# Patient Record
Sex: FEMALE | Race: NATIVE HAWAIIAN OR OTHER PACIFIC ISLANDER | NOT HISPANIC OR LATINO
[De-identification: names, ages, dates, MRNs, and addresses within clinical notes are randomized per-mention and may not be internally consistent; named-entity substitution may affect disease eponyms.]

---

## 2020-05-14 PROBLEM — Z00.00 ENCOUNTER FOR PREVENTIVE HEALTH EXAMINATION: Status: ACTIVE | Noted: 2020-05-14

## 2020-05-21 PROBLEM — E78.00 HYPERCHOLESTEREMIA: Status: ACTIVE | Noted: 2020-05-21

## 2020-05-21 PROBLEM — I10 HYPERTENSION: Status: ACTIVE | Noted: 2020-05-21

## 2020-05-22 ENCOUNTER — APPOINTMENT (OUTPATIENT)
Dept: OTOLARYNGOLOGY | Facility: CLINIC | Age: 55
End: 2020-05-22
Payer: MEDICAID

## 2020-05-22 ENCOUNTER — OUTPATIENT (OUTPATIENT)
Dept: OUTPATIENT SERVICES | Facility: HOSPITAL | Age: 55
LOS: 1 days | Discharge: HOME | End: 2020-05-22

## 2020-05-22 ENCOUNTER — LABORATORY RESULT (OUTPATIENT)
Age: 55
End: 2020-05-22

## 2020-05-22 VITALS
SYSTOLIC BLOOD PRESSURE: 133 MMHG | BODY MASS INDEX: 21.26 KG/M2 | HEIGHT: 63 IN | DIASTOLIC BLOOD PRESSURE: 91 MMHG | WEIGHT: 120 LBS

## 2020-05-22 DIAGNOSIS — I10 ESSENTIAL (PRIMARY) HYPERTENSION: ICD-10-CM

## 2020-05-22 DIAGNOSIS — E78.00 PURE HYPERCHOLESTEROLEMIA, UNSPECIFIED: ICD-10-CM

## 2020-05-22 PROCEDURE — 99204 OFFICE O/P NEW MOD 45 MIN: CPT | Mod: 25

## 2020-05-22 PROCEDURE — 41100 BIOPSY OF TONGUE: CPT

## 2020-05-22 RX ORDER — CALCIUM CARBONATE 500(1250)
TABLET ORAL
Refills: 0 | Status: ACTIVE | COMMUNITY

## 2020-05-22 RX ORDER — ATORVASTATIN CALCIUM 20 MG/1
20 TABLET, FILM COATED ORAL
Refills: 0 | Status: ACTIVE | COMMUNITY

## 2020-05-22 RX ORDER — LIOTHYRONINE SODIUM 25 UG/1
25 TABLET ORAL
Refills: 0 | Status: COMPLETED | COMMUNITY
End: 2020-05-22

## 2020-05-22 RX ORDER — AMLODIPINE BESYLATE 5 MG/1
5 TABLET ORAL
Refills: 0 | Status: ACTIVE | COMMUNITY

## 2020-05-22 NOTE — CONSULT LETTER
[Dear  ___] : Dear  [unfilled], [Consult Letter:] : I had the pleasure of evaluating your patient, [unfilled]. [Please see my note below.] : Please see my note below. [Sincerely,] : Sincerely, [Consult Closing:] : Thank you very much for allowing me to participate in the care of this patient.  If you have any questions, please do not hesitate to contact me. [FreeTextEntry2] : Dr Aman Perea [FreeTextEntry3] : \par Laurent Busby MD, FACS\par \par Otolaryngology-Head and Neck Surgery\par [employer] at [facility]\par

## 2020-05-22 NOTE — PHYSICAL EXAM
[Midline] : trachea located in midline position [Normal] : no rashes [de-identified] : 7 x 5 cm leukoplakic lesion of the R oral tongue at the level of the L inferior molars.

## 2020-05-22 NOTE — HISTORY OF PRESENT ILLNESS
[de-identified] : pt thyroid cancer ( dr Salmeron)  s/p 3/2020 ( sched MORGAN 6/2020) and stroke 11/2018 refer by Dr. Perea right tongue lesion. pt states the lesion started after stroke in 2018  and thinks it could be from trauma to the tongue from the teeth. pt has some discomfort, no bleeding, no pain. pt denies  dysphagia. no wt loss. No biopsy done of the tongue.\par never smoked and no h/o etoh

## 2020-05-26 DIAGNOSIS — K13.21 LEUKOPLAKIA OF ORAL MUCOSA, INCLUDING TONGUE: ICD-10-CM

## 2020-06-11 ENCOUNTER — APPOINTMENT (OUTPATIENT)
Dept: OTOLARYNGOLOGY | Facility: CLINIC | Age: 55
End: 2020-06-11
Payer: MEDICAID

## 2020-06-11 VITALS
SYSTOLIC BLOOD PRESSURE: 125 MMHG | DIASTOLIC BLOOD PRESSURE: 85 MMHG | BODY MASS INDEX: 21.26 KG/M2 | HEIGHT: 63 IN | WEIGHT: 120 LBS | HEART RATE: 84 BPM

## 2020-06-11 PROCEDURE — 99214 OFFICE O/P EST MOD 30 MIN: CPT

## 2020-06-11 NOTE — PHYSICAL EXAM
[Midline] : trachea located in midline position [de-identified] : 7 x 5 cm leukoplakic lesion of the R oral tongue at the level of the L inferior molars. [Normal] : no rashes

## 2020-06-11 NOTE — REASON FOR VISIT
[Initial Evaluation] : an initial evaluation for [[employer] ] : provided by [employer]   [FreeTextEntry1] : [id number] [TWNoteComboBox1] : Chinese [FreeTextEntry2] : Luis Angel

## 2020-06-11 NOTE — HISTORY OF PRESENT ILLNESS
[de-identified] : pt thyroid cancer ( dr Salmeron)  s/p 3/2020 ( sched MORGAN 6/2020) and stroke 11/2018 refer by Dr. Perea right tongue lesion. pt states the lesion started after stroke in 2018  and thinks it could be from trauma to the tongue from the teeth. pt is here for biopsy result. pt still some discomfort, s/s no change, no bleeding, no pain. pt denies  dysphagia. no wt loss. No biopsy done of the tongue.\par never smoked and no h/o etoh.\par Biopsy on 5/27/20 showed hyperkeratosis and mild to moderate dysplasia.\par Complete review of systems which was performed during a previous encounter was reviewed with the patient and there are no changes except as stated in the HPI section.\par

## 2020-12-08 ENCOUNTER — LABORATORY RESULT (OUTPATIENT)
Age: 55
End: 2020-12-08

## 2020-12-08 ENCOUNTER — APPOINTMENT (OUTPATIENT)
Dept: OTOLARYNGOLOGY | Facility: CLINIC | Age: 55
End: 2020-12-08
Payer: MEDICAID

## 2020-12-08 VITALS
DIASTOLIC BLOOD PRESSURE: 82 MMHG | HEART RATE: 108 BPM | SYSTOLIC BLOOD PRESSURE: 138 MMHG | WEIGHT: 116 LBS | OXYGEN SATURATION: 96 % | HEIGHT: 63 IN | BODY MASS INDEX: 20.55 KG/M2

## 2020-12-08 VITALS — TEMPERATURE: 97.4 F

## 2020-12-08 PROCEDURE — 41100 BIOPSY OF TONGUE: CPT

## 2020-12-08 PROCEDURE — 99214 OFFICE O/P EST MOD 30 MIN: CPT | Mod: 25

## 2020-12-08 PROCEDURE — 99072 ADDL SUPL MATRL&STAF TM PHE: CPT

## 2020-12-08 NOTE — PHYSICAL EXAM
[Midline] : trachea located in midline position [Normal] : no rashes [de-identified] : 3 cm infiltrative lesion of the R lateral tongue. Does nor extent into the FOM.

## 2020-12-08 NOTE — CONSULT LETTER
[Dear  ___] : Dear  [unfilled], [Courtesy Letter:] : I had the pleasure of seeing your patient, [unfilled], in my office today. [Please see my note below.] : Please see my note below. [Consult Closing:] : Thank you very much for allowing me to participate in the care of this patient.  If you have any questions, please do not hesitate to contact me. [Sincerely,] : Sincerely, [FreeTextEntry2] : Dr Aman Perea [FreeTextEntry3] : \par Laurent Busby MD, FACS\par \par Otolaryngology-Head and Neck Surgery\par [employer] at [facility]\par

## 2020-12-08 NOTE — HISTORY OF PRESENT ILLNESS
[de-identified] : 55 yro female pt with thyroid cancer ( dr Salmeron)  s/p 3/2020 ( sched MORGAN 6/2020) and stroke 11/2018 referred by Dr. Aman Perea for right tongue lesion. Pt states the lesion started after stroke in 2018 and thinks it could be from trauma to the tongue from the teeth. Today pt c/o slight tongue swelling and some discomfort speaking because of tongue lesion, which is getting larger. Pt denies tongue pain, bleeding, dysphagia, dyspnea, fever, chills. Weight stable, able to tolerate regular diet. Pt has been applying Triamcinolone acetonide dental paste as per PCP Dr. Mihai Wei, which provides relief. \par Never smoked and no h/o etoh.\par \par Biopsy - 5/27/20 showed hyperkeratosis and mild to moderate dysplasia.\par \par Complete review of systems which was performed during a previous encounter was reviewed with the patient and there are no changes except as stated in the HPI section.\par

## 2020-12-08 NOTE — REASON FOR VISIT
[Subsequent Evaluation] : a subsequent evaluation for [[employer] ] : provided by [employer]   [FreeTextEntry1] : [id number] [FreeTextEntry2] : clarence [FreeTextEntry3] : Mandarin [TWNoteComboBox1] : Chinese

## 2020-12-15 ENCOUNTER — APPOINTMENT (OUTPATIENT)
Dept: OTOLARYNGOLOGY | Facility: CLINIC | Age: 55
End: 2020-12-15
Payer: MEDICAID

## 2020-12-15 VITALS — TEMPERATURE: 97.1 F

## 2020-12-15 VITALS
BODY MASS INDEX: 20.55 KG/M2 | HEART RATE: 104 BPM | OXYGEN SATURATION: 97 % | HEIGHT: 63 IN | DIASTOLIC BLOOD PRESSURE: 95 MMHG | SYSTOLIC BLOOD PRESSURE: 154 MMHG | WEIGHT: 116 LBS

## 2020-12-15 PROCEDURE — 99024 POSTOP FOLLOW-UP VISIT: CPT

## 2020-12-15 NOTE — REASON FOR VISIT
[Subsequent Evaluation] : a subsequent evaluation for [[employer] ] : provided by [employer]   [FreeTextEntry1] : [id number] [FreeTextEntry2] : Obie [FreeTextEntry3] : Mandarin [TWNoteComboBox1] : Chinese

## 2020-12-15 NOTE — CONSULT LETTER
[Dear  ___] : Dear  [unfilled], [Courtesy Letter:] : I had the pleasure of seeing your patient, [unfilled], in my office today. [Please see my note below.] : Please see my note below. [Consult Closing:] : Thank you very much for allowing me to participate in the care of this patient.  If you have any questions, please do not hesitate to contact me. [Sincerely,] : Sincerely, [FreeTextEntry2] : Dr Aman Perea  [FreeTextEntry3] : \par Laurent Busby MD, FACS\par \par Otolaryngology-Head and Neck Surgery\par [employer] at [facility]\par

## 2020-12-15 NOTE — HISTORY OF PRESENT ILLNESS
[de-identified] : Pt here to go over biopsy results. 55 yro female pt with thyroid cancer ( dr Salmeron)  s/p 3/2020 ( sched MORGAN 6/2020) and stroke 11/2018 referred by Dr. Aman Perea for right tongue lesion. Pt states the lesion started after stroke in 2018 and thinks it could be from trauma to the tongue from the teeth. Today pt c/o occasional tongue pain, and still some discomfort speaking because of tongue lesion. Pt denies bleeding, dysphagia, dyspnea, fever, chills. Weight stable, able to tolerate regular diet. Pt has been applying Triamcinolone acetonide dental paste as per PCP Dr. Mihai Wei, which provides relief. \par Never smoked and no h/o etoh.\par \par Biopsy - 5/27/20 showed hyperkeratosis and mild to moderate dysplasia.\par Biopsy 12/13/20: Final Diagnosis\par \par           1. Tongue, right, biopsy\par - Invasive squamous cell carcinoma, well-differentiated\par \par Complete review of systems which was performed during a previous encounter was reviewed with the patient and there are no changes except as stated in the HPI section.\par

## 2020-12-15 NOTE — PHYSICAL EXAM
[Midline] : trachea located in midline position [de-identified] : 3 cm infiltrative lesion of the R lateral tongue. Does nor extent into the FOM. [Normal] : no rashes

## 2020-12-21 ENCOUNTER — OUTPATIENT (OUTPATIENT)
Dept: OUTPATIENT SERVICES | Facility: HOSPITAL | Age: 55
LOS: 1 days | End: 2020-12-21
Payer: MEDICAID

## 2020-12-21 ENCOUNTER — APPOINTMENT (OUTPATIENT)
Dept: NUCLEAR MEDICINE | Facility: IMAGING CENTER | Age: 55
End: 2020-12-21
Payer: MEDICAID

## 2020-12-21 VITALS
DIASTOLIC BLOOD PRESSURE: 78 MMHG | HEART RATE: 104 BPM | TEMPERATURE: 98 F | HEIGHT: 63 IN | OXYGEN SATURATION: 98 % | SYSTOLIC BLOOD PRESSURE: 122 MMHG | WEIGHT: 111.99 LBS | RESPIRATION RATE: 16 BRPM

## 2020-12-21 DIAGNOSIS — Z98.890 OTHER SPECIFIED POSTPROCEDURAL STATES: Chronic | ICD-10-CM

## 2020-12-21 DIAGNOSIS — I63.9 CEREBRAL INFARCTION, UNSPECIFIED: ICD-10-CM

## 2020-12-21 DIAGNOSIS — C02.9 MALIGNANT NEOPLASM OF TONGUE, UNSPECIFIED: ICD-10-CM

## 2020-12-21 DIAGNOSIS — G47.33 OBSTRUCTIVE SLEEP APNEA (ADULT) (PEDIATRIC): ICD-10-CM

## 2020-12-21 LAB
ALBUMIN SERPL ELPH-MCNC: 5.3 G/DL — HIGH (ref 3.3–5)
ALP SERPL-CCNC: 97 U/L — SIGNIFICANT CHANGE UP (ref 40–120)
ALT FLD-CCNC: 13 U/L — SIGNIFICANT CHANGE UP (ref 4–33)
ANION GAP SERPL CALC-SCNC: 15 MMOL/L — HIGH (ref 7–14)
AST SERPL-CCNC: 17 U/L — SIGNIFICANT CHANGE UP (ref 4–32)
BILIRUB SERPL-MCNC: 1.2 MG/DL — SIGNIFICANT CHANGE UP (ref 0.2–1.2)
BLD GP AB SCN SERPL QL: NEGATIVE — SIGNIFICANT CHANGE UP
BUN SERPL-MCNC: 26 MG/DL — HIGH (ref 7–23)
CALCIUM SERPL-MCNC: 9.8 MG/DL — SIGNIFICANT CHANGE UP (ref 8.4–10.5)
CHLORIDE SERPL-SCNC: 104 MMOL/L — SIGNIFICANT CHANGE UP (ref 98–107)
CO2 SERPL-SCNC: 23 MMOL/L — SIGNIFICANT CHANGE UP (ref 22–31)
CREAT SERPL-MCNC: 0.7 MG/DL — SIGNIFICANT CHANGE UP (ref 0.5–1.3)
GLUCOSE SERPL-MCNC: 99 MG/DL — SIGNIFICANT CHANGE UP (ref 70–99)
HCT VFR BLD CALC: 39.7 % — SIGNIFICANT CHANGE UP (ref 34.5–45)
HGB BLD-MCNC: 13.2 G/DL — SIGNIFICANT CHANGE UP (ref 11.5–15.5)
MCHC RBC-ENTMCNC: 28.1 PG — SIGNIFICANT CHANGE UP (ref 27–34)
MCHC RBC-ENTMCNC: 33.2 GM/DL — SIGNIFICANT CHANGE UP (ref 32–36)
MCV RBC AUTO: 84.5 FL — SIGNIFICANT CHANGE UP (ref 80–100)
NRBC # BLD: 0 /100 WBCS — SIGNIFICANT CHANGE UP
NRBC # FLD: 0 K/UL — SIGNIFICANT CHANGE UP
PLATELET # BLD AUTO: 212 K/UL — SIGNIFICANT CHANGE UP (ref 150–400)
POTASSIUM SERPL-MCNC: 3.3 MMOL/L — LOW (ref 3.5–5.3)
POTASSIUM SERPL-SCNC: 3.3 MMOL/L — LOW (ref 3.5–5.3)
PROT SERPL-MCNC: 8.2 G/DL — SIGNIFICANT CHANGE UP (ref 6–8.3)
RBC # BLD: 4.7 M/UL — SIGNIFICANT CHANGE UP (ref 3.8–5.2)
RBC # FLD: 12.9 % — SIGNIFICANT CHANGE UP (ref 10.3–14.5)
RH IG SCN BLD-IMP: POSITIVE — SIGNIFICANT CHANGE UP
SODIUM SERPL-SCNC: 142 MMOL/L — SIGNIFICANT CHANGE UP (ref 135–145)
WBC # BLD: 5.96 K/UL — SIGNIFICANT CHANGE UP (ref 3.8–10.5)
WBC # FLD AUTO: 5.96 K/UL — SIGNIFICANT CHANGE UP (ref 3.8–10.5)

## 2020-12-21 PROCEDURE — 93010 ELECTROCARDIOGRAM REPORT: CPT

## 2020-12-21 PROCEDURE — 78815 PET IMAGE W/CT SKULL-THIGH: CPT | Mod: 26,PI

## 2020-12-21 PROCEDURE — 78815 PET IMAGE W/CT SKULL-THIGH: CPT

## 2020-12-21 PROCEDURE — A9552: CPT

## 2020-12-21 RX ORDER — SODIUM CHLORIDE 9 MG/ML
1000 INJECTION, SOLUTION INTRAVENOUS
Refills: 0 | Status: DISCONTINUED | OUTPATIENT
Start: 2020-12-29 | End: 2020-12-29

## 2020-12-21 NOTE — H&P PST ADULT - HISTORY OF PRESENT ILLNESS
Pt is a 55 y.o. female ; pt speaks mandarin ; information obtained with assistance of [employer]  Taylor [id number]. Per pt ; she palpated a growth on her neck. Initial biopsy " no cancer " Pt states f/u with ENT "It is cancer "Pt s/p Pet Scan  Pt now presents for Right Hemiglossectomy Right Neck Dissection Tracheostomy . Pt is a 55 y.o. female ; pt speaks mandarin ; information obtained with assistance of [employer]  Taylor [id number].Pt with h/o Thyroid cancer ; s/p Total Thyroidectomy 3/3030 and RT . Pt reports right tongue lesion s/p Biopsy x 2 5/2020 and 12/2020 .Pt states " it is cancer "  Pt f/u with surgeon ;  Pt now presents for Right Hemiglossectomy Right Neck Dissection Tracheostomy . Pt is a 55 y.o. female ; pt speaks Mandarin> English  ; information obtained with assistance of [employer]  Taylor [id number].  Pt with h/o Thyroid cancer ; s/p Total Thyroidectomy 3/2020  and RT . Pt reports right tongue lesion s/p Biopsy x 2 last  12/2020 .Pt states " it is cancer "  Pt f/u with surgeon ;  Pt now presents for Right Hemiglossectomy Right Neck Dissection Tracheostomy .

## 2020-12-21 NOTE — H&P PST ADULT - NSICDXPASTMEDICALHX_GEN_ALL_CORE_FT
PAST MEDICAL HISTORY:  CVA (cerebrovascular accident) Per pt in [place] 11/ 2018 ; rx plavix ; Left Sided Weakness    Hypercholesterolemia     Hypertension     Thyroid cancer dx 3/2020 ; tx surgically/ RT

## 2020-12-21 NOTE — H&P PST ADULT - ASSESSMENT
Malignant Neoplasm of Tongue  Malignant Neoplasm of Tongue             RUBY    RUBY Precautions  OR Booking notified via fax Malignant Neoplasm of Tongue

## 2020-12-21 NOTE — H&P PST ADULT - NSICDXPROBLEM_GEN_ALL_CORE_FT
PROBLEM DIAGNOSES  Problem: Malignant neoplasm of tongue  Assessment and Plan: Right Hemoglossectomy , Right Neck Dissection, Tracheostomy  Pre op instructions reviewed with pt ; via [employer] # [id number] . pt verbalized good understanding of pre op instructions  Pt to Dr Wei for pre op medical evaluation; awaiting plavix instructions     Problem: CVA (cerebrovascular accident)  Assessment and Plan: Pt to Dr Gama for pre op neurological evaluation ; rx plavix ; awaiting pre op instructions        PROBLEM DIAGNOSES  Problem: Malignant neoplasm of tongue  Assessment and Plan: Right Hemoglossectomy , Right Neck Dissection, Tracheostomy  Pre op instructions reviewed with pt ; [facility] [employer] [facility] [id number] . pt verbalized good understanding of pre op instructions  Pt to Dr Wei for pre op medical evaluation; awaiting plavix instructions ; s/w Hyacinth     Problem: CVA (cerebrovascular accident)  Assessment and Plan: Pt to Dr Gama for pre op neurological evaluation  ; awaiting pre op plavix instructions        PROBLEM DIAGNOSES  Problem: Malignant neoplasm of tongue  Assessment and Plan: Right Hemoglossectomy , Right Neck Dissection, Tracheostomy  Pre op instructions reviewed with pt ; [facility] [id number] . pt verbalized good understanding of pre op instructions  Pt to Dr Wei for pre op medical evaluation; awaiting plavix instructions ; s/w Hyacinth   Per pt Covid test scheduled pre op    Problem: CVA (cerebrovascular accident)  Assessment and Plan: Pt to Dr Gama for pre op neurological evaluation  ; awaiting pre op plavix instructions        PROBLEM DIAGNOSES  Problem: Malignant neoplasm of tongue  Assessment and Plan: Right Hemoglossectomy , Right Neck Dissection, Tracheostomy  Pre op instructions reviewed with pt ; via [employer] # [id number] . pt verbalized good understanding of pre op instructions  Pt to Dr Wei for pre op medical evaluation; awaiting confirmation of plavix instructions from pcp  ; s/w Hyacinth   Per pt Covid test scheduled pre op    Problem: CVA (cerebrovascular accident)  Assessment and Plan: Pt to Dr Gama for pre op neurological evaluation  ; awaiting confirmation of  plavix instructions     Problem: RUBY (obstructive sleep apnea)  Assessment and Plan: RUBY Precautions  OR booking notified vi fax        PROBLEM DIAGNOSES  Problem: Malignant neoplasm of tongue  Assessment and Plan: Right Hemoglossectomy , Right Neck Dissection, Tracheostomy  Pre op instructions reviewed with pt ; via [employer]ating # [id number] . pt verbalized good understanding of pre op instructions  Pt to Dr Wei for preoperative  medical evaluation  Pt to Dr Gama for preoperative neurological evaluation   Per pt Covid test scheduled pre op    Problem: CVA (cerebrovascular accident)  Assessment and Plan: Rx Plavix ; per pt instructions as per Dr Wei . Call to Dr Wei message left with Hyacinth awaiting return call   Pt to Dr Gama for pre op neurological evaluation  ; review of confirmation plan     Problem: RUBY (obstructive sleep apnea)  Assessment and Plan: RUBY Precautions  OR booking notified vi fax

## 2020-12-26 ENCOUNTER — APPOINTMENT (OUTPATIENT)
Dept: DISASTER EMERGENCY | Facility: CLINIC | Age: 55
End: 2020-12-26

## 2020-12-26 DIAGNOSIS — Z01.818 ENCOUNTER FOR OTHER PREPROCEDURAL EXAMINATION: ICD-10-CM

## 2020-12-27 LAB — SARS-COV-2 N GENE NPH QL NAA+PROBE: NOT DETECTED

## 2020-12-28 ENCOUNTER — TRANSCRIPTION ENCOUNTER (OUTPATIENT)
Age: 55
End: 2020-12-28

## 2020-12-28 PROBLEM — E78.00 PURE HYPERCHOLESTEROLEMIA, UNSPECIFIED: Chronic | Status: ACTIVE | Noted: 2020-12-21

## 2020-12-28 PROBLEM — C73 MALIGNANT NEOPLASM OF THYROID GLAND: Chronic | Status: ACTIVE | Noted: 2020-12-21

## 2020-12-28 PROBLEM — I10 ESSENTIAL (PRIMARY) HYPERTENSION: Chronic | Status: ACTIVE | Noted: 2020-12-21

## 2020-12-28 PROBLEM — I63.9 CEREBRAL INFARCTION, UNSPECIFIED: Chronic | Status: ACTIVE | Noted: 2020-12-21

## 2020-12-28 NOTE — ASU PATIENT PROFILE, ADULT - PMH
CVA (cerebrovascular accident)  Per pt in [place] 11/ 2018 ; rx plavix ; Left Sided Weakness  Hypercholesterolemia    Hypertension    Thyroid cancer  dx 3/2020 ; tx surgically/ RT

## 2020-12-29 ENCOUNTER — RESULT REVIEW (OUTPATIENT)
Age: 55
End: 2020-12-29

## 2020-12-29 ENCOUNTER — APPOINTMENT (OUTPATIENT)
Dept: OTOLARYNGOLOGY | Facility: HOSPITAL | Age: 55
End: 2020-12-29

## 2020-12-29 ENCOUNTER — INPATIENT (INPATIENT)
Facility: HOSPITAL | Age: 55
LOS: 15 days | Discharge: HOME HEALTH | End: 2021-01-14
Attending: OTOLARYNGOLOGY | Admitting: OTOLARYNGOLOGY
Payer: MEDICAID

## 2020-12-29 VITALS
HEIGHT: 63 IN | HEART RATE: 93 BPM | WEIGHT: 111.99 LBS | RESPIRATION RATE: 16 BRPM | OXYGEN SATURATION: 99 % | TEMPERATURE: 98 F | DIASTOLIC BLOOD PRESSURE: 86 MMHG | SYSTOLIC BLOOD PRESSURE: 138 MMHG

## 2020-12-29 DIAGNOSIS — C02.9 MALIGNANT NEOPLASM OF TONGUE, UNSPECIFIED: ICD-10-CM

## 2020-12-29 DIAGNOSIS — Z98.890 OTHER SPECIFIED POSTPROCEDURAL STATES: Chronic | ICD-10-CM

## 2020-12-29 LAB
ALBUMIN SERPL ELPH-MCNC: 3.6 G/DL — SIGNIFICANT CHANGE UP (ref 3.3–5)
ALP SERPL-CCNC: 64 U/L — SIGNIFICANT CHANGE UP (ref 40–120)
ALT FLD-CCNC: 19 U/L — SIGNIFICANT CHANGE UP (ref 4–33)
ANION GAP SERPL CALC-SCNC: 10 MMOL/L — SIGNIFICANT CHANGE UP (ref 7–14)
APTT BLD: 31.1 SEC — SIGNIFICANT CHANGE UP (ref 27–36.3)
AST SERPL-CCNC: 21 U/L — SIGNIFICANT CHANGE UP (ref 4–32)
BILIRUB SERPL-MCNC: 1.2 MG/DL — SIGNIFICANT CHANGE UP (ref 0.2–1.2)
BLOOD GAS ARTERIAL - LYTES,HGB,ICA,LACT RESULT: SIGNIFICANT CHANGE UP
BUN SERPL-MCNC: 11 MG/DL — SIGNIFICANT CHANGE UP (ref 7–23)
CALCIUM SERPL-MCNC: 8.5 MG/DL — SIGNIFICANT CHANGE UP (ref 8.4–10.5)
CHLORIDE SERPL-SCNC: 104 MMOL/L — SIGNIFICANT CHANGE UP (ref 98–107)
CO2 SERPL-SCNC: 24 MMOL/L — SIGNIFICANT CHANGE UP (ref 22–31)
CREAT SERPL-MCNC: 0.63 MG/DL — SIGNIFICANT CHANGE UP (ref 0.5–1.3)
GAS PNL BLDA: SIGNIFICANT CHANGE UP
GLUCOSE SERPL-MCNC: 119 MG/DL — HIGH (ref 70–99)
HCT VFR BLD CALC: 28.9 % — LOW (ref 34.5–45)
HGB BLD-MCNC: 9.9 G/DL — LOW (ref 11.5–15.5)
INR BLD: 1.18 RATIO — HIGH (ref 0.88–1.16)
MAGNESIUM SERPL-MCNC: 1.5 MG/DL — LOW (ref 1.6–2.6)
MCHC RBC-ENTMCNC: 28.2 PG — SIGNIFICANT CHANGE UP (ref 27–34)
MCHC RBC-ENTMCNC: 34.3 GM/DL — SIGNIFICANT CHANGE UP (ref 32–36)
MCV RBC AUTO: 82.3 FL — SIGNIFICANT CHANGE UP (ref 80–100)
NRBC # BLD: 0 /100 WBCS — SIGNIFICANT CHANGE UP
NRBC # FLD: 0 K/UL — SIGNIFICANT CHANGE UP
PHOSPHATE SERPL-MCNC: 3.7 MG/DL — SIGNIFICANT CHANGE UP (ref 2.5–4.5)
PLATELET # BLD AUTO: 143 K/UL — LOW (ref 150–400)
POTASSIUM SERPL-MCNC: 3.8 MMOL/L — SIGNIFICANT CHANGE UP (ref 3.5–5.3)
POTASSIUM SERPL-SCNC: 3.8 MMOL/L — SIGNIFICANT CHANGE UP (ref 3.5–5.3)
PROT SERPL-MCNC: 5.5 G/DL — LOW (ref 6–8.3)
PROTHROM AB SERPL-ACNC: 13.3 SEC — SIGNIFICANT CHANGE UP (ref 10.6–13.6)
RBC # BLD: 3.51 M/UL — LOW (ref 3.8–5.2)
RBC # FLD: 13.1 % — SIGNIFICANT CHANGE UP (ref 10.3–14.5)
RH IG SCN BLD-IMP: POSITIVE — SIGNIFICANT CHANGE UP
SODIUM SERPL-SCNC: 138 MMOL/L — SIGNIFICANT CHANGE UP (ref 135–145)
WBC # BLD: 9.29 K/UL — SIGNIFICANT CHANGE UP (ref 3.8–10.5)
WBC # FLD AUTO: 9.29 K/UL — SIGNIFICANT CHANGE UP (ref 3.8–10.5)

## 2020-12-29 PROCEDURE — 99222 1ST HOSP IP/OBS MODERATE 55: CPT

## 2020-12-29 PROCEDURE — 88305 TISSUE EXAM BY PATHOLOGIST: CPT | Mod: 26

## 2020-12-29 PROCEDURE — 88332 PATH CONSLTJ SURG EA ADD BLK: CPT | Mod: 26

## 2020-12-29 PROCEDURE — 88331 PATH CONSLTJ SURG 1 BLK 1SPC: CPT | Mod: 26

## 2020-12-29 PROCEDURE — 15757 FREE SKIN FLAP MICROVASC: CPT | Mod: GC

## 2020-12-29 PROCEDURE — 38724 REMOVAL OF LYMPH NODES NECK: CPT | Mod: 50

## 2020-12-29 PROCEDURE — 41130 PARTIAL REMOVAL OF TONGUE: CPT

## 2020-12-29 PROCEDURE — 88307 TISSUE EXAM BY PATHOLOGIST: CPT | Mod: 26

## 2020-12-29 PROCEDURE — 71045 X-RAY EXAM CHEST 1 VIEW: CPT | Mod: 26

## 2020-12-29 PROCEDURE — 88309 TISSUE EXAM BY PATHOLOGIST: CPT | Mod: 26

## 2020-12-29 PROCEDURE — 31600 PLANNED TRACHEOSTOMY: CPT

## 2020-12-29 RX ORDER — ENOXAPARIN SODIUM 100 MG/ML
40 INJECTION SUBCUTANEOUS DAILY
Refills: 0 | Status: DISCONTINUED | OUTPATIENT
Start: 2020-12-29 | End: 2021-01-05

## 2020-12-29 RX ORDER — CALCIUM GLUCONATE 100 MG/ML
1 VIAL (ML) INTRAVENOUS ONCE
Refills: 0 | Status: COMPLETED | OUTPATIENT
Start: 2020-12-29 | End: 2020-12-29

## 2020-12-29 RX ORDER — ASPIRIN/CALCIUM CARB/MAGNESIUM 324 MG
300 TABLET ORAL ONCE
Refills: 0 | Status: COMPLETED | OUTPATIENT
Start: 2020-12-29 | End: 2020-12-29

## 2020-12-29 RX ORDER — CHLORHEXIDINE GLUCONATE 213 G/1000ML
15 SOLUTION TOPICAL EVERY 12 HOURS
Refills: 0 | Status: DISCONTINUED | OUTPATIENT
Start: 2020-12-29 | End: 2021-01-01

## 2020-12-29 RX ORDER — AMPICILLIN SODIUM AND SULBACTAM SODIUM 250; 125 MG/ML; MG/ML
INJECTION, POWDER, FOR SUSPENSION INTRAMUSCULAR; INTRAVENOUS
Refills: 0 | Status: DISCONTINUED | OUTPATIENT
Start: 2020-12-29 | End: 2021-01-03

## 2020-12-29 RX ORDER — ENOXAPARIN SODIUM 100 MG/ML
40 INJECTION SUBCUTANEOUS ONCE
Refills: 0 | Status: DISCONTINUED | OUTPATIENT
Start: 2020-12-29 | End: 2020-12-29

## 2020-12-29 RX ORDER — AMPICILLIN SODIUM AND SULBACTAM SODIUM 250; 125 MG/ML; MG/ML
INJECTION, POWDER, FOR SUSPENSION INTRAMUSCULAR; INTRAVENOUS
Refills: 0 | Status: DISCONTINUED | OUTPATIENT
Start: 2020-12-29 | End: 2020-12-29

## 2020-12-29 RX ORDER — ASPIRIN/CALCIUM CARB/MAGNESIUM 324 MG
81 TABLET ORAL DAILY
Refills: 0 | Status: DISCONTINUED | OUTPATIENT
Start: 2020-12-30 | End: 2021-01-14

## 2020-12-29 RX ORDER — AMPICILLIN SODIUM AND SULBACTAM SODIUM 250; 125 MG/ML; MG/ML
1.5 INJECTION, POWDER, FOR SUSPENSION INTRAMUSCULAR; INTRAVENOUS EVERY 6 HOURS
Refills: 0 | Status: DISCONTINUED | OUTPATIENT
Start: 2020-12-29 | End: 2021-01-03

## 2020-12-29 RX ORDER — LEVOTHYROXINE SODIUM 125 MCG
100 TABLET ORAL
Refills: 0 | Status: DISCONTINUED | OUTPATIENT
Start: 2020-12-29 | End: 2021-01-14

## 2020-12-29 RX ORDER — ACETAMINOPHEN 500 MG
1000 TABLET ORAL EVERY 6 HOURS
Refills: 0 | Status: DISCONTINUED | OUTPATIENT
Start: 2020-12-29 | End: 2020-12-30

## 2020-12-29 RX ORDER — PROPOFOL 10 MG/ML
20 INJECTION, EMULSION INTRAVENOUS
Qty: 1000 | Refills: 0 | Status: DISCONTINUED | OUTPATIENT
Start: 2020-12-29 | End: 2020-12-30

## 2020-12-29 RX ORDER — FENTANYL CITRATE 50 UG/ML
0.5 INJECTION INTRAVENOUS
Qty: 2500 | Refills: 0 | Status: DISCONTINUED | OUTPATIENT
Start: 2020-12-29 | End: 2020-12-30

## 2020-12-29 RX ORDER — LEVOTHYROXINE SODIUM 125 MCG
112 TABLET ORAL
Refills: 0 | Status: DISCONTINUED | OUTPATIENT
Start: 2020-12-29 | End: 2021-01-14

## 2020-12-29 RX ORDER — SODIUM CHLORIDE 9 MG/ML
1000 INJECTION, SOLUTION INTRAVENOUS
Refills: 0 | Status: DISCONTINUED | OUTPATIENT
Start: 2020-12-29 | End: 2020-12-31

## 2020-12-29 RX ORDER — CHLORHEXIDINE GLUCONATE 213 G/1000ML
1 SOLUTION TOPICAL DAILY
Refills: 0 | Status: DISCONTINUED | OUTPATIENT
Start: 2020-12-29 | End: 2021-01-05

## 2020-12-29 RX ORDER — AMPICILLIN SODIUM AND SULBACTAM SODIUM 250; 125 MG/ML; MG/ML
3 INJECTION, POWDER, FOR SUSPENSION INTRAMUSCULAR; INTRAVENOUS ONCE
Refills: 0 | Status: COMPLETED | OUTPATIENT
Start: 2020-12-29 | End: 2020-12-29

## 2020-12-29 RX ADMIN — Medication 100 GRAM(S): at 19:40

## 2020-12-29 RX ADMIN — PROPOFOL 6.1 MICROGRAM(S)/KG/MIN: 10 INJECTION, EMULSION INTRAVENOUS at 20:30

## 2020-12-29 RX ADMIN — FENTANYL CITRATE 2.54 MICROGRAM(S)/KG/HR: 50 INJECTION INTRAVENOUS at 20:30

## 2020-12-29 RX ADMIN — CHLORHEXIDINE GLUCONATE 15 MILLILITER(S): 213 SOLUTION TOPICAL at 19:41

## 2020-12-29 RX ADMIN — FENTANYL CITRATE 0.5 MICROGRAM(S)/KG/HR: 50 INJECTION INTRAVENOUS at 20:45

## 2020-12-29 RX ADMIN — Medication 300 MILLIGRAM(S): at 19:40

## 2020-12-29 RX ADMIN — SODIUM CHLORIDE 100 MILLILITER(S): 9 INJECTION, SOLUTION INTRAVENOUS at 20:30

## 2020-12-29 NOTE — CONSULT NOTE ADULT - ATTENDING COMMENTS
Agree with notes of health care providers on my service (PAs, residents and House Staff).  The patient is a patient with hemodynamic and metabolic instability being consulted for SICU care.   I have personally discussed with other consultants, House staff and PA's.   These diagnoses are unrelated to the surgical procedure noted above.  55F s/p total thyroidectomy (3/2020) and RT, CVA, HTN, now s/p R hemiglossectomy, b/l neck dissection with L ALT reconstruction for tongue cancer. SICU consulted for q1hr flap checks in respiratory failure secondary to failure to wean.  I have personally examined the patient, reviewed data and laboratory tests/x-rays and all pertinent electronic images.  NEURO  Exam: sedated on propafol, pain controlled with fentanyl  RESPIRATORY  Exam: tracheostomy on mechanical ventilation: AC 12/450/5/30%  CARDIOVASCULAR  Exam: normotensive, RRR  Exam: neck incision well approximated, soft, no hematoma; bilateral BRUCE draining ss  GI/NUTRITION  Exam: abdomen soft, ND  Diet: NPO    The assessment and plan is specified.  NEURO: no acute issue, hx CVA  - sedated on propofol  - tylenol, fentanyl    RESPIRATORY: no acute issue  - s/p tracheostomy on AC 12/450/5/30%    CARDIOVASCULAR: HTN  - q1h flap check with cook doppler    GI/NUTRITION: no acute issue  - NPO    GENITOURINARY/RENAL: no acute issue  - Alarcon in place    HEMATOLOGIC: no acute issue  - ASA   - LVX dvt ppx    INFECTIOUS DISEASE: no acute issue  - Unasyn while BRUCE drains in place    ENDOCRINE: thyroid cancer, s/p total thyroidectomy  - continue with synthroid    ENT: s/p R hemiglossectomy, b/l neck dissection with L ALT reconstruction for tongue cancer 12/29  - continue q1hr flap checks    DISPO: PACU/SICU care

## 2020-12-29 NOTE — CONSULT NOTE ADULT - SUBJECTIVE AND OBJECTIVE BOX
SICU CONSULT NOTE    HPI  55 year old female, Mandarin-speaking, with hx thyroid cancer s/p Total Thyroidectomy (3/2020) and RT, CVA, HTN, presented with R tongue lesion s/p biopsy x 2 (last 12/2020), said it was cancer. Patient now s/p R hemiglossectomy, b/l neck dissection with L ALT reconstruction. SICU consulted for q1hr flap checks    PAST MEDICAL HISTORY: Hypercholesterolemia    Thyroid cancer    CVA (cerebrovascular accident)    Hypertension    PAST SURGICAL HISTORY: S/P thyroidectomy    FAMILY HISTORY: No pertinent family history in first degree relatives    SOCIAL HISTORY:    CODE STATUS:     HOME MEDICATIONS:    ALLERGIES: apple (Other)  No Known Drug Allergies      VITAL SIGNS:  ICU Vital Signs Last 24 Hrs  T(C): 37.3 (29 Dec 2020 17:20), Max: 37.3 (29 Dec 2020 17:20)  T(F): 99.1 (29 Dec 2020 17:20), Max: 99.1 (29 Dec 2020 17:20)  HR: 75 (29 Dec 2020 18:00) (72 - 93)  BP: 97/65 (29 Dec 2020 18:00) (97/58 - 138/86)  BP(mean): 71 (29 Dec 2020 18:00) (66 - 71)  ABP: 107/59 (29 Dec 2020 18:00) (106/53 - 123/42)  ABP(mean): 76 (29 Dec 2020 18:00) (71 - 78)  RR: 12 (29 Dec 2020 18:00) (12 - 17)  SpO2: 100% (29 Dec 2020 18:00) (99% - 100%)    NEURO  Exam: sedated on propafol, pain controlled with fentanyl  acetaminophen  IVPB .. 1000 milliGRAM(s) IV Intermittent every 6 hours  aspirin Suppository 300 milliGRAM(s) Rectal once  fentaNYL   Infusion 0.5 MICROgram(s)/kG/Hr IV Continuous <Continuous>  propofol Infusion 20 MICROgram(s)/kG/Min IV Continuous <Continuous>    RESPIRATORY  Exam: tracheostomy on mechanical ventilation: AC 12/450/5/30%  ABG - ( 29 Dec 2020 18:06 )  pH: 7.50  /  pCO2: 30    /  pO2: 138   / HCO3: 25    / Base Excess: x     /  SaO2: 99.4    Lactate: x        CARDIOVASCULAR  Exam: normotensive, RRR    ENT:  Exam: neck incision well approximated, soft, no hematoma; bilateral BRUCE draining ss    GI/NUTRITION  Exam: abdomen soft, ND; keotube in nose  Diet: NPO    GENITOURINARY/RENAL  Alarcon in place, draining clear yellow urine  calcium gluconate IVPB 1 Gram(s) IV Intermittent once    12-29 @ 07:01  -  12-29 @ 19:15  --------------------------------------------------------  IN:  Total IN: 0 mL    OUT:    Bulb (mL): 25 mL    Bulb (mL): 25 mL    Bulb (mL): 0 mL    Indwelling Catheter - Urethral (mL): 35 mL  Total OUT: 85 mL    Total NET: -85 mL    Weight (kg): 50.8 (12-29 @ 06:56)  12-29    138  |  104  |  11  ----------------------------<  119<H>  3.8   |  24  |  0.63    Ca    8.5      29 Dec 2020 18:06  Phos  3.7     12-29  Mg     1.5     12-29    TPro  5.5<L>  /  Alb  3.6  /  TBili  1.2  /  DBili  x   /  AST  21  /  ALT  19  /  AlkPhos  64  12-29    [x] Alarcon catheter, indication: urine output monitoring in critically ill patient    HEMATOLOGIC  [x] VTE Prophylaxis:  aspirin  chewable 81 milliGRAM(s) Oral daily  enoxaparin Injectable 40 milliGRAM(s) SubCutaneous daily                          9.9    9.29  )-----------( 143      ( 29 Dec 2020 18:06 )             28.9     PT/INR - ( 29 Dec 2020 18:06 )   PT: 13.3 sec;   INR: 1.18 ratio    PTT - ( 29 Dec 2020 18:06 )  PTT:31.1 sec  Transfusion: [ ] PRBC	[ ] Platelets	[ ] FFP	[ ] Cryoprecipitate    INFECTIOUS DISEASES  ampicillin/sulbactam  IVPB      ampicillin/sulbactam  IVPB 3 Gram(s) IV Intermittent once  ampicillin/sulbactam  IVPB 1.5 Gram(s) IV Intermittent every 6 hours    RECENT CULTURES:    ENDOCRINE  levothyroxine 112 MICROGram(s) Oral <User Schedule>  levothyroxine 100 MICROGram(s) Oral <User Schedule>    CAPILLARY BLOOD GLUCOSE    MSK:  L thigh incision soft, c/d/i; BRUCE drain with ss    PATIENT CARE ACCESS DEVICES:  [x] Peripheral IV  [ ] Central Venous Line	[ ] R	[ ] L	[ ] IJ	[ ] Fem	[ ] SC	Placed:   [x] Arterial Line		[x] R	[ ] L	[ ] Fem	[x] Rad	[ ] Ax	Placed:   [ ] PICC:					[ ] Mediport  [x] Urinary Catheter, Date Placed: 12/29  [x] Necessity of urinary, arterial, and venous catheters discussed    OTHER MEDICATIONS: chlorhexidine 0.12% Liquid 15 milliLiter(s) Oral Mucosa every 12 hours  chlorhexidine 4% Liquid 1 Application(s) Topical daily    IMAGING STUDIES:

## 2020-12-29 NOTE — ASU PREOP CHECKLIST - 3.
Mandarian speaking Interperter #[id number] Anca Mandarian speaking Interperter #[id number] Howard Mandarin speaking  #[id number] Howard

## 2020-12-29 NOTE — ASU PREOP CHECKLIST - DNR CLARIFICATION FORM COMPLETED
History of Present Illness  Sapna Henao is a 58-year-old male who presents today for evaluation of a scalp lesion  He was referred to us by his primary care doctor, Dr Bree Ruvalcaba for several concerning scalp lesions  He states that he has had the scalp lesions now for several years and that they bleed and crust on occasion  He also has several other other lesions on the body; he has one on the nose, one on the right shoulder, and one on the mid chest  All of these are concerning to him  He does not follow the dermatologist because he has a  and states that he doesn't know where he will be working until the week before, so he is never able to maintain her dermatology appointment  Discussion/Summary    Please see history of present illness  All 6 lesions were biopsied at today's visit  The patient is given basic wound care instructions which include keeping the area dry for 24 hours after which time he may shower regularly  He understands that he will need to follow up with us again in 2 weeks for suture removal and path review, all questions were answered at today's visit and photographs were taken  The patient was counseled regarding instructions for management, prognosis, risks and benefits of treatment options  total time of encounter was 40 minutes and 30 minutes was spent counseling        Signatures   Electronically signed by : Aureliano Rogers, [facility]; Mar 16 2016  4:38PM EST                       (Author)    Electronically signed by : MIKHAIL Osman ; Mar 21 2016 10:53AM EST
n/a

## 2020-12-30 LAB
ALBUMIN SERPL ELPH-MCNC: 3.6 G/DL — SIGNIFICANT CHANGE UP (ref 3.3–5)
ALP SERPL-CCNC: 56 U/L — SIGNIFICANT CHANGE UP (ref 40–120)
ALT FLD-CCNC: 13 U/L — SIGNIFICANT CHANGE UP (ref 4–33)
ANION GAP SERPL CALC-SCNC: 12 MMOL/L — SIGNIFICANT CHANGE UP (ref 7–14)
ANION GAP SERPL CALC-SCNC: 14 MMOL/L — SIGNIFICANT CHANGE UP (ref 7–14)
AST SERPL-CCNC: 17 U/L — SIGNIFICANT CHANGE UP (ref 4–32)
BILIRUB SERPL-MCNC: 0.8 MG/DL — SIGNIFICANT CHANGE UP (ref 0.2–1.2)
BLOOD GAS ARTERIAL - LYTES,HGB,ICA,LACT RESULT: SIGNIFICANT CHANGE UP
BUN SERPL-MCNC: 12 MG/DL — SIGNIFICANT CHANGE UP (ref 7–23)
BUN SERPL-MCNC: 15 MG/DL — SIGNIFICANT CHANGE UP (ref 7–23)
CALCIUM SERPL-MCNC: 8.4 MG/DL — SIGNIFICANT CHANGE UP (ref 8.4–10.5)
CALCIUM SERPL-MCNC: 8.8 MG/DL — SIGNIFICANT CHANGE UP (ref 8.4–10.5)
CHLORIDE SERPL-SCNC: 105 MMOL/L — SIGNIFICANT CHANGE UP (ref 98–107)
CHLORIDE SERPL-SCNC: 105 MMOL/L — SIGNIFICANT CHANGE UP (ref 98–107)
CO2 SERPL-SCNC: 22 MMOL/L — SIGNIFICANT CHANGE UP (ref 22–31)
CO2 SERPL-SCNC: 23 MMOL/L — SIGNIFICANT CHANGE UP (ref 22–31)
CREAT SERPL-MCNC: 0.6 MG/DL — SIGNIFICANT CHANGE UP (ref 0.5–1.3)
CREAT SERPL-MCNC: 0.67 MG/DL — SIGNIFICANT CHANGE UP (ref 0.5–1.3)
GLUCOSE SERPL-MCNC: 155 MG/DL — HIGH (ref 70–99)
GLUCOSE SERPL-MCNC: 163 MG/DL — HIGH (ref 70–99)
HCT VFR BLD CALC: 28.1 % — LOW (ref 34.5–45)
HGB BLD-MCNC: 9.5 G/DL — LOW (ref 11.5–15.5)
MAGNESIUM SERPL-MCNC: 1.7 MG/DL — SIGNIFICANT CHANGE UP (ref 1.6–2.6)
MCHC RBC-ENTMCNC: 28.7 PG — SIGNIFICANT CHANGE UP (ref 27–34)
MCHC RBC-ENTMCNC: 33.8 GM/DL — SIGNIFICANT CHANGE UP (ref 32–36)
MCV RBC AUTO: 84.9 FL — SIGNIFICANT CHANGE UP (ref 80–100)
NRBC # BLD: 0 /100 WBCS — SIGNIFICANT CHANGE UP
NRBC # FLD: 0 K/UL — SIGNIFICANT CHANGE UP
PHOSPHATE SERPL-MCNC: 5.7 MG/DL — HIGH (ref 2.5–4.5)
PLATELET # BLD AUTO: 150 K/UL — SIGNIFICANT CHANGE UP (ref 150–400)
POTASSIUM SERPL-MCNC: 3.6 MMOL/L — SIGNIFICANT CHANGE UP (ref 3.5–5.3)
POTASSIUM SERPL-MCNC: 3.8 MMOL/L — SIGNIFICANT CHANGE UP (ref 3.5–5.3)
POTASSIUM SERPL-SCNC: 3.6 MMOL/L — SIGNIFICANT CHANGE UP (ref 3.5–5.3)
POTASSIUM SERPL-SCNC: 3.8 MMOL/L — SIGNIFICANT CHANGE UP (ref 3.5–5.3)
PROT SERPL-MCNC: 5.7 G/DL — LOW (ref 6–8.3)
RBC # BLD: 3.31 M/UL — LOW (ref 3.8–5.2)
RBC # FLD: 13.1 % — SIGNIFICANT CHANGE UP (ref 10.3–14.5)
SODIUM SERPL-SCNC: 140 MMOL/L — SIGNIFICANT CHANGE UP (ref 135–145)
SODIUM SERPL-SCNC: 141 MMOL/L — SIGNIFICANT CHANGE UP (ref 135–145)
WBC # BLD: 12.08 K/UL — HIGH (ref 3.8–10.5)
WBC # FLD AUTO: 12.08 K/UL — HIGH (ref 3.8–10.5)

## 2020-12-30 PROCEDURE — 99292 CRITICAL CARE ADDL 30 MIN: CPT

## 2020-12-30 PROCEDURE — 99291 CRITICAL CARE FIRST HOUR: CPT

## 2020-12-30 RX ORDER — GABAPENTIN 400 MG/1
200 CAPSULE ORAL EVERY 8 HOURS
Refills: 0 | Status: DISCONTINUED | OUTPATIENT
Start: 2020-12-30 | End: 2021-01-14

## 2020-12-30 RX ORDER — SODIUM CHLORIDE 9 MG/ML
500 INJECTION, SOLUTION INTRAVENOUS ONCE
Refills: 0 | Status: COMPLETED | OUTPATIENT
Start: 2020-12-30 | End: 2020-12-30

## 2020-12-30 RX ORDER — OXYCODONE HYDROCHLORIDE 5 MG/1
5 TABLET ORAL EVERY 4 HOURS
Refills: 0 | Status: DISCONTINUED | OUTPATIENT
Start: 2020-12-30 | End: 2021-01-05

## 2020-12-30 RX ORDER — SENNA PLUS 8.6 MG/1
10 TABLET ORAL AT BEDTIME
Refills: 0 | Status: DISCONTINUED | OUTPATIENT
Start: 2020-12-30 | End: 2021-01-14

## 2020-12-30 RX ORDER — AMLODIPINE BESYLATE 2.5 MG/1
5 TABLET ORAL DAILY
Refills: 0 | Status: DISCONTINUED | OUTPATIENT
Start: 2020-12-31 | End: 2021-01-14

## 2020-12-30 RX ORDER — ATORVASTATIN CALCIUM 80 MG/1
20 TABLET, FILM COATED ORAL AT BEDTIME
Refills: 0 | Status: DISCONTINUED | OUTPATIENT
Start: 2020-12-30 | End: 2021-01-14

## 2020-12-30 RX ORDER — POTASSIUM CHLORIDE 20 MEQ
10 PACKET (EA) ORAL
Refills: 0 | Status: COMPLETED | OUTPATIENT
Start: 2020-12-30 | End: 2020-12-30

## 2020-12-30 RX ORDER — OXYCODONE HYDROCHLORIDE 5 MG/1
10 TABLET ORAL EVERY 4 HOURS
Refills: 0 | Status: DISCONTINUED | OUTPATIENT
Start: 2020-12-30 | End: 2021-01-05

## 2020-12-30 RX ORDER — ACETAMINOPHEN 500 MG
650 TABLET ORAL EVERY 6 HOURS
Refills: 0 | Status: COMPLETED | OUTPATIENT
Start: 2020-12-30 | End: 2021-01-02

## 2020-12-30 RX ORDER — HYDROMORPHONE HYDROCHLORIDE 2 MG/ML
0.5 INJECTION INTRAMUSCULAR; INTRAVENOUS; SUBCUTANEOUS ONCE
Refills: 0 | Status: DISCONTINUED | OUTPATIENT
Start: 2020-12-30 | End: 2020-12-30

## 2020-12-30 RX ORDER — MAGNESIUM SULFATE 500 MG/ML
2 VIAL (ML) INJECTION ONCE
Refills: 0 | Status: COMPLETED | OUTPATIENT
Start: 2020-12-30 | End: 2020-12-30

## 2020-12-30 RX ADMIN — AMPICILLIN SODIUM AND SULBACTAM SODIUM 200 GRAM(S): 250; 125 INJECTION, POWDER, FOR SUSPENSION INTRAMUSCULAR; INTRAVENOUS at 18:22

## 2020-12-30 RX ADMIN — SODIUM CHLORIDE 500 MILLILITER(S): 9 INJECTION, SOLUTION INTRAVENOUS at 23:15

## 2020-12-30 RX ADMIN — GABAPENTIN 200 MILLIGRAM(S): 400 CAPSULE ORAL at 21:41

## 2020-12-30 RX ADMIN — AMPICILLIN SODIUM AND SULBACTAM SODIUM 200 GRAM(S): 250; 125 INJECTION, POWDER, FOR SUSPENSION INTRAMUSCULAR; INTRAVENOUS at 00:22

## 2020-12-30 RX ADMIN — CHLORHEXIDINE GLUCONATE 1 APPLICATION(S): 213 SOLUTION TOPICAL at 15:28

## 2020-12-30 RX ADMIN — OXYCODONE HYDROCHLORIDE 5 MILLIGRAM(S): 5 TABLET ORAL at 11:48

## 2020-12-30 RX ADMIN — GABAPENTIN 200 MILLIGRAM(S): 400 CAPSULE ORAL at 14:32

## 2020-12-30 RX ADMIN — SODIUM CHLORIDE 75 MILLILITER(S): 9 INJECTION, SOLUTION INTRAVENOUS at 12:00

## 2020-12-30 RX ADMIN — Medication 100 MILLIEQUIVALENT(S): at 04:27

## 2020-12-30 RX ADMIN — Medication 50 GRAM(S): at 02:57

## 2020-12-30 RX ADMIN — Medication 400 MILLIGRAM(S): at 06:43

## 2020-12-30 RX ADMIN — AMPICILLIN SODIUM AND SULBACTAM SODIUM 200 GRAM(S): 250; 125 INJECTION, POWDER, FOR SUSPENSION INTRAMUSCULAR; INTRAVENOUS at 06:26

## 2020-12-30 RX ADMIN — HYDROMORPHONE HYDROCHLORIDE 0.5 MILLIGRAM(S): 2 INJECTION INTRAMUSCULAR; INTRAVENOUS; SUBCUTANEOUS at 01:12

## 2020-12-30 RX ADMIN — HYDROMORPHONE HYDROCHLORIDE 0.5 MILLIGRAM(S): 2 INJECTION INTRAMUSCULAR; INTRAVENOUS; SUBCUTANEOUS at 01:25

## 2020-12-30 RX ADMIN — Medication 400 MILLIGRAM(S): at 00:22

## 2020-12-30 RX ADMIN — Medication 1000 MILLIGRAM(S): at 07:06

## 2020-12-30 RX ADMIN — Medication 650 MILLIGRAM(S): at 11:48

## 2020-12-30 RX ADMIN — Medication 650 MILLIGRAM(S): at 23:31

## 2020-12-30 RX ADMIN — CHLORHEXIDINE GLUCONATE 15 MILLILITER(S): 213 SOLUTION TOPICAL at 07:06

## 2020-12-30 RX ADMIN — Medication 100 MILLIEQUIVALENT(S): at 03:19

## 2020-12-30 RX ADMIN — Medication 650 MILLIGRAM(S): at 19:00

## 2020-12-30 RX ADMIN — Medication 650 MILLIGRAM(S): at 12:17

## 2020-12-30 RX ADMIN — Medication 112 MICROGRAM(S): at 06:27

## 2020-12-30 RX ADMIN — Medication 650 MILLIGRAM(S): at 18:23

## 2020-12-30 RX ADMIN — Medication 81 MILLIGRAM(S): at 11:48

## 2020-12-30 RX ADMIN — OXYCODONE HYDROCHLORIDE 5 MILLIGRAM(S): 5 TABLET ORAL at 12:20

## 2020-12-30 RX ADMIN — ENOXAPARIN SODIUM 40 MILLIGRAM(S): 100 INJECTION SUBCUTANEOUS at 11:48

## 2020-12-30 RX ADMIN — Medication 100 MILLIEQUIVALENT(S): at 05:29

## 2020-12-30 RX ADMIN — Medication 1000 MILLIGRAM(S): at 00:48

## 2020-12-30 RX ADMIN — AMPICILLIN SODIUM AND SULBACTAM SODIUM 200 GRAM(S): 250; 125 INJECTION, POWDER, FOR SUSPENSION INTRAMUSCULAR; INTRAVENOUS at 11:47

## 2020-12-30 RX ADMIN — SENNA PLUS 10 MILLILITER(S): 8.6 TABLET ORAL at 21:40

## 2020-12-30 RX ADMIN — ATORVASTATIN CALCIUM 20 MILLIGRAM(S): 80 TABLET, FILM COATED ORAL at 21:41

## 2020-12-30 RX ADMIN — SODIUM CHLORIDE 75 MILLILITER(S): 9 INJECTION, SOLUTION INTRAVENOUS at 06:56

## 2020-12-30 RX ADMIN — CHLORHEXIDINE GLUCONATE 15 MILLILITER(S): 213 SOLUTION TOPICAL at 18:22

## 2020-12-30 NOTE — PROGRESS NOTE ADULT - ASSESSMENT
ASSESSMENT/PLAN:   NICOL COLINDRES is a 55yFemale s/p Free ALT  doing well, currently sedated   - Q1H flap checks   - Bacitracin BID to incision line   - TF per ENT   -Wean sedation   -Cont drains   -Apprciate SICU ENT care   -Asa     Plastic Surgery   LYNDA: [id number]  [facility]: [phone]

## 2020-12-30 NOTE — PROGRESS NOTE ADULT - SUBJECTIVE AND OBJECTIVE BOX
55F s/p R glossectomy, bl SND 1-4, L ALT, trach 12/29. Transferred to PACU without incident. Sedated and on mechanical ventilation overnight. Flap checks unremarkable.    ICU Vital Signs Last 24 Hrs  T(C): 37.1 (30 Dec 2020 03:00), Max: 37.3 (29 Dec 2020 17:20)  T(F): 98.7 (30 Dec 2020 03:00), Max: 99.1 (29 Dec 2020 17:20)  HR: 58 (30 Dec 2020 06:00) (55 - 76)  BP: 97/59 (30 Dec 2020 06:00) (90/55 - 113/65)  BP(mean): 67 (30 Dec 2020 06:00) (63 - 80)  ABP: 101/50 (30 Dec 2020 06:00) (92/48 - 130/63)  ABP(mean): 68 (30 Dec 2020 06:00) (65 - 86)  RR: 12 (30 Dec 2020 06:00) (12 - 17)  SpO2: 100% (30 Dec 2020 06:00) (99% - 100%)    NAD, sedated  NGT sutures in nasal cavity  Intraoral suture lines intact, soft, warm, cook doppler with triphasic signal  Neck incision closed with stables, c/d/i, BRUCE x2 with serosanguinous drainage  6LPC sutured in place  L ALT incision c/d/i, closed with staples, JPx1 with serosanguinous drainage

## 2020-12-30 NOTE — PROGRESS NOTE ADULT - ASSESSMENT
55F s/p R glossectomy, bl SND 1-4, L ALT, trach 12/29    #Neuro  - pain control PRN     #ENT  - Flap checks per PRS  - ASA 81 via NGT daily  - lovenox     - Monitor BRUCE drain outputs     #CV  - Monitor BP, maintain MAPs   - Avoid pressors  - 500 cc bolus NS PRN for MAP maintenance     #Resp  - Routine trach care (obturator taped to head of bed, replacement trach at bedside, suction with red rubber catheters only, change inner cannula PRN and q shift, keep surrounding skin clean / dry / protected)  - trach collar, humidified O2   - Routine trach/stoma care, suctioning    #GI  - NPO, IVF  - TF to begin likely POD1, obtain nutrition consult and advance to goal per nutrition recs   - Bowel regimen while receiving opiates for pain  - PPI, anti-emetics   - Monitor lytes; replace PRN    #ID  - Monitor temp, WBC  - Continue unasyn    #MSK  - Monitor leg incision   - activity per PRS

## 2020-12-30 NOTE — PROGRESS NOTE ADULT - SUBJECTIVE AND OBJECTIVE BOX
SURGICAL INTENSIVE CARE UNIT DAILY PROGRESS NOTE    24 HOUR EVENTS:   -     HPI:      NEURO    RESPIRATORY  RR: 12 (12-30-20 @ 03:00) (12 - 17)  SpO2: 99% (12-30-20 @ 03:43) (99% - 100%)  Wt(kg): --  Mechanical Ventilation: Mode: AC/ CMV (Assist Control/ Continuous Mandatory Ventilation), RR (machine): 12, RR (patient): 12, TV (machine): 450, FiO2: 30, PEEP: 5, ITime: 0.98, MAP: 7, PIP: 15  ABG - ( 29 Dec 2020 18:06 )  pH: 7.50  /  pCO2: 30    /  pO2: 138   / HCO3: 25    / Base Excess: x     /  SaO2: 99.4    Lactate: x                  CARDIOVASCULAR  HR: 56 (12-30-20 @ 03:43) (55 - 93)  BP: 107/67 (12-30-20 @ 03:00) (90/55 - 138/86)  BP(mean): 77 (12-30-20 @ 03:00) (63 - 80)  ABP: 121/62 (12-30-20 @ 03:00) (92/48 - 130/63)  ABP(mean): 85 (12-30-20 @ 03:00) (65 - 86)  Wt(kg): --  CVP(cm H2O): --        GI/NUTRITION  Diet:    GENITOURINARY  I&O's Detail    12-29 @ 07:01  -  12-30 @ 03:50  --------------------------------------------------------  IN:    FentaNYL: 49 mL    IV PiggyBack: 350 mL    Lactated Ringers: 1000 mL    Propofol: 179.4 mL  Total IN: 1578.4 mL    OUT:    Bulb (mL): 25 mL    Bulb (mL): 50 mL    Bulb (mL): 20 mL    Indwelling Catheter - Urethral (mL): 1210 mL  Total OUT: 1305 mL    Total NET: 273.4 mL        Weight (kg): 50.8 (12-29 @ 06:56)  12-30    140  |  105  |  12  ----------------------------<  163<H>  3.6   |  23  |  0.67    Ca    8.8      30 Dec 2020 00:51  Phos  5.7     12-30  Mg     1.7     12-30    TPro  5.5<L>  /  Alb  3.6  /  TBili  1.2  /  DBili  x   /  AST  21  /  ALT  19  /  AlkPhos  64  12-29      HEMATOLOGIC                        9.5    12.08 )-----------( 150      ( 30 Dec 2020 00:51 )             28.1     PT/INR - ( 29 Dec 2020 18:06 )   PT: 13.3 sec;   INR: 1.18 ratio         PTT - ( 29 Dec 2020 18:06 )  PTT:31.1 sec    INFECTIOUS DISEASES  RECENT CULTURES:      ENDOCRINE  CAPILLARY BLOOD GLUCOSE          IMAGING:

## 2020-12-30 NOTE — PROGRESS NOTE ADULT - SUBJECTIVE AND OBJECTIVE BOX
Plastic Surgery Progress Note (pg [facility]: [id number], [facility]: [phone])    SUBJECTIVE  The patient was seen and examined. No acute events overnight.  Stayed in PACU and sedated  Lactate 2.6 this AM, otherwise labs remain stable from midnight  No acute events with flap checks     OBJECTIVE  ___________________________________________________  VITAL SIGNS / I&O's   Vital Signs Last 24 Hrs  T(C): 37.1 (30 Dec 2020 03:00), Max: 37.3 (29 Dec 2020 17:20)  T(F): 98.7 (30 Dec 2020 03:00), Max: 99.1 (29 Dec 2020 17:20)  HR: 59 (30 Dec 2020 08:38) (55 - 76)  BP: 97/59 (30 Dec 2020 06:00) (90/55 - 113/65)  BP(mean): 67 (30 Dec 2020 06:00) (63 - 80)  RR: 12 (30 Dec 2020 06:00) (12 - 17)  SpO2: 100% (30 Dec 2020 08:38) (99% - 100%)  Mode: AC/ CMV (Assist Control/ Continuous Mandatory Ventilation)  RR (machine): 12  TV (machine): 350  FiO2: 30  PEEP: 5  ITime: 0.98  MAP: 7  PIP: 15      29 Dec 2020 07:01  -  30 Dec 2020 07:00  --------------------------------------------------------  IN:    FentaNYL: 63.4 mL    IV PiggyBack: 750 mL    Lactated Ringers: 1375 mL    Propofol: 218.9 mL  Total IN: 2407.3 mL    OUT:    Bulb (mL): 25 mL    Bulb (mL): 60 mL    Bulb (mL): 27 mL    Indwelling Catheter - Urethral (mL): 1447 mL  Total OUT: 1559 mL    Total NET: 848.3 mL        ___________________________________________________  PHYSICAL EXAM    -- CONSTITUTIONAL: NAD, lying in bed, sedated   -- NEURO: sedated this AM, but arousable   -- HEENT: Neck is soft, no hematomas noted, BRUCE drains serosanginous. Cook doppler strong this AM     -- EXTREMITIES: Left thigh donor site CDI, drains are serosanginous     ___________________________________________________  LABS                        9.5    12.08 )-----------( 150      ( 30 Dec 2020 00:51 )             28.1     30 Dec 2020 00:51    140    |  105    |  12     ----------------------------<  163    3.6     |  23     |  0.67     Ca    8.8        30 Dec 2020 00:51  Phos  5.7       30 Dec 2020 00:51  Mg     1.7       30 Dec 2020 00:51    TPro  5.5    /  Alb  3.6    /  TBili  1.2    /  DBili  x      /  AST  21     /  ALT  19     /  AlkPhos  64     29 Dec 2020 18:06    PT/INR - ( 29 Dec 2020 18:06 )   PT: 13.3 sec;   INR: 1.18 ratio         PTT - ( 29 Dec 2020 18:06 )  PTT:31.1 sec  CAPILLARY BLOOD GLUCOSE              ___________________________________________________  MICRO  Recent Cultures:    ___________________________________________________  MEDICATIONS  (STANDING):  acetaminophen  IVPB .. 1000 milliGRAM(s) IV Intermittent every 6 hours  ampicillin/sulbactam  IVPB      ampicillin/sulbactam  IVPB 1.5 Gram(s) IV Intermittent every 6 hours  aspirin  chewable 81 milliGRAM(s) Oral daily  chlorhexidine 0.12% Liquid 15 milliLiter(s) Oral Mucosa every 12 hours  chlorhexidine 4% Liquid 1 Application(s) Topical daily  enoxaparin Injectable 40 milliGRAM(s) SubCutaneous daily  fentaNYL   Infusion 0.5 MICROgram(s)/kG/Hr (2.54 mL/Hr) IV Continuous <Continuous>  lactated ringers. 1000 milliLiter(s) (75 mL/Hr) IV Continuous <Continuous>  levothyroxine 112 MICROGram(s) Oral <User Schedule>  levothyroxine 100 MICROGram(s) Oral <User Schedule>  propofol Infusion 20 MICROgram(s)/kG/Min (6.1 mL/Hr) IV Continuous <Continuous>    MEDICATIONS  (PRN):

## 2020-12-30 NOTE — PROGRESS NOTE ADULT - ASSESSMENT
55F Mandarin-speaking, with hx thyroid cancer s/p total thyroidectomy (3/2020) and RT, CVA, HTN, now PO1 s/p R hemiglossectomy, b/l neck dissection with L ALT reconstruction for tongue cancer. SICU consulted for q1hr flap checks 12/30    PLAN:  NEURO: no acute issue, hx CVA  - awake, off propofol   - pain control with tylenol, fentanyl    RESPIRATORY: no acute issue  - s/p tracheostomy on AC 12/450/5/30%    CARDIOVASCULAR: HTN  - monitor vital, marie as need for MAP >65; currently not on pressors  - q1h flap check with Net 263 doppler    GI/NUTRITION: no acute issue  - NPO  - kurt tube in place    GENITOURINARY/RENAL: no acute issue  - Alarcon in place  - monitor UOP, I&O  - monitor BUN/Cr    HEMATOLOGIC: no acute issue  - ASA rectal last night; PO 81 starting today in AM  - LVX dvt ppx    INFECTIOUS DISEASE: no acute issue  - Unasyn while BRUCE drains in place  - monitor for fever and leukocytosis    ENDOCRINE: thyroid cancer, s/p total thyroidectomy  - continue with synthroid  - monitor blood glucose with BMP    ENT: s/p R hemiglossectomy, b/l neck dissection with L ALT reconstruction for tongue cancer 12/29  - continue q1hr flap checks    DISPO: PACU (under SICU care)

## 2020-12-30 NOTE — PROGRESS NOTE ADULT - ASSESSMENT
55F Mandarin-speaking, with hx thyroid cancer s/p total thyroidectomy (3/2020) and RT, CVA, HTN, now PO1 s/p R hemiglossectomy, b/l neck dissection with L ALT reconstruction for tongue cancer. SICU consulted for q1hr flap checks 12/30    PLAN:  NEURO: no acute issue, hx CVA  - sedated on propofol  - pain control with tylenol, fentanyl    RESPIRATORY: no acute issue  - s/p tracheostomy on AC 12/450/5/30%    CARDIOVASCULAR: HTN  - monitor vital, marie as need for MAP >65; currently not on pressors  - q1h flap check with [employer] doppler    GI/NUTRITION: no acute issue  - NPO  - keotube in place    GENITOURINARY/RENAL: no acute issue  - Alarcon in place  - monitor UOP, I&O  - monitor BUN/Cr    HEMATOLOGIC: no acute issue  - ASA rectal tonight; PO 81 starting tomorrow AM  - LVX dvt ppx    INFECTIOUS DISEASE: no acute issue  - Unasyn while BRUCE drains in place  - monitor for fever and leukocytosis    ENDOCRINE: thyroid cancer, s/p total thyroidectomy  - continue with synthroid  - monitor blood glucose with BMP    ENT: s/p R hemiglossectomy, b/l neck dissection with L ALT reconstruction for tongue cancer 12/29  - continue q1hr flap checks    DISPO: PACU (under SICU care)

## 2020-12-30 NOTE — PROGRESS NOTE ADULT - SUBJECTIVE AND OBJECTIVE BOX
24 HOUR EVENTS:   - started TF   - tracheostomy AC 12/350/5/30    HPI: 55F Mandarin-speaking, with hx thyroid cancer s/p total thyroidectomy (3/2020) and RT, CVA, HTN, now PO1 s/p R hemiglossectomy, b/l neck dissection with L ALT reconstruction for tongue cancer. SICU consulted for q1hr flap checks 12/30    NEURO: awake and alert     RESPIRATORY  -tracheostomy AC 12/350/5/30  RR: 17 (30 Dec 2020 15:00) (8 - 17)  SpO2: 100% (30 Dec 2020 15:00) (98% - 100%)  -ABG - ( 30 Dec 2020 07:35 )  pH, Arterial: 7.37  pH, Blood: x     /  pCO2: 42    /  pO2: 135   / HCO3: 24    / Base Excess: -1.0  /  SaO2: 98.8      CARDIOVASCULAR  HR: 96 (30 Dec 2020 15:00) (55 - 96)  BP: 114/68 (30 Dec 2020 15:00) (85/55 - 122/65)  BP(mean): 79 (30 Dec 2020 15:00) (62 - 80)  ABP: 136/69 (30 Dec 2020 15:00) (92/48 - 137/64)  ABP(mean): 94 (30 Dec 2020 15:00) (62 - 94)      GI/NUTRITION  Diet:  NGT TF with Pivot 1.5    GENITOURINARY  I&O's Detail    29 Dec 2020 07:01  -  30 Dec 2020 07:00  --------------------------------------------------------  IN:    FentaNYL: 63.4 mL    IV PiggyBack: 750 mL    Lactated Ringers: 1375 mL    Propofol: 218.9 mL  Total IN: 2407.3 mL    OUT:    Bulb (mL): 25 mL    Bulb (mL): 60 mL    Bulb (mL): 27 mL    Indwelling Catheter - Urethral (mL): 1447 mL  Total OUT: 1559 mL    Total NET: 848.3 mL      30 Dec 2020 07:01  -  30 Dec 2020 15:15  --------------------------------------------------------  IN:    FentaNYL: 7.2 mL    IV PiggyBack: 100 mL    Lactated Ringers: 600 mL    Pivot 1.5: 25 mL    Propofol: 18.2 mL  Total IN: 750.4 mL    OUT:    Bulb (mL): 9 mL    Bulb (mL): 2.8 mL    Bulb (mL): 8 mL    Indwelling Catheter - Urethral (mL): 503 mL    Oral Fluid: 0 mL  Total OUT: 522.8 mL    Total NET: 227.6 mL    12-30    140  |  105  |  12  ----------------------------<  163<H>  3.6   |  23  |  0.67    Ca    8.8      30 Dec 2020 00:51  Phos  5.7     12-30  Mg     1.7     12-30    TPro  5.5<L>  /  Alb  3.6  /  TBili  1.2  /  DBili  x   /  AST  21  /  ALT  19  /  AlkPhos  64  12-29        HEMATOLOGIC                                          9.5    12.08 )-----------( 150      ( 30 Dec 2020 00:51 )             28.1   PT/INR - ( 29 Dec 2020 18:06 )   PT: 13.3 sec;   INR: 1.18 ratio         PTT - ( 29 Dec 2020 18:06 )  PTT:31.1 sec    INFECTIOUS DISEASES: Unasyn while BRUCE drains in place  RECENT CULTURES: none      IMAGING:  < from: Xray Chest 1 View- PORTABLE-Urgent (Xray Chest 1 View- PORTABLE-Urgent .) (12.29.20 @ 17:57) >  IMPRESSION:  Clear lungs post tracheostomy and enteric tube placement without complicating extra alveolar air.    < end of copied text >

## 2020-12-31 LAB
ALBUMIN SERPL ELPH-MCNC: 2.9 G/DL — LOW (ref 3.3–5)
ALP SERPL-CCNC: 51 U/L — SIGNIFICANT CHANGE UP (ref 40–120)
ALT FLD-CCNC: 10 U/L — SIGNIFICANT CHANGE UP (ref 4–33)
ANION GAP SERPL CALC-SCNC: 9 MMOL/L — SIGNIFICANT CHANGE UP (ref 7–14)
AST SERPL-CCNC: 12 U/L — SIGNIFICANT CHANGE UP (ref 4–32)
BASOPHILS # BLD AUTO: 0.01 K/UL — SIGNIFICANT CHANGE UP (ref 0–0.2)
BASOPHILS NFR BLD AUTO: 0.1 % — SIGNIFICANT CHANGE UP (ref 0–2)
BILIRUB SERPL-MCNC: 0.4 MG/DL — SIGNIFICANT CHANGE UP (ref 0.2–1.2)
BUN SERPL-MCNC: 15 MG/DL — SIGNIFICANT CHANGE UP (ref 7–23)
CALCIUM SERPL-MCNC: 7.6 MG/DL — LOW (ref 8.4–10.5)
CHLORIDE SERPL-SCNC: 106 MMOL/L — SIGNIFICANT CHANGE UP (ref 98–107)
CO2 SERPL-SCNC: 25 MMOL/L — SIGNIFICANT CHANGE UP (ref 22–31)
CREAT SERPL-MCNC: 0.47 MG/DL — LOW (ref 0.5–1.3)
EOSINOPHIL # BLD AUTO: 0 K/UL — SIGNIFICANT CHANGE UP (ref 0–0.5)
EOSINOPHIL NFR BLD AUTO: 0 % — SIGNIFICANT CHANGE UP (ref 0–6)
GLUCOSE SERPL-MCNC: 182 MG/DL — HIGH (ref 70–99)
HCT VFR BLD CALC: 20.3 % — CRITICAL LOW (ref 34.5–45)
HCT VFR BLD CALC: 22.2 % — LOW (ref 34.5–45)
HCT VFR BLD CALC: 23.6 % — LOW (ref 34.5–45)
HGB BLD-MCNC: 6.8 G/DL — CRITICAL LOW (ref 11.5–15.5)
HGB BLD-MCNC: 7.4 G/DL — LOW (ref 11.5–15.5)
HGB BLD-MCNC: 7.9 G/DL — LOW (ref 11.5–15.5)
IANC: 8.92 K/UL — HIGH (ref 1.5–8.5)
IMM GRANULOCYTES NFR BLD AUTO: 0.6 % — SIGNIFICANT CHANGE UP (ref 0–1.5)
LYMPHOCYTES # BLD AUTO: 0.75 K/UL — LOW (ref 1–3.3)
LYMPHOCYTES # BLD AUTO: 7.3 % — LOW (ref 13–44)
MAGNESIUM SERPL-MCNC: 2.1 MG/DL — SIGNIFICANT CHANGE UP (ref 1.6–2.6)
MCHC RBC-ENTMCNC: 28.6 PG — SIGNIFICANT CHANGE UP (ref 27–34)
MCHC RBC-ENTMCNC: 29 PG — SIGNIFICANT CHANGE UP (ref 27–34)
MCHC RBC-ENTMCNC: 29.1 PG — SIGNIFICANT CHANGE UP (ref 27–34)
MCHC RBC-ENTMCNC: 33.3 GM/DL — SIGNIFICANT CHANGE UP (ref 32–36)
MCHC RBC-ENTMCNC: 33.5 GM/DL — SIGNIFICANT CHANGE UP (ref 32–36)
MCHC RBC-ENTMCNC: 33.5 GM/DL — SIGNIFICANT CHANGE UP (ref 32–36)
MCV RBC AUTO: 85.7 FL — SIGNIFICANT CHANGE UP (ref 80–100)
MCV RBC AUTO: 86.8 FL — SIGNIFICANT CHANGE UP (ref 80–100)
MCV RBC AUTO: 86.8 FL — SIGNIFICANT CHANGE UP (ref 80–100)
MONOCYTES # BLD AUTO: 0.54 K/UL — SIGNIFICANT CHANGE UP (ref 0–0.9)
MONOCYTES NFR BLD AUTO: 5.3 % — SIGNIFICANT CHANGE UP (ref 2–14)
NEUTROPHILS # BLD AUTO: 8.92 K/UL — HIGH (ref 1.8–7.4)
NEUTROPHILS NFR BLD AUTO: 86.7 % — HIGH (ref 43–77)
NRBC # BLD: 0 /100 WBCS — SIGNIFICANT CHANGE UP
NRBC # FLD: 0 K/UL — SIGNIFICANT CHANGE UP
PHOSPHATE SERPL-MCNC: 2.1 MG/DL — LOW (ref 2.5–4.5)
PLATELET # BLD AUTO: 104 K/UL — LOW (ref 150–400)
PLATELET # BLD AUTO: 109 K/UL — LOW (ref 150–400)
PLATELET # BLD AUTO: 136 K/UL — LOW (ref 150–400)
POTASSIUM SERPL-MCNC: 3.8 MMOL/L — SIGNIFICANT CHANGE UP (ref 3.5–5.3)
POTASSIUM SERPL-SCNC: 3.8 MMOL/L — SIGNIFICANT CHANGE UP (ref 3.5–5.3)
PROT SERPL-MCNC: 5 G/DL — LOW (ref 6–8.3)
RBC # BLD: 2.34 M/UL — LOW (ref 3.8–5.2)
RBC # BLD: 2.59 M/UL — LOW (ref 3.8–5.2)
RBC # BLD: 2.72 M/UL — LOW (ref 3.8–5.2)
RBC # FLD: 13.5 % — SIGNIFICANT CHANGE UP (ref 10.3–14.5)
RBC # FLD: 13.5 % — SIGNIFICANT CHANGE UP (ref 10.3–14.5)
RBC # FLD: 13.8 % — SIGNIFICANT CHANGE UP (ref 10.3–14.5)
SODIUM SERPL-SCNC: 140 MMOL/L — SIGNIFICANT CHANGE UP (ref 135–145)
WBC # BLD: 10.28 K/UL — SIGNIFICANT CHANGE UP (ref 3.8–10.5)
WBC # BLD: 8.22 K/UL — SIGNIFICANT CHANGE UP (ref 3.8–10.5)
WBC # BLD: 9.33 K/UL — SIGNIFICANT CHANGE UP (ref 3.8–10.5)
WBC # FLD AUTO: 10.28 K/UL — SIGNIFICANT CHANGE UP (ref 3.8–10.5)
WBC # FLD AUTO: 8.22 K/UL — SIGNIFICANT CHANGE UP (ref 3.8–10.5)
WBC # FLD AUTO: 9.33 K/UL — SIGNIFICANT CHANGE UP (ref 3.8–10.5)

## 2020-12-31 PROCEDURE — 99291 CRITICAL CARE FIRST HOUR: CPT

## 2020-12-31 RX ORDER — POTASSIUM CHLORIDE 20 MEQ
10 PACKET (EA) ORAL
Refills: 0 | Status: COMPLETED | OUTPATIENT
Start: 2020-12-31 | End: 2020-12-31

## 2020-12-31 RX ADMIN — GABAPENTIN 200 MILLIGRAM(S): 400 CAPSULE ORAL at 14:07

## 2020-12-31 RX ADMIN — ENOXAPARIN SODIUM 40 MILLIGRAM(S): 100 INJECTION SUBCUTANEOUS at 12:10

## 2020-12-31 RX ADMIN — CHLORHEXIDINE GLUCONATE 1 APPLICATION(S): 213 SOLUTION TOPICAL at 05:26

## 2020-12-31 RX ADMIN — Medication 100 MILLIEQUIVALENT(S): at 05:23

## 2020-12-31 RX ADMIN — AMPICILLIN SODIUM AND SULBACTAM SODIUM 200 GRAM(S): 250; 125 INJECTION, POWDER, FOR SUSPENSION INTRAMUSCULAR; INTRAVENOUS at 23:04

## 2020-12-31 RX ADMIN — Medication 100 MILLIEQUIVALENT(S): at 06:29

## 2020-12-31 RX ADMIN — CHLORHEXIDINE GLUCONATE 15 MILLILITER(S): 213 SOLUTION TOPICAL at 05:41

## 2020-12-31 RX ADMIN — SENNA PLUS 10 MILLILITER(S): 8.6 TABLET ORAL at 23:03

## 2020-12-31 RX ADMIN — Medication 650 MILLIGRAM(S): at 00:00

## 2020-12-31 RX ADMIN — Medication 112 MICROGRAM(S): at 05:23

## 2020-12-31 RX ADMIN — Medication 100 MILLIEQUIVALENT(S): at 07:32

## 2020-12-31 RX ADMIN — CHLORHEXIDINE GLUCONATE 15 MILLILITER(S): 213 SOLUTION TOPICAL at 17:52

## 2020-12-31 RX ADMIN — GABAPENTIN 200 MILLIGRAM(S): 400 CAPSULE ORAL at 23:03

## 2020-12-31 RX ADMIN — ATORVASTATIN CALCIUM 20 MILLIGRAM(S): 80 TABLET, FILM COATED ORAL at 23:03

## 2020-12-31 RX ADMIN — Medication 650 MILLIGRAM(S): at 23:03

## 2020-12-31 RX ADMIN — Medication 650 MILLIGRAM(S): at 12:30

## 2020-12-31 RX ADMIN — AMPICILLIN SODIUM AND SULBACTAM SODIUM 200 GRAM(S): 250; 125 INJECTION, POWDER, FOR SUSPENSION INTRAMUSCULAR; INTRAVENOUS at 12:03

## 2020-12-31 RX ADMIN — AMPICILLIN SODIUM AND SULBACTAM SODIUM 200 GRAM(S): 250; 125 INJECTION, POWDER, FOR SUSPENSION INTRAMUSCULAR; INTRAVENOUS at 00:12

## 2020-12-31 RX ADMIN — Medication 650 MILLIGRAM(S): at 18:30

## 2020-12-31 RX ADMIN — Medication 650 MILLIGRAM(S): at 06:00

## 2020-12-31 RX ADMIN — GABAPENTIN 200 MILLIGRAM(S): 400 CAPSULE ORAL at 05:23

## 2020-12-31 RX ADMIN — AMPICILLIN SODIUM AND SULBACTAM SODIUM 200 GRAM(S): 250; 125 INJECTION, POWDER, FOR SUSPENSION INTRAMUSCULAR; INTRAVENOUS at 18:00

## 2020-12-31 RX ADMIN — SODIUM CHLORIDE 75 MILLILITER(S): 9 INJECTION, SOLUTION INTRAVENOUS at 00:14

## 2020-12-31 RX ADMIN — Medication 650 MILLIGRAM(S): at 18:00

## 2020-12-31 RX ADMIN — AMPICILLIN SODIUM AND SULBACTAM SODIUM 200 GRAM(S): 250; 125 INJECTION, POWDER, FOR SUSPENSION INTRAMUSCULAR; INTRAVENOUS at 05:23

## 2020-12-31 RX ADMIN — Medication 650 MILLIGRAM(S): at 12:03

## 2020-12-31 RX ADMIN — Medication 81 MILLIGRAM(S): at 14:07

## 2020-12-31 RX ADMIN — Medication 650 MILLIGRAM(S): at 23:04

## 2020-12-31 RX ADMIN — Medication 650 MILLIGRAM(S): at 05:23

## 2020-12-31 NOTE — PROGRESS NOTE ADULT - SUBJECTIVE AND OBJECTIVE BOX
SICU Daily Progress Note  =====================================================  Interval/Overnight Events:     ***    HPI: Pt is a 55 y.o. female ; pt speaks Mandarin> English  ; information obtained with assistance of [employer]  Taylor [id number].  Pt with h/o Thyroid cancer ; s/p Total Thyroidectomy 3/2020  and RT . Pt reports right tongue lesion s/p Biopsy x 2 last  12/2020 .Pt states " it is cancer "  Pt f/u with surgeon ;  Pt now presents for Right Hemiglossectomy Right Neck Dissection Tracheostomy    PMH:    CVA (cerebrovascular accident) Per pt in [place] 11/ 2018 ; rx plavix ; Left Sided Weakness  Hypercholesterolemia   Hypertension   Thyroid cancer dx 3/2020 ; tx surgically/ RT.     PAST SURGICAL HISTORY:  S/P thyroidectomy 3/2020.     FAMILY HISTORY:  No pertinent family history in first degree relatives.  Allergies: apple (Other)  No Known Drug Allergies      MEDICATIONS:   --------------------------------------------------------------------------------------  Neurologic Medications  acetaminophen    Suspension .. 650 milliGRAM(s) Oral every 6 hours  gabapentin   Solution 200 milliGRAM(s) Oral every 8 hours  oxyCODONE    Solution 5 milliGRAM(s) Oral every 4 hours PRN Moderate Pain (4 - 6)  oxyCODONE    Solution 10 milliGRAM(s) Oral every 4 hours PRN Severe Pain (7 - 10)    Respiratory Medications    Cardiovascular Medications  amLODIPine   Tablet 5 milliGRAM(s) Oral daily    Gastrointestinal Medications  lactated ringers. 1000 milliLiter(s) IV Continuous <Continuous>  senna Syrup 10 milliLiter(s) Oral at bedtime    Genitourinary Medications    Hematologic/Oncologic Medications  aspirin  chewable 81 milliGRAM(s) Oral daily  enoxaparin Injectable 40 milliGRAM(s) SubCutaneous daily    Antimicrobial/Immunologic Medications  ampicillin/sulbactam  IVPB      ampicillin/sulbactam  IVPB 1.5 Gram(s) IV Intermittent every 6 hours    Endocrine/Metabolic Medications  atorvastatin 20 milliGRAM(s) Oral at bedtime  levothyroxine 112 MICROGram(s) Oral <User Schedule>  levothyroxine 100 MICROGram(s) Oral <User Schedule>    Topical/Other Medications  chlorhexidine 0.12% Liquid 15 milliLiter(s) Oral Mucosa every 12 hours  chlorhexidine 4% Liquid 1 Application(s) Topical daily    --------------------------------------------------------------------------------------    VITAL SIGNS, INS/OUTS (last 24 hours):  --------------------------------------------------------------------------------------  T(C): 37.1 (12-31-20 @ 00:00), Max: 37.3 (12-30-20 @ 12:00)  HR: 84 (12-31-20 @ 00:00) (55 - 96)  BP: 99/51 (12-31-20 @ 00:00) (85/55 - 122/65)  BP(mean): 65 (12-31-20 @ 00:00) (60 - 79)  ABP: 105/55 (12-31-20 @ 00:00) (92/48 - 137/64)  ABP(mean): 76 (12-31-20 @ 00:00) (62 - 94)  RR: 16 (12-31-20 @ 00:00) (8 - 17)  SpO2: 96% (12-31-20 @ 00:00) (96% - 100%)  Wt(kg): --  CVP(mm Hg): --  CI: --  CAPILLARY BLOOD GLUCOSE       N/A      12-29 @ 07:01 - 12-30 @ 07:00  --------------------------------------------------------  IN:    FentaNYL: 63.4 mL    IV PiggyBack: 750 mL    Lactated Ringers: 1375 mL    Propofol: 218.9 mL  Total IN: 2407.3 mL    OUT:    Bulb (mL): 25 mL    Bulb (mL): 60 mL    Bulb (mL): 27 mL    Indwelling Catheter - Urethral (mL): 1447 mL  Total OUT: 1559 mL    Total NET: 848.3 mL      12-30 @ 07:01 - 12-31 @ 00:12  --------------------------------------------------------  IN:    Enteral Tube Flush: 60 mL    FentaNYL: 7.2 mL    IV PiggyBack: 200 mL    IV PiggyBack: 500 mL    Lactated Ringers: 1275 mL    Pivot 1.5: 316 mL    Propofol: 18.2 mL  Total IN: 2376.4 mL    OUT:    Bulb (mL): 8 mL    Bulb (mL): 32 mL    Bulb (mL): 14.2 mL    Indwelling Catheter - Urethral (mL): 858 mL    Oral Fluid: 0 mL  Total OUT: 912.2 mL    Total NET: 1464.2 mL  --------------------------------------------------------------------------------------    EXAM  NEUROLOGY  RASS:   	GCS:    Exam: Normal, NAD, alert, oriented x3, no focal deficits. ***    HEENT  Exam: Normocephalic, atraumatic, EOMI.  ***    RESPIRATORY  Exam: Lungs clear to auscultation, Normal expansion/effort. ***  Mechanical Ventilation:     CARDIOVASCULAR  Exam: S1, S2.  Regular rate and rhythm.   ***    GI/NUTRITION  Exam: Abdomen soft, Non-tender, Non-distended.  ***  Wound:  ***  Current Diet:  NPO***    VASCULAR  Exam: Extremities warm, pink, well-perfused. ***    MUSCULOSKELETAL  Exam: All extremities moving spontaneously without limitations. ***    SKIN  Exam: Good skin turgor, no skin breakdown. ***    METABOLIC/FLUIDS/ELECTROLYTES  lactated ringers. 1000 milliLiter(s) IV Continuous <Continuous>      HEMATOLOGIC  [x] VTE Prophylaxis: aspirin  chewable 81 milliGRAM(s) Oral daily  enoxaparin Injectable 40 milliGRAM(s) SubCutaneous daily    Transfusions:	[] PRBC	[] Platelets		[] FFP	[] Cryoprecipitate    INFECTIOUS DISEASE  Antimicrobials/Immunologic Medications:  ampicillin/sulbactam  IVPB      ampicillin/sulbactam  IVPB 1.5 Gram(s) IV Intermittent every 6 hours    Day #      of     ***    Tubes/Lines/Drains  ***  [x] Peripheral IV  [] Central Venous Line     	[] R	[] L	[] IJ	[] Fem	[] SC	Date Placed:   [] Arterial Line		[] R	[] L	[] Fem	[] Rad	[] Ax	Date Placed:   [] PICC		[] Midline		[] Mediport  [] Urinary Catheter		Date Placed:   [x] Necessity of urinary, arterial, and venous catheters discussed    LABS  --------------------------------------------------------------------------------------  ((Insert SICU Labs here))***  --------------------------------------------------------------------------------------    OTHER LABORATORY:     IMAGING STUDIES:   CXR:     ASSESSMENT:  55y Female    ((insert from previous))***    PLAN:   ***  Neurologic:   Respiratory:   Cardiovascular:   Gastrointestinal/Nutrition:   Renal/Genitourinary:   Hematologic:   Infectious Disease:   Tubes/Lines/Drains:   Endocrine:   Disposition:     --------------------------------------------------------------------------------------    Critical Care Diagnoses: SICU Daily Progress Note  =====================================================  24HR EVENTS:  - Started on Pivot 1.5 Tube Feeds  - Cont Trache AC 12/350/5/30  - No acute events overnight        HPI: Pt is a 55 y.o. female ; pt speaks Mandarin> English  ; information obtained with assistance of [employer]  Taylor [id number].  Pt with h/o Thyroid cancer ; s/p Total Thyroidectomy 3/2020  and RT . Pt reports right tongue lesion s/p Biopsy x 2 last  12/2020 .Pt states " it is cancer "  Pt f/u with surgeon ;  Pt now presents for Right Hemiglossectomy Right Neck Dissection Tracheostomy    PMH:    CVA (cerebrovascular accident) Per pt in [place] 11/ 2018 ; rx plavix ; Left Sided Weakness  Hypercholesterolemia   Hypertension   Thyroid cancer dx 3/2020 ; tx surgically/ RT.     PAST SURGICAL HISTORY:  S/P thyroidectomy 3/2020.     FAMILY HISTORY:  No pertinent family history in first degree relatives.  Allergies: apple (Other)  No Known Drug Allergies      MEDICATIONS:   --------------------------------------------------------------------------------------  Neurologic Medications  acetaminophen    Suspension .. 650 milliGRAM(s) Oral every 6 hours  gabapentin   Solution 200 milliGRAM(s) Oral every 8 hours  oxyCODONE    Solution 5 milliGRAM(s) Oral every 4 hours PRN Moderate Pain (4 - 6)  oxyCODONE    Solution 10 milliGRAM(s) Oral every 4 hours PRN Severe Pain (7 - 10)    Respiratory Medications    Cardiovascular Medications  amLODIPine   Tablet 5 milliGRAM(s) Oral daily    Gastrointestinal Medications  lactated ringers. 1000 milliLiter(s) IV Continuous <Continuous>  senna Syrup 10 milliLiter(s) Oral at bedtime    Genitourinary Medications    Hematologic/Oncologic Medications  aspirin  chewable 81 milliGRAM(s) Oral daily  enoxaparin Injectable 40 milliGRAM(s) SubCutaneous daily    Antimicrobial/Immunologic Medications  ampicillin/sulbactam  IVPB      ampicillin/sulbactam  IVPB 1.5 Gram(s) IV Intermittent every 6 hours    Endocrine/Metabolic Medications  atorvastatin 20 milliGRAM(s) Oral at bedtime  levothyroxine 112 MICROGram(s) Oral <User Schedule>  levothyroxine 100 MICROGram(s) Oral <User Schedule>    Topical/Other Medications  chlorhexidine 0.12% Liquid 15 milliLiter(s) Oral Mucosa every 12 hours  chlorhexidine 4% Liquid 1 Application(s) Topical daily    --------------------------------------------------------------------------------------    VITAL SIGNS, INS/OUTS (last 24 hours):  --------------------------------------------------------------------------------------  T(C): 37.1 (12-31-20 @ 00:00), Max: 37.3 (12-30-20 @ 12:00)  HR: 84 (12-31-20 @ 00:00) (55 - 96)  BP: 99/51 (12-31-20 @ 00:00) (85/55 - 122/65)  BP(mean): 65 (12-31-20 @ 00:00) (60 - 79)  ABP: 105/55 (12-31-20 @ 00:00) (92/48 - 137/64)  ABP(mean): 76 (12-31-20 @ 00:00) (62 - 94)  RR: 16 (12-31-20 @ 00:00) (8 - 17)  SpO2: 96% (12-31-20 @ 00:00) (96% - 100%)  Wt(kg): --  CVP(mm Hg): --  CI: --  CAPILLARY BLOOD GLUCOSE       N/A      12-29 @ 07:01 - 12-30 @ 07:00  --------------------------------------------------------  IN:    FentaNYL: 63.4 mL    IV PiggyBack: 750 mL    Lactated Ringers: 1375 mL    Propofol: 218.9 mL  Total IN: 2407.3 mL    OUT:    Bulb (mL): 25 mL    Bulb (mL): 60 mL    Bulb (mL): 27 mL    Indwelling Catheter - Urethral (mL): 1447 mL  Total OUT: 1559 mL    Total NET: 848.3 mL      12-30 @ 07:01  -  12-31 @ 00:12  --------------------------------------------------------  IN:    Enteral Tube Flush: 60 mL    FentaNYL: 7.2 mL    IV PiggyBack: 200 mL    IV PiggyBack: 500 mL    Lactated Ringers: 1275 mL    Pivot 1.5: 316 mL    Propofol: 18.2 mL  Total IN: 2376.4 mL    OUT:    Bulb (mL): 8 mL    Bulb (mL): 32 mL    Bulb (mL): 14.2 mL    Indwelling Catheter - Urethral (mL): 858 mL    Oral Fluid: 0 mL  Total OUT: 912.2 mL    Total NET: 1464.2 mL  --------------------------------------------------------------------------------------    EXAM  CONSTITUTIONAL: NAD, lying in bed, sedated   NEURO: sedated this AM, but arousable  CARDS: RRR  GI: Belly soft, nondistended.    HEENT: Neck is soft, no hematomas noted, BRUCE drains serosanginous. Cook doppler strong this AM with triphasic signal  EXTREMITIES: Left thigh donor site CDI, drains are serosanginous     Tubes/Lines/Drains  ***  [x] Peripheral IV  [] Central Venous Line     	[] R	[] L	[] IJ	[] Fem	[] SC	Date Placed:   [] Arterial Line		[] R	[] L	[] Fem	[] Rad	[] Ax	Date Placed:   [] PICC		[] Midline		[] Mediport  [] Urinary Catheter		Date Placed:   [x] Necessity of urinary, arterial, and venous catheters discussed    LABS  --------------------------------------------------------------------------------------                                            9.5                   Neurophils% (auto):   x      (12-30 @ 00:51):    12.08)-----------(150          Lymphocytes% (auto):  x                                             28.1                   Eosinphils% (auto):   x        Manual%: Neutrophils x    ; Lymphocytes x    ; Eosinophils x    ; Bands%: x    ; Blasts x          12-30    141  |  105  |  15  ----------------------------<  155<H>  3.8   |  22  |  0.60    Ca    8.4      30 Dec 2020 16:21  Phos  5.7     12-30  Mg     1.7     12-30    TPro  5.7<L>  /  Alb  3.6  /  TBili  0.8  /  DBili  x   /  AST  17  /  ALT  13  /  AlkPhos  56  12-30      ABG - ( 30 Dec 2020 07:35 )  pH: 7.37  /  pCO2: 42    /  pO2: 135   / HCO3: 24    / Base Excess: -1.0  /  SaO2: 98.8  / Lactate: x          RECENT CULTURES:      ABG - ( 30 Dec 2020 07:35 )  pH, Arterial: 7.37  pH, Blood: x     /  pCO2: 42    /  pO2: 135   / HCO3: 24    / Base Excess: -1.0  /  SaO2: 98.8              PT/INR - ( 29 Dec 2020 18:06 )   PT: 13.3 sec;   INR: 1.18 ratio         PTT - ( 29 Dec 2020 18:06 )  PTT:31.1 sec  --------------------------------------------------------------------------------------    OTHER LABORATORY:     IMAGING STUDIES:   CXR:

## 2020-12-31 NOTE — PROVIDER CONTACT NOTE (CRITICAL VALUE NOTIFICATION) - BACKGROUND
s/p neck dissection and hemiglossectomy
S/P B/L Neck Dissection, Right Subtotal Glossectomy, Trach, Reconstruction of Oral Cavity

## 2020-12-31 NOTE — PROGRESS NOTE ADULT - SUBJECTIVE AND OBJECTIVE BOX
55F s/p R glossectomy, bl SND 1-4, L ALT, trach 12/29. Transferred to PACU without incident. off vent to trach collar. Flap checks unremarkable. awaiting sicu bed    T(C): 37.1 (12-31-20 @ 04:00), Max: 37.3 (12-30-20 @ 12:00)  HR: 80 (12-31-20 @ 07:00) (59 - 96)  BP: 102/50 (12-31-20 @ 06:00) (85/48 - 122/65)  RR: 13 (12-31-20 @ 07:00) (8 - 17)  SpO2: 99% (12-31-20 @ 07:00) (96% - 100%)    NAD, sedated  NGT sutures in nasal cavity  Intraoral suture lines intact, soft, warm, cook doppler with triphasic signal  Neck incision closed with stables, c/d/i, BRUCE x2 with serosanguinous drainage  6LPC sutured in place  L ALT incision c/d/i, closed with staples, JPx1 with serosanguinous drainage

## 2020-12-31 NOTE — PROGRESS NOTE ADULT - SUBJECTIVE AND OBJECTIVE BOX
S: pt seen and examined    O:  Vital Signs Last 24 Hrs  T(C): 37.1 (31 Dec 2020 04:00), Max: 37.3 (30 Dec 2020 12:00)  T(F): 98.7 (31 Dec 2020 04:00), Max: 99.1 (30 Dec 2020 12:00)  HR: 79 (31 Dec 2020 06:00) (59 - 96)  BP: 102/50 (31 Dec 2020 06:00) (85/48 - 122/65)  BP(mean): 64 (31 Dec 2020 06:00) (56 - 79)  RR: 14 (31 Dec 2020 06:00) (8 - 17)  SpO2: 100% (31 Dec 2020 06:00) (96% - 100%)    Gen NAD  HEENT: neck soft, cdi with staples, flap wwp; cook with arterial signal; drains ss  LLE: incision cdi; soft no collections, drain ss                          7.4    10.28 )-----------( 136      ( 31 Dec 2020 03:48 )             22.2

## 2020-12-31 NOTE — PROGRESS NOTE ADULT - ASSESSMENT
ASSESSMENT/PLAN:   NICOL COLINDRES is a 55yFemale s/p Free ALT  doing well  - recheck CBC in afternoon  - Q2H flap checks   - Bacitracin BID to incision line   - TF per ENT   -Cont drains   -Apprciate SICU ENT care   -Asa, lovenox, unasyn    Plastic Surgery   [facility]: [id number]  [facility]: [phone]

## 2020-12-31 NOTE — PROGRESS NOTE ADULT - ASSESSMENT
55F s/p R glossectomy, bl SND 1-4, L ALT, trach 12/29    #Neuro  - pain control PRN     #ENT  - Flap checks per PRS  - ASA 81 via NGT daily  - lovenox     - Monitor BRUCE drain outputs     #CV  - Monitor BP, maintain MAPs   - Avoid pressors  - 500 cc bolus NS PRN for MAP maintenance     #Resp  - Routine trach care (obturator taped to head of bed, replacement trach at bedside, suction with red rubber catheters only, change inner cannula PRN and q shift, keep surrounding skin clean / dry / protected)  - trach collar, humidified O2   - Routine trach/stoma care, suctioning    #GI  - NPO, IVF  - TF to begin, obtain nutrition consult and advance to goal per nutrition recs   - Bowel regimen while receiving opiates for pain  - PPI, anti-emetics   - Monitor lytes; replace PRN    #ID  - Monitor temp, WBC  - Continue unasyn    #MSK  - Monitor leg incision   - activity per PRS

## 2020-12-31 NOTE — PROGRESS NOTE ADULT - ASSESSMENT
55F Mandarin-speaking, with hx thyroid cancer s/p total thyroidectomy (3/2020) and RT, CVA, HTN, now PO1 s/p R hemiglossectomy, b/l neck dissection with L ALT reconstruction for tongue cancer. SICU consulted for q1hr flap checks 12/30    PLAN:  NEURO: no acute issue, hx CVA  - sedated on propofol  - pain control with tylenol, fentanyl    RESPIRATORY: no acute issue  - s/p tracheostomy on AC 12/450/5/30%    CARDIOVASCULAR: HTN  - monitor vital, marie as need for MAP >65; currently not on pressors  - q1h flap check with [employer] doppler    GI/NUTRITION: no acute issue  - NPO  - keotube in place    GENITOURINARY/RENAL: no acute issue  - Alarcon in place  - monitor UOP, I&O  - monitor BUN/Cr    HEMATOLOGIC: no acute issue  - ASA rectal tonight; PO 81 starting tomorrow AM  - LVX dvt ppx    INFECTIOUS DISEASE: no acute issue  - Unasyn while BRUCE drains in place  - monitor for fever and leukocytosis    ENDOCRINE: thyroid cancer, s/p total thyroidectomy  - continue with synthroid  - monitor blood glucose with BMP    ENT: s/p R hemiglossectomy, b/l neck dissection with L ALT reconstruction for tongue cancer 12/29  - continue q1hr flap checks    DISPO: PACU (under SICU care)     55F Mandarin-speaking, with hx thyroid cancer s/p total thyroidectomy (3/2020) and RT, CVA, HTN, now PO1 s/p R hemiglossectomy, b/l neck dissection with L ALT reconstruction for tongue cancer. SICU consulted for q1hr flap checks 12/30    PLAN:  NEURO: no acute issue, hx CVA  - sedated on propofol  - pain control with tylenol, fentanyl    RESPIRATORY: no acute issue  - s/p tracheostomy on AC 12/350/5/30    CARDIOVASCULAR: HTN  - monitor vital, marie as need for MAP >65; currently not on pressors  - q1h flap check with [employer] doppler    GI/NUTRITION: no acute issue  - Pivot 1.5 TF  - keotube in place    GENITOURINARY/RENAL: no acute issue  - Alarcon in place  - monitor UOP, I&O  - monitor BUN/Cr    HEMATOLOGIC: no acute issue  - ASA rectal tonight; PO 81 starting tomorrow AM  - LVX dvt ppx    INFECTIOUS DISEASE: no acute issue  - Unasyn while BRUCE drains in place  - monitor for fever and leukocytosis    ENDOCRINE: thyroid cancer, s/p total thyroidectomy  - continue with synthroid  - monitor blood glucose with BMP    ENT: s/p R hemiglossectomy, b/l neck dissection with L ALT reconstruction for tongue cancer 12/29  - continue q1hr flap checks    DISPO: PACU (under SICU care)

## 2021-01-01 LAB
ANION GAP SERPL CALC-SCNC: 9 MMOL/L — SIGNIFICANT CHANGE UP (ref 7–14)
APTT BLD: 20.1 SEC — LOW (ref 27–36.3)
BUN SERPL-MCNC: 10 MG/DL — SIGNIFICANT CHANGE UP (ref 7–23)
CALCIUM SERPL-MCNC: 8.1 MG/DL — LOW (ref 8.4–10.5)
CHLORIDE SERPL-SCNC: 104 MMOL/L — SIGNIFICANT CHANGE UP (ref 98–107)
CO2 SERPL-SCNC: 27 MMOL/L — SIGNIFICANT CHANGE UP (ref 22–31)
CREAT SERPL-MCNC: 0.41 MG/DL — LOW (ref 0.5–1.3)
GLUCOSE SERPL-MCNC: 142 MG/DL — HIGH (ref 70–99)
HCT VFR BLD CALC: 25.5 % — LOW (ref 34.5–45)
HGB BLD-MCNC: 8.5 G/DL — LOW (ref 11.5–15.5)
INR BLD: 0.98 RATIO — SIGNIFICANT CHANGE UP (ref 0.88–1.16)
MAGNESIUM SERPL-MCNC: 2 MG/DL — SIGNIFICANT CHANGE UP (ref 1.6–2.6)
MCHC RBC-ENTMCNC: 28.6 PG — SIGNIFICANT CHANGE UP (ref 27–34)
MCHC RBC-ENTMCNC: 33.3 GM/DL — SIGNIFICANT CHANGE UP (ref 32–36)
MCV RBC AUTO: 85.9 FL — SIGNIFICANT CHANGE UP (ref 80–100)
NRBC # BLD: 0 /100 WBCS — SIGNIFICANT CHANGE UP
NRBC # FLD: 0 K/UL — SIGNIFICANT CHANGE UP
PHOSPHATE SERPL-MCNC: 1.8 MG/DL — LOW (ref 2.5–4.5)
PLATELET # BLD AUTO: 121 K/UL — LOW (ref 150–400)
POTASSIUM SERPL-MCNC: 3.5 MMOL/L — SIGNIFICANT CHANGE UP (ref 3.5–5.3)
POTASSIUM SERPL-SCNC: 3.5 MMOL/L — SIGNIFICANT CHANGE UP (ref 3.5–5.3)
PROTHROM AB SERPL-ACNC: 11.3 SEC — SIGNIFICANT CHANGE UP (ref 10.6–13.6)
RBC # BLD: 2.97 M/UL — LOW (ref 3.8–5.2)
RBC # FLD: 13.6 % — SIGNIFICANT CHANGE UP (ref 10.3–14.5)
SODIUM SERPL-SCNC: 140 MMOL/L — SIGNIFICANT CHANGE UP (ref 135–145)
WBC # BLD: 9.5 K/UL — SIGNIFICANT CHANGE UP (ref 3.8–10.5)
WBC # FLD AUTO: 9.5 K/UL — SIGNIFICANT CHANGE UP (ref 3.8–10.5)

## 2021-01-01 PROCEDURE — 99233 SBSQ HOSP IP/OBS HIGH 50: CPT

## 2021-01-01 RX ORDER — SENNA PLUS 8.6 MG/1
2 TABLET ORAL AT BEDTIME
Refills: 0 | Status: DISCONTINUED | OUTPATIENT
Start: 2021-01-01 | End: 2021-01-01

## 2021-01-01 RX ORDER — POTASSIUM PHOSPHATE, MONOBASIC POTASSIUM PHOSPHATE, DIBASIC 236; 224 MG/ML; MG/ML
30 INJECTION, SOLUTION INTRAVENOUS ONCE
Refills: 0 | Status: COMPLETED | OUTPATIENT
Start: 2021-01-01 | End: 2021-01-01

## 2021-01-01 RX ADMIN — ATORVASTATIN CALCIUM 20 MILLIGRAM(S): 80 TABLET, FILM COATED ORAL at 22:34

## 2021-01-01 RX ADMIN — Medication 650 MILLIGRAM(S): at 05:59

## 2021-01-01 RX ADMIN — GABAPENTIN 200 MILLIGRAM(S): 400 CAPSULE ORAL at 05:59

## 2021-01-01 RX ADMIN — Medication 81 MILLIGRAM(S): at 11:58

## 2021-01-01 RX ADMIN — Medication 112 MICROGRAM(S): at 05:59

## 2021-01-01 RX ADMIN — AMPICILLIN SODIUM AND SULBACTAM SODIUM 200 GRAM(S): 250; 125 INJECTION, POWDER, FOR SUSPENSION INTRAMUSCULAR; INTRAVENOUS at 17:51

## 2021-01-01 RX ADMIN — GABAPENTIN 200 MILLIGRAM(S): 400 CAPSULE ORAL at 22:33

## 2021-01-01 RX ADMIN — AMPICILLIN SODIUM AND SULBACTAM SODIUM 200 GRAM(S): 250; 125 INJECTION, POWDER, FOR SUSPENSION INTRAMUSCULAR; INTRAVENOUS at 11:58

## 2021-01-01 RX ADMIN — GABAPENTIN 200 MILLIGRAM(S): 400 CAPSULE ORAL at 12:00

## 2021-01-01 RX ADMIN — CHLORHEXIDINE GLUCONATE 15 MILLILITER(S): 213 SOLUTION TOPICAL at 17:51

## 2021-01-01 RX ADMIN — CHLORHEXIDINE GLUCONATE 15 MILLILITER(S): 213 SOLUTION TOPICAL at 05:59

## 2021-01-01 RX ADMIN — POTASSIUM PHOSPHATE, MONOBASIC POTASSIUM PHOSPHATE, DIBASIC 83.33 MILLIMOLE(S): 236; 224 INJECTION, SOLUTION INTRAVENOUS at 05:58

## 2021-01-01 RX ADMIN — SENNA PLUS 10 MILLILITER(S): 8.6 TABLET ORAL at 22:34

## 2021-01-01 RX ADMIN — Medication 650 MILLIGRAM(S): at 05:58

## 2021-01-01 RX ADMIN — AMPICILLIN SODIUM AND SULBACTAM SODIUM 200 GRAM(S): 250; 125 INJECTION, POWDER, FOR SUSPENSION INTRAMUSCULAR; INTRAVENOUS at 05:58

## 2021-01-01 RX ADMIN — AMLODIPINE BESYLATE 5 MILLIGRAM(S): 2.5 TABLET ORAL at 05:58

## 2021-01-01 RX ADMIN — ENOXAPARIN SODIUM 40 MILLIGRAM(S): 100 INJECTION SUBCUTANEOUS at 11:58

## 2021-01-01 NOTE — PROGRESS NOTE ADULT - SUBJECTIVE AND OBJECTIVE BOX
SICU Daily Progress Note  =====================================================  Interval/Overnight Events:  - Advanced to Q2 flap checks   - Downtrend in HH requiring 1U PRBCs with proper response  - Stable H/H after       HPI: Pt is a 55 y.o. female with right tongue lesion now s/p right hemiglossectomy and b/l neck dissection w/ left ALT reconstruction and tracheostomy. SICU consulted for airway monitoring in new tracheostomy and vascular flap checks.       MEDICATIONS:   --------------------------------------------------------------------------------------  Neurologic Medications  acetaminophen    Suspension .. 650 milliGRAM(s) Oral every 6 hours  gabapentin   Solution 200 milliGRAM(s) Oral every 8 hours  oxyCODONE    Solution 5 milliGRAM(s) Oral every 4 hours PRN Moderate Pain (4 - 6)  oxyCODONE    Solution 10 milliGRAM(s) Oral every 4 hours PRN Severe Pain (7 - 10)    Respiratory Medications    Cardiovascular Medications  amLODIPine   Tablet 5 milliGRAM(s) Oral daily    Gastrointestinal Medications  senna Syrup 10 milliLiter(s) Oral at bedtime    Genitourinary Medications    Hematologic/Oncologic Medications  aspirin  chewable 81 milliGRAM(s) Oral daily  enoxaparin Injectable 40 milliGRAM(s) SubCutaneous daily    Antimicrobial/Immunologic Medications  ampicillin/sulbactam  IVPB 1.5 Gram(s) IV Intermittent every 6 hours  ampicillin/sulbactam  IVPB        Endocrine/Metabolic Medications  atorvastatin 20 milliGRAM(s) Oral at bedtime  levothyroxine 112 MICROGram(s) Oral <User Schedule>  levothyroxine 100 MICROGram(s) Oral <User Schedule>    Topical/Other Medications  chlorhexidine 0.12% Liquid 15 milliLiter(s) Oral Mucosa every 12 hours  chlorhexidine 4% Liquid 1 Application(s) Topical daily    --------------------------------------------------------------------------------------    VITAL SIGNS, INS/OUTS (last 24 hours):  --------------------------------------------------------------------------------------  Vital Signs Last 24 Hrs  T(C): 37.3 (01 Jan 2021 00:00), Max: 37.7 (31 Dec 2020 18:25)  T(F): 99.1 (01 Jan 2021 00:00), Max: 99.8 (31 Dec 2020 18:25)  HR: 72 (01 Jan 2021 00:00) (70 - 99)  BP: 117/70 (31 Dec 2020 18:35) (88/46 - 117/70)  BP(mean): 82 (31 Dec 2020 18:35) (56 - 82)  RR: 15 (01 Jan 2021 00:00) (11 - 20)  SpO2: 100% (01 Jan 2021 00:00) (92% - 100%)  --------------------------------------------------------------------------------------    EXAM  NEUROLOGY  Exam: Normal, NAD, alert, oriented x3, no focal deficits    NECK: soft, no hematoma noted, BRUCE in place    RESPIRATORY  Exam: Nonlabored, trach on trach collar     CARDIOVASCULAR  Exam: Normotensive, RRR, no M/R/G     GI/NUTRITION  Exam: Abdomen soft, NT/ND, no rebound or guarding.  Current Diet:  NPO w/ TF    VASCULAR  Exam: Extremities warm, well-perfused. Adequate capillary refill, incision clean, dry and intact around swollen mouth and neck, thigh soft, no hematomas     HEMATOLOGIC  [x] VTE Prophylaxis: aspirin  chewable 81 milliGRAM(s) Oral daily  enoxaparin Injectable 40 milliGRAM(s) SubCutaneous daily        INFECTIOUS DISEASE  Antimicrobials/Immunologic Medications:  ampicillin/sulbactam  IVPB 1.5 Gram(s) IV Intermittent every 6 hours  ampicillin/sulbactam  IVPB          Tubes/Lines/Drains  [x] Peripheral IV  [] Central Venous Line     	[] R	[] L	[] IJ	[] Fem	[] SC	Date Placed:   [] Arterial Line		[] R	[] L	[] Fem	[] Rad	[] Ax	Date Placed:   [] PICC		[] Midline		[] Mediport  [] Urinary Catheter		  [x] Necessity of urinary, arterial, and venous catheters discussed    LABS  --------------------------------------------------------------------------------------                        7.9    8.22  )-----------( 104      ( 31 Dec 2020 17:35 )             23.6     12-31    140  |  106  |  15  ----------------------------<  182<H>  3.8   |  25  |  0.47<L>    Ca    7.6<L>      31 Dec 2020 03:48  Phos  2.1     12-31  Mg     2.1     12-31    TPro  5.0<L>  /  Alb  2.9<L>  /  TBili  0.4  /  DBili  x   /  AST  12  /  ALT  10  /  AlkPhos  51  12-31        --------------------------------------------------------------------------------------     SICU Daily Progress Note  =====================================================  Interval/Overnight Events:  - Advanced to Q2 flap checks   - Downtrend in HH requiring 1U PRBCs with proper response  - Alarcon DC overnight      HPI: Pt is a 55 y.o. female with right tongue lesion now s/p right hemiglossectomy and b/l neck dissection w/ left ALT reconstruction and tracheostomy. SICU consulted for airway monitoring in new tracheostomy and vascular flap checks.       MEDICATIONS:   --------------------------------------------------------------------------------------  Neurologic Medications  acetaminophen    Suspension .. 650 milliGRAM(s) Oral every 6 hours  gabapentin   Solution 200 milliGRAM(s) Oral every 8 hours  oxyCODONE    Solution 5 milliGRAM(s) Oral every 4 hours PRN Moderate Pain (4 - 6)  oxyCODONE    Solution 10 milliGRAM(s) Oral every 4 hours PRN Severe Pain (7 - 10)    Respiratory Medications    Cardiovascular Medications  amLODIPine   Tablet 5 milliGRAM(s) Oral daily    Gastrointestinal Medications  senna Syrup 10 milliLiter(s) Oral at bedtime    Genitourinary Medications    Hematologic/Oncologic Medications  aspirin  chewable 81 milliGRAM(s) Oral daily  enoxaparin Injectable 40 milliGRAM(s) SubCutaneous daily    Antimicrobial/Immunologic Medications  ampicillin/sulbactam  IVPB 1.5 Gram(s) IV Intermittent every 6 hours  ampicillin/sulbactam  IVPB        Endocrine/Metabolic Medications  atorvastatin 20 milliGRAM(s) Oral at bedtime  levothyroxine 112 MICROGram(s) Oral <User Schedule>  levothyroxine 100 MICROGram(s) Oral <User Schedule>    Topical/Other Medications  chlorhexidine 0.12% Liquid 15 milliLiter(s) Oral Mucosa every 12 hours  chlorhexidine 4% Liquid 1 Application(s) Topical daily    --------------------------------------------------------------------------------------    VITAL SIGNS, INS/OUTS (last 24 hours):  --------------------------------------------------------------------------------------  Vital Signs Last 24 Hrs  T(C): 37.3 (01 Jan 2021 00:00), Max: 37.7 (31 Dec 2020 18:25)  T(F): 99.1 (01 Jan 2021 00:00), Max: 99.8 (31 Dec 2020 18:25)  HR: 72 (01 Jan 2021 00:00) (70 - 99)  BP: 117/70 (31 Dec 2020 18:35) (88/46 - 117/70)  BP(mean): 82 (31 Dec 2020 18:35) (56 - 82)  RR: 15 (01 Jan 2021 00:00) (11 - 20)  SpO2: 100% (01 Jan 2021 00:00) (92% - 100%)  --------------------------------------------------------------------------------------    EXAM  NEUROLOGY  Exam: Normal, NAD, alert, oriented x3, no focal deficits    NECK: soft, no hematoma noted, BRUCE in place    RESPIRATORY  Exam: Nonlabored, trach on trach collar     CARDIOVASCULAR  Exam: Normotensive, RRR, no M/R/G     GI/NUTRITION  Exam: Abdomen soft, NT/ND, no rebound or guarding.  Current Diet:  NPO w/ TF    VASCULAR  Exam: Extremities warm, well-perfused. Adequate capillary refill, incision clean, dry and intact around swollen mouth and neck, thigh soft, no hematomas     HEMATOLOGIC  [x] VTE Prophylaxis: aspirin  chewable 81 milliGRAM(s) Oral daily  enoxaparin Injectable 40 milliGRAM(s) SubCutaneous daily        INFECTIOUS DISEASE  Antimicrobials/Immunologic Medications:  ampicillin/sulbactam  IVPB 1.5 Gram(s) IV Intermittent every 6 hours  ampicillin/sulbactam  IVPB          Tubes/Lines/Drains  [x] Peripheral IV  [] Central Venous Line     	[] R	[] L	[] IJ	[] Fem	[] SC	Date Placed:   [] Arterial Line		[] R	[] L	[] Fem	[] Rad	[] Ax	Date Placed:   [] PICC		[] Midline		[] Mediport  [] Urinary Catheter		  [x] Necessity of urinary, arterial, and venous catheters discussed    LABS  --------------------------------------------------------------------------------------                        7.9    8.22  )-----------( 104      ( 31 Dec 2020 17:35 )             23.6     12-31    140  |  106  |  15  ----------------------------<  182<H>  3.8   |  25  |  0.47<L>    Ca    7.6<L>      31 Dec 2020 03:48  Phos  2.1     12-31  Mg     2.1     12-31    TPro  5.0<L>  /  Alb  2.9<L>  /  TBili  0.4  /  DBili  x   /  AST  12  /  ALT  10  /  AlkPhos  51  12-31        --------------------------------------------------------------------------------------     SICU Daily Progress Note  =====================================================  Interval / Overnight Events:  - Downgrade to q4 flap checks   - Required 1 unit PRBC yesterday with proper response  - Alarcon removed and patient passed trial of void      HPI:  Patient is a 55 year old female with a PMHx of CVA, HTN and thyroid cancer (S/P total thyroidectomy 3/20 and RT).  Patient reports right tongue lesion S/P biopsy x2 (last 12/20).      PAST MEDICAL & SURGICAL HISTORY:  Hypercholesterolemia  Thyroid cancer  dx 3/2020 ; tx surgically/ RT  CVA (cerebrovascular accident)  Per pt in [place] 11/ 2018 ; rx plavix ; Left Sided Weakness  Hypertension  S/P thyroidectomy  3/2020      ALLERGIES:  apple (Other)  No Known Drug Allergies    --------------------------------------------------------------------------------------    MEDICATIONS:    Neurologic Medications  acetaminophen    Suspension .. 650 milliGRAM(s) Oral every 6 hours  gabapentin   Solution 200 milliGRAM(s) Oral every 8 hours  oxyCODONE    Solution 5 milliGRAM(s) Oral every 4 hours PRN Moderate Pain (4 - 6)  oxyCODONE    Solution 10 milliGRAM(s) Oral every 4 hours PRN Severe Pain (7 - 10)    Cardiovascular Medications  amLODIPine   Tablet 5 milliGRAM(s) Oral daily    Gastrointestinal Medications  senna Syrup 10 milliLiter(s) Oral at bedtime    Hematologic/Oncologic Medications  aspirin  chewable 81 milliGRAM(s) Oral daily  enoxaparin Injectable 40 milliGRAM(s) SubCutaneous daily    Antimicrobial/Immunologic Medications  ampicillin/sulbactam  IVPB 1.5 Gram(s) IV Intermittent every 6 hours        Endocrine/Metabolic Medications  atorvastatin 20 milliGRAM(s) Oral at bedtime  levothyroxine 112 MICROGram(s) Oral <User Schedule>  levothyroxine 100 MICROGram(s) Oral <User Schedule>    Topical/Other Medications  chlorhexidine 0.12% Liquid 15 milliLiter(s) Oral Mucosa every 12 hours  chlorhexidine 4% Liquid 1 Application(s) Topical daily    --------------------------------------------------------------------------------------    VITAL SIGNS:  T(C): 37 (01 Jan 2021 08:00), Max: 37.7 (31 Dec 2020 18:25)  T(F): 98.6 (01 Jan 2021 08:00), Max: 99.8 (31 Dec 2020 18:25)  HR: 75 (01 Jan 2021 09:00) (69 - 99)  BP: 117/70 (31 Dec 2020 18:35) (92/50 - 117/70)  BP(mean): 82 (31 Dec 2020 18:35) (58 - 82)  ABP: 119/68 (01 Jan 2021 09:00) (96/49 - 179/89)  ABP(mean): 89 (01 Jan 2021 09:00) (68 - 125)  RR: 16 (01 Jan 2021 09:00) (11 - 20)  SpO2: 100% (01 Jan 2021 09:00) (92% - 100%)    --------------------------------------------------------------------------------------    INS AND OUTS:    31 Dec 2020 07:01  -  01 Jan 2021 07:00  --------------------------------------------------------  IN:    Enteral Tube Flush: 320 mL    IV PiggyBack: 900 mL    Lactated Ringers: 825 mL    Pivot 1.5: 864 mL    PRBCs (Packed Red Blood Cells): 300 mL  Total IN: 3209 mL    OUT:    Bulb (mL): 13 mL    Bulb (mL): 17.5 mL    Bulb (mL): 18 mL    Incontinent per Collection Bag (mL): 400 mL    Indwelling Catheter - Urethral (mL): 2260 mL    Oral Fluid: 0 mL  Total OUT: 2708.5 mL    Total NET: 500.5 mL      01 Jan 2021 07:01  -  01 Jan 2021 09:53  --------------------------------------------------------  IN:    Enteral Tube Flush: 60 mL    Pivot 1.5: 72 mL  Total IN: 132 mL    OUT:  Total OUT: 0 mL    Total NET: 132 mL    --------------------------------------------------------------------------------------    EXAM    NEUROLOGY  Exam: Normal, in no acute distress.    HEENT  Exam: Normocephalic, atraumatic.  Neck soft.  BRUCE x1.    RESPIRATORY  Exam: Normal expansion / effort.  Tracheostomy.    CARDIOVASCULAR  Exam: S1, S2.  Regular rate and rhythm.    GI/NUTRITION  Exam: Abdomen soft, Non-tender, Non-distended.  Current Diet: NPO with tube feeds    MUSCULOSKELETAL  Exam: All extremities moving spontaneously without limitations.      HEMATOLOGIC  [x] VTE Prophylaxis: aspirin  chewable 81 milliGRAM(s) Oral daily  enoxaparin Injectable 40 milliGRAM(s) SubCutaneous daily      INFECTIOUS DISEASE  Antimicrobials/Immunologic Medications:  ampicillin/sulbactam  IVPB 1.5 Gram(s) IV Intermittent every 6 hours      TUBES / LINES / DRAINS  [x] Peripheral IV  [] Central Venous Line     	[] R	[] L	[] IJ	[] Fem	[] SC	Date Placed:   [x] Arterial Line		[x] R	[] L	[] Fem	[x] Rad	[] Ax	Date Placed:   [] PICC		[] Midline		[] Mediport  [] Urinary Catheter		Date Placed:   [x] Necessity of urinary, arterial, and venous catheters discussed    --------------------------------------------------------------------------------------

## 2021-01-01 NOTE — PROGRESS NOTE ADULT - SUBJECTIVE AND OBJECTIVE BOX
s/p 1 prbc yesterday  No identifiable source of bleeding    T(F): 98.9 (01 Jan 2021 04:00), Max: 99.8 (31 Dec 2020 18:25)  HR: 77 (01 Jan 2021 04:00) (69 - 99)  BP: 117/70 (31 Dec 2020 18:35) (88/46 - 117/70)  RR: 15 (01 Jan 2021 04:00) (11 - 20)  SpO2: 100% (01 Jan 2021 04:00) (92% - 100%)    NAD  NGT sutures in nasal cavity  Intraoral suture lines intact, soft, warm, cook doppler with triphasic signal  Neck incision closed with stables, c/d/i, BRUCE x2 with serosanguinous drainage  6LPC sutured in place  L ALT incision c/d/i, closed with staples, JPx1 with serosanguinous drainage        Assessment and Plan:   · Assessment	  55F s/p R glossectomy, bl SND 1-4, L ALT, trach 12/29    #Neuro  - pain control PRN     #ENT  - Flap checks per PRS  - ASA 81 via NGT daily  - lovenox     - Monitor BRUCE drain outputs     #CV  - Monitor BP, maintain MAPs   - Avoid pressors  - 500 cc bolus NS PRN for MAP maintenance     #Resp  - Routine trach care (obturator taped to head of bed, replacement trach at bedside, suction with red rubber catheters only, change inner cannula PRN and q shift, keep surrounding skin clean / dry / protected)  - trach collar, humidified O2   - Routine trach/stoma care, suctioning    #GI  - NPO, IVF  - TF to begin, obtain nutrition consult and advance to goal per nutrition recs   - Bowel regimen while receiving opiates for pain  - PPI, anti-emetics   - Monitor lytes; replace PRN    #ID  - Monitor temp, WBC  - Continue unasyn    #MSK  - Monitor leg incision   - activity per PRS   s/p 1 prbc yesterday  No identifiable source of bleeding  Agnes dc'd overnight, f/u TOV    T(F): 98.9 (01 Jan 2021 04:00), Max: 99.8 (31 Dec 2020 18:25)  HR: 77 (01 Jan 2021 04:00) (69 - 99)  BP: 117/70 (31 Dec 2020 18:35) (88/46 - 117/70)  RR: 15 (01 Jan 2021 04:00) (11 - 20)  SpO2: 100% (01 Jan 2021 04:00) (92% - 100%)    NAD  NGT sutures in nasal cavity  Intraoral suture lines intact, soft, warm, cook doppler with triphasic signal  Neck incision closed with stables, c/d/i, BRUCE x2 with serosanguinous drainage  6LPC sutured in place  L ALT incision c/d/i, closed with staples, JPx1 with serosanguinous drainage        Assessment and Plan:   · Assessment	  55F s/p R glossectomy, bl SND 1-4, L ALT, trach 12/29    #Neuro  - pain control PRN     #ENT  - Flap checks per PRS  - ASA 81 via NGT daily  - lovenox     - Monitor BRUCE drain outputs     #CV  - Monitor BP, maintain MAPs   - Avoid pressors  - 500 cc bolus NS PRN for MAP maintenance     #Resp  - Routine trach care (obturator taped to head of bed, replacement trach at bedside, suction with red rubber catheters only, change inner cannula PRN and q shift, keep surrounding skin clean / dry / protected)  - trach collar, humidified O2   - Routine trach/stoma care, suctioning    #GI  - NPO, IVF  - TF to begin, obtain nutrition consult and advance to goal per nutrition recs   - Bowel regimen while receiving opiates for pain  - PPI, anti-emetics   - Monitor lytes; replace PRN    #ID  - Monitor temp, WBC  - Continue unasyn    #MSK  - Monitor leg incision   - activity per PRS

## 2021-01-01 NOTE — PROGRESS NOTE ADULT - ASSESSMENT
55F Mandarin-speaking, with hx thyroid cancer s/p total thyroidectomy (3/2020) and RT, CVA, HTN, now PO1 s/p R hemiglossectomy, b/l neck dissection with L ALT reconstruction for tongue cancer. SICU consulted for q1hr flap checks 12/30    PLAN:  NEURO:   - pain control with tylenol, po oxycodone     RESPIRATORY: no acute issue  - tolerating trach collar     CARDIOVASCULAR: HTN  - HD stable, history of HTN  - q2h flap check with [employer] doppler    GI/NUTRITION:   - Pivot 1.5 TF via kurt NGT  - senna    GENITOURINARY/RENAL:   - Agnes dc overnight, follow up TOV ([id number])    HEMATOLOGIC:   - ASA 81 po daily for flap   - Lovenox for DVT ppx    INFECTIOUS DISEASE:   - Unasyn while BRUCE drains in place    ENDOCRINE:  - Home synthroid  - monitor blood glucose with BMP      DISPO: SICU Patient is a 55 year old female with a PMHx of thyroid cancer (S/P total thyroidectomy 3/20 and RT).  Patient reports right tongue lesion S/P biopsy x2 (last 12/20).  She is now S/P right hemiglossectomy and bilateral neck dissection with left ALT reconstruction for tongue cancer on 12/29/20.      PLAN    NEUROLOGY:  - No active issues  - Pain control with Tylenol, Oxycodone and Neurontin    RESPIRATORY:  - Tracheostomy  - Saturating well on trach collar  - Continuous pulse oximetry  - Maintain SpO2 >92%    CARDIOVASCULAR:  - History of HTN  - Continue with home dose Amlodipine  - Continue with Aspirin 81mg QD for flaps  - q4h flap checks with [employer] doppler    GI / NUTRITION:   - NPO with Pivot tube feeds via NGT  - Senna qHS    RENAL / GENITOURINARY:  - Indwelling hackett catheter removed  - Patient passed trial of void  - Monitor electrolytes and replete PRN  - Continue to monitor strict ins and outs q1 hour    HEMATOLOGIC:  - Required 1 unit PRBC yesterday with appropriate response  - Monitor daily CBC  - VTE prophylaxis with Lovenox subcutaneous    INFECTIOUS DISEASE:   - Currently afebrile with no leukocytosis  - Continue with Unasyn while BRUCE drains in place    ENDOCRINOLOGY:  - Continue with home dose Synthroid  - Continue to monitor glucose on BMP (goal 140-180)      Disposition: SICU    -------------------------------------------------------------------------------------- Patient is a 55 year old female with a PMHx of thyroid cancer (S/P total thyroidectomy 3/20 and RT).  Patient reports right tongue lesion S/P biopsy x2 (last 12/20).  She is now S/P right hemiglossectomy and bilateral neck dissection with left ALT reconstruction for tongue cancer on 12/29/20.      PLAN    NEUROLOGY:  - No active issues  - Pain control with Tylenol, Oxycodone and Neurontin    RESPIRATORY:  - Tracheostomy  - Saturating well on trach collar  - Continuous pulse oximetry  - Maintain SpO2 >92%    CARDIOVASCULAR:  - History of HTN  - Continue with home dose Amlodipine  - Continue with Aspirin 81mg QD for flaps  - q4h flap checks with [employer] doppler    GI / NUTRITION:   - NPO with Pivot tube feeds via NGT  - Senna qHS    RENAL / GENITOURINARY:  - Indwelling hackett catheter removed  - Patient passed trial of void  - Monitor electrolytes and replete PRN  - Continue to monitor strict ins and outs q1 hour    HEMATOLOGIC:  - Required 1 unit PRBC yesterday with appropriate response  - Monitor daily CBC  - VTE prophylaxis with Lovenox subcutaneous    INFECTIOUS DISEASE:   - Currently afebrile with no leukocytosis  - Continue with Unasyn while BRUCE drains in place    ENDOCRINOLOGY:  - Continue with home dose Synthroid  - Continue to monitor glucose on BMP (goal 140-180)      Disposition: Floor    --------------------------------------------------------------------------------------

## 2021-01-01 NOTE — PROGRESS NOTE ADULT - SUBJECTIVE AND OBJECTIVE BOX
S: pt seen and examined  thigh swelling stable   Responded to transfusion     OBJECTIVE  ___________________________________________________  VITAL SIGNS / I&O's   Vital Signs Last 24 Hrs  T(C): 37 (01 Jan 2021 08:00), Max: 37.7 (31 Dec 2020 18:25)  T(F): 98.6 (01 Jan 2021 08:00), Max: 99.8 (31 Dec 2020 18:25)  HR: 75 (01 Jan 2021 09:00) (69 - 99)  BP: 117/70 (31 Dec 2020 18:35) (92/50 - 117/70)  BP(mean): 82 (31 Dec 2020 18:35) (58 - 82)  RR: 16 (01 Jan 2021 09:00) (11 - 20)  SpO2: 100% (01 Jan 2021 09:00) (92% - 100%)      31 Dec 2020 07:01  -  01 Jan 2021 07:00  --------------------------------------------------------  IN:    Enteral Tube Flush: 320 mL    IV PiggyBack: 900 mL    Lactated Ringers: 825 mL    Pivot 1.5: 864 mL    PRBCs (Packed Red Blood Cells): 300 mL  Total IN: 3209 mL    OUT:    Bulb (mL): 13 mL    Bulb (mL): 17.5 mL    Bulb (mL): 18 mL    Incontinent per Collection Bag (mL): 400 mL    Indwelling Catheter - Urethral (mL): 2260 mL    Oral Fluid: 0 mL  Total OUT: 2708.5 mL    Total NET: 500.5 mL      01 Jan 2021 07:01  -  01 Jan 2021 09:12  --------------------------------------------------------  IN:    Enteral Tube Flush: 60 mL    Pivot 1.5: 72 mL  Total IN: 132 mL    OUT:  Total OUT: 0 mL    Total NET: 132 mL        ___________________________________________________  PHYSICAL EXAM    -- CONSTITUTIONAL: NAD, lying in bed  -- NEURO: Awake, alert  -- HEENT: intra oral flap viable, strong cook doppler, neck incision C/D/I, BRUCE SS   --- EXTREMITIES:  Left ALT donor site C/D/I, right upper fullness stable, drain SS     ___________________________________________________  LABS                        8.5    9.50  )-----------( 121      ( 01 Jan 2021 02:17 )             25.5     01 Jan 2021 02:17    140    |  104    |  10     ----------------------------<  142    3.5     |  27     |  0.41     Ca    8.1        01 Jan 2021 02:17  Phos  1.8       01 Jan 2021 02:17  Mg     2.0       01 Jan 2021 02:17    TPro  5.0    /  Alb  2.9    /  TBili  0.4    /  DBili  x      /  AST  12     /  ALT  10     /  AlkPhos  51     31 Dec 2020 03:48    PT/INR - ( 01 Jan 2021 02:17 )   PT: 11.3 sec;   INR: 0.98 ratio         PTT - ( 01 Jan 2021 02:17 )  PTT:20.1 sec  CAPILLARY BLOOD GLUCOSE              ___________________________________________________  MICRO  Recent Cultures:    ___________________________________________________  MEDICATIONS  (STANDING):  acetaminophen    Suspension .. 650 milliGRAM(s) Oral every 6 hours  amLODIPine   Tablet 5 milliGRAM(s) Oral daily  ampicillin/sulbactam  IVPB 1.5 Gram(s) IV Intermittent every 6 hours  ampicillin/sulbactam  IVPB      aspirin  chewable 81 milliGRAM(s) Oral daily  atorvastatin 20 milliGRAM(s) Oral at bedtime  chlorhexidine 0.12% Liquid 15 milliLiter(s) Oral Mucosa every 12 hours  chlorhexidine 4% Liquid 1 Application(s) Topical daily  enoxaparin Injectable 40 milliGRAM(s) SubCutaneous daily  gabapentin   Solution 200 milliGRAM(s) Oral every 8 hours  levothyroxine 112 MICROGram(s) Oral <User Schedule>  levothyroxine 100 MICROGram(s) Oral <User Schedule>  senna Syrup 10 milliLiter(s) Oral at bedtime    MEDICATIONS  (PRN):  oxyCODONE    Solution 5 milliGRAM(s) Oral every 4 hours PRN Moderate Pain (4 - 6)  oxyCODONE    Solution 10 milliGRAM(s) Oral every 4 hours PRN Severe Pain (7 - 10)

## 2021-01-01 NOTE — PROGRESS NOTE ADULT - ASSESSMENT
ASSESSMENT/PLAN:   NICOL COLINDRES is a 55yFemale s/p Free ALT  doing well  - Q4H flap checks today   - Bacitracin BID to incision line   - TF per ENT   -Cont drains   -Apprciate SICU ENT care   -Asa, lovenox, unasyn    Plastic Surgery   [facility]: [id number]  [facility]: [phone]

## 2021-01-02 LAB
ANION GAP SERPL CALC-SCNC: 6 MMOL/L — LOW (ref 7–14)
BUN SERPL-MCNC: 12 MG/DL — SIGNIFICANT CHANGE UP (ref 7–23)
CALCIUM SERPL-MCNC: 8.8 MG/DL — SIGNIFICANT CHANGE UP (ref 8.4–10.5)
CHLORIDE SERPL-SCNC: 102 MMOL/L — SIGNIFICANT CHANGE UP (ref 98–107)
CO2 SERPL-SCNC: 29 MMOL/L — SIGNIFICANT CHANGE UP (ref 22–31)
CREAT SERPL-MCNC: 0.4 MG/DL — LOW (ref 0.5–1.3)
GLUCOSE SERPL-MCNC: 135 MG/DL — HIGH (ref 70–99)
HCT VFR BLD CALC: 26.9 % — LOW (ref 34.5–45)
HGB BLD-MCNC: 9.2 G/DL — LOW (ref 11.5–15.5)
MAGNESIUM SERPL-MCNC: 2.1 MG/DL — SIGNIFICANT CHANGE UP (ref 1.6–2.6)
MCHC RBC-ENTMCNC: 29 PG — SIGNIFICANT CHANGE UP (ref 27–34)
MCHC RBC-ENTMCNC: 34.2 GM/DL — SIGNIFICANT CHANGE UP (ref 32–36)
MCV RBC AUTO: 84.9 FL — SIGNIFICANT CHANGE UP (ref 80–100)
NRBC # BLD: 0 /100 WBCS — SIGNIFICANT CHANGE UP
NRBC # FLD: 0 K/UL — SIGNIFICANT CHANGE UP
PHOSPHATE SERPL-MCNC: 3 MG/DL — SIGNIFICANT CHANGE UP (ref 2.5–4.5)
PLATELET # BLD AUTO: 167 K/UL — SIGNIFICANT CHANGE UP (ref 150–400)
POTASSIUM SERPL-MCNC: 3.9 MMOL/L — SIGNIFICANT CHANGE UP (ref 3.5–5.3)
POTASSIUM SERPL-SCNC: 3.9 MMOL/L — SIGNIFICANT CHANGE UP (ref 3.5–5.3)
RBC # BLD: 3.17 M/UL — LOW (ref 3.8–5.2)
RBC # FLD: 13.2 % — SIGNIFICANT CHANGE UP (ref 10.3–14.5)
SODIUM SERPL-SCNC: 137 MMOL/L — SIGNIFICANT CHANGE UP (ref 135–145)
WBC # BLD: 8.15 K/UL — SIGNIFICANT CHANGE UP (ref 3.8–10.5)
WBC # FLD AUTO: 8.15 K/UL — SIGNIFICANT CHANGE UP (ref 3.8–10.5)

## 2021-01-02 PROCEDURE — 99232 SBSQ HOSP IP/OBS MODERATE 35: CPT

## 2021-01-02 RX ADMIN — Medication 100 MICROGRAM(S): at 06:27

## 2021-01-02 RX ADMIN — Medication 650 MILLIGRAM(S): at 00:41

## 2021-01-02 RX ADMIN — AMPICILLIN SODIUM AND SULBACTAM SODIUM 200 GRAM(S): 250; 125 INJECTION, POWDER, FOR SUSPENSION INTRAMUSCULAR; INTRAVENOUS at 06:27

## 2021-01-02 RX ADMIN — GABAPENTIN 200 MILLIGRAM(S): 400 CAPSULE ORAL at 22:28

## 2021-01-02 RX ADMIN — GABAPENTIN 200 MILLIGRAM(S): 400 CAPSULE ORAL at 06:27

## 2021-01-02 RX ADMIN — ATORVASTATIN CALCIUM 20 MILLIGRAM(S): 80 TABLET, FILM COATED ORAL at 22:28

## 2021-01-02 RX ADMIN — GABAPENTIN 200 MILLIGRAM(S): 400 CAPSULE ORAL at 14:54

## 2021-01-02 RX ADMIN — SENNA PLUS 10 MILLILITER(S): 8.6 TABLET ORAL at 22:29

## 2021-01-02 RX ADMIN — CHLORHEXIDINE GLUCONATE 1 APPLICATION(S): 213 SOLUTION TOPICAL at 11:38

## 2021-01-02 RX ADMIN — Medication 650 MILLIGRAM(S): at 06:27

## 2021-01-02 RX ADMIN — Medication 650 MILLIGRAM(S): at 06:57

## 2021-01-02 RX ADMIN — AMPICILLIN SODIUM AND SULBACTAM SODIUM 200 GRAM(S): 250; 125 INJECTION, POWDER, FOR SUSPENSION INTRAMUSCULAR; INTRAVENOUS at 11:38

## 2021-01-02 RX ADMIN — Medication 81 MILLIGRAM(S): at 11:38

## 2021-01-02 RX ADMIN — Medication 650 MILLIGRAM(S): at 01:11

## 2021-01-02 RX ADMIN — AMPICILLIN SODIUM AND SULBACTAM SODIUM 200 GRAM(S): 250; 125 INJECTION, POWDER, FOR SUSPENSION INTRAMUSCULAR; INTRAVENOUS at 00:41

## 2021-01-02 RX ADMIN — AMPICILLIN SODIUM AND SULBACTAM SODIUM 200 GRAM(S): 250; 125 INJECTION, POWDER, FOR SUSPENSION INTRAMUSCULAR; INTRAVENOUS at 17:42

## 2021-01-02 RX ADMIN — AMLODIPINE BESYLATE 5 MILLIGRAM(S): 2.5 TABLET ORAL at 06:27

## 2021-01-02 RX ADMIN — ENOXAPARIN SODIUM 40 MILLIGRAM(S): 100 INJECTION SUBCUTANEOUS at 11:38

## 2021-01-02 NOTE — PROGRESS NOTE ADULT - ASSESSMENT
Patient is a 55 year old female with a PMHx of thyroid cancer (S/P total thyroidectomy 3/20 and RT).  Patient reports right tongue lesion S/P biopsy x2 (last 12/20).  She is now S/P right hemiglossectomy and bilateral neck dissection with left ALT reconstruction for tongue cancer on 12/29/20.      PLAN    NEUROLOGY: No active issues  - Pain control with Tylenol, Oxycodone and Neurontin    RESPIRATORY: s/p tracheostomy  - Saturating well on trach collar  - Continuous pulse oximetry  - Maintain SpO2 >92%    CARDIOVASCULAR: hx HTN  - Continue with home dose Amlodipine  - Continue with Aspirin 81mg QD for flaps  - q4h flap checks with [employer] doppler    GI / NUTRITION: no acute issue  - NPO with Pivot tube feeds via NGT  - Senna qHS    RENAL / GENITOURINARY: no acute issue  - Indwelling hackett catheter removed, passed TOV  - Monitor electrolytes and replete PRN  - Continue to monitor strict ins and outs q1 hour    HEMATOLOGIC: no acute ev=  - Monitor daily CBC  - VTE prophylaxis with Lovenox subcutaneous    INFECTIOUS DISEASE:   - Currently afebrile with no leukocytosis  - Continue with Unasyn while BRUCE drains in place    ENDOCRINOLOGY:  - Continue with home dose Synthroid  - Continue to monitor glucose on BMP (goal 140-180)    Disposition: Floor     Patient is a 55 year old female with a PMHx of thyroid cancer (S/P total thyroidectomy 3/20 and RT).  Patient reports right tongue lesion S/P biopsy x2 (last 12/20).  She is now S/P right hemiglossectomy and bilateral neck dissection with left ALT reconstruction for tongue cancer on 12/29/20.      PLAN    NEUROLOGY: No active issues  - Pain control with Tylenol, Oxycodone and Neurontin    RESPIRATORY: s/p tracheostomy  - Saturating well on trach collar  - Continuous pulse oximetry  - Maintain SpO2 >92%    CARDIOVASCULAR: hx HTN  - Continue with home dose Amlodipine  - Continue with Aspirin 81mg QD for flaps  - q4h flap checks with [employer] doppler    GI / NUTRITION: no acute issue  - NPO with Pivot tube feeds via NGT  - Senna qHS    RENAL / GENITOURINARY: no acute issue  - Indwelling hackett catheter removed, passed TOV  - Monitor electrolytes and replete PRN  - Continue to monitor strict ins and outs q1 hour    HEMATOLOGIC: H/H stable  - Monitor daily CBC  - VTE prophylaxis with Lovenox subcutaneous    INFECTIOUS DISEASE:   - Currently afebrile with no leukocytosis  - Continue with Unasyn while BRUCE drains in place    ENDOCRINOLOGY:  - Continue with home dose Synthroid  - Continue to monitor glucose on BMP (goal 140-180)    Disposition: Floor     Patient is a 55 year old female with a PMHx of thyroid cancer (S/P total thyroidectomy 3/20 and RT).  Patient reports right tongue lesion S/P biopsy x2 (last 12/20).  She is now S/P right hemiglossectomy and bilateral neck dissection with left ALT reconstruction for tongue cancer on 12/29/20.      PLAN    NEUROLOGY: No active issues  - Pain control with Tylenol, Oxycodone and Neurontin    RESPIRATORY: s/p tracheostomy  - Saturating well on trach collar  - Continuous pulse oximetry  - Maintain SpO2 >92%    CARDIOVASCULAR: hx HTN  - Continue with home dose Amlodipine and Atorvastatin   - Continue with Aspirin 81mg QD for flaps  - q4h flap checks with [employer] doppler    GI / NUTRITION: no acute issue  - NPO with Pivot tube feeds via NGT  - Senna qHS    RENAL / GENITOURINARY: no acute issue  - Indwelling hackett catheter removed, passed TOV  - Monitor electrolytes and replete PRN  - Continue to monitor strict ins and outs q1 hour    HEMATOLOGIC: H/H stable  - Monitor daily CBC  - VTE prophylaxis with Lovenox subcutaneous  - Plavix home medication held since 12/21; f/u NSx    INFECTIOUS DISEASE:   - Currently afebrile with no leukocytosis  - Continue with Unasyn while BRUCE drains in place    ENDOCRINOLOGY:  - Continue with home dose Synthroid  - Continue to monitor glucose on BMP (goal 140-180)    D/c A-line    Disposition: Floor

## 2021-01-02 NOTE — PROGRESS NOTE ADULT - SUBJECTIVE AND OBJECTIVE BOX
SURGICAL INTENSIVE CARE UNIT DAILY PROGRESS NOTE    24 HOUR EVENTS:   - switched to q4 hr flap check  - passed TOV  - listed for floor    HPI:  Patient is a 55 year old female with a PMHx of CVA, HTN and thyroid cancer (S/P total thyroidectomy 3/20 and RT).  Patient reports right tongue lesion S/P biopsy x2 (last 12/20).    NEURO  - awake, alert, communicative    RESPIRATORY  RR: 18 (01-01-21 @ 20:30) (15 - 21)  SpO2: 100% (01-01-21 @ 20:30) (96% - 100%)  tracheostomy, s/p trach collar    CARDIOVASCULAR  HR: 88 (01-01-21 @ 20:30) (69 - 89)  BP: 134/80 (01-01-21 @ 20:00) (134/80 - 134/80)  BP(mean): 94 (01-01-21 @ 20:00) (94 - 94)  ABP: 136/76 (01-01-21 @ 20:00) (114/70 - 144/78)  ABP(mean): 100 (01-01-21 @ 20:00) (86 - 105)    GI/NUTRITION  Diet: NPO    GENITOURINARY  I&O's Detail    12-31 @ 07:01 - 01-01 @ 07:00  --------------------------------------------------------  IN:    Enteral Tube Flush: 320 mL    IV PiggyBack: 900 mL    Lactated Ringers: 825 mL    Pivot 1.5: 864 mL    PRBCs (Packed Red Blood Cells): 300 mL  Total IN: 3209 mL    OUT:    Bulb (mL): 13 mL    Bulb (mL): 17.5 mL    Bulb (mL): 18 mL    Incontinent per Collection Bag (mL): 400 mL    Indwelling Catheter - Urethral (mL): 2260 mL    Oral Fluid: 0 mL  Total OUT: 2708.5 mL    Total NET: 500.5 mL    01-01 @ 07:01  -  01-02 @ 00:42  --------------------------------------------------------  IN:    Enteral Tube Flush: 240 mL    IV PiggyBack: 200 mL    Pivot 1.5: 504 mL  Total IN: 944 mL    OUT:    Bulb (mL): 5 mL    Bulb (mL): 12.5 mL    Bulb (mL): 11 mL    Incontinent per Collection Bag (mL): 1600 mL  Total OUT: 1628.5 mL    Total NET: -684.5 mL    01-01    140  |  104  |  10  ----------------------------<  142<H>  3.5   |  27  |  0.41<L>    Ca    8.1<L>      01 Jan 2021 02:17  Phos  1.8     01-01  Mg     2.0     01-01    TPro  5.0<L>  /  Alb  2.9<L>  /  TBili  0.4  /  DBili  x   /  AST  12  /  ALT  10  /  AlkPhos  51  12-31    HEMATOLOGIC                        8.5    9.50  )-----------( 121      ( 01 Jan 2021 02:17 )             25.5     PT/INR - ( 01 Jan 2021 02:17 )   PT: 11.3 sec;   INR: 0.98 ratio         PTT - ( 01 Jan 2021 02:17 )  PTT:20.1 sec    INFECTIOUS DISEASES  RECENT CULTURES:    ENDOCRINE  CAPILLARY BLOOD GLUCOSE    IMAGING:

## 2021-01-02 NOTE — PROGRESS NOTE ADULT - SUBJECTIVE AND OBJECTIVE BOX
Right neck BRUCE removed yesterday   Was out of bed to chair during the day. Recovering well     T(F): 98.9 (01 Jan 2021 04:00), Max: 99.8 (31 Dec 2020 18:25)  HR: 77 (01 Jan 2021 04:00) (69 - 99)  BP: 117/70 (31 Dec 2020 18:35) (88/46 - 117/70)  RR: 15 (01 Jan 2021 04:00) (11 - 20)  SpO2: 100% (01 Jan 2021 04:00) (92% - 100%)    NAD  NGT sutures in nasal cavity  Intraoral suture lines intact, soft, warm, cook doppler with triphasic signal  Neck incision closed with stables, c/d/i, BRUCE x1 with serosanguinous drainage  6LPC sutured in place  L ALT incision c/d/i, closed with staples, JPx1 with serosanguinous drainage        Assessment and Plan:   · Assessment	  55F s/p R glossectomy, bl SND 1-4, L ALT, trach 12/29    #Neuro  - pain control PRN     #ENT  - Flap checks per PRS  - ASA 81 via NGT daily  - lovenox     - Monitor BRUCE drain outputs     #CV  - Monitor BP, maintain MAPs   - Avoid pressors  - 500 cc bolus NS PRN for MAP maintenance     #Resp  - Routine trach care (obturator taped to head of bed, replacement trach at bedside, suction with red rubber catheters only, change inner cannula PRN and q shift, keep surrounding skin clean / dry / protected)  - trach collar, humidified O2   - Routine trach/stoma care, suctioning    #GI  - NPO, IVF  - TF to begin, obtain nutrition consult and advance to goal per nutrition recs   - Bowel regimen while receiving opiates for pain  - PPI, anti-emetics   - Monitor lytes; replace PRN    #ID  - Monitor temp, WBC  - Continue unasyn    #MSK  - Monitor leg incision   - activity per PRS

## 2021-01-02 NOTE — PROGRESS NOTE ADULT - SUBJECTIVE AND OBJECTIVE BOX
S: pt seen and examined  thigh swelling stable   no acute overnight events.       T(C): 36.6 (01-02-21 @ 08:00), Max: 37.1 (01-01-21 @ 12:00)  T(F): 97.9 (01-02-21 @ 08:00), Max: 98.8 (01-01-21 @ 12:00)  HR: 82 (01-02-21 @ 08:00) (78 - 89)  BP: 108/67 (01-02-21 @ 08:00) (108/67 - 134/80)  RR: 18 (01-02-21 @ 08:00) (17 - 20)  SpO2: 100% (01-02-21 @ 08:00) (96% - 100%)      01 Jan 2021 07:01  -  02 Jan 2021 07:00  --------------------------------------------------------  IN:    Enteral Tube Flush: 440 mL    IV PiggyBack: 400 mL    Pivot 1.5: 792 mL  Total IN: 1632 mL    OUT:    Bulb (mL): 5 mL    Bulb (mL): 17.5 mL    Bulb (mL): 13.5 mL    Incontinent per Collection Bag (mL): 2200 mL  Total OUT: 2236 mL    Total NET: -604 mL        ___________________________________________________  PHYSICAL EXAM    -- CONSTITUTIONAL: NAD, lying in bed  -- NEURO: Awake, alert  -- HEENT: intra oral flap viable, strong cook doppler, neck incision C/D/I, BRUCE SS   --- EXTREMITIES:  Left ALT donor site C/D/I, right upper fullness stable, drain SS     ___________________________________________________  LABS                    9.2    8.15   )----------(  167       ( 02 Jan 2021 01:06 )               26.9      137    |  102    |  12     ----------------------------<  135        ( 02 Jan 2021 01:06 )  3.9     |  29     |  0.40     Ca    8.8        ( 02 Jan 2021 01:06 )  Phos  3.0       ( 02 Jan 2021 01:06 )  Mg     2.1       ( 02 Jan 2021 01:06 )          CAPILLARY BLOOD GLUCOSE

## 2021-01-02 NOTE — PROGRESS NOTE ADULT - ASSESSMENT
ASSESSMENT/PLAN:   NICOL COLINDRES is a 55yFemale s/p Free ALT  doing well  - Q4H flap checks today - ok for floor - awaiting floor bed   - Bacitracin BID to incision line   - TF per ENT   -Cont drains   -Apprciate SICU ENT care   -Asa, lovenox, unasyn    Plastic Surgery   [facility]: [id number]  [facility]: [phone]

## 2021-01-03 LAB
ANION GAP SERPL CALC-SCNC: 10 MMOL/L — SIGNIFICANT CHANGE UP (ref 7–14)
BUN SERPL-MCNC: 13 MG/DL — SIGNIFICANT CHANGE UP (ref 7–23)
CALCIUM SERPL-MCNC: 9 MG/DL — SIGNIFICANT CHANGE UP (ref 8.4–10.5)
CHLORIDE SERPL-SCNC: 102 MMOL/L — SIGNIFICANT CHANGE UP (ref 98–107)
CO2 SERPL-SCNC: 26 MMOL/L — SIGNIFICANT CHANGE UP (ref 22–31)
CREAT SERPL-MCNC: 0.44 MG/DL — LOW (ref 0.5–1.3)
GLUCOSE SERPL-MCNC: 132 MG/DL — HIGH (ref 70–99)
HCT VFR BLD CALC: 24.9 % — LOW (ref 34.5–45)
HGB BLD-MCNC: 8.4 G/DL — LOW (ref 11.5–15.5)
MAGNESIUM SERPL-MCNC: 2.2 MG/DL — SIGNIFICANT CHANGE UP (ref 1.6–2.6)
MCHC RBC-ENTMCNC: 28.6 PG — SIGNIFICANT CHANGE UP (ref 27–34)
MCHC RBC-ENTMCNC: 33.7 GM/DL — SIGNIFICANT CHANGE UP (ref 32–36)
MCV RBC AUTO: 84.7 FL — SIGNIFICANT CHANGE UP (ref 80–100)
NRBC # BLD: 0 /100 WBCS — SIGNIFICANT CHANGE UP
NRBC # FLD: 0 K/UL — SIGNIFICANT CHANGE UP
PHOSPHATE SERPL-MCNC: 3.4 MG/DL — SIGNIFICANT CHANGE UP (ref 2.5–4.5)
PLATELET # BLD AUTO: 222 K/UL — SIGNIFICANT CHANGE UP (ref 150–400)
POTASSIUM SERPL-MCNC: 4 MMOL/L — SIGNIFICANT CHANGE UP (ref 3.5–5.3)
POTASSIUM SERPL-SCNC: 4 MMOL/L — SIGNIFICANT CHANGE UP (ref 3.5–5.3)
RBC # BLD: 2.94 M/UL — LOW (ref 3.8–5.2)
RBC # FLD: 13.1 % — SIGNIFICANT CHANGE UP (ref 10.3–14.5)
SODIUM SERPL-SCNC: 138 MMOL/L — SIGNIFICANT CHANGE UP (ref 135–145)
WBC # BLD: 7.91 K/UL — SIGNIFICANT CHANGE UP (ref 3.8–10.5)
WBC # FLD AUTO: 7.91 K/UL — SIGNIFICANT CHANGE UP (ref 3.8–10.5)

## 2021-01-03 RX ORDER — CHLORHEXIDINE GLUCONATE 213 G/1000ML
15 SOLUTION TOPICAL
Refills: 0 | Status: DISCONTINUED | OUTPATIENT
Start: 2021-01-03 | End: 2021-01-13

## 2021-01-03 RX ADMIN — ATORVASTATIN CALCIUM 20 MILLIGRAM(S): 80 TABLET, FILM COATED ORAL at 21:12

## 2021-01-03 RX ADMIN — Medication 100 MICROGRAM(S): at 05:00

## 2021-01-03 RX ADMIN — AMPICILLIN SODIUM AND SULBACTAM SODIUM 200 GRAM(S): 250; 125 INJECTION, POWDER, FOR SUSPENSION INTRAMUSCULAR; INTRAVENOUS at 01:31

## 2021-01-03 RX ADMIN — AMPICILLIN SODIUM AND SULBACTAM SODIUM 200 GRAM(S): 250; 125 INJECTION, POWDER, FOR SUSPENSION INTRAMUSCULAR; INTRAVENOUS at 06:03

## 2021-01-03 RX ADMIN — CHLORHEXIDINE GLUCONATE 15 MILLILITER(S): 213 SOLUTION TOPICAL at 17:29

## 2021-01-03 RX ADMIN — GABAPENTIN 200 MILLIGRAM(S): 400 CAPSULE ORAL at 14:21

## 2021-01-03 RX ADMIN — ENOXAPARIN SODIUM 40 MILLIGRAM(S): 100 INJECTION SUBCUTANEOUS at 12:09

## 2021-01-03 RX ADMIN — AMLODIPINE BESYLATE 5 MILLIGRAM(S): 2.5 TABLET ORAL at 05:00

## 2021-01-03 RX ADMIN — SENNA PLUS 10 MILLILITER(S): 8.6 TABLET ORAL at 21:12

## 2021-01-03 RX ADMIN — GABAPENTIN 200 MILLIGRAM(S): 400 CAPSULE ORAL at 21:12

## 2021-01-03 RX ADMIN — GABAPENTIN 200 MILLIGRAM(S): 400 CAPSULE ORAL at 05:00

## 2021-01-03 RX ADMIN — Medication 81 MILLIGRAM(S): at 12:09

## 2021-01-03 NOTE — PROGRESS NOTE ADULT - SUBJECTIVE AND OBJECTIVE BOX
No acute events overnight    T(F): 97.4 (03 Jan 2021 06:00), Max: 99.7 (02 Jan 2021 20:00)  HR: 96 (03 Jan 2021 06:00) (82 - 96)  BP: 117/80 (03 Jan 2021 06:00) (108/67 - 130/85)  RR: 18 (03 Jan 2021 06:00) (13 - 19)  SpO2: 96% (03 Jan 2021 06:00) (96% - 100%)    NAD  NGT sutures in nasal cavity  Intraoral suture lines intact, soft, warm, cook doppler with triphasic signal  Neck incision closed with stables, c/d/i, BRUCE x1 with serosanguinous drainage  6LPC sutured in place  L ALT incision c/d/i, closed with staples, JPx1 with serosanguinous drainage        Assessment and Plan:   · Assessment	  55F s/p R glossectomy, bl SND 1-4, L ALT, trach 12/29    #Neuro  - pain control PRN     #ENT  - Flap checks per PRS  - ASA 81 via NGT daily  - lovenox       #CV  - Monitor BP, maintain MAPs   - Avoid pressors  - 500 cc bolus NS PRN for MAP maintenance     #Resp  - Routine trach care (obturator taped to head of bed, replacement trach at bedside, suction with red rubber catheters only, change inner cannula PRN and q shift, keep surrounding skin clean / dry / protected)  - trach collar, humidified O2   - Routine trach/stoma care, suctioning    #GI  - NPO  - TF to begin, obtain nutrition consult and advance to goal per nutrition recs   - Bowel regimen while receiving opiates for pain  - PPI, anti-emetics   - Monitor lytes; replace PRN    #ID  - Monitor temp, WBC    #MSK  - Monitor leg incision   - activity per PRS   No acute events overnight  Started bolus tube feeds    T(F): 97.4 (03 Jan 2021 06:00), Max: 99.7 (02 Jan 2021 20:00)  HR: 96 (03 Jan 2021 06:00) (82 - 96)  BP: 117/80 (03 Jan 2021 06:00) (108/67 - 130/85)  RR: 18 (03 Jan 2021 06:00) (13 - 19)  SpO2: 96% (03 Jan 2021 06:00) (96% - 100%)    NAD  NGT sutures in nasal cavity  Intraoral suture lines intact, soft, warm, cook doppler with triphasic signal  Neck incision closed with stables, c/d/i, BRUCE x1 with serosanguinous drainage  6LPC sutured in place  L ALT incision c/d/i, closed with staples, JPx1 with serosanguinous drainage        Assessment and Plan:   · Assessment	  55F s/p R glossectomy, bl SND 1-4, L ALT, trach 12/29    #Neuro  - pain control PRN     #ENT  - Flap checks per PRS  - ASA 81 via NGT daily  - lovenox       #CV  - Monitor BP, maintain MAPs   - Avoid pressors  - 500 cc bolus NS PRN for MAP maintenance     #Resp  - Routine trach care (obturator taped to head of bed, replacement trach at bedside, suction with red rubber catheters only, change inner cannula PRN and q shift, keep surrounding skin clean / dry / protected)  - trach collar, humidified O2   - Routine trach/stoma care, suctioning    #GI  - NPO  - bolus TF, f/u nutrition for feeding recommendations  - Bowel regimen while receiving opiates for pain  - PPI, anti-emetics   - Monitor lytes; replace PRN    #ID  - Monitor temp, WBC    #MSK  - Monitor leg incision   - activity per PRS

## 2021-01-03 NOTE — PROGRESS NOTE ADULT - ASSESSMENT
ASSESSMENT/PLAN:   NICOL COLINDRES is a 55yFemale s/p Free ALT  doing well  -Q4H flap checks  -Bacitracin BID to incision line   -TF per ENT    -Asa, lovenox, unasyn    Plastic Surgery   [facility]: [id number]  [facility]: [phone]

## 2021-01-03 NOTE — PROGRESS NOTE ADULT - SUBJECTIVE AND OBJECTIVE BOX
Plastic Surgery Progress Note    S: Patient seen and examined. transferred to floor.    Vital Signs Last 24 Hrs  T(C): 36.3 (03 Jan 2021 06:00), Max: 37.6 (02 Jan 2021 20:00)  T(F): 97.4 (03 Jan 2021 06:00), Max: 99.7 (02 Jan 2021 20:00)  HR: 96 (03 Jan 2021 06:00) (86 - 96)  BP: 117/80 (03 Jan 2021 06:00) (117/74 - 130/85)  BP(mean): 83 (02 Jan 2021 20:00) (83 - 88)  RR: 18 (03 Jan 2021 06:00) (13 - 19)  SpO2: 96% (03 Jan 2021 06:00) (96% - 100%)  I&O's Detail    02 Jan 2021 07:01  -  03 Jan 2021 07:00  --------------------------------------------------------  IN:    Enteral Tube Flush: 100 mL    Pivot 1.5: 720 mL  Total IN: 820 mL    OUT:    Incontinent per Collection Bag (mL): 1050 mL    Oral Fluid: 0 mL    Voided (mL): 300 mL  Total OUT: 1350 mL    Total NET: -530 mL          Physical Exam:  General: Awake and alert, NAD  HEENT: flap soft, warm, good cook signal.  neck soft, no signs of collection  L thigh: incision intact, no signs of collection

## 2021-01-04 DIAGNOSIS — E78.00 PURE HYPERCHOLESTEROLEMIA, UNSPECIFIED: ICD-10-CM

## 2021-01-04 DIAGNOSIS — Z29.9 ENCOUNTER FOR PROPHYLACTIC MEASURES, UNSPECIFIED: ICD-10-CM

## 2021-01-04 DIAGNOSIS — C73 MALIGNANT NEOPLASM OF THYROID GLAND: ICD-10-CM

## 2021-01-04 DIAGNOSIS — I63.9 CEREBRAL INFARCTION, UNSPECIFIED: ICD-10-CM

## 2021-01-04 DIAGNOSIS — I10 ESSENTIAL (PRIMARY) HYPERTENSION: ICD-10-CM

## 2021-01-04 DIAGNOSIS — C02.9 MALIGNANT NEOPLASM OF TONGUE, UNSPECIFIED: ICD-10-CM

## 2021-01-04 LAB
ANION GAP SERPL CALC-SCNC: 9 MMOL/L — SIGNIFICANT CHANGE UP (ref 7–14)
BUN SERPL-MCNC: 17 MG/DL — SIGNIFICANT CHANGE UP (ref 7–23)
CALCIUM SERPL-MCNC: 8.7 MG/DL — SIGNIFICANT CHANGE UP (ref 8.4–10.5)
CHLORIDE SERPL-SCNC: 102 MMOL/L — SIGNIFICANT CHANGE UP (ref 98–107)
CO2 SERPL-SCNC: 28 MMOL/L — SIGNIFICANT CHANGE UP (ref 22–31)
CREAT SERPL-MCNC: 0.48 MG/DL — LOW (ref 0.5–1.3)
GLUCOSE SERPL-MCNC: 112 MG/DL — HIGH (ref 70–99)
HCT VFR BLD CALC: 28.7 % — LOW (ref 34.5–45)
HGB BLD-MCNC: 9.6 G/DL — LOW (ref 11.5–15.5)
MAGNESIUM SERPL-MCNC: 2.3 MG/DL — SIGNIFICANT CHANGE UP (ref 1.6–2.6)
MCHC RBC-ENTMCNC: 28.2 PG — SIGNIFICANT CHANGE UP (ref 27–34)
MCHC RBC-ENTMCNC: 33.4 GM/DL — SIGNIFICANT CHANGE UP (ref 32–36)
MCV RBC AUTO: 84.2 FL — SIGNIFICANT CHANGE UP (ref 80–100)
NRBC # BLD: 0 /100 WBCS — SIGNIFICANT CHANGE UP
NRBC # FLD: 0 K/UL — SIGNIFICANT CHANGE UP
PHOSPHATE SERPL-MCNC: 3.6 MG/DL — SIGNIFICANT CHANGE UP (ref 2.5–4.5)
PLATELET # BLD AUTO: 252 K/UL — SIGNIFICANT CHANGE UP (ref 150–400)
POTASSIUM SERPL-MCNC: 3.8 MMOL/L — SIGNIFICANT CHANGE UP (ref 3.5–5.3)
POTASSIUM SERPL-SCNC: 3.8 MMOL/L — SIGNIFICANT CHANGE UP (ref 3.5–5.3)
RBC # BLD: 3.41 M/UL — LOW (ref 3.8–5.2)
RBC # FLD: 13.3 % — SIGNIFICANT CHANGE UP (ref 10.3–14.5)
SODIUM SERPL-SCNC: 139 MMOL/L — SIGNIFICANT CHANGE UP (ref 135–145)
WBC # BLD: 8.07 K/UL — SIGNIFICANT CHANGE UP (ref 3.8–10.5)
WBC # FLD AUTO: 8.07 K/UL — SIGNIFICANT CHANGE UP (ref 3.8–10.5)

## 2021-01-04 PROCEDURE — 99223 1ST HOSP IP/OBS HIGH 75: CPT

## 2021-01-04 RX ORDER — POLYETHYLENE GLYCOL 3350 17 G/17G
17 POWDER, FOR SOLUTION ORAL ONCE
Refills: 0 | Status: DISCONTINUED | OUTPATIENT
Start: 2021-01-04 | End: 2021-01-14

## 2021-01-04 RX ORDER — LIDOCAINE HCL 20 MG/ML
10 VIAL (ML) INJECTION ONCE
Refills: 0 | Status: COMPLETED | OUTPATIENT
Start: 2021-01-04 | End: 2021-01-04

## 2021-01-04 RX ADMIN — CHLORHEXIDINE GLUCONATE 15 MILLILITER(S): 213 SOLUTION TOPICAL at 05:10

## 2021-01-04 RX ADMIN — AMLODIPINE BESYLATE 5 MILLIGRAM(S): 2.5 TABLET ORAL at 05:10

## 2021-01-04 RX ADMIN — ENOXAPARIN SODIUM 40 MILLIGRAM(S): 100 INJECTION SUBCUTANEOUS at 11:48

## 2021-01-04 RX ADMIN — ATORVASTATIN CALCIUM 20 MILLIGRAM(S): 80 TABLET, FILM COATED ORAL at 21:06

## 2021-01-04 RX ADMIN — GABAPENTIN 200 MILLIGRAM(S): 400 CAPSULE ORAL at 21:06

## 2021-01-04 RX ADMIN — GABAPENTIN 200 MILLIGRAM(S): 400 CAPSULE ORAL at 05:10

## 2021-01-04 RX ADMIN — Medication 81 MILLIGRAM(S): at 11:48

## 2021-01-04 RX ADMIN — SENNA PLUS 10 MILLILITER(S): 8.6 TABLET ORAL at 21:06

## 2021-01-04 RX ADMIN — GABAPENTIN 200 MILLIGRAM(S): 400 CAPSULE ORAL at 13:03

## 2021-01-04 RX ADMIN — CHLORHEXIDINE GLUCONATE 15 MILLILITER(S): 213 SOLUTION TOPICAL at 18:39

## 2021-01-04 RX ADMIN — Medication 112 MICROGRAM(S): at 05:10

## 2021-01-04 NOTE — CONSULT NOTE ADULT - ASSESSMENT
54 y/o F with PMH of HTN, HLD, thyroid ca s/p thyroidectomy, CVA in 2018 presents for elective right Hemiglossectomy on 12/29
55F Mandarin-speaking, with hx thyroid cancer s/p total thyroidectomy (3/2020) and RT, CVA, HTN, now s/p R hemiglossectomy, b/l neck dissection with L ALT reconstruction for tongue cancer. SICU consulted for q1hr flap checks    PLAN:  NEURO: no acute issue, hx CVA  - sedated on propofol  - pain control with tylenol, fentanyl    RESPIRATORY: no acute issue  - s/p tracheostomy on AC 12/450/5/30%    CARDIOVASCULAR: HTN  - monitor vital, mraie as need for MAP >65; currently not on pressors  - q1h flap check with [employer] doppler    GI/NUTRITION: no acute issue  - NPO  - keotube in place    GENITOURINARY/RENAL: no acute issue  - Alarcon in place  - monitor UOP, I&O  - monitor BUN/Cr    HEMATOLOGIC: no acute issue  - ASA rectal tonight; PO 81 starting tomorrow AM  - LVX dvt ppx    INFECTIOUS DISEASE: no acute issue  - Unasyn while BRUCE drains in place  - monitor for fever and leukocytosis    ENDOCRINE: thyroid cancer, s/p total thyroidectomy  - continue with synthroid  - monitor blood glucose with BMP    ENT: s/p R hemiglossectomy, b/l neck dissection with L ALT reconstruction for tongue cancer 12/29  - continue q1hr flap checks    DISPO: PACU (under SICU care)

## 2021-01-04 NOTE — CONSULT NOTE ADULT - SUBJECTIVE AND OBJECTIVE BOX
HPI: 56 y/o F with PMH of HTN, HLD, thyroid ca s/p thyroidectomy, CVA in 2018 presents for elective right Hemiglossectomy. S/p OR on  12/29 with post op stay in SICU for q1h flap check. Now on the medical floors. Doing well. Spoke to her via Mandarin  however ROS limited as patient unable to speak out loud for the .       PAST MEDICAL & SURGICAL HISTORY:  Hypercholesterolemia    Thyroid cancer  dx 3/2020 ; tx surgically/ RT    CVA (cerebrovascular accident)  Per pt in [place] 11/ 2018 ; rx plavix ; Left Sided Weakness    Hypertension    S/P thyroidectomy  3/2020        Review of Systems:   Limited as patient unable to speak loudly for the  service over the phone.     Allergies    apple (Other)  No Known Drug Allergies    Intolerances    Social History: never smoker     FAMILY HISTORY:  No pertinent family history in first degree relatives        MEDICATIONS  (STANDING):  amLODIPine   Tablet 5 milliGRAM(s) Oral daily  aspirin  chewable 81 milliGRAM(s) Oral daily  atorvastatin 20 milliGRAM(s) Oral at bedtime  chlorhexidine 0.12% Liquid 15 milliLiter(s) Oral Mucosa two times a day  chlorhexidine 4% Liquid 1 Application(s) Topical daily  enoxaparin Injectable 40 milliGRAM(s) SubCutaneous daily  gabapentin   Solution 200 milliGRAM(s) Oral every 8 hours  levothyroxine 112 MICROGram(s) Oral <User Schedule>  levothyroxine 100 MICROGram(s) Oral <User Schedule>  lidocaine 2% Injectable 10 milliLiter(s) Local Injection once  senna Syrup 10 milliLiter(s) Oral at bedtime    MEDICATIONS  (PRN):  oxyCODONE    Solution 5 milliGRAM(s) Oral every 4 hours PRN Moderate Pain (4 - 6)  oxyCODONE    Solution 10 milliGRAM(s) Oral every 4 hours PRN Severe Pain (7 - 10)  polyethylene glycol 3350 17 Gram(s) Oral once PRN Constipation      Vital Signs Last 24 Hrs  T(C): 36.7 (04 Jan 2021 10:03), Max: 37.5 (03 Jan 2021 22:00)  T(F): 98.1 (04 Jan 2021 10:03), Max: 99.5 (03 Jan 2021 22:00)  HR: 88 (04 Jan 2021 10:03) (87 - 92)  BP: 98/70 (04 Jan 2021 10:03) (98/70 - 129/81)  BP(mean): --  RR: 18 (04 Jan 2021 10:03) (16 - 18)  SpO2: 100% (04 Jan 2021 10:03) (95% - 100%)        PHYSICAL EXAM:  GENERAL: NAD, well-developed, sitting in chair  HEAD:  Atraumatic, Normocephalic  EYES: EOMI, PERRLA, conjunctiva and sclera clear  NECK: + trach  CHEST/LUNG: Clear to auscultation bilaterally; No wheeze  HEART: Regular rate and rhythm; No murmurs, rubs, or gallops  ABDOMEN: Soft, Nontender, Nondistended; Bowel sounds present  EXTREMITIES:  2+ Peripheral Pulses, No clubbing, cyanosis, or edema  PSYCH: AAOx3  NEUROLOGY: non-focal  SKIN: No rashes or lesions    LABS:                        9.6    8.07  )-----------( 252      ( 04 Jan 2021 05:43 )             28.7     01-04    139  |  102  |  17  ----------------------------<  112<H>  3.8   |  28  |  0.48<L>    Ca    8.7      04 Jan 2021 05:43  Phos  3.6     01-04  Mg     2.3     01-04      RADIOLOGY & ADDITIONAL TESTS:    Imaging Personally Reviewed:    Consultant(s) Notes Reviewed:      Care Discussed with Consultants/Other Providers:    Assessment and Plan:

## 2021-01-04 NOTE — CONSULT NOTE ADULT - PROBLEM SELECTOR RECOMMENDATION 2
-Reported CVA in 2018 in [place]   -On plaivx at home. Held since 12/21 in [facility] for surgery  -I personally spoke to patient's neurologist Dr. Gama () who recommends that patient can be transitioned to ASA 81 from plavix on discharge  -C/w asa 81 for now

## 2021-01-04 NOTE — PROGRESS NOTE ADULT - SUBJECTIVE AND OBJECTIVE BOX
Plastic Surgery    SUBJECTIVE: Pt seen and examined on rounds with team. No acute events overnight.        OBJECTIVE    PHYSICAL EXAM:   General: Awake and alert, NAD  HEENT: flap soft, warm, good cook signal.  neck soft, no signs of collection  L thigh: incision intact, no signs of collection      VITALS  T(C): 37.3 (01-04-21 @ 05:08), Max: 37.5 (01-03-21 @ 22:00)  HR: 91 (01-04-21 @ 05:08) (87 - 92)  BP: 129/81 (01-04-21 @ 05:08) (100/67 - 129/81)  RR: 17 (01-04-21 @ 05:08) (16 - 18)  SpO2: 96% (01-04-21 @ 05:08) (91% - 99%)  CAPILLARY BLOOD GLUCOSE          Is/Os    01-03 @ 07:01  -  01-04 @ 07:00  --------------------------------------------------------  IN:    Enteral Tube Flush: 200 mL    Pivot 1.5: 432 mL  Total IN: 632 mL    OUT:    IV PiggyBack: 0 mL    Oral Fluid: 0 mL    Voided (mL): 1500 mL  Total OUT: 1500 mL    Total NET: -868 mL          MEDICATIONS (STANDING): amLODIPine   Tablet 5 milliGRAM(s) Oral daily  aspirin  chewable 81 milliGRAM(s) Oral daily  atorvastatin 20 milliGRAM(s) Oral at bedtime  enoxaparin Injectable 40 milliGRAM(s) SubCutaneous daily  gabapentin   Solution 200 milliGRAM(s) Oral every 8 hours  levothyroxine 112 MICROGram(s) Oral <User Schedule>  levothyroxine 100 MICROGram(s) Oral <User Schedule>  senna Syrup 10 milliLiter(s) Oral at bedtime    MEDICATIONS (PRN):oxyCODONE    Solution 5 milliGRAM(s) Oral every 4 hours PRN Moderate Pain (4 - 6)  oxyCODONE    Solution 10 milliGRAM(s) Oral every 4 hours PRN Severe Pain (7 - 10)      LABS  CBC (01-04 @ 05:43)                              9.6<L>                         8.07    )----------------(  252        --    % Neutrophils, --    % Lymphocytes, ANC: --                                  28.7<L>  CBC (01-03 @ 01:32)                              8.4<L>                         7.91    )----------------(  222        --    % Neutrophils, --    % Lymphocytes, ANC: --                                  24.9<L>    BMP (01-04 @ 05:43)             139     |  102     |  17    		Ca++ --      Ca 8.7                ---------------------------------( 112<H>		Mg 2.3                3.8     |  28      |  0.48<L>			Ph 3.6     BMP (01-03 @ 01:32)             138     |  102     |  13    		Ca++ --      Ca 9.0                ---------------------------------( 132<H>		Mg 2.2                4.0     |  26      |  0.44<L>			Ph 3.4                   IMAGING STUDIES

## 2021-01-04 NOTE — PROGRESS NOTE ADULT - ASSESSMENT
ASSESSMENT/PLAN:   NICOL COLINDRES is a 55yFemale s/p Free ALT  doing well  -Q4H flap checks  -Bacitracin BID to incision line   -TF per ENT    -Asa, lovenox, unasyn    Plastic Surgery   [facility]: [id number]  [facility]: [phone]

## 2021-01-04 NOTE — PROGRESS NOTE ADULT - SUBJECTIVE AND OBJECTIVE BOX
No acute events overnight  Plan to change to 6 CFS today with plan of swallow on POD7.     T(F): 97.4 (03 Jan 2021 06:00), Max: 99.7 (02 Jan 2021 20:00)  HR: 96 (03 Jan 2021 06:00) (82 - 96)  BP: 117/80 (03 Jan 2021 06:00) (108/67 - 130/85)  RR: 18 (03 Jan 2021 06:00) (13 - 19)  SpO2: 96% (03 Jan 2021 06:00) (96% - 100%)    NAD  NGT sutures in nasal cavity  Intraoral suture lines intact, soft, warm, cook doppler with triphasic signal  Neck incision closed with stables, c/d/i  6LPC sutured in place  L ALT incision c/d/i, closed with staples, JPx1 with serosanguinous drainage        Assessment and Plan:   · Assessment	  55F s/p R glossectomy, bl SND 1-4, L ALT, trach 12/29    #Neuro  - pain control PRN     #ENT  - Flap checks per PRS  - ASA 81 via NGT daily  - lovenox       #CV  - Monitor BP, maintain MAPs   - Avoid pressors  - 500 cc bolus NS PRN for MAP maintenance     #Resp  - Routine trach care (obturator taped to head of bed, replacement trach at bedside, suction with red rubber catheters only, change inner cannula PRN and q shift, keep surrounding skin clean / dry / protected)  - trach collar, humidified O2   - Routine trach/stoma care, suctioning    #GI  - NPO  - bolus TF, f/u nutrition for feeding recommendations  - Bedside swallow POD7  - Bowel regimen while receiving opiates for pain  - PPI, anti-emetics   - Monitor lytes; replace PRN    #ID  - Monitor temp, WBC    #MSK  - Monitor leg incision   - activity per PRS

## 2021-01-05 LAB — SURGICAL PATHOLOGY STUDY: SIGNIFICANT CHANGE UP

## 2021-01-05 PROCEDURE — 99232 SBSQ HOSP IP/OBS MODERATE 35: CPT

## 2021-01-05 RX ORDER — OXYCODONE HYDROCHLORIDE 5 MG/1
10 TABLET ORAL EVERY 4 HOURS
Refills: 0 | Status: DISCONTINUED | OUTPATIENT
Start: 2021-01-05 | End: 2021-01-11

## 2021-01-05 RX ORDER — OXYCODONE HYDROCHLORIDE 5 MG/1
5 TABLET ORAL EVERY 4 HOURS
Refills: 0 | Status: DISCONTINUED | OUTPATIENT
Start: 2021-01-05 | End: 2021-01-11

## 2021-01-05 RX ORDER — ENOXAPARIN SODIUM 100 MG/ML
40 INJECTION SUBCUTANEOUS DAILY
Refills: 0 | Status: DISCONTINUED | OUTPATIENT
Start: 2021-01-05 | End: 2021-01-13

## 2021-01-05 RX ADMIN — GABAPENTIN 200 MILLIGRAM(S): 400 CAPSULE ORAL at 06:28

## 2021-01-05 RX ADMIN — Medication 81 MILLIGRAM(S): at 13:39

## 2021-01-05 RX ADMIN — CHLORHEXIDINE GLUCONATE 15 MILLILITER(S): 213 SOLUTION TOPICAL at 18:29

## 2021-01-05 RX ADMIN — ATORVASTATIN CALCIUM 20 MILLIGRAM(S): 80 TABLET, FILM COATED ORAL at 23:03

## 2021-01-05 RX ADMIN — ENOXAPARIN SODIUM 40 MILLIGRAM(S): 100 INJECTION SUBCUTANEOUS at 13:39

## 2021-01-05 RX ADMIN — CHLORHEXIDINE GLUCONATE 15 MILLILITER(S): 213 SOLUTION TOPICAL at 06:27

## 2021-01-05 RX ADMIN — Medication 112 MICROGRAM(S): at 06:28

## 2021-01-05 RX ADMIN — GABAPENTIN 200 MILLIGRAM(S): 400 CAPSULE ORAL at 13:38

## 2021-01-05 RX ADMIN — GABAPENTIN 200 MILLIGRAM(S): 400 CAPSULE ORAL at 23:03

## 2021-01-05 NOTE — PROGRESS NOTE ADULT - ASSESSMENT
ASSESSMENT/PLAN:   NICOL COLINDRES is a 55yFemale s/p Free ALT  doing well    -Cook doppler d/c'd  -staples out POD10  -Q4H flap checks  -Bacitracin BID to incision line   -TF per ENT    -Asa, lovenox, unasyn    Plastic Surgery   [facility]: [id number]  [facility]: [phone]

## 2021-01-05 NOTE — DIETITIAN INITIAL EVALUATION ADULT. - OTHER INFO
Patient was maintained on NGT of Pivot1.5Cal bolus feeds of 216mL s4oszul provides total volume of 864mL, 1296Kcal, 81gpro and 648mL free water. Patient is tolerating tube feeds well. No GI distress (nausea, vomiting, diarrhea, constipation) reported-confirmed with RN. S/p swallow evaluation today-"PO is contraindicated at this time".

## 2021-01-05 NOTE — DIETITIAN INITIAL EVALUATION ADULT. - PERTINENT LABORATORY DATA
01-04 Na139 mmol/L Glu 112 mg/dL<H> K+ 3.8 mmol/L Cr  0.48 mg/dL<L> BUN 17 mg/dL 01-04 Phos 3.6 mg/dL 12-31 Alb 2.9 g/dL<L>

## 2021-01-05 NOTE — DIETITIAN INITIAL EVALUATION ADULT. - COLLABORATION WITH OTHER PROVIDERS
Consider alternate means of nutrition (PEG) if NGT prolonged given patient failed swallow evaluation as discussed with ENT PA.

## 2021-01-05 NOTE — DIETITIAN INITIAL EVALUATION ADULT. - ADD RECOMMEND
Further adjustments to rate/volume/duration/free water provision of enteral feeds dependant on long term monitoring of patient's tolerance, weight trends and needs.

## 2021-01-05 NOTE — PROGRESS NOTE ADULT - SUBJECTIVE AND OBJECTIVE BOX
Patient is a 55y old  Female who presents with a chief complaint of Elective Right Hemiglossectomy Right Neck Dissection Tracheostomy (04 Jan 2021 12:28)    SUBJECTIVE / OVERNIGHT EVENTS: Sitting in chair. Comfortable.     MEDICATIONS  (STANDING):  amLODIPine   Tablet 5 milliGRAM(s) Oral daily  aspirin  chewable 81 milliGRAM(s) Oral daily  atorvastatin 20 milliGRAM(s) Oral at bedtime  chlorhexidine 0.12% Liquid 15 milliLiter(s) Oral Mucosa two times a day  enoxaparin Injectable 40 milliGRAM(s) SubCutaneous daily  gabapentin   Solution 200 milliGRAM(s) Oral every 8 hours  levothyroxine 112 MICROGram(s) Oral <User Schedule>  levothyroxine 100 MICROGram(s) Oral <User Schedule>  lidocaine 2% Injectable 10 milliLiter(s) Local Injection once  senna Syrup 10 milliLiter(s) Oral at bedtime    MEDICATIONS  (PRN):  oxyCODONE    Solution 5 milliGRAM(s) Oral every 4 hours PRN Moderate Pain (4 - 6)  oxyCODONE    Solution 10 milliGRAM(s) Oral every 4 hours PRN Severe Pain (7 - 10)  polyethylene glycol 3350 17 Gram(s) Oral once PRN Constipation        I&O's Summary    04 Jan 2021 07:01  -  05 Jan 2021 07:00  --------------------------------------------------------  IN: 1164 mL / OUT: 1070 mL / NET: 94 mL    05 Jan 2021 07:01  -  05 Jan 2021 12:32  --------------------------------------------------------  IN: 0 mL / OUT: 0 mL / NET: 0 mL    Vital Signs Last 24 Hrs  T(C): 36.5 (05 Jan 2021 10:00), Max: 36.6 (04 Jan 2021 21:31)  T(F): 97.7 (05 Jan 2021 10:00), Max: 97.8 (04 Jan 2021 21:31)  HR: 86 (05 Jan 2021 10:00) (86 - 96)  BP: 97/66 (05 Jan 2021 10:00) (97/66 - 108/74)  BP(mean): --  RR: 19 (05 Jan 2021 10:00) (16 - 19)  SpO2: 99% (05 Jan 2021 10:00) (95% - 99%)    PHYSICAL EXAM:  GENERAL: NAD, well-developed  HEAD:  Atraumatic, Normocephalic  EYES: EOMI, PERRLA, conjunctiva and sclera clear  NECK: staples intact, dressing c/d/i  CHEST/LUNG: Clear to auscultation bilaterally; No wheeze  HEART: Regular rate and rhythm; No murmurs, rubs, or gallops  ABDOMEN: Soft, Nontender, Nondistended; Bowel sounds present  EXTREMITIES:  2+ Peripheral Pulses, No clubbing, cyanosis, or edema  PSYCH: AAOx3  NEUROLOGY: non-focal  SKIN: No rashes or lesions    LABS:                        9.6    8.07  )-----------( 252      ( 04 Jan 2021 05:43 )             28.7     01-04    139  |  102  |  17  ----------------------------<  112<H>  3.8   |  28  |  0.48<L>    Ca    8.7      04 Jan 2021 05:43  Phos  3.6     01-04  Mg     2.3     01-04      RADIOLOGY & ADDITIONAL TESTS:    Imaging Personally Reviewed:    Consultant(s) Notes Reviewed:      Care Discussed with Consultants/Other Providers:    Assessment and Plan:

## 2021-01-05 NOTE — PROGRESS NOTE ADULT - ASSESSMENT
54 y/o F with PMH of HTN, HLD, thyroid ca s/p thyroidectomy, CVA in 2018 presents for elective right Hemiglossectomy on 12/29

## 2021-01-05 NOTE — DIETITIAN INITIAL EVALUATION ADULT. - CHIEF COMPLAINT
55 year old female with hx thyroid cancer s/p Total Thyroidectomy (3/2020) and RT, CVA, HTN, presented with R tongue lesion s/p biopsy x 2 (last 12/2020), now s/p R hemiglossectomy, b/l neck dissection per chart review.

## 2021-01-05 NOTE — PROGRESS NOTE ADULT - SUBJECTIVE AND OBJECTIVE BOX
Plan for bedside swallow eval today  OOBTC    T(F): 97.6 (05 Jan 2021 01:57), Max: 98.1 (04 Jan 2021 10:03)  HR: 96 (05 Jan 2021 01:57) (86 - 96)  BP: 98/67 (05 Jan 2021 01:57) (98/67 - 108/74)  RR: 17 (05 Jan 2021 01:57) (16 - 18)  SpO2: 97% (05 Jan 2021 01:57) (97% - 100%)    NAD  NGT sutures in nasal cavity  Intraoral suture lines intact, soft, warm, cook doppler with triphasic signal  Neck incision closed with stables, c/d/i  6LPC sutured in place  L ALT incision c/d/i, closed with staples, JPx1 with serosanguinous drainage      Assessment and Plan:   · Assessment	  55F s/p R glossectomy, bl SND 1-4, L ALT, trach 12/29    #Neuro  - pain control PRN     #ENT  - Flap checks per PRS  - ASA 81 via NGT daily  - lovenox       #CV  - Monitor BP, maintain MAPs   - Avoid pressors  - 500 cc bolus NS PRN for MAP maintenance     #Resp  - Routine trach care (obturator taped to head of bed, replacement trach at bedside, suction with red rubber catheters only, change inner cannula PRN and q shift, keep surrounding skin clean / dry / protected)  - trach collar, humidified O2   - Routine trach/stoma care, suctioning    #GI  - NPO  - bolus TF, f/u nutrition for feeding recommendations  - Bedside swallow POD7  - Bowel regimen while receiving opiates for pain  - PPI, anti-emetics   - Monitor lytes; replace PRN    #ID  - Monitor temp, WBC    #MSK  - Monitor leg incision   - activity per PRS

## 2021-01-05 NOTE — PROGRESS NOTE ADULT - SUBJECTIVE AND OBJECTIVE BOX
Plastic Surgery    SUBJECTIVE: Pt seen and examined on rounds with team. No acute events overnight.        OBJECTIVE    PHYSICAL EXAM:   PHYSICAL EXAM:   General: Awake and alert, NAD  HEENT: flap soft, warm.  neck soft, no signs of collection.  L thigh: incision intact, no signs of collection    VITALS  T(C): 36.3 (01-05-21 @ 06:24), Max: 36.7 (01-04-21 @ 10:03)  HR: 88 (01-05-21 @ 06:24) (86 - 96)  BP: 101/68 (01-05-21 @ 06:24) (98/67 - 108/74)  RR: 17 (01-05-21 @ 06:24) (16 - 18)  SpO2: 95% (01-05-21 @ 06:24) (95% - 100%)  CAPILLARY BLOOD GLUCOSE          Is/Os    01-04 @ 07:01  -  01-05 @ 07:00  --------------------------------------------------------  IN:    Enteral Tube Flush: 300 mL    Pivot 1.5: 864 mL  Total IN: 1164 mL    OUT:    Oral Fluid: 0 mL    Voided (mL): 1070 mL  Total OUT: 1070 mL    Total NET: 94 mL          MEDICATIONS (STANDING): amLODIPine   Tablet 5 milliGRAM(s) Oral daily  aspirin  chewable 81 milliGRAM(s) Oral daily  atorvastatin 20 milliGRAM(s) Oral at bedtime  enoxaparin Injectable 40 milliGRAM(s) SubCutaneous daily  gabapentin   Solution 200 milliGRAM(s) Oral every 8 hours  levothyroxine 112 MICROGram(s) Oral <User Schedule>  levothyroxine 100 MICROGram(s) Oral <User Schedule>  lidocaine 2% Injectable 10 milliLiter(s) Local Injection once  senna Syrup 10 milliLiter(s) Oral at bedtime    MEDICATIONS (PRN):oxyCODONE    Solution 5 milliGRAM(s) Oral every 4 hours PRN Moderate Pain (4 - 6)  oxyCODONE    Solution 10 milliGRAM(s) Oral every 4 hours PRN Severe Pain (7 - 10)  polyethylene glycol 3350 17 Gram(s) Oral once PRN Constipation      LABS  CBC (01-04 @ 05:43)                              9.6<L>                         8.07    )----------------(  252        --    % Neutrophils, --    % Lymphocytes, ANC: --                                  28.7<L>    BMP (01-04 @ 05:43)             139     |  102     |  17    		Ca++ --      Ca 8.7                ---------------------------------( 112<H>		Mg 2.3                3.8     |  28      |  0.48<L>			Ph 3.6                   IMAGING STUDIES

## 2021-01-05 NOTE — SWALLOW BEDSIDE ASSESSMENT ADULT - ADDITIONAL RECOMMENDATIONS
SLP spoke with ENT THANG Curry, this service will follow up to assess patient's swallow function SLP spoke with ENT THANG Curry, this service will follow up to assess patient's ability to initiate a PO diet versus need for a cinesophagram

## 2021-01-05 NOTE — PROGRESS NOTE ADULT - PROBLEM SELECTOR PLAN 2
-Reported CVA in 2018 in [place]   -On plaivx at home. Held since 12/21 in prep for surgery  -I personally spoke to patient's neurologist Dr. Gama () who recommends that patient can be transitioned to ASA 81 from plavix on discharge  -C/w asa 81 for now.

## 2021-01-05 NOTE — DIETITIAN INITIAL EVALUATION ADULT. - ENTERAL
1. Recommend change TF to Jevity1.2Cal bolus feeds of [id number] provides total volume of 1320mL, 1584KCal, 73g pro and 1065mL free water. 2. Monitor weights, labs, BM's, skin integrity, tolerance to EN.

## 2021-01-05 NOTE — SWALLOW BEDSIDE ASSESSMENT ADULT - COMMENTS
As per ENT Note: 55F s/p R glossectomy, bl SND 1-4, L ALT, trach 12/29    As per H&P: 55 year old female, Mandarin-speaking, with hx thyroid cancer s/p Total Thyroidectomy (3/2020) and RT, CVA, HTN, presented with R tongue lesion s/p biopsy x 2 (last 12/2020), said it was cancer. Patient now s/p R hemiglossectomy, b/l neck dissection with L ALT reconstruction.  Anterior neck incision with staples.  6CFS with  in place.  Patient with NGT in situ.     SLP received patient upright in chair, awake, alert, able to follow directions and gesture to make wants and needs know.  SLP used [employer] for English-Mandarin translation, #[id number]. Patient able to phonate schwa with good voicing, patient’s speech is not intelligible, able to make wants and needs known via gestures. Patient denies pain. As per ENT Note: 55F s/p R glossectomy, bl SND 1-4, L ALT, trach 12/29    As per H&P: 55 year old female, Mandarin-speaking, with hx thyroid cancer s/p Total Thyroidectomy (3/2020) and RT, CVA, HTN, presented with R tongue lesion s/p biopsy x 2 (last 12/2020), said it was cancer. Patient now s/p R hemiglossectomy, b/l neck dissection with L ALT reconstruction.  Anterior neck incision with staples.  6CFS with  in place.  Patient with NGT in situ.     SLP received patient upright in chair, awake, alert, able to follow directions and gesture to make wants and needs know.  SLP used [employer] for English-Mandarin translation, #[id number]. Patient able to phonate schwa with good voicing, patient with reduced intelligibility due to post surgical changes, able to make wants and needs known via gestures. Patient denies pain.

## 2021-01-06 LAB
ANION GAP SERPL CALC-SCNC: 11 MMOL/L — SIGNIFICANT CHANGE UP (ref 7–14)
BUN SERPL-MCNC: 14 MG/DL — SIGNIFICANT CHANGE UP (ref 7–23)
CALCIUM SERPL-MCNC: 9 MG/DL — SIGNIFICANT CHANGE UP (ref 8.4–10.5)
CHLORIDE SERPL-SCNC: 102 MMOL/L — SIGNIFICANT CHANGE UP (ref 98–107)
CO2 SERPL-SCNC: 27 MMOL/L — SIGNIFICANT CHANGE UP (ref 22–31)
CREAT SERPL-MCNC: 0.43 MG/DL — LOW (ref 0.5–1.3)
GLUCOSE SERPL-MCNC: 106 MG/DL — HIGH (ref 70–99)
MAGNESIUM SERPL-MCNC: 2.4 MG/DL — SIGNIFICANT CHANGE UP (ref 1.6–2.6)
PHOSPHATE SERPL-MCNC: 3.7 MG/DL — SIGNIFICANT CHANGE UP (ref 2.5–4.5)
POTASSIUM SERPL-MCNC: 4 MMOL/L — SIGNIFICANT CHANGE UP (ref 3.5–5.3)
POTASSIUM SERPL-SCNC: 4 MMOL/L — SIGNIFICANT CHANGE UP (ref 3.5–5.3)
SODIUM SERPL-SCNC: 140 MMOL/L — SIGNIFICANT CHANGE UP (ref 135–145)

## 2021-01-06 PROCEDURE — 99232 SBSQ HOSP IP/OBS MODERATE 35: CPT

## 2021-01-06 RX ADMIN — ENOXAPARIN SODIUM 40 MILLIGRAM(S): 100 INJECTION SUBCUTANEOUS at 10:52

## 2021-01-06 RX ADMIN — GABAPENTIN 200 MILLIGRAM(S): 400 CAPSULE ORAL at 21:34

## 2021-01-06 RX ADMIN — CHLORHEXIDINE GLUCONATE 15 MILLILITER(S): 213 SOLUTION TOPICAL at 17:48

## 2021-01-06 RX ADMIN — GABAPENTIN 200 MILLIGRAM(S): 400 CAPSULE ORAL at 14:15

## 2021-01-06 RX ADMIN — CHLORHEXIDINE GLUCONATE 15 MILLILITER(S): 213 SOLUTION TOPICAL at 06:55

## 2021-01-06 RX ADMIN — ATORVASTATIN CALCIUM 20 MILLIGRAM(S): 80 TABLET, FILM COATED ORAL at 21:34

## 2021-01-06 RX ADMIN — GABAPENTIN 200 MILLIGRAM(S): 400 CAPSULE ORAL at 06:55

## 2021-01-06 RX ADMIN — Medication 81 MILLIGRAM(S): at 10:52

## 2021-01-06 RX ADMIN — Medication 112 MICROGRAM(S): at 06:55

## 2021-01-06 NOTE — PROGRESS NOTE ADULT - SUBJECTIVE AND OBJECTIVE BOX
Patient is a 55y old  Female who presents with a chief complaint of Elective Right Hemiglossectomy Right Neck Dissection Tracheostomy (04 Jan 2021 12:28)    SUBJECTIVE / OVERNIGHT EVENTS: Sitting in chair. Comfortable.     MEDICATIONS  (STANDING):  amLODIPine   Tablet 5 milliGRAM(s) Oral daily  aspirin  chewable 81 milliGRAM(s) Oral daily  atorvastatin 20 milliGRAM(s) Oral at bedtime  chlorhexidine 0.12% Liquid 15 milliLiter(s) Oral Mucosa two times a day  enoxaparin Injectable 40 milliGRAM(s) SubCutaneous daily  gabapentin   Solution 200 milliGRAM(s) Oral every 8 hours  levothyroxine 112 MICROGram(s) Oral <User Schedule>  levothyroxine 100 MICROGram(s) Oral <User Schedule>  lidocaine 2% Injectable 10 milliLiter(s) Local Injection once  senna Syrup 10 milliLiter(s) Oral at bedtime    MEDICATIONS  (PRN):  oxyCODONE    Solution 5 milliGRAM(s) Oral every 4 hours PRN Moderate Pain (4 - 6)  oxyCODONE    Solution 10 milliGRAM(s) Oral every 4 hours PRN Severe Pain (7 - 10)  polyethylene glycol 3350 17 Gram(s) Oral once PRN Constipation      Vital Signs Last 24 Hrs  T(C): 36.6 (06 Jan 2021 09:38), Max: 37.1 (05 Jan 2021 22:07)  T(F): 97.9 (06 Jan 2021 09:38), Max: 98.8 (05 Jan 2021 22:07)  HR: 98 (06 Jan 2021 09:38) (89 - 98)  BP: 122/73 (06 Jan 2021 09:38) (108/77 - 124/77)  BP(mean): --  RR: 17 (06 Jan 2021 09:38) (16 - 17)  SpO2: 97% (06 Jan 2021 09:38) (97% - 99%)    PHYSICAL EXAM:  GENERAL: NAD, well-developed  HEAD:  Atraumatic, Normocephalic  EYES: EOMI, PERRLA, conjunctiva and sclera clear  NECK: staples intact, dressing c/d/i  CHEST/LUNG: Clear to auscultation bilaterally; No wheeze  HEART: Regular rate and rhythm; No murmurs, rubs, or gallops  ABDOMEN: Soft, Nontender, Nondistended; Bowel sounds present  EXTREMITIES:  2+ Peripheral Pulses, No clubbing, cyanosis, or edema  PSYCH: AAOx3  NEUROLOGY: non-focal  SKIN: No rashes or lesions    LABS:                   RADIOLOGY & ADDITIONAL TESTS:    Imaging Personally Reviewed:    Consultant(s) Notes Reviewed:      Care Discussed with Consultants/Other Providers:    Assessment and Plan:

## 2021-01-06 NOTE — PROGRESS NOTE ADULT - SUBJECTIVE AND OBJECTIVE BOX
Plastic Surgery    SUBJECTIVE: Pt seen and examined on rounds with team. No acute events overnight.        OBJECTIVE    PHYSICAL EXAM:   General: Awake and alert, NAD  HEENT: flap soft, warm.  neck soft, no signs of collection.  L thigh: incision intact, no signs of collection    VITALS  T(C): 36.6 (01-06-21 @ 05:45), Max: 37.1 (01-05-21 @ 22:07)  HR: 97 (01-06-21 @ 05:45) (86 - 98)  BP: 108/77 (01-06-21 @ 05:45) (97/66 - 124/77)  RR: 17 (01-06-21 @ 05:45) (16 - 19)  SpO2: 98% (01-06-21 @ 05:45) (97% - 99%)  CAPILLARY BLOOD GLUCOSE          Is/Os    01-05 @ 07:01  -  01-06 @ 07:00  --------------------------------------------------------  IN:    Enteral Tube Flush: 450 mL    Jevity 1.2: 990 mL    Pivot 1.5: 216 mL  Total IN: 1656 mL    OUT:    Voided (mL): 0 mL  Total OUT: 0 mL    Total NET: 1656 mL          MEDICATIONS (STANDING): amLODIPine   Tablet 5 milliGRAM(s) Oral daily  aspirin  chewable 81 milliGRAM(s) Oral daily  atorvastatin 20 milliGRAM(s) Oral at bedtime  enoxaparin Injectable 40 milliGRAM(s) SubCutaneous daily  gabapentin   Solution 200 milliGRAM(s) Oral every 8 hours  levothyroxine 112 MICROGram(s) Oral <User Schedule>  levothyroxine 100 MICROGram(s) Oral <User Schedule>  lidocaine 2% Injectable 10 milliLiter(s) Local Injection once  senna Syrup 10 milliLiter(s) Oral at bedtime    MEDICATIONS (PRN):oxyCODONE    Solution 5 milliGRAM(s) Oral every 4 hours PRN Moderate Pain (4 - 6)  oxyCODONE    Solution 10 milliGRAM(s) Oral every 4 hours PRN Severe Pain (7 - 10)  polyethylene glycol 3350 17 Gram(s) Oral once PRN Constipation      LABS                  IMAGING STUDIES

## 2021-01-06 NOTE — PROGRESS NOTE ADULT - PROBLEM SELECTOR PLAN 6
Health Maintenance Due   Topic Date Due   • Hepatitis B Vaccine (1 of 3 - Risk 3-dose series) 11/29/1995   • Diabetes Foot Exam  05/21/2019   • Diabetes A1C  10/04/2019   • Diabetes Eye Exam  02/21/2020       Patient is due for topics as listed above but is not proceeding with Immunization(s) Hep B, Diabetes Eye Exam and Diabetes Foot Exam at this time.                -lovenox

## 2021-01-06 NOTE — PROGRESS NOTE ADULT - ASSESSMENT
56 y/o F with PMH of HTN, HLD, thyroid ca s/p thyroidectomy, CVA in 2018 presents for elective right Hemiglossectomy on 12/29

## 2021-01-06 NOTE — PROGRESS NOTE ADULT - ASSESSMENT
ASSESSMENT/PLAN:   NICOL COLINDRES is a 55yFemale s/p Free ALT  doing well    -staples out POD10  -Q4H flap checks  -Bacitracin BID to incision line   -TF per ENT    -Asa, lovenox, unasyn    Plastic Surgery   [facility]: [id number]  [facility]: [phone]

## 2021-01-06 NOTE — PROGRESS NOTE ADULT - SUBJECTIVE AND OBJECTIVE BOX
Pt failed bedside swallow today. Plan for repeat on Thursday.  Pt was capped today and tolerated this well    T(F): 97.6 (05 Jan 2021 01:57), Max: 98.1 (04 Jan 2021 10:03)  HR: 96 (05 Jan 2021 01:57) (86 - 96)  BP: 98/67 (05 Jan 2021 01:57) (98/67 - 108/74)  RR: 17 (05 Jan 2021 01:57) (16 - 18)  SpO2: 97% (05 Jan 2021 01:57) (97% - 100%)    NAD  NGT sutures in nasal cavity  Intraoral suture lines intact, soft, warm, cook doppler with triphasic signal  Neck incision closed with stables, c/d/i  6 CFS sutured in place with cap in place   L ALT incision c/d/i, closed with staples, JPx1 with serosanguinous drainage      Assessment and Plan:   · Assessment	  55F s/p R glossectomy, bl SND 1-4, L ALT, trach 12/29    #Neuro  - pain control PRN     #ENT  - Flap checks per PRS  - ASA 81 via NGT daily  - lovenox       #CV  - Monitor BP, maintain MAPs   - Avoid pressors  - 500 cc bolus NS PRN for MAP maintenance     #Resp  - Routine trach care (obturator taped to head of bed, replacement trach at bedside, suction with red rubber catheters only, change inner cannula PRN and q shift, keep surrounding skin clean / dry / protected)  - trach collar, humidified O2   - Routine trach/stoma care, suctioning    #GI  - NPO  - bolus TF, f/u nutrition for feeding recommendations  - Repeat bedside swallow 1/7  - Bowel regimen while receiving opiates for pain  - PPI, anti-emetics   - Monitor lytes; replace PRN    #ID  - Monitor temp, WBC    #MSK  - Monitor leg incision   - activity per PRS

## 2021-01-07 PROCEDURE — 99232 SBSQ HOSP IP/OBS MODERATE 35: CPT

## 2021-01-07 RX ORDER — PETROLATUM,WHITE
1 JELLY (GRAM) TOPICAL THREE TIMES A DAY
Refills: 0 | Status: DISCONTINUED | OUTPATIENT
Start: 2021-01-07 | End: 2021-01-14

## 2021-01-07 RX ADMIN — CHLORHEXIDINE GLUCONATE 15 MILLILITER(S): 213 SOLUTION TOPICAL at 17:39

## 2021-01-07 RX ADMIN — Medication 81 MILLIGRAM(S): at 11:20

## 2021-01-07 RX ADMIN — Medication 1 APPLICATION(S): at 14:07

## 2021-01-07 RX ADMIN — CHLORHEXIDINE GLUCONATE 15 MILLILITER(S): 213 SOLUTION TOPICAL at 06:38

## 2021-01-07 RX ADMIN — Medication 112 MICROGRAM(S): at 06:39

## 2021-01-07 RX ADMIN — Medication 1 APPLICATION(S): at 22:00

## 2021-01-07 RX ADMIN — GABAPENTIN 200 MILLIGRAM(S): 400 CAPSULE ORAL at 14:07

## 2021-01-07 RX ADMIN — GABAPENTIN 200 MILLIGRAM(S): 400 CAPSULE ORAL at 22:48

## 2021-01-07 RX ADMIN — ATORVASTATIN CALCIUM 20 MILLIGRAM(S): 80 TABLET, FILM COATED ORAL at 22:48

## 2021-01-07 RX ADMIN — GABAPENTIN 200 MILLIGRAM(S): 400 CAPSULE ORAL at 06:39

## 2021-01-07 RX ADMIN — ENOXAPARIN SODIUM 40 MILLIGRAM(S): 100 INJECTION SUBCUTANEOUS at 11:20

## 2021-01-07 RX ADMIN — AMLODIPINE BESYLATE 5 MILLIGRAM(S): 2.5 TABLET ORAL at 06:39

## 2021-01-07 NOTE — SWALLOW BEDSIDE ASSESSMENT ADULT - ASR SWALLOW LABIAL MOBILITY
open mouth posture at baseline, mild-moderately reduced retraction, severely reduced protrusion
open mouth posture at baseline, mild-moderately reduced range of motion and strength

## 2021-01-07 NOTE — SWALLOW BEDSIDE ASSESSMENT ADULT - SWALLOW EVAL: RECOMMENDED DIET
PO is contraindicated at this time, continue NGT feeds to meet nutrition/hydration/medication needs
1. PO is contraindicated at this time, continue NGT feeds to meet nutrition/hydration/medication needs 2. Cinesophagram to objectively assess swallow function

## 2021-01-07 NOTE — SWALLOW BEDSIDE ASSESSMENT ADULT - SWALLOW EVAL: DIAGNOSIS
Patient consumed trials of puree and honey thick liquids via teaspoon presenting with oropharyngeal dysphagia.  Oral phase characterized by reduced ability to strip utensil, requiring placement into the oral cavity on anterior/mid tongue.  Patient with reduced ability to manipulate bolus,  with minimal tongue motion and reduced anterior to posterior transport, patient hyperextending head back to assist with bolus transport, suspect piecemeal transport given multiple swallows and patient's confirmation of difficulty transporting bolus.  There is mild anterior sulcus residue post swallow, SLP removed with Stephane with good result.  Pharyngeal phase characterized by initiation of the pharyngeal swallow and hyolaryngeal elevation noted upon palpation.
SLP provided trials of puree, patient presented with severe oral dysphagia. Oral phase characterized by reduced ability to strip utensil, requiring placement into the oral cavity.  SLP placed bolus on anterior tongue, however patient unable to manipulate/collect the bolus with minimal to no tongue motion and minimal transport posteriorly, resulting in the majority of the bolus migrating to the anterior sulcus.  Patient is noted to exhibited a dry swallow given with laryngeal elevation upon palpation likely without bolus due to severity of the oral stage deficit with majority of the bolus in the oral cavity which had to be removed via yankauer.  Due to the severity of the patient’s oral phase deficits, PO intake is not functional at this time.

## 2021-01-07 NOTE — PROGRESS NOTE ADULT - SUBJECTIVE AND OBJECTIVE BOX
Plastic Surgery    SUBJECTIVE: Pt seen and examined on rounds with team. No acute events overnight.        OBJECTIVE    PHYSICAL EXAM:   General: Awake and alert, NAD  HEENT: flap soft, warm, incisions intact.  neck soft, no signs of collection.  L thigh: incision intact, no signs of collection    VITALS  T(C): 36.8 (01-07-21 @ 06:34), Max: 37.1 (01-06-21 @ 21:29)  HR: 86 (01-07-21 @ 06:34) (77 - 99)  BP: 130/84 (01-07-21 @ 06:34) (122/67 - 132/77)  RR: 17 (01-07-21 @ 06:34) (16 - 17)  SpO2: 97% (01-07-21 @ 06:34) (97% - 98%)  CAPILLARY BLOOD GLUCOSE          Is/Os    01-06 @ 07:01  -  01-07 @ 07:00  --------------------------------------------------------  IN:    Enteral Tube Flush: 600 mL    Jevity 1.2: 1320 mL  Total IN: 1920 mL    OUT:    IV PiggyBack: 0 mL    Oral Fluid: 0 mL    Voided (mL): 300 mL  Total OUT: 300 mL    Total NET: 1620 mL          MEDICATIONS (STANDING): amLODIPine   Tablet 5 milliGRAM(s) Oral daily  aspirin  chewable 81 milliGRAM(s) Oral daily  atorvastatin 20 milliGRAM(s) Oral at bedtime  enoxaparin Injectable 40 milliGRAM(s) SubCutaneous daily  gabapentin   Solution 200 milliGRAM(s) Oral every 8 hours  levothyroxine 112 MICROGram(s) Oral <User Schedule>  levothyroxine 100 MICROGram(s) Oral <User Schedule>  lidocaine 2% Injectable 10 milliLiter(s) Local Injection once  senna Syrup 10 milliLiter(s) Oral at bedtime    MEDICATIONS (PRN):oxyCODONE    Solution 5 milliGRAM(s) Oral every 4 hours PRN Moderate Pain (4 - 6)  oxyCODONE    Solution 10 milliGRAM(s) Oral every 4 hours PRN Severe Pain (7 - 10)  polyethylene glycol 3350 17 Gram(s) Oral once PRN Constipation      LABS    BMP (01-06 @ 07:37)             140     |  102     |  14    		Ca++ --      Ca 9.0                ---------------------------------( 106<H>		Mg 2.4                4.0     |  27      |  0.43<L>			Ph 3.7                   IMAGING STUDIES

## 2021-01-07 NOTE — SWALLOW BEDSIDE ASSESSMENT ADULT - COMMENTS
As per ENT Note: 55F s/p R glossectomy, bl SND 1-4, L ALT, trach 12/29    As per H&P: 55 year old female, Mandarin-speaking, with hx thyroid cancer s/p Total Thyroidectomy (3/2020) and RT, CVA, HTN, presented with R tongue lesion s/p biopsy x 2 (last 12/2020), said it was cancer. Patient now s/p R hemiglossectomy, b/l neck dissection with L ALT reconstruction.    Patient with tracheostomy on 12/29/20 and decannulated on 1/6/21.  Anterior neck incision with staples.  Patient with NGT in situ. Patient with bandage over stoma site.     SLP received patient upright in at edge of bed, awake, alert, able to follow directions and gesture to make wants and needs know.  SLP used [employer] for English-Mandarin translation, #[id number]. Patient able to phonate schwa with good voicing, initially with wet vocal quality, but resolves with cued throat clear and swallow.  Patient with reduced intelligibility due to post surgical changes, able to make wants and needs known via gestures. SLP provided patient with English-Mandarin communication board. Patient denies pain.

## 2021-01-07 NOTE — PROGRESS NOTE ADULT - SUBJECTIVE AND OBJECTIVE BOX
Patient is a 55y old  Female who presents with a chief complaint of Elective Right Hemiglossectomy Right Neck Dissection Tracheostomy (04 Jan 2021 12:28)    SUBJECTIVE / OVERNIGHT EVENTS: Sitting in bed. Comfortable.     MEDICATIONS  (STANDING):  amLODIPine   Tablet 5 milliGRAM(s) Oral daily  aspirin  chewable 81 milliGRAM(s) Oral daily  atorvastatin 20 milliGRAM(s) Oral at bedtime  chlorhexidine 0.12% Liquid 15 milliLiter(s) Oral Mucosa two times a day  enoxaparin Injectable 40 milliGRAM(s) SubCutaneous daily  gabapentin   Solution 200 milliGRAM(s) Oral every 8 hours  levothyroxine 112 MICROGram(s) Oral <User Schedule>  levothyroxine 100 MICROGram(s) Oral <User Schedule>  lidocaine 2% Injectable 10 milliLiter(s) Local Injection once  senna Syrup 10 milliLiter(s) Oral at bedtime    MEDICATIONS  (PRN):  oxyCODONE    Solution 5 milliGRAM(s) Oral every 4 hours PRN Moderate Pain (4 - 6)  oxyCODONE    Solution 10 milliGRAM(s) Oral every 4 hours PRN Severe Pain (7 - 10)  polyethylene glycol 3350 17 Gram(s) Oral once PRN Constipation      Vital Signs Last 24 Hrs  T(C): 36.9 (07 Jan 2021 10:03), Max: 37.1 (06 Jan 2021 21:29)  T(F): 98.4 (07 Jan 2021 10:03), Max: 98.7 (06 Jan 2021 21:29)  HR: 97 (07 Jan 2021 10:33) (77 - 107)  BP: 113/76 (07 Jan 2021 10:03) (113/76 - 132/77)  BP(mean): --  RR: 16 (07 Jan 2021 10:03) (16 - 17)  SpO2: 98% (07 Jan 2021 10:03) (97% - 98%)    PHYSICAL EXAM:  GENERAL: NAD, well-developed  HEAD:  Atraumatic, Normocephalic  EYES: EOMI, PERRLA, conjunctiva and sclera clear  NECK: staples intact, dressing c/d/i  CHEST/LUNG: Clear to auscultation bilaterally; No wheeze  HEART: Regular rate and rhythm; No murmurs, rubs, or gallops  ABDOMEN: Soft, Nontender, Nondistended; Bowel sounds present  EXTREMITIES:  2+ Peripheral Pulses, No clubbing, cyanosis, or edema  PSYCH: AAOx3  NEUROLOGY: non-focal  SKIN: No rashes or lesions    LABS:                   RADIOLOGY & ADDITIONAL TESTS:    Imaging Personally Reviewed:    Consultant(s) Notes Reviewed:      Care Discussed with Consultants/Other Providers: ENT PA    Assessment and Plan:

## 2021-01-07 NOTE — SWALLOW BEDSIDE ASSESSMENT ADULT - SWALLOW EVAL: PROGNOSIS
CONTINUED IMPRESSIONS: Multiple swallows (~7) with puree, patient denies feeling of pharyngeal residue/globus sensation.  Patient with delayed weak cough response following honey thick liquid trials, questionable if related to PO trial.  Given patient's baseline vocal quality, and questionable signs of aspiration during evaluation, recommend objective assessment of swallow function. CONTINUED IMPRESSIONS: Multiple swallows (~7) with puree, patient denies feeling of pharyngeal residue/globus sensation.  Patient with delayed weak cough response 1x following honey thick liquid trials, questionable if related to PO trial.  Given patient's baseline wet vocal quality, and questionable signs of aspiration during evaluation, recommend objective assessment of swallow function.

## 2021-01-07 NOTE — SWALLOW BEDSIDE ASSESSMENT ADULT - SWALLOW EVAL: SECRETION MANAGEMENT
patient drooling at baseline, utilizes [employer] to manage secretions
patient drooling at baseline, utilizes [employer] to manage secretions

## 2021-01-07 NOTE — SWALLOW BEDSIDE ASSESSMENT ADULT - NS ASR SWALLOW FINDINGS DISCUS
SLP spoke with ENT THANG Curry regarding results and recommendations
SLP spoke with ENT THANG Dawson regarding results and recommendations/Patient

## 2021-01-07 NOTE — SWALLOW BEDSIDE ASSESSMENT ADULT - ASR SWALLOW LINGUAL MOBILITY
severely reduced protrusion, lateralization and elevation
severely reduced protrusion, lateralization and elevation

## 2021-01-07 NOTE — PROGRESS NOTE ADULT - SUBJECTIVE AND OBJECTIVE BOX
DANE. Decannulated yesterday without incident. Plan for repeat swallow today.    Vital Signs Last 24 Hrs  T(C): 36.7 (07 Jan 2021 02:37), Max: 37.1 (06 Jan 2021 21:29)  T(F): 98 (07 Jan 2021 02:37), Max: 98.7 (06 Jan 2021 21:29)  HR: 98 (07 Jan 2021 02:37) (77 - 99)  BP: 122/76 (07 Jan 2021 02:37) (122/67 - 132/77)  BP(mean): --  RR: 17 (07 Jan 2021 02:37) (16 - 17)  SpO2: 98% (07 Jan 2021 02:37) (97% - 98%)    NAD  NGT sutures in nasal cavity  Intraoral suture lines intact, soft, warm, cook doppler with triphasic signal  Neck incision closed with stables, c/d/i  Stoma site well-healing, covered with gauze/tape  L ALT incision c/d/i, closed with staples, JPx1 with serosanguinous drainage      Assessment and Plan:   55F s/p R glossectomy, bl SND 1-4, L ALT, trach 12/29    #Neuro  - pain control PRN     #ENT  - Flap checks per PRS  - ASA 81 via NGT daily  - lovenox       #CV  - Monitor BP, maintain MAPs   - Avoid pressors  - 500 cc bolus NS PRN for MAP maintenance     #Resp  - Routine trach care (obturator taped to head of bed, replacement trach at bedside, suction with red rubber catheters only, change inner cannula PRN and q shift, keep surrounding skin clean / dry / protected)  - trach collar, humidified O2   - Routine trach/stoma care, suctioning    #GI  - NPO  - bolus TF, f/u nutrition for feeding recommendations  - Repeat bedside swallow 1/7  - Bowel regimen while receiving opiates for pain  - PPI, anti-emetics   - Monitor lytes; replace PRN    #ID  - Monitor temp, WBC    #MSK  - Monitor leg incision   - activity per PRS

## 2021-01-07 NOTE — PROGRESS NOTE ADULT - ASSESSMENT
ASSESSMENT/PLAN:   NICOL COLINDRES is a 55yFemale s/p Free ALT  doing well    -staples out POD10  -Q4H flap checks  -Bacitracin BID to incision line   -TF per ENT    -Asa, lovenox, unasyn    Plastic Surgery   [facility]: [id number]  [facility]: [phone]

## 2021-01-08 ENCOUNTER — TRANSCRIPTION ENCOUNTER (OUTPATIENT)
Age: 56
End: 2021-01-08

## 2021-01-08 DIAGNOSIS — D64.9 ANEMIA, UNSPECIFIED: ICD-10-CM

## 2021-01-08 DIAGNOSIS — E87.8 OTHER DISORDERS OF ELECTROLYTE AND FLUID BALANCE, NOT ELSEWHERE CLASSIFIED: ICD-10-CM

## 2021-01-08 PROCEDURE — 74230 X-RAY XM SWLNG FUNCJ C+: CPT | Mod: 26

## 2021-01-08 PROCEDURE — 71045 X-RAY EXAM CHEST 1 VIEW: CPT | Mod: 26

## 2021-01-08 PROCEDURE — 43752 NASAL/OROGASTRIC W/TUBE PLMT: CPT | Mod: 79

## 2021-01-08 PROCEDURE — 99232 SBSQ HOSP IP/OBS MODERATE 35: CPT

## 2021-01-08 RX ORDER — ACETAMINOPHEN 500 MG
650 TABLET ORAL EVERY 6 HOURS
Refills: 0 | Status: DISCONTINUED | OUTPATIENT
Start: 2021-01-08 | End: 2021-01-14

## 2021-01-08 RX ADMIN — Medication 650 MILLIGRAM(S): at 21:41

## 2021-01-08 RX ADMIN — CHLORHEXIDINE GLUCONATE 15 MILLILITER(S): 213 SOLUTION TOPICAL at 05:25

## 2021-01-08 RX ADMIN — GABAPENTIN 200 MILLIGRAM(S): 400 CAPSULE ORAL at 13:43

## 2021-01-08 RX ADMIN — CHLORHEXIDINE GLUCONATE 15 MILLILITER(S): 213 SOLUTION TOPICAL at 18:17

## 2021-01-08 RX ADMIN — ATORVASTATIN CALCIUM 20 MILLIGRAM(S): 80 TABLET, FILM COATED ORAL at 21:42

## 2021-01-08 RX ADMIN — ENOXAPARIN SODIUM 40 MILLIGRAM(S): 100 INJECTION SUBCUTANEOUS at 13:42

## 2021-01-08 RX ADMIN — Medication 81 MILLIGRAM(S): at 13:42

## 2021-01-08 RX ADMIN — Medication 1 APPLICATION(S): at 05:26

## 2021-01-08 RX ADMIN — GABAPENTIN 200 MILLIGRAM(S): 400 CAPSULE ORAL at 21:41

## 2021-01-08 RX ADMIN — GABAPENTIN 200 MILLIGRAM(S): 400 CAPSULE ORAL at 05:25

## 2021-01-08 RX ADMIN — Medication 1 APPLICATION(S): at 21:42

## 2021-01-08 RX ADMIN — Medication 112 MICROGRAM(S): at 05:25

## 2021-01-08 RX ADMIN — Medication 1 APPLICATION(S): at 13:43

## 2021-01-08 NOTE — DISCHARGE NOTE PROVIDER - CARE PROVIDERS DIRECT ADDRESSES
[email or web address]

## 2021-01-08 NOTE — SWALLOW VFSS/MBS ASSESSMENT ADULT - RECOMMENDED CONSISTENCY
1. PO is contraindicated at this time, continue to use NGT to meet nutrition/hydration/medication needs  2. Aspiration precautions   3. MD to consider repeat cinesophagram as per discussion with ENT THANG Dawson

## 2021-01-08 NOTE — PROGRESS NOTE ADULT - PROBLEM SELECTOR PLAN 6
-lovenox -Anemia to 6.8 noted on 12/31 s/p 1 unit PRBC  -hgb now stable  -Unspecified anemia - possibly post op - 300cc blood loss noted during surgery

## 2021-01-08 NOTE — PROGRESS NOTE ADULT - SUBJECTIVE AND OBJECTIVE BOX
Patient is a 55y old  Female who presents with a chief complaint of Elective Right Hemiglossectomy Right Neck Dissection Tracheostomy (04 Jan 2021 12:28)    SUBJECTIVE / OVERNIGHT EVENTS: Sitting in bed. Comfortable.     MEDICATIONS  (STANDING):  amLODIPine   Tablet 5 milliGRAM(s) Oral daily  aspirin  chewable 81 milliGRAM(s) Oral daily  atorvastatin 20 milliGRAM(s) Oral at bedtime  chlorhexidine 0.12% Liquid 15 milliLiter(s) Oral Mucosa two times a day  enoxaparin Injectable 40 milliGRAM(s) SubCutaneous daily  gabapentin   Solution 200 milliGRAM(s) Oral every 8 hours  levothyroxine 112 MICROGram(s) Oral <User Schedule>  levothyroxine 100 MICROGram(s) Oral <User Schedule>  lidocaine 2% Injectable 10 milliLiter(s) Local Injection once  senna Syrup 10 milliLiter(s) Oral at bedtime    MEDICATIONS  (PRN):  oxyCODONE    Solution 5 milliGRAM(s) Oral every 4 hours PRN Moderate Pain (4 - 6)  oxyCODONE    Solution 10 milliGRAM(s) Oral every 4 hours PRN Severe Pain (7 - 10)  polyethylene glycol 3350 17 Gram(s) Oral once PRN Constipation      Vital Signs Last 24 Hrs  T(C): 36.8 (08 Jan 2021 09:27), Max: 37.1 (07 Jan 2021 21:12)  T(F): 98.3 (08 Jan 2021 09:27), Max: 98.7 (07 Jan 2021 21:12)  HR: 96 (08 Jan 2021 09:27) (71 - 102)  BP: 130/92 (08 Jan 2021 09:27) (111/72 - 130/92)  BP(mean): --  RR: 18 (08 Jan 2021 09:27) (16 - 18)  SpO2: 100% (08 Jan 2021 09:27) (96% - 100%)    PHYSICAL EXAM:  GENERAL: NAD, well-developed  HEAD:  Atraumatic, Normocephalic  EYES: EOMI, PERRLA, conjunctiva and sclera clear  NECK: staples intact, dressing c/d/i  CHEST/LUNG: Clear to auscultation bilaterally; No wheeze  HEART: Regular rate and rhythm; No murmurs, rubs, or gallops  ABDOMEN: Soft, Nontender, Nondistended; Bowel sounds present  EXTREMITIES:  2+ Peripheral Pulses, No clubbing, cyanosis, or edema  PSYCH: AAOx3  NEUROLOGY: non-focal  SKIN: No rashes or lesions    LABS:                   RADIOLOGY & ADDITIONAL TESTS:    Imaging Personally Reviewed:    Consultant(s) Notes Reviewed:      Care Discussed with Consultants/Other Providers: ENT PA, plastic    Assessment and Plan:

## 2021-01-08 NOTE — PROGRESS NOTE ADULT - SUBJECTIVE AND OBJECTIVE BOX
Pt failed repeat bedside swallow test yesterday with SLP (but did show some improvement from prior exam). Plan for [facility] today to evaluate swallow further.     ICU Vital Signs Last 24 Hrs  T(C): 37.1 (07 Jan 2021 21:12), Max: 37.1 (07 Jan 2021 21:12)  T(F): 98.7 (07 Jan 2021 21:12), Max: 98.7 (07 Jan 2021 21:12)  HR: 95 (07 Jan 2021 21:16) (71 - 107)  BP: 112/75 (07 Jan 2021 21:12) (111/72 - 130/84)  BP(mean): --  ABP: --  ABP(mean): --  RR: 16 (07 Jan 2021 21:12) (16 - 17)  SpO2: 98% (07 Jan 2021 21:12) (96% - 99%)      NAD  NGT sutures in nasal cavity  Intraoral suture lines intact, soft, warm, cook doppler with triphasic signal  Neck incision closed with stables, c/d/i  Stoma site well-healing, covered with gauze/tape  L ALT incision c/d/i, closed with staples, JPx1 with serosanguinous drainage      Assessment and Plan:   55F s/p R glossectomy, bl SND 1-4, L ALT, trach 12/29    #Neuro  - pain control PRN     #ENT  - Flap checks per PRS  - ASA 81 via NGT daily  - lovenox       #CV  - Monitor BP, maintain MAPs   - Avoid pressors  - 500 cc bolus NS PRN for MAP maintenance     #Resp  - Routine trach care (obturator taped to head of bed, replacement trach at bedside, suction with red rubber catheters only, change inner cannula PRN and q shift, keep surrounding skin clean / dry / protected)  - trach collar, humidified O2   - Routine trach/stoma care, suctioning    #GI  - NPO  - bolus TF, f/u nutrition for feeding recommendations  - [facility] 1/8  - Bowel regimen while receiving opiates for pain  - PPI, anti-emetics   - Monitor lytes; replace PRN    #ID  - Monitor temp, WBC    #MSK  - Monitor leg incision   - activity per PRS

## 2021-01-08 NOTE — DISCHARGE NOTE PROVIDER - NSDCFUADDINST_GEN_ALL_CORE_FT
- Wound Care  1. Keep tracheostomy stoma covered with dry 4x4 gauze folded and tape in place. Change daily and as needed.  2. Use boot for ambulation and ambulate at least 4 times a day.  3. Cover the cook wire with tegaderm, and change every 48 hours or as needed until seen by Dr. Alberto or Dr. Richard.  4. Rinse oral cavity with salt water swish and spit 3 times daily.     - Wound Care  1. Keep tracheostomy stoma covered with dry 4x4 gauze folded and tape in place. Change daily and as needed.  2. Use boot for ambulation and ambulate at least 4 times a day.  3. Cover the cook wire with tegaderm, and change every 48 hours or as needed until seen by Dr. Alberto or Dr. Busby  4. Rinse oral cavity with salt water swish and spit 3 times daily.

## 2021-01-08 NOTE — DISCHARGE NOTE PROVIDER - NSDCFUSCHEDAPPT_GEN_ALL_CORE_FT
NICOL COLINDRES ; 01/19/2021 ; NPP Ot[facility] [address] [facility] NICOL COLINDRES ; 01/12/2021 ; ELI [facility] [address]  NICOL COLINDRES ; 01/19/2021 ; NPROCKY Otolaryng [address] [facility] NICOL COLINDRES ; 01/19/2021 ; NPP Otolaryng [address] [facility]

## 2021-01-08 NOTE — PROGRESS NOTE ADULT - PROBLEM SELECTOR PLAN 7
-Magnesium levels are normal today. Hypomagnesemia noted on 12/29 (mag level 1.5)  -Hypophosphatemia noted 1/1, 12/31 - now resolved. Phos level normal today.  -Replete electrolytes PRN

## 2021-01-08 NOTE — DISCHARGE NOTE PROVIDER - PROVIDER TOKENS
PROVIDER:[TOKEN:[[id number]]] PROVIDER:[TOKEN:[[id number]]],PROVIDER:[TOKEN:[[id number]]]

## 2021-01-08 NOTE — PROCEDURE NOTE - ADDITIONAL PROCEDURE DETAILS
RN notified provider the Keofeeding tube fall out, 16Fr NG tube replaced via left nostril without complication or difficulty, NG tube secured with type, postprocedural chest x-ray ordered STAT to confirm placement of the NG tube, result pending.

## 2021-01-08 NOTE — DISCHARGE NOTE PROVIDER - HOSPITAL COURSE
55 year old female with malignant neoplasm of tongue S/P right hemiglossectomy, left anterior lateral thigh free flap, bilateral selective neck dissection level 1-4, and tracheostomy tube placement on 12/29/2020. Had BRUCE drains that were monitored for output to prevent seroma formation. BRUCE drains were removed. Tracheostomy was capped and decannulated. Required (Peg/NG) tube placement to allow wound healing. Advanced to Bolus tube feeds. Patient failed bedside swallow evaluation on 01/05/2021 and 01/07/2021, and modified barium swallow was also performed on 01/08/2021, which she did not pass. Patient request to have another modified barium swallow done on 01/11/2021, which she ..... Patient is advanced to ...... 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 PT evaluated patient and cleared for discharge home. Plastic surgery cleared for discharge. Patient was cleared for discharge home by Dr. Padilla.. on... All prescriptions were sent to a pharmacy that was agreed on with the patient.   55 year old female with malignant neoplasm of tongue S/P right hemiglossectomy, left anterior lateral thigh free flap, bilateral selective neck dissection level 1-4, and tracheostomy tube placement on 12/29/2020. Had BRUCE drains that were monitored for output to prevent seroma formation. BRUCE drains were removed. Tracheostomy was capped and decannulated on 01/06/2021. Required NG tube placement to allow wound healing. Advanced to Bolus tube feeds. Patient failed bedside swallow evaluation on 01/05/2021 and 01/07/2021, and modified barium swallow was also performed on 01/08/2021, which she did not pass. Patient request to have another modified barium swallow done on 01/11/2021, which she ..... Patient is advanced to ...... 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 PT evaluated patient and cleared for discharge home. Plastic surgery cleared for discharge. Patient was cleared for discharge home by  ... on... All prescriptions were sent to a pharmacy that was agreed on with the patient.   55 year old female with malignant neoplasm of tongue S/P right hemiglossectomy, left anterior lateral thigh free flap, bilateral selective neck dissection level 1-4, and tracheostomy tube placement on 12/29/2020. Had BRUCE drains that were monitored for output to prevent seroma formation. BRUCE drains were removed. Tracheostomy was capped and decannulated on 01/06/2021. Required NG tube placement to allow wound healing. Advanced to Bolus tube feeds. Patient failed bedside swallow evaluation on 01/05/2021 and 01/07/2021, and modified barium swallow was also performed on 01/08/2021, which she did not pass. Patient request to have another modified barium swallow done on 01/11/2021, which she failed. Peg tube was placed by CTS and patient was started on tube feeds recommended by nutrition. 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 PT evaluated patient and cleared for discharge home. Plastic surgery cleared for discharge. Patient was cleared for discharge home by Dr. Padilla.. on... All prescriptions were sent to a pharmacy that was agreed on with the patient.   55 year old female with malignant neoplasm of tongue S/P right hemiglossectomy, left anterior lateral thigh free flap, bilateral selective neck dissection level 1-4, and tracheostomy tube placement on 12/29/2020. Had BRUCE drains that were monitored for output to prevent seroma formation. BRUCE drains were removed. Tracheostomy was capped and decannulated on 01/06/2021. Required NG tube placement to allow wound healing. Advanced to Bolus tube feeds. Patient failed bedside swallow evaluation on 01/05/2021 and 01/07/2021, and modified barium swallow was also performed on 01/08/2021, which she did not pass. Patient request to have another modified barium swallow done on 01/11/2021, which she failed. Peg tube was placed by CTS and patient was started on tube feeds recommended by nutrition. 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 PT evaluated patient and cleared for discharge home. Plastic surgery cleared for discharge. Patient was cleared for discharge home by Dr. Powell. on 1/15/21. All prescriptions were sent to a pharmacy that was agreed on with the patient.   55 year old female with malignant neoplasm of tongue S/P right hemiglossectomy, left anterior lateral thigh free flap, bilateral selective neck dissection level 1-4, and tracheostomy tube placement on 12/29/2020. Had BRUCE drains that were monitored for output to prevent seroma formation. BRUCE drains were removed. Tracheostomy was capped and decannulated on 01/06/2021. Required NG tube placement to allow wound healing. Advanced to Bolus tube feeds. Patient failed bedside swallow evaluation on 01/05/2021 and 01/07/2021, and modified barium swallow was also performed on 01/08/2021, which she did not pass. Patient request to have another modified barium swallow done on 01/11/2021, which she failed. Peg tube was placed by CTS and patient was started on tube feeds recommended by nutrition. 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 PT evaluated patient and cleared for discharge home. Plastic surgery cleared for discharge. Patient was cleared for discharge home by Dr. Busby on 1/15/21. All prescriptions were sent to a pharmacy that was agreed on with the patient.

## 2021-01-08 NOTE — PROGRESS NOTE ADULT - SUBJECTIVE AND OBJECTIVE BOX
Plastic Surgery    SUBJECTIVE: Pt seen and examined on rounds with team. No acute events overnight.        OBJECTIVE    PHYSICAL EXAM:   General: Awake and alert, NAD  HEENT: flap soft, warm, incisions intact.  neck soft, no signs of collection.  L thigh: incision intact, no signs of collection    VITALS  T(C): 36.6 (01-08-21 @ 05:00), Max: 37.1 (01-07-21 @ 21:12)  HR: 81 (01-08-21 @ 05:00) (71 - 107)  BP: 112/72 (01-08-21 @ 05:00) (111/72 - 123/77)  RR: 18 (01-08-21 @ 05:00) (16 - 18)  SpO2: 100% (01-08-21 @ 05:00) (96% - 100%)  CAPILLARY BLOOD GLUCOSE          Is/Os    01-07 @ 07:01  -  01-08 @ 07:00  --------------------------------------------------------  IN:    Enteral Tube Flush: 600 mL    Jevity 1.2: 1320 mL  Total IN: 1920 mL    OUT:  Total OUT: 0 mL    Total NET: 1920 mL          MEDICATIONS (STANDING): amLODIPine   Tablet 5 milliGRAM(s) Oral daily  aspirin  chewable 81 milliGRAM(s) Oral daily  atorvastatin 20 milliGRAM(s) Oral at bedtime  enoxaparin Injectable 40 milliGRAM(s) SubCutaneous daily  gabapentin   Solution 200 milliGRAM(s) Oral every 8 hours  levothyroxine 112 MICROGram(s) Oral <User Schedule>  levothyroxine 100 MICROGram(s) Oral <User Schedule>  lidocaine 2% Injectable 10 milliLiter(s) Local Injection once  senna Syrup 10 milliLiter(s) Oral at bedtime    MEDICATIONS (PRN):oxyCODONE    Solution 5 milliGRAM(s) Oral every 4 hours PRN Moderate Pain (4 - 6)  oxyCODONE    Solution 10 milliGRAM(s) Oral every 4 hours PRN Severe Pain (7 - 10)  polyethylene glycol 3350 17 Gram(s) Oral once PRN Constipation      LABS                  IMAGING STUDIES

## 2021-01-08 NOTE — DISCHARGE NOTE PROVIDER - NSDCCPCAREPLAN_GEN_ALL_CORE_FT
PRINCIPAL DISCHARGE DIAGNOSIS  Diagnosis: Malignant neoplasm of tongue  Assessment and Plan of Treatment: - Please follow up with Dr. Busby as scheduled

## 2021-01-08 NOTE — PROGRESS NOTE ADULT - ASSESSMENT
ASSESSMENT/PLAN:   NICOL COLINDRES is a 55yFemale s/p Free ALT  doing well    -staples out POD10  -Q4H flap checks  -Bacitracin BID to incision line   -TF per ENT    -Asa, lovenox, unasyn    Plastic Surgery   [facility]: [id number]  [facility]: [phone]

## 2021-01-08 NOTE — DISCHARGE NOTE PROVIDER - INSTRUCTIONS
diet:  diet: Peg Feeds:  - PEG care  1. Clean around your PEG tube daily as taught at [facility] and as needed  2. Jevity 1.2 bolus feed at 330ml every 6 hours via PEG  3. Free water 250ml every 6 hours via PEG  4. Please flush PEG tube after each feed with free water to prevent plug

## 2021-01-08 NOTE — SWALLOW VFSS/MBS ASSESSMENT ADULT - DIAGNOSTIC IMPRESSIONS
Patient presented with severe Oral dysphagia and Moderate-Severe Pharyngeal dysphagia.    1.	Severe oral phase deficits for puree, honey thick liquids and nectar thick liquids characterized by reduced utensil stripping with SLP placing bolus on anterior/mid tongue.  Reduced lingual strength/motion resulting in reduced bolus manipulation/collection, reduced anterior to posterior transport with piecemeal transport (~7-9 swallows per trial) and moderate amount of the bolus pooling in the anterior sulcus.  Patient with limited ability to recollect and transport bolus resulting in moderate residue in the anterior sulcus.  SLP administered suction via Yankauer to remove anterior oral cavity residue.    2.	Moderate to Severe pharyngeal deficits for Nectar thick liquids, honey thick liquids and puree characterized by adequate initiation of the pharyngeal swallow.  There is reduced base of tongue retraction, reduced hyolaryngeal elevation and excursion, reduced epiglottic deflection, reduced pharyngeal contraction and reduced laryngeal vestibule closure.  There are Moderate pharyngeal clearance deficits primary located in the pyriforms and the posterior pharyngeal wall (puree/honey thick liquids > nectar thick liquids) post primary swallow, spontaneous reswallows assist with pharyngeal clearance.  There is deep Laryngeal Penetration during the swallow, without complete retrieval leaving trace residue in the laryngeal vestibule with puree, honey thick liquids and nectar this liquids.  Laryngeal residue migrates to the level of the vocal folds after the swallow with puree and honey thick liquids.  There is reduced laryngopharyngeal sensation evidenced by no cough/throat clear response.    3.	Postural Compensatory Strategy, chin tuck was trialed, which did not eliminate Laryngeal Penetration. Patient presented with severe Oral dysphagia and Moderate-Severe Pharyngeal dysphagia.    1.	Severe oral phase deficits for puree, honey thick liquids and nectar thick liquids characterized by reduced utensil stripping with SLP placing bolus on anterior/mid tongue.  Reduced lingual strength/motion resulting in reduced bolus manipulation/collection, reduced anterior to posterior transport with piecemeal transport (~7-9 swallows per trial) and moderate oral clearance deficits located in the anterior sulcus despite patient’s attempt to recollect and transport the bolus.  SLP administered suction via Yankauer to remove anterior oral cavity residue.    2.	Moderate to Severe pharyngeal deficits for Nectar thick liquids, honey thick liquids and puree characterized by adequate initiation of the pharyngeal swallow.  There is reduced base of tongue retraction, reduced hyolaryngeal elevation and excursion, reduced epiglottic deflection, reduced pharyngeal contraction and reduced laryngeal vestibule closure.  There are Moderate pharyngeal clearance deficits primary located in the pyriforms and the posterior pharyngeal wall (puree/honey thick liquids > nectar thick liquids) post primary swallow, spontaneous multiple reswallows assist with pharyngeal clearance.  There is deep Laryngeal Penetration during the swallow, without complete retrieval leaving residue in the laryngeal vestibule with puree, honey thick liquids and nectar this liquids migrating to the level of the vocal folds.  There is reduced laryngopharyngeal sensation evidenced by no cough/throat clear response.    3.	Postural Compensatory Strategy, chin tuck was trialed, which did not eliminate Laryngeal Penetration. Patient presented with severe Oral dysphagia and Moderate-Severe Pharyngeal dysphagia.    1.	Severe oral phase deficits for puree, honey thick liquids and nectar thick liquids characterized by reduced utensil stripping with SLP placing bolus on anterior/mid tongue.  Reduced lingual strength/motion resulting in reduced bolus manipulation/collection, reduced anterior to posterior transport with piecemeal transport (~7-9 swallows per trial) and moderate oral clearance deficits located in the anterior sulcus despite patient’s attempt to recollect and transport the bolus.  SLP administered suction via Yankauer to remove anterior oral cavity residue.    2.	Severe pharyngeal deficits for Nectar thick liquids, honey thick liquids and puree characterized by adequate initiation of the pharyngeal swallow.  There is reduced base of tongue retraction, reduced hyolaryngeal elevation and excursion, reduced epiglottic deflection, reduced pharyngeal contraction and reduced laryngeal vestibule closure.  There are Moderate pharyngeal clearance deficits primary located in the pyriforms and the posterior pharyngeal wall (puree/honey thick liquids > nectar thick liquids) post primary swallow, spontaneous multiple reswallows assist with pharyngeal clearance.  There is deep Laryngeal Penetration during the swallow, without complete retrieval leaving residue in the laryngeal vestibule with puree, honey thick liquids and nectar this liquids migrating to the level of the vocal folds.  There is reduced laryngopharyngeal sensation evidenced by no cough/throat clear response.    3.	Postural Compensatory Strategy, chin tuck was trialed, which did not eliminate Laryngeal Penetration.

## 2021-01-08 NOTE — DISCHARGE NOTE PROVIDER - CARE PROVIDER_API CALL
Laurent Busby  OTOLARYNGOLOGY  [address] [place] [address]  Phone: [phone]  Fax: [phone]  Follow Up Time:    Laurent Busby)  Otolaryngology  [address] [place] [address]  Phone: [phone]  Fax: [phone]  Follow Up Time:     Nikolay Alberto)  Plastic Surgery  [address] [place] [id number]  Phone: [phone]  Fax: [phone]  Follow Up Time:

## 2021-01-08 NOTE — SWALLOW VFSS/MBS ASSESSMENT ADULT - PHARYNGEAL PHASE COMMENTS
There is reduced base of tongue retraction, reduced hyolaryngeal elevation and excursion, reduced epiglottic deflection, reduced pharyngeal contraction and reduced laryngeal vestibule closure.

## 2021-01-09 LAB
ANION GAP SERPL CALC-SCNC: 7 MMOL/L — SIGNIFICANT CHANGE UP (ref 7–14)
BUN SERPL-MCNC: 18 MG/DL — SIGNIFICANT CHANGE UP (ref 7–23)
CALCIUM SERPL-MCNC: 9.1 MG/DL — SIGNIFICANT CHANGE UP (ref 8.4–10.5)
CHLORIDE SERPL-SCNC: 100 MMOL/L — SIGNIFICANT CHANGE UP (ref 98–107)
CO2 SERPL-SCNC: 30 MMOL/L — SIGNIFICANT CHANGE UP (ref 22–31)
CREAT SERPL-MCNC: 0.54 MG/DL — SIGNIFICANT CHANGE UP (ref 0.5–1.3)
GLUCOSE SERPL-MCNC: 100 MG/DL — HIGH (ref 70–99)
HCT VFR BLD CALC: 28.9 % — LOW (ref 34.5–45)
HGB BLD-MCNC: 9.6 G/DL — LOW (ref 11.5–15.5)
MAGNESIUM SERPL-MCNC: 2.6 MG/DL — SIGNIFICANT CHANGE UP (ref 1.6–2.6)
MCHC RBC-ENTMCNC: 28.7 PG — SIGNIFICANT CHANGE UP (ref 27–34)
MCHC RBC-ENTMCNC: 33.2 GM/DL — SIGNIFICANT CHANGE UP (ref 32–36)
MCV RBC AUTO: 86.3 FL — SIGNIFICANT CHANGE UP (ref 80–100)
NRBC # BLD: 0 /100 WBCS — SIGNIFICANT CHANGE UP
NRBC # FLD: 0 K/UL — SIGNIFICANT CHANGE UP
PHOSPHATE SERPL-MCNC: 4.4 MG/DL — SIGNIFICANT CHANGE UP (ref 2.5–4.5)
PLATELET # BLD AUTO: 378 K/UL — SIGNIFICANT CHANGE UP (ref 150–400)
POTASSIUM SERPL-MCNC: 4.4 MMOL/L — SIGNIFICANT CHANGE UP (ref 3.5–5.3)
POTASSIUM SERPL-SCNC: 4.4 MMOL/L — SIGNIFICANT CHANGE UP (ref 3.5–5.3)
RBC # BLD: 3.35 M/UL — LOW (ref 3.8–5.2)
RBC # FLD: 14.2 % — SIGNIFICANT CHANGE UP (ref 10.3–14.5)
SODIUM SERPL-SCNC: 137 MMOL/L — SIGNIFICANT CHANGE UP (ref 135–145)
WBC # BLD: 12.81 K/UL — HIGH (ref 3.8–10.5)
WBC # FLD AUTO: 12.81 K/UL — HIGH (ref 3.8–10.5)

## 2021-01-09 PROCEDURE — 99232 SBSQ HOSP IP/OBS MODERATE 35: CPT

## 2021-01-09 RX ORDER — POLYETHYLENE GLYCOL 3350 17 G/17G
17 POWDER, FOR SOLUTION ORAL DAILY
Refills: 0 | Status: DISCONTINUED | OUTPATIENT
Start: 2021-01-09 | End: 2021-01-14

## 2021-01-09 RX ADMIN — Medication 81 MILLIGRAM(S): at 11:27

## 2021-01-09 RX ADMIN — Medication 1 APPLICATION(S): at 11:27

## 2021-01-09 RX ADMIN — ENOXAPARIN SODIUM 40 MILLIGRAM(S): 100 INJECTION SUBCUTANEOUS at 11:27

## 2021-01-09 RX ADMIN — GABAPENTIN 200 MILLIGRAM(S): 400 CAPSULE ORAL at 14:46

## 2021-01-09 RX ADMIN — GABAPENTIN 200 MILLIGRAM(S): 400 CAPSULE ORAL at 21:15

## 2021-01-09 RX ADMIN — CHLORHEXIDINE GLUCONATE 15 MILLILITER(S): 213 SOLUTION TOPICAL at 19:40

## 2021-01-09 RX ADMIN — GABAPENTIN 200 MILLIGRAM(S): 400 CAPSULE ORAL at 06:00

## 2021-01-09 RX ADMIN — Medication 650 MILLIGRAM(S): at 21:15

## 2021-01-09 RX ADMIN — ATORVASTATIN CALCIUM 20 MILLIGRAM(S): 80 TABLET, FILM COATED ORAL at 21:15

## 2021-01-09 RX ADMIN — Medication 1 APPLICATION(S): at 21:15

## 2021-01-09 RX ADMIN — Medication 1 APPLICATION(S): at 06:01

## 2021-01-09 RX ADMIN — Medication 650 MILLIGRAM(S): at 14:30

## 2021-01-09 RX ADMIN — CHLORHEXIDINE GLUCONATE 15 MILLILITER(S): 213 SOLUTION TOPICAL at 06:00

## 2021-01-09 RX ADMIN — Medication 100 MICROGRAM(S): at 06:00

## 2021-01-09 RX ADMIN — Medication 650 MILLIGRAM(S): at 06:00

## 2021-01-09 RX ADMIN — POLYETHYLENE GLYCOL 3350 17 GRAM(S): 17 POWDER, FOR SOLUTION ORAL at 11:27

## 2021-01-09 NOTE — PROGRESS NOTE ADULT - PROBLEM SELECTOR PLAN 6
-Anemia to 6.8 noted on 12/31 s/p 1 unit PRBC  -hgb now stable  -Unspecified anemia - possibly post op - 300cc blood loss noted during surgery

## 2021-01-09 NOTE — PROGRESS NOTE ADULT - PROBLEM SELECTOR PLAN 2
-Reported CVA in 2018 in [place]   -On plaivx at home. Held since 12/21 in prep for surgery  - team spoke to patient's neurologist Dr. Gama () who recommended that patient can be transitioned to ASA 81  -C/w asa 81 for now.

## 2021-01-09 NOTE — PROGRESS NOTE ADULT - SUBJECTIVE AND OBJECTIVE BOX
[facility] Division of Hospital Medicine  Cassy Nash MD  Pager (M-F, 8A-5P):  In-house pager [phone]; Long-range pager [phone]  Other Times:  Please page Hospitalist in Charge -  In-house pager [phone]    Patient is a 55y old  Female who presents with a chief complaint of malignant neoplasm of tongue (08 Jan 2021 10:56)      SUBJECTIVE / OVERNIGHT EVENTS: NGT.  Pt seen earlier, sitting up on side of bed.  Uncomfortable from NGT.  No chest pain, SOB. +BM today after Miralax.  ADDITIONAL REVIEW OF SYSTEMS:    MEDICATIONS  (STANDING):  amLODIPine   Tablet 5 milliGRAM(s) Oral daily  AQUAPHOR (petrolatum Ointment) 1 Application(s) Topical three times a day  aspirin  chewable 81 milliGRAM(s) Oral daily  atorvastatin 20 milliGRAM(s) Oral at bedtime  chlorhexidine 0.12% Liquid 15 milliLiter(s) Oral Mucosa two times a day  enoxaparin Injectable 40 milliGRAM(s) SubCutaneous daily  gabapentin   Solution 200 milliGRAM(s) Oral every 8 hours  levothyroxine 112 MICROGram(s) Oral <User Schedule>  levothyroxine 100 MICROGram(s) Oral <User Schedule>  lidocaine 2% Injectable 10 milliLiter(s) Local Injection once  polyethylene glycol 3350 17 Gram(s) Oral daily  senna Syrup 10 milliLiter(s) Oral at bedtime    MEDICATIONS  (PRN):  acetaminophen    Suspension .. 650 milliGRAM(s) Enteral Tube every 6 hours PRN Mild Pain (1 - 3)  oxyCODONE    Solution 5 milliGRAM(s) Oral every 4 hours PRN Moderate Pain (4 - 6)  oxyCODONE    Solution 10 milliGRAM(s) Oral every 4 hours PRN Severe Pain (7 - 10)  polyethylene glycol 3350 17 Gram(s) Oral once PRN Constipation      CAPILLARY BLOOD GLUCOSE        I&O's Summary    08 Jan 2021 07:01  -  09 Jan 2021 07:00  --------------------------------------------------------  IN: 1740 mL / OUT: 800 mL / NET: 940 mL        PHYSICAL EXAM:  Vital Signs Last 24 Hrs  T(C): 36.4 (09 Jan 2021 09:30), Max: 36.5 (08 Jan 2021 21:29)  T(F): 97.5 (09 Jan 2021 09:30), Max: 97.7 (08 Jan 2021 21:29)  HR: 87 (09 Jan 2021 09:30) (80 - 98)  BP: 101/74 (09 Jan 2021 09:30) (101/74 - 117/82)  BP(mean): --  RR: 18 (09 Jan 2021 09:30) (17 - 18)  SpO2: 98% (09 Jan 2021 09:30) (95% - 100%)    GENERAL: thin female, sitting up on side of bed  +NGT  NECK: staples intact, dressing c/d/i; stoma covered  CHEST/LUNG: Clear to auscultation bilaterally; No wheeze  HEART: Regular rate and rhythm; No murmurs, rubs, or gallops  ABDOMEN: Soft, Nontender, Nondistended; Bowel sounds present  EXTREMITIES:  2+ Peripheral Pulses, No clubbing, cyanosis, or edema; L thigh wound intact  PSYCH: AAOx3  NEUROLOGY: non-focal  SKIN: No rashes or lesions    LABS:                        9.6    12.81 )-----------( 378      ( 09 Jan 2021 10:43 )             28.9     01-09    137  |  100  |  18  ----------------------------<  100<H>  4.4   |  30  |  0.54    Ca    9.1      09 Jan 2021 10:43  Phos  4.4     01-09  Mg     2.6     01-09                  RADIOLOGY & ADDITIONAL TESTS:  Results Reviewed:   Imaging Personally Reviewed:  Electrocardiogram Personally Reviewed:    COORDINATION OF CARE:  Care Discussed with Consultants/Other Providers [Y/N]: ENT re overall care  Prior or Outpatient Records Reviewed [Y/N]:

## 2021-01-09 NOTE — PROGRESS NOTE ADULT - PROBLEM SELECTOR PLAN 1
-s/p right hemiglossectomy and b/l neck dissection on 12/29  -Management per ENT.  - NGT, plan MDS on Monday

## 2021-01-09 NOTE — PROGRESS NOTE ADULT - SUBJECTIVE AND OBJECTIVE BOX
Failed MBS yesterday, plan to perform MBS again on Monday    T(F): 97.4 (09 Jan 2021 05:59), Max: 98.3 (08 Jan 2021 09:27)  HR: 81 (09 Jan 2021 05:59) (80 - 98)  BP: 109/69 (09 Jan 2021 05:59) (105/74 - 130/92)  RR: 18 (09 Jan 2021 05:59) (16 - 18)  SpO2: 100% (09 Jan 2021 05:59) (95% - 100%)    NAD  NGT sutures in nasal cavity  Intraoral suture lines intact, soft, warm, cook doppler with triphasic signal  Neck incision closed with stables, c/d/i  Stoma site well-healing, covered with gauze/tape  L ALT incision c/d/i, closed with staples, JPx1 with serosanguinous drainage      Assessment and Plan:   55F s/p R glossectomy, bl SND 1-4, L ALT, trach 12/29    #Neuro  - pain control PRN     #ENT  - Flap checks per PRS  - ASA 81 via NGT daily  - lovenox       #CV  - Monitor BP, maintain MAPs   - Avoid pressors  - 500 cc bolus NS PRN for MAP maintenance     #GI  - NPO  - bolus TF, f/u nutrition for feeding recommendations  - Bowel regimen while receiving opiates for pain  - PPI, anti-emetics   - Monitor lytes; replace PRN    #ID  - Monitor temp, WBC    #MSK  - Monitor leg incision   - activity per PRS

## 2021-01-10 DIAGNOSIS — D72.829 ELEVATED WHITE BLOOD CELL COUNT, UNSPECIFIED: ICD-10-CM

## 2021-01-10 LAB
ANION GAP SERPL CALC-SCNC: 12 MMOL/L — SIGNIFICANT CHANGE UP (ref 7–14)
APPEARANCE UR: ABNORMAL
BILIRUB UR-MCNC: NEGATIVE — SIGNIFICANT CHANGE UP
BUN SERPL-MCNC: 18 MG/DL — SIGNIFICANT CHANGE UP (ref 7–23)
CALCIUM SERPL-MCNC: 9.4 MG/DL — SIGNIFICANT CHANGE UP (ref 8.4–10.5)
CHLORIDE SERPL-SCNC: 100 MMOL/L — SIGNIFICANT CHANGE UP (ref 98–107)
CO2 SERPL-SCNC: 25 MMOL/L — SIGNIFICANT CHANGE UP (ref 22–31)
COLOR SPEC: YELLOW — SIGNIFICANT CHANGE UP
CREAT SERPL-MCNC: 0.55 MG/DL — SIGNIFICANT CHANGE UP (ref 0.5–1.3)
DIFF PNL FLD: NEGATIVE — SIGNIFICANT CHANGE UP
GLUCOSE SERPL-MCNC: 242 MG/DL — HIGH (ref 70–99)
GLUCOSE UR QL: NEGATIVE — SIGNIFICANT CHANGE UP
HCT VFR BLD CALC: 28.7 % — LOW (ref 34.5–45)
HGB BLD-MCNC: 9.4 G/DL — LOW (ref 11.5–15.5)
KETONES UR-MCNC: NEGATIVE — SIGNIFICANT CHANGE UP
LEUKOCYTE ESTERASE UR-ACNC: NEGATIVE — SIGNIFICANT CHANGE UP
MAGNESIUM SERPL-MCNC: 2.3 MG/DL — SIGNIFICANT CHANGE UP (ref 1.6–2.6)
MCHC RBC-ENTMCNC: 28.7 PG — SIGNIFICANT CHANGE UP (ref 27–34)
MCHC RBC-ENTMCNC: 32.8 GM/DL — SIGNIFICANT CHANGE UP (ref 32–36)
MCV RBC AUTO: 87.5 FL — SIGNIFICANT CHANGE UP (ref 80–100)
NITRITE UR-MCNC: NEGATIVE — SIGNIFICANT CHANGE UP
NRBC # BLD: 0 /100 WBCS — SIGNIFICANT CHANGE UP
NRBC # FLD: 0 K/UL — SIGNIFICANT CHANGE UP
PH UR: 7 — SIGNIFICANT CHANGE UP (ref 5–8)
PHOSPHATE SERPL-MCNC: 3.2 MG/DL — SIGNIFICANT CHANGE UP (ref 2.5–4.5)
PLATELET # BLD AUTO: 397 K/UL — SIGNIFICANT CHANGE UP (ref 150–400)
POTASSIUM SERPL-MCNC: 4.1 MMOL/L — SIGNIFICANT CHANGE UP (ref 3.5–5.3)
POTASSIUM SERPL-SCNC: 4.1 MMOL/L — SIGNIFICANT CHANGE UP (ref 3.5–5.3)
PROT UR-MCNC: ABNORMAL
RBC # BLD: 3.28 M/UL — LOW (ref 3.8–5.2)
RBC # FLD: 14.2 % — SIGNIFICANT CHANGE UP (ref 10.3–14.5)
SODIUM SERPL-SCNC: 137 MMOL/L — SIGNIFICANT CHANGE UP (ref 135–145)
SP GR SPEC: 1.03 — HIGH (ref 1.01–1.02)
UROBILINOGEN FLD QL: ABNORMAL
WBC # BLD: 14.43 K/UL — HIGH (ref 3.8–10.5)
WBC # FLD AUTO: 14.43 K/UL — HIGH (ref 3.8–10.5)

## 2021-01-10 PROCEDURE — 99232 SBSQ HOSP IP/OBS MODERATE 35: CPT

## 2021-01-10 RX ADMIN — Medication 100 MICROGRAM(S): at 04:47

## 2021-01-10 RX ADMIN — ATORVASTATIN CALCIUM 20 MILLIGRAM(S): 80 TABLET, FILM COATED ORAL at 21:58

## 2021-01-10 RX ADMIN — Medication 10 MILLIGRAM(S): at 14:10

## 2021-01-10 RX ADMIN — ENOXAPARIN SODIUM 40 MILLIGRAM(S): 100 INJECTION SUBCUTANEOUS at 14:06

## 2021-01-10 RX ADMIN — GABAPENTIN 200 MILLIGRAM(S): 400 CAPSULE ORAL at 14:06

## 2021-01-10 RX ADMIN — GABAPENTIN 200 MILLIGRAM(S): 400 CAPSULE ORAL at 04:47

## 2021-01-10 RX ADMIN — CHLORHEXIDINE GLUCONATE 15 MILLILITER(S): 213 SOLUTION TOPICAL at 04:47

## 2021-01-10 RX ADMIN — Medication 1 APPLICATION(S): at 21:58

## 2021-01-10 RX ADMIN — CHLORHEXIDINE GLUCONATE 15 MILLILITER(S): 213 SOLUTION TOPICAL at 18:13

## 2021-01-10 RX ADMIN — Medication 1 APPLICATION(S): at 14:06

## 2021-01-10 RX ADMIN — GABAPENTIN 200 MILLIGRAM(S): 400 CAPSULE ORAL at 21:58

## 2021-01-10 RX ADMIN — Medication 81 MILLIGRAM(S): at 14:06

## 2021-01-10 RX ADMIN — Medication 1 APPLICATION(S): at 04:47

## 2021-01-10 RX ADMIN — Medication 650 MILLIGRAM(S): at 20:26

## 2021-01-10 RX ADMIN — Medication 650 MILLIGRAM(S): at 14:06

## 2021-01-10 RX ADMIN — Medication 650 MILLIGRAM(S): at 04:47

## 2021-01-10 RX ADMIN — POLYETHYLENE GLYCOL 3350 17 GRAM(S): 17 POWDER, FOR SOLUTION ORAL at 14:10

## 2021-01-10 NOTE — PROGRESS NOTE ADULT - PROBLEM SELECTOR PLAN 2
-Reported CVA in 2018 in [place]   -On plaivx at home. Held since 12/21 in prep for surgery  - team spoke to patient's neurologist Dr. Gama () who recommended that patient can be transitioned to ASA 81  -C/w asa 81 for now. -s/p right hemiglossectomy and b/l neck dissection on 12/29  -Management per ENT.  - NGT, plan MDS on Monday

## 2021-01-10 NOTE — PROGRESS NOTE ADULT - SUBJECTIVE AND OBJECTIVE BOX
Patient seen and examined at bedside. Removed staples yesterday. Planning to repeat MBS without NG on Monday 1/11. Added miralax to bowel regimen. Still no bowel movement. Will add suppository today.     PE:  NAD  NGT sutures in nasal cavity  Intraoral suture lines intact, soft, warm, cook doppler with triphasic signal  Neck incision closed with stables, c/d/i  Stoma site well-healing, covered with gauze/tape  L ALT incision c/d/i, closed with staples, JPx1 with serosanguinous drainage      Assessment and Plan:   55F s/p R glossectomy, bl SND 1-4, L ALT, trach 12/29  - pain control PRN   - Flap checks per PRS  - ASA 81 via NGT daily  - lovenox     - NPO  - bolus TF, f/u nutrition for feeding recommendations  - Bowel regimen   - PPI, anti-emetics   - Monitor lytes; replace PRN

## 2021-01-10 NOTE — PROGRESS NOTE ADULT - PROBLEM SELECTOR PLAN 1
-s/p right hemiglossectomy and b/l neck dissection on 12/29  -Management per ENT.  - NGT, plan MDS on Monday WBC trending up, no fever, UA neg, CXR neg 1/8  f/u T/WBC

## 2021-01-10 NOTE — PROGRESS NOTE ADULT - PROBLEM SELECTOR PLAN 3
-BP acceptable  -c/w amlodipine. -Reported CVA in 2018 in [place]   -On plaivx at home. Held since 12/21 in prep for surgery  - team spoke to patient's neurologist Dr. Gama () who recommended that patient can be transitioned to ASA 81  -C/w asa 81 for now.

## 2021-01-10 NOTE — PROGRESS NOTE ADULT - ATTENDING COMMENTS
Agree with notes/assessment of health care providers on my service (PAs, Residents and House Staff).  The patient is in SICU with diagnosis mentioned in the note.    The patient is a critical care patient in SICU with diagnosis mentioned above.    The SICU team has a constant risk benefit analyzes discussion with the primary team, all consultants, House Staff and PA's.  The documentation of the total time spent 55 minutes ( 1600Hrs-1659Hrs).  55F hx thyroid cancer s/p total thyroidectomy  s/p R hemiglossectomy, b/l neck dissection with L ALT reconstruction for tongue cancer.   I have personally examined the patient, reviewed data and laboratory tests/x-rays and all pertinent electronic images.    NEURO: awake and alert   RESPIRATORY  -tracheostomy   clear  CARDIOVASCULAR  HR: 96   BP: 114/68   GI/NUTRITION  Diet:  NGT TF with Pivot 1.5    The plan is specified below:  NEURO: no acute issue, hx CVA  - awake,     RESPIRATORY: no acute issue  - s/p tracheostomy on AC 12/450/5/30%    CARDIOVASCULAR: HTN  - monitor vital, marie as need for MAP >65; currently not on pressors  - q1h flap check with cook doppler    GI/NUTRITION: no acute issue  - NPO  - kurt tube in place    GENITOURINARY/RENAL: no acute issue  - Alarcon in place  - monitor UOP, I&O  - monitor BUN/Cr    HEMATOLOGIC: no acute issue  - ASA rectal last night; PO 81 starting today in AM  - LVX dvt ppx    INFECTIOUS DISEASE: no acute issue  - Unasyn while BRUCE drains in place  - monitor for fever and leukocytosis    ENDOCRINE: thyroid cancer, s/p total thyroidectomy  - continue with synthroid  - monitor blood glucose with BMP    ENT: s/p R hemiglossectomy, b/l neck dissection with L ALT reconstruction for tongue cancer 12/29  - continue q1hr flap checks    DISPO: PACU (under SICU care)
Agree with notes/assessment of health care providers on my service (PAs, Residents and House Staff).  The patient is in SICU with diagnosis mentioned in the note.    The patient is a critical care patient with life threatening hemodynamic and metabolic instability in SICU.  Risk benefit analyzes discussed.  The documentation of the total time spent 55-75 minutes ( 0800Hrs-0920Hrs in AM ).  55F woman with hx thyroid cancer s/p total thyroidectomy and RT, CVA, HTN, now PO1 s/p R hemiglossectomy, b/l neck dissection with L ALT reconstruction for tongue cancer in SICU for q1hr flap checks  I have personally examined the patient, reviewed data and laboratory tests/x-rays and all pertinent electronic images.  NEURO  arousable  RESPIRATORY  RR: 12   SpO2: 99%   Mechanical Ventilation: Mode: AC/ CMV   CARDIOVASCULAR  HR: 56   BP: 107/67   GI/NUTRITION  Diet: NPO    The plan is specified below:  NEURO: no acute issue, hx CVA  - sedated on propofol/fentanyl    RESPIRATORY: no acute issue  - s/p tracheostomy on AC 12/450/5/30%    CARDIOVASCULAR: HTN  - q1h flap check with cook doppler    GI/NUTRITION: no acute issue  - NPO    GENITOURINARY/RENAL: no acute issue  - Alarcon in place    HEMATOLOGIC: no acute issue  - ASA   - LVX dvt ppx    INFECTIOUS DISEASE: no acute issue  - Unasyn while BRUCE drains in place    ENDOCRINE: thyroid cancer, s/p total thyroidectomy  - continue with synthroid    ENT: s/p R hemiglossectomy, b/l neck dissection with L ALT reconstruction for tongue cancer 12/29  - continue q1hr flap checks    DISPO: SICU
Agree with notes/assessment of health care providers on my service (PAs, Residents and House Staff).  The patient is in SICU with diagnosis mentioned in the note.    The patient is a critical care patient with life threatening hemodynamic and metabolic instability in SICU.  Risk benefit analyzes discussed.  The documentation of the total time spent 55-75 minutes ( 0800Hrs-0920Hrs in AM ).  55F  hx thyroid cancer s/p total thyroidectomy  and RT, CVA, HTN,  s/p R hemiglossectomy, b/l neck dissection with L ALT reconstruction for tongue cancer in SICU  for q1hr flap checks  I have personally examined the patient, reviewed data and laboratory tests/x-rays and all pertinent electronic images.CONSTITUTIONAL: NAD, lying in bed, sedated   NEURO: sedated this AM, but arousable  CARDS: RRR  GI: Belly soft, nondistended.    HEENT: Neck is soft, no hematomas noted, BRUCE drains serosanginous. Cook doppler strong this AM with triphasic signal  EXTREMITIES: Left thigh donor site CDI, drains are serosanginous     The plan is specified below:  NEURO: no acute issue, hx CVA  - sedated on propofol  - pain control with tylenol, fentanyl    RESPIRATORY: no acute issue  - s/p tracheostomy on AC 12/350/5/30    CARDIOVASCULAR: HTN  - monitor vital, marie as need for MAP >65; currently not on pressors  - q1h flap check with cook doppler    GI/NUTRITION: no acute issue  - Pivot 1.5 TF  - keotube in place    GENITOURINARY/RENAL: no acute issue  - Alarcon in place  - monitor UOP, I&O  - monitor BUN/Cr    HEMATOLOGIC: no acute issue  - ASA   - LVX dvt ppx    INFECTIOUS DISEASE: no acute issue  - Unasyn while BRUCE drains in place    ENDOCRINE: thyroid cancer, s/p total thyroidectomy  - continue with synthroid  ENT: s/p R hemiglossectomy, b/l neck dissection with L ALT reconstruction for tongue cancer 12/29  - continue q1hr flap checks    DISPO: PACU (under SICU care)
Patient overnight no issues. Now q4 hr flap checks. Patient on trach collar, tolerating tube feeds, on home medications, out of bed. Patient floor eligible.    I have personally interviewed when possible and examined the patient, reviewed data and laboratory tests/x-rays and all pertinent electronic images.  I was physically present for the key portions of the evaluation and management (E/M) service provided.   The SICU team has a constant risk benefit analyzes discussion with the primary team, all consultants, House Staff and PA's on all decisions.  These diagnoses are unrelated to the surgical procedure noted above.  I meet with family if needed to get further history, discuss the case and make care decisions for this patient who might not be able to participate.  Time involved in performance of separately billable procedures was not counted toward my critical care time. There is no overlap.  I spent 30 minutes ( 0800Hrs-0915Hrs in AM/ 1600Hrs-1715Hrs in PM, or other time indicated) of critical care time for the diagnoses, assessment, plan and interventions.  This time excludes time spent on separate procedures and teaching.
The patient was seen and examined, chart and notes reviewed.  The current diagnosis, plan of care and alternatives have been discussed with the patient.  All questions have been answered and updates have been discussed.  The case was discussed with team residents/PA's and medical students at morning  surgical rounds and throughout the course of the day.    Respiratory insufficiency  a.  Trach collar as tolerated  b.  Suction prn    At risk for malnutrition  a.  TF to goal    Hypertension  a.  Continue amlodipine    Flap checks q 2  DVT prophylaxis with Lovenox  Unasyn per primary team
constipation - c/w senna/miralax
constipation - c/w senna/miralax, suppos PRN

## 2021-01-10 NOTE — PROGRESS NOTE ADULT - PROBLEM SELECTOR PLAN 7
-Replete electrolytes PRN -Anemia to 6.8 noted on 12/31 s/p 1 unit PRBC, hgb now stable  -Unspecified anemia - possibly post op - 300cc blood loss noted during surgery

## 2021-01-10 NOTE — PROGRESS NOTE ADULT - PROBLEM SELECTOR PLAN 6
-Anemia to 6.8 noted on 12/31 s/p 1 unit PRBC, hgb now stable  -Unspecified anemia - possibly post op - 300cc blood loss noted during surgery -s/p resection   -c/w home dose synthroid

## 2021-01-10 NOTE — PROGRESS NOTE ADULT - SUBJECTIVE AND OBJECTIVE BOX
[facility] Division of Hospital Medicine  Cassy Nash MD  Pager (M-F, 8A-5P):  In-house pager [phone]; Long-range pager [phone]  Other Times:  Please page Hospitalist in Charge -  In-house pager [phone]    Patient is a 55y old  Female who presents with a chief complaint of malignant neoplasm of tongue (2021 10:56)      SUBJECTIVE / OVERNIGHT EVENTS: Pt seen earlier, sitting up in bed.  NGT is uncomfortable in her nose and throat.  No chest pain, SOB, nausea.  No dysuria.  ADDITIONAL REVIEW OF SYSTEMS:    MEDICATIONS  (STANDING):  amLODIPine   Tablet 5 milliGRAM(s) Oral daily  AQUAPHOR (petrolatum Ointment) 1 Application(s) Topical three times a day  aspirin  chewable 81 milliGRAM(s) Oral daily  atorvastatin 20 milliGRAM(s) Oral at bedtime  chlorhexidine 0.12% Liquid 15 milliLiter(s) Oral Mucosa two times a day  enoxaparin Injectable 40 milliGRAM(s) SubCutaneous daily  gabapentin   Solution 200 milliGRAM(s) Oral every 8 hours  levothyroxine 112 MICROGram(s) Oral <User Schedule>  levothyroxine 100 MICROGram(s) Oral <User Schedule>  polyethylene glycol 3350 17 Gram(s) Oral daily  senna Syrup 10 milliLiter(s) Oral at bedtime    MEDICATIONS  (PRN):  acetaminophen    Suspension .. 650 milliGRAM(s) Enteral Tube every 6 hours PRN Mild Pain (1 - 3)  oxyCODONE    Solution 5 milliGRAM(s) Oral every 4 hours PRN Moderate Pain (4 - 6)  oxyCODONE    Solution 10 milliGRAM(s) Oral every 4 hours PRN Severe Pain (7 - 10)  polyethylene glycol 3350 17 Gram(s) Oral once PRN Constipation      CAPILLARY BLOOD GLUCOSE        I&O's Summary    2021 07:01  -  10 Arvin 2021 07:00  --------------------------------------------------------  IN: 1720 mL / OUT: 0 mL / NET: 1720 mL    10 Arvin 2021 07:01  -  10 Arvin 2021 16:54  --------------------------------------------------------  IN: 530 mL / OUT: 400 mL / NET: 130 mL        PHYSICAL EXAM:  Vital Signs Last 24 Hrs  T(C): 36.7 (10 Arvin 2021 14:10), Max: 36.9 (2021 17:24)  T(F): 98.1 (10 Arvin 2021 14:10), Max: 98.4 (2021 17:24)  HR: 77 (10 Arvin 2021 14:10) (77 - 89)  BP: 136/85 (10 Arvin 2021 14:10) (108/70 - 136/85)  BP(mean): --  RR: 16 (10 Arvin 2021 14:10) (16 - 18)  SpO2: 99% (10 Arvin 2021 14:10) (97% - 100%)    GENERAL: thin female, sitting up on side of bed  +NGT  NECK: staples intact, dressing c/d/i; stoma covered  CHEST/LUNG: Clear to auscultation bilaterally; No wheeze  HEART: Regular rate and rhythm; No murmurs, rubs, or gallops  ABDOMEN: Soft, Nontender, Nondistended; Bowel sounds present  EXTREMITIES:  2+ Peripheral Pulses, No clubbing, cyanosis, or edema; L thigh wound intact  PSYCH: AAOx3  NEUROLOGY: non-focal  SKIN: No rashes or lesions    LABS:                        9.4    14.43 )-----------( 397      ( 10 Arvin 2021 07:37 )             28.7     01-10    137  |  100  |  18  ----------------------------<  242<H>  4.1   |  25  |  0.55    Ca    9.4      10 Arvin 2021 06:25  Phos  3.2     01-10  Mg     2.3     01-10            Urinalysis Basic - ( 10 Arvin 2021 13:19 )    Color: Yellow / Appearance: Slightly Turbid / S.027 / pH: x  Gluc: x / Ketone: Negative  / Bili: Negative / Urobili: 6 mg/dL   Blood: x / Protein: Trace / Nitrite: Negative   Leuk Esterase: Negative / RBC: 0-5 /HPF / WBC 0-3 /HPF   Sq Epi: x / Non Sq Epi: 0-1 /HPF / Bacteria: Negative          RADIOLOGY & ADDITIONAL TESTS:  Results Reviewed:   Imaging Personally Reviewed:  Electrocardiogram Personally Reviewed:    COORDINATION OF CARE:  Care Discussed with Consultants/Other Providers [Y/N]: ENT re overall care  Prior or Outpatient Records Reviewed [Y/N]:

## 2021-01-11 ENCOUNTER — TRANSCRIPTION ENCOUNTER (OUTPATIENT)
Age: 56
End: 2021-01-11

## 2021-01-11 LAB
HCT VFR BLD CALC: 30.5 % — LOW (ref 34.5–45)
HGB BLD-MCNC: 9.8 G/DL — LOW (ref 11.5–15.5)
MCHC RBC-ENTMCNC: 28.5 PG — SIGNIFICANT CHANGE UP (ref 27–34)
MCHC RBC-ENTMCNC: 32.1 GM/DL — SIGNIFICANT CHANGE UP (ref 32–36)
MCV RBC AUTO: 88.7 FL — SIGNIFICANT CHANGE UP (ref 80–100)
NRBC # BLD: 0 /100 WBCS — SIGNIFICANT CHANGE UP
NRBC # FLD: 0 K/UL — SIGNIFICANT CHANGE UP
PLATELET # BLD AUTO: 418 K/UL — HIGH (ref 150–400)
RBC # BLD: 3.44 M/UL — LOW (ref 3.8–5.2)
RBC # FLD: 14.2 % — SIGNIFICANT CHANGE UP (ref 10.3–14.5)
WBC # BLD: 10.97 K/UL — HIGH (ref 3.8–10.5)
WBC # FLD AUTO: 10.97 K/UL — HIGH (ref 3.8–10.5)

## 2021-01-11 PROCEDURE — 31502 CHANGE OF WINDPIPE AIRWAY: CPT

## 2021-01-11 PROCEDURE — 99233 SBSQ HOSP IP/OBS HIGH 50: CPT

## 2021-01-11 PROCEDURE — 99223 1ST HOSP IP/OBS HIGH 75: CPT | Mod: 57

## 2021-01-11 PROCEDURE — 74230 X-RAY XM SWLNG FUNCJ C+: CPT | Mod: 26

## 2021-01-11 PROCEDURE — 99231 SBSQ HOSP IP/OBS SF/LOW 25: CPT | Mod: 25

## 2021-01-11 RX ORDER — OXYCODONE HYDROCHLORIDE 5 MG/1
10 TABLET ORAL EVERY 4 HOURS
Refills: 0 | Status: DISCONTINUED | OUTPATIENT
Start: 2021-01-11 | End: 2021-01-14

## 2021-01-11 RX ORDER — OXYCODONE HYDROCHLORIDE 5 MG/1
5 TABLET ORAL EVERY 4 HOURS
Refills: 0 | Status: DISCONTINUED | OUTPATIENT
Start: 2021-01-11 | End: 2021-01-14

## 2021-01-11 RX ORDER — SODIUM CHLORIDE 9 MG/ML
1000 INJECTION, SOLUTION INTRAVENOUS
Refills: 0 | Status: DISCONTINUED | OUTPATIENT
Start: 2021-01-11 | End: 2021-01-13

## 2021-01-11 RX ADMIN — Medication 1 APPLICATION(S): at 05:50

## 2021-01-11 RX ADMIN — CHLORHEXIDINE GLUCONATE 15 MILLILITER(S): 213 SOLUTION TOPICAL at 05:50

## 2021-01-11 RX ADMIN — Medication 1 APPLICATION(S): at 12:54

## 2021-01-11 RX ADMIN — AMLODIPINE BESYLATE 5 MILLIGRAM(S): 2.5 TABLET ORAL at 05:50

## 2021-01-11 RX ADMIN — Medication 1 APPLICATION(S): at 21:51

## 2021-01-11 RX ADMIN — CHLORHEXIDINE GLUCONATE 15 MILLILITER(S): 213 SOLUTION TOPICAL at 18:13

## 2021-01-11 RX ADMIN — Medication 112 MICROGRAM(S): at 05:50

## 2021-01-11 RX ADMIN — GABAPENTIN 200 MILLIGRAM(S): 400 CAPSULE ORAL at 05:53

## 2021-01-11 RX ADMIN — ENOXAPARIN SODIUM 40 MILLIGRAM(S): 100 INJECTION SUBCUTANEOUS at 12:54

## 2021-01-11 NOTE — SWALLOW VFSS/MBS ASSESSMENT ADULT - ORAL PHASE
Delayed oral transit time/Reduced anterior - posterior transport/Residue in oral cavity
Delayed oral transit time/Reduced anterior - posterior transport/Residue in oral cavity

## 2021-01-11 NOTE — SWALLOW VFSS/MBS ASSESSMENT ADULT - DIAGNOSTIC IMPRESSIONS
1.	Severe oral phase deficits for puree, honey thick liquids, nectar thick liquids and thin liquids characterized by reduced utensil stripping, with SLP placing bolus on anterior/mid tongue.  Reduced lingual strength and range of motion resulting in reduced bolus manipulation/collection, reduced anterior to posterior transport and piecemeal transport (~5-7 swallows per trial) and moderate oral clearance deficits located in the anterior sulcus despite patient’s attempt to recollect and transport the bolus.  Patient expectorated residue at the end of the evaluation.   2.	Severe pharyngeal phase deficits for puree, honey thick liquids, thin liquids and nectar thick liquids characterized by delayed initiation of the swallow initiating deep laryngeal surface of the epiglottis and within the laryngeal vestibule with all liquid consistencies.  There is reduced base of tongue retraction, reduced hyolaryngeal elevation and excursion, reduced epiglottic deflection, reduced pharyngeal contraction and reduced laryngeal vestibule closure.  There are moderate pharyngeal clearance deficits primarily located in the pyriforms and lateral pharyngeal walls, resulting in moderate Laryngeal Penetration after the swallow across all consistencies.  There is Laryngeal Penetration during and after the swallow with puree, without complete retrieval, leaving trace residue in the laryngeal vestibule above the level of the vocal folds.  There is Silent Laryngeal Penetration before, during and after the swallow with thin liquids, nectar thick liquids and honey thick liquids to the level of the vocal folds.  There is reduced laryngopharyngeal sensation evidenced by inconsistent weak throat clear response to the Laryngeal Penetration episodes.    3.	Postural Compensatory strategies of chin tuck and semi-head tilt back did Not Eliminate Laryngeal Penetration. 1.	Severe oral phase deficits for puree, honey thick liquids, nectar thick liquids and thin liquids characterized by reduced utensil stripping, with SLP placing bolus on anterior/mid tongue.  Reduced lingual strength and range of motion resulting in reduced bolus manipulation/collection, reduced anterior to posterior transport and piecemeal transport (~5-7 swallows per trial) and moderate oral clearance deficits (puree and honey thick liquids > nectar thick liquids and thin liquids) located from the anterior to posterior tongue surface and in the anterior sulcus despite patient’s attempt to recollect and transport the bolus.  Patient expectorated residue at the end of the evaluation. 1.	Severe oral phase deficits for puree, honey thick liquids, nectar thick liquids and thin liquids characterized by reduced utensil stripping, with SLP placing bolus on anterior/mid tongue.  Reduced lingual strength and range of motion resulting in reduced bolus manipulation/collection, reduced anterior to posterior transport and piecemeal transport (~5-7 swallows per trial) and moderate oral clearance deficits (puree and honey thick liquids > nectar thick liquids and thin liquids) located from the anterior to posterior tongue surface and in the anterior sulcus despite patient’s attempt to recollect and transport the bolus.  Patient expectorated residue at the end of the evaluation.   2.	Severe pharyngeal phase deficits for puree, honey thick liquids, thin liquids and nectar thick liquids characterized by delayed swallow, initiating at the laryngeal surface of the epiglottis (deep) and within the laryngeal vestibule with all liquid consistencies.  There is reduced base of tongue retraction, reduced hyolaryngeal elevation and excursion, reduced epiglottic deflection, reduced pharyngeal contraction and reduced laryngeal vestibule closure.  There are moderate pharyngeal clearance deficits (puree/honey thick liquids > nectar thick liquids/thin liquids) primarily located in the pyriforms, lateral pharyngeal walls, base of tongue, vallecula, and posterior pharyngeal wall, resulting in moderate Laryngeal Penetration after the swallow across all consistencies.  Multiple spontaneous reswallows minimally assisted with pharyngeal clearance.

## 2021-01-11 NOTE — PROGRESS NOTE ADULT - PROBLEM SELECTOR PLAN 7
-Anemia to 6.8 noted on 12/31 s/p 1 unit PRBC, hgb now stable  -Unspecified anemia - possibly post op - 300cc blood loss noted during surgery

## 2021-01-11 NOTE — SWALLOW VFSS/MBS ASSESSMENT ADULT - COMMENTS
As per ENT Note: 55F s/p R glossectomy, bl SND 1-4, L ALT, trach 12/29    As per H&P: 55 year old female, Mandarin-speaking, with hx thyroid cancer s/p Total Thyroidectomy (3/2020) and RT, CVA, HTN, presented with R tongue lesion s/p biopsy x 2 (last 12/2020), said it was cancer. Patient now s/p R hemiglossectomy, b/l neck dissection with L ALT reconstruction.    Patient with tracheostomy on 12/29/20 and decannulated on 1/6/21.  Anterior neck incision with staples.  Patient with NGT in situ. Patient with bandage over stoma site.     SLP received patient upright in holsted chair, awake, alert, able to follow directions and gesture to make wants and needs know.  SLP used [employer] for English-Mandarin translation, #[id number]. Patient with reduced intelligibility due to post surgical changes, able to make wants and needs known via gestures. Patient denies pain.
As per ENT Note: 55F s/p R glossectomy, bl SND 1-4, L ALT, trach 12/29    As per H&P: 55 year old female, Mandarin-speaking, with hx thyroid cancer s/p Total Thyroidectomy (3/2020) and RT, CVA, HTN, presented with R tongue lesion s/p biopsy x 2 (last 12/2020), said it was cancer. Patient now s/p R hemiglossectomy, b/l neck dissection with L ALT reconstruction.    Patient with tracheostomy on 12/29/20 and decannulated on 1/6/21.  Anterior neck incision staples.  Patient with bandage over stoma site.     SLP received patient upright in holsted chair, awake, alert, able to follow directions and make needs known.  Patient declined use of [employer].  Patient with reduced intelligibility due to post surgical changes, but able to make wants and needs known.  Patient with drooling at baseline, but clear vocal quality.  Patient denies pain.

## 2021-01-11 NOTE — SWALLOW VFSS/MBS ASSESSMENT ADULT - PHARYNGEAL PHASE COMMENTS
Delayed initiation of the pharyngeal swallow, reduced base of tongue retraction, reduced hyolaryngeal elevation and excursion, reduced epiglottic deflection, reduced pharyngeal contraction and reduced laryngeal vestibule closure. There is reduced base of tongue retraction, reduced hyolaryngeal elevation and excursion, reduced epiglottic deflection, reduced pharyngeal contraction and reduced laryngeal vestibule closure.

## 2021-01-11 NOTE — PROGRESS NOTE ADULT - ASSESSMENT
ASSESSMENT/PLAN:   NICOL COLINDRES is a 55yFemale s/p Free ALT  doing well    -staples d/c'd  -q4h flap checks  -Bacitracin BID to incision line   -TF per ENT    -Asa, lovenox, unasyn    Plastic Surgery   [facility]: [id number]  [facility]: [phone]

## 2021-01-11 NOTE — PROGRESS NOTE ADULT - SUBJECTIVE AND OBJECTIVE BOX
Plastic Surgery    SUBJECTIVE: Pt seen and examined on rounds with team. No acute events overnight.        OBJECTIVE    PHYSICAL EXAM:   General: Awake and alert, NAD  HEENT: flap soft, warm, incisions intact.  neck soft, no signs of collection.  L thigh: incision intact, no signs of collection    VITALS  T(C): 36.8 (01-11-21 @ 05:48), Max: 36.9 (01-10-21 @ 17:57)  HR: 98 (01-11-21 @ 05:48) (77 - 101)  BP: 124/89 (01-11-21 @ 05:48) (112/71 - 136/85)  RR: 17 (01-11-21 @ 05:48) (16 - 18)  SpO2: 99% (01-11-21 @ 05:48) (97% - 99%)  CAPILLARY BLOOD GLUCOSE          Is/Os    01-10 @ 07:01  -  01-11 @ 07:00  --------------------------------------------------------  IN:    Enteral Tube Flush: 400 mL    Jevity 1.2: 990 mL  Total IN: 1390 mL    OUT:    IV PiggyBack: 0 mL    Oral Fluid: 0 mL    Voided (mL): 400 mL  Total OUT: 400 mL    Total NET: 990 mL          MEDICATIONS (STANDING): amLODIPine   Tablet 5 milliGRAM(s) Oral daily  aspirin  chewable 81 milliGRAM(s) Oral daily  atorvastatin 20 milliGRAM(s) Oral at bedtime  enoxaparin Injectable 40 milliGRAM(s) SubCutaneous daily  gabapentin   Solution 200 milliGRAM(s) Oral every 8 hours  levothyroxine 112 MICROGram(s) Oral <User Schedule>  levothyroxine 100 MICROGram(s) Oral <User Schedule>  polyethylene glycol 3350 17 Gram(s) Oral daily  senna Syrup 10 milliLiter(s) Oral at bedtime    MEDICATIONS (PRN):acetaminophen    Suspension .. 650 milliGRAM(s) Enteral Tube every 6 hours PRN Mild Pain (1 - 3)  oxyCODONE    Solution 5 milliGRAM(s) Oral every 4 hours PRN Moderate Pain (4 - 6)  oxyCODONE    Solution 10 milliGRAM(s) Oral every 4 hours PRN Severe Pain (7 - 10)  polyethylene glycol 3350 17 Gram(s) Oral once PRN Constipation      LABS  CBC (01-10 @ 07:37)                              9.4<L>                         14.43<H>  )----------------(  397        --    % Neutrophils, --    % Lymphocytes, ANC: --                                  28.7<L>    BMP (01-10 @ 06:25)             137     |  100     |  18    		Ca++ --      Ca 9.4                ---------------------------------( 242<H>		Mg 2.3                4.1     |  25      |  0.55  			Ph 3.2                   IMAGING STUDIES

## 2021-01-11 NOTE — PROGRESS NOTE ADULT - SUBJECTIVE AND OBJECTIVE BOX
Plan to repeat MBS without NG on Monday 1/11.   Reports bowel movement to nurse when attempted to give suppository  WBC uptrending yesterday, UA negative, afebrile    T(F): 97.2 (10 Arvin 2021 21:53), Max: 98.4 (10 Arvin 2021 17:57)  HR: 77 (10 Arvin 2021 21:53) (77 - 89)  BP: 122/74 (10 Arvin 2021 21:53) (108/70 - 136/85)  RR: 17 (10 Arvin 2021 21:53) (16 - 18)  SpO2: 99% (10 Arvin 2021 21:53) (97% - 100%)    PE:  NAD  NGT sutures in nasal cavity  Intraoral suture lines intact, soft, warm, cook doppler with triphasic signal  Neck incision closed with stables, c/d/i  Stoma site well-healing, covered with gauze/tape  L ALT incision c/d/i, closed with staples, JPx1 with serosanguinous drainage      Assessment and Plan:   55F s/p R glossectomy, bl SND 1-4, L ALT, trach 12/29  - plan for repeat MBS today, will remove NGT prior  - pain control PRN   - Flap checks per PRS  - ASA 81 via NGT daily  - lovenox     - NPO  - bolus TF, f/u nutrition for feeding recommendations  - Bowel regimen   - PPI, anti-emetics   - Monitor lytes; replace PRN

## 2021-01-11 NOTE — SWALLOW VFSS/MBS ASSESSMENT ADULT - ADDITIONAL RECOMMENDATIONS
1. Dysphagia therapy pending discharge (home care versus outpatient at [facility] [phone]) RECOMMENDATIONS:  1. PO is contraindicated, consideration for long term alternate means of nutrition/hydration/medication   2. Aspiration precautions  3. Dysphagia therapy pending discharge (home care versus outpatient at [facility] [phone])

## 2021-01-11 NOTE — PROGRESS NOTE ADULT - PROBLEM SELECTOR PLAN 2
-s/p right hemiglossectomy and b/l neck dissection on 12/29  -Management per ENT.  -Failed MBS, will be scheduled for G-tube

## 2021-01-11 NOTE — PROGRESS NOTE ADULT - SUBJECTIVE AND OBJECTIVE BOX
Willy Siegel MD  Academic Hospitalist  Pager [phone]  Email: [email or web address]          CHIEF COMPLAINT: f/u     SUBJECTIVE / OVERNIGHT EVENTS: Patient seen and examined. MBS today, w/recommendations again PO. No reports of f/c.     MEDICATIONS  (STANDING):  amLODIPine   Tablet 5 milliGRAM(s) Oral daily  AQUAPHOR (petrolatum Ointment) 1 Application(s) Topical three times a day  aspirin  chewable 81 milliGRAM(s) Oral daily  atorvastatin 20 milliGRAM(s) Oral at bedtime  chlorhexidine 0.12% Liquid 15 milliLiter(s) Oral Mucosa two times a day  enoxaparin Injectable 40 milliGRAM(s) SubCutaneous daily  gabapentin   Solution 200 milliGRAM(s) Oral every 8 hours  levothyroxine 112 MICROGram(s) Oral <User Schedule>  levothyroxine 100 MICROGram(s) Oral <User Schedule>  polyethylene glycol 3350 17 Gram(s) Oral daily  senna Syrup 10 milliLiter(s) Oral at bedtime    MEDICATIONS  (PRN):  acetaminophen    Suspension .. 650 milliGRAM(s) Enteral Tube every 6 hours PRN Mild Pain (1 - 3)  oxyCODONE    Solution 5 milliGRAM(s) Oral every 4 hours PRN Moderate Pain (4 - 6)  oxyCODONE    Solution 10 milliGRAM(s) Oral every 4 hours PRN Severe Pain (7 - 10)  polyethylene glycol 3350 17 Gram(s) Oral once PRN Constipation      VITALS:  T(F): 98.6 (21 @ 14:01), Max: 98.7 (21 @ 09:54)  HR: 64 (21 @ 14:01) (55 - 101)  BP: 126/84 (21 @ 14:01) (112/71 - 136/85)  RR: 18 (21 @ 14:01) (16 - 18)  SpO2: 98% (21 @ 14:01)  Wt(kg): --      CAPILLARY BLOOD GLUCOSE      GENERAL: thin female, sitting up on side of bed  +NGT  NECK: staples intact, dressing c/d/i; stoma covered  CHEST/LUNG: Clear to auscultation bilaterally; No wheeze  HEART: Regular rate and rhythm; No murmurs, rubs, or gallops  ABDOMEN: Soft, Nontender, Nondistended; Bowel sounds present  EXTREMITIES:  2+ Peripheral Pulses, No clubbing, cyanosis, or edema; L thigh wound intact  PSYCH: AAOx3  NEUROLOGY: non-focal  SKIN: No rashes or lesions      LABS:              9.8                  x    | x    | x            10.97 >-----------< 418     ------------------------< x                     30.5                 x    | x    | x                                            Ca x     Mg x     Ph x                  Urinalysis Basic - ( 10 Arvin 2021 13:19 )    Color: Yellow / Appearance: Slightly Turbid / S.027 / pH: x  Gluc: x / Ketone: Negative  / Bili: Negative / Urobili: 6 mg/dL   Blood: x / Protein: Trace / Nitrite: Negative   Leuk Esterase: Negative / RBC: 0-5 /HPF / WBC 0-3 /HPF   Sq Epi: x / Non Sq Epi: 0-1 /HPF / Bacteria: Negative            MICROBIOLOGY:        RADIOLOGY & ADDITIONAL TESTS:  Imaging Personally Reviewed: [ ] Yes    [ ] Consultant(s) Notes Reviewed:  [x] Care Discussed with Consultants/Other Providers:

## 2021-01-11 NOTE — SWALLOW VFSS/MBS ASSESSMENT ADULT - RECOMMENDED CONSISTENCY
1. PO is contraindicated, consideration for long term alternate means of nutrition/hydration/medication   2. Aspiration precautions CONTINUED IMPRESSIONS:  2.	Severe pharyngeal phase deficits for puree, honey thick liquids, thin liquids and nectar thick liquids characterized by delayed initiation of the swallow initiating deep laryngeal surface of the epiglottis and within the laryngeal vestibule with all liquid consistencies.  There is reduced base of tongue retraction, reduced hyolaryngeal elevation and excursion, reduced epiglottic deflection, reduced pharyngeal contraction and reduced laryngeal vestibule closure.  There are moderate pharyngeal clearance deficits (puree/honey thick liquids > nectar thick liquids/thin liquids) primarily located in the pyriforms and lateral pharyngeal walls, but also across the base of tongue, vallecula, and posterior pharyngeal wall, resulting in moderate Laryngeal Penetration after the swallow across all consistencies.  Multiple spontaneous reswallows minimally assisted with pharyngeal clearance.  There is Laryngeal Penetration during and after the swallow with puree, without complete retrieval, leaving trace residue in the laryngeal vestibule above the level of the vocal folds.  There is Silent Laryngeal Penetration before, during and after the swallow with thin liquids, nectar thick liquids and honey thick liquids to the level of the vocal folds.  There is reduced laryngopharyngeal sensation evidenced by inconsistent weak throat clear response to the Laryngeal Penetration episodes.    3.	Of note, there is an episode of retrograde of bolus from the hypopharynx to the oropharynx due to quantity of the pharyngeal residue.    4.	Postural Compensatory strategies of chin tuck and semi-head tilt back did Not Eliminate Laryngeal Penetration.  Chin tuck strategy exacerbated oral phase deficits, patient transported a minimal amount of the bolus.  Semi-head Tilt strategy resulted in premature spillage and increasing the amount of laryngeal penetration before the swallow. CONTINUED IMPRESSIONS:   There is Laryngeal Penetration during the swallow with puree, without complete retrieval, leaving trace residue in the laryngeal vestibule above the level of the vocal folds.  There is Laryngeal Penetration after the swallow with puree on pharyngeal residue from the pyriforms and lateral pharyngeal wall residue, without complete retrieval, leaving trace residue in the laryngeal vestibule above the level of the vocal folds.  There is Silent Laryngeal Penetration before the swallow without retrieval and during the swallow with thin liquids, nectar thick liquids and honey thick liquids to the level of the vocal folds.  There is Silent Laryngeal Penetration after the swallow with honey thick liquids, nectar thick liquids and thin liquids, on pharyngeal residue from the pyriforms and lateral pharyngeal walls to the level of the vocal folds.  There is reduced laryngopharyngeal sensation evidenced by inconsistent weak throat clear response to the Laryngeal Penetration episodes.    3.	Of note, there is an episode of retrograde of bolus from the hypopharynx to the oropharynx due to severity of the pharyngeal residue.    4.	Postural Compensatory strategies of chin tuck and semi-head tilt back did Not Eliminate Laryngeal Penetration, exacerbated by the severity of the oral phase deficits.

## 2021-01-11 NOTE — CONSULT NOTE ADULT - SUBJECTIVE AND OBJECTIVE BOX
HPI:  Pt is a 55 y.o. female PMHx CVA , total thyroidectomy 2020 and radiation therapy for thyroid CA, primarily Mandarin speaking was found to have tongue cancer and is now sp Right Hemiglossectomy, Right Neck Dissection, Tracheostomy and left anterior lateral thigh free flap on 2020. Tracheostomy was capped and decannulated on 2021. Pt failed barium swallow three times and ENT team is now requesting placement of open G tube.       PAST MEDICAL & SURGICAL HISTORY:  Hypercholesterolemia  Thyroid cancer  dx 3/2020 ; tx surgically/ RT  CVA (cerebrovascular accident)  Per pt in [place] 2018 ; rx plavix ; Left Sided Weakness  Hypertension  S/P thyroidectomy  3/2020        REVIEW OF SYSTEMS  General:No Weight change/ Fatigue/ HA/Dizzy	  Skin/Breast: No Rashes/ Lesions/ Masses  Ophthalmologic: No Blurry vision/ Glaucoma/ Blindness  ENMT: No Hearing loss/ Drainage/ Lesions	  Respiratory and Thorax: No Cough/ Wheezing/ SOB/ Hemoptysis/ Sputum production  Cardiovascular: No Chest pain/ Palpitations/ Diaphoresis	  Gastrointestinal: No Nausea/ Vomiting/ Constipation/ Appetite Change	  Genitourinary: No Heamturia/ Dysuria/ Frequency change/ Impotence	  Musculoskeletal: No Pain/ Weakness/ Claudication	  Neurological: No Seizures/ TIA/CVA/ Parastesias	  Psychiatric: No Dementia/ Depression/ SI/HI	  Hematology/Lymphatics: No hx of bleeding/ Edema	  Endocrine:	No Hyperglycemia/ Hypoglycemia  Allergic/Immunologic:	 No Anaphylaxis/ Intolerance/ Recent illnesses          MEDICATIONS  (STANDING):  amLODIPine   Tablet 5 milliGRAM(s) Oral daily  AQUAPHOR (petrolatum Ointment) 1 Application(s) Topical three times a day  aspirin  chewable 81 milliGRAM(s) Oral daily  atorvastatin 20 milliGRAM(s) Oral at bedtime  chlorhexidine 0.12% Liquid 15 milliLiter(s) Oral Mucosa two times a day  enoxaparin Injectable 40 milliGRAM(s) SubCutaneous daily  gabapentin   Solution 200 milliGRAM(s) Oral every 8 hours  levothyroxine 112 MICROGram(s) Oral <User Schedule>  levothyroxine 100 MICROGram(s) Oral <User Schedule>  polyethylene glycol 3350 17 Gram(s) Oral daily  senna Syrup 10 milliLiter(s) Oral at bedtime    MEDICATIONS  (PRN):  acetaminophen    Suspension .. 650 milliGRAM(s) Enteral Tube every 6 hours PRN Mild Pain (1 - 3)  oxyCODONE    Solution 5 milliGRAM(s) Oral every 4 hours PRN Moderate Pain (4 - 6)  oxyCODONE    Solution 10 milliGRAM(s) Oral every 4 hours PRN Severe Pain (7 - 10)  polyethylene glycol 3350 17 Gram(s) Oral once PRN Constipation          Allergies  apple (Other)  No Known Drug Allergies  Intolerances       Social History:  · Marital Status	  · Lives With	alone   Substance Use History:  · Substance Use	never used  caffeine   Alcohol Use History:  · Have you ever consumed alcohol	never          FAMILY HISTORY:  No pertinent family history in first degree relatives  unless noted, no significant family hx with Mother, Father, Siblings          Vital Signs Last 24 Hrs  T(C): 37.1 (2021 09:54), Max: 37.1 (2021 09:54)  T(F): 98.7 (2021 09:54), Max: 98.7 (2021 09:54)  HR: 55 (2021 09:54) (55 - 101)  BP: 115/77 (2021 09:54) (112/71 - 136/85)  BP(mean): --  RR: 17 (2021 09:54) (16 - 18)  SpO2: 97% (2021 09:54) (97% - 99%)    General: WN/WD NAD  Neurology: Awake, nonfocal, AMEZQUITA x 4  Eyes: Scleras clear, PERRLA/ EOMI, Gross vision intact  ENT:Gross hearing intact, grossly patent pharynx, no stridor  Neck: Neck supple, trachea midline, No JVD, trach decannulated  Respiratory: CTA B/L, No wheezing, rales, rhonchi  CV: RRR, S1S2, no murmurs, rubs or gallops  Abdominal: Soft, NT, ND +BS,   Extremities: No edema, + peripheral pulses  Skin: No Rashes, Hematoma, Ecchymosis  Lymphatic: No Neck, axilla, groin LAD  Psych: Oriented x 3, normal affect            LABS:                        9.8    10.97 )-----------( 418      ( 2021 07:46 )             30.5     01-10    137  |  100  |  18  ----------------------------<  242<H>  4.1   |  25  |  0.55    Ca    9.4      10 Arvin 2021 06:25  Phos  3.2     -10  Mg     2.3     -10        Urinalysis Basic - ( 10 Arvin 2021 13:19 )    Color: Yellow / Appearance: Slightly Turbid / S.027 / pH: x  Gluc: x / Ketone: Negative  / Bili: Negative / Urobili: 6 mg/dL   Blood: x / Protein: Trace / Nitrite: Negative   Leuk Esterase: Negative / RBC: 0-5 /HPF / WBC 0-3 /HPF   Sq Epi: x / Non Sq Epi: 0-1 /HPF / Bacteria: Negative        RADIOLOGY & ADDITIONAL STUDIES: failed barium swallow x3    ASSESSMENT:   55yFemalePAST MEDICAL & SURGICAL HISTORY:  Hypercholesterolemia  Thyroid cancer  dx 3/2020 ; tx surgically/ RT  CVA (cerebrovascular accident)  Per pt in [place] 2018 ; rx plavix ; Left Sided Weakness  Hypertension  S/P thyroidectomy  3/2020        HEALTH ISSUES - PROBLEM Dx:  Leukocytosis, unspecified type  Electrolyte abnormality  Anemia  DVT prophylaxis  Thyroid cancer  Hypercholesterolemia  Hypertension  CVA (cerebrovascular accident)  Tongue cancer        HEALTH ISSUES - R/O PROBLEM Dx:      PLAN: HPI:  Pt is a 55 y.o. female PMHx CVA , total thyroidectomy 2020 and radiation therapy for thyroid CA, primarily Mandarin speaking was found to have tongue cancer and is now sp Right Hemiglossectomy, Right Neck Dissection, Tracheostomy and left anterior lateral thigh free flap on 2020. Tracheostomy was capped and decannulated on 2021. Pt failed barium swallow three times and ENT team is now requesting EGD, PEG, possible open G tube.       PAST MEDICAL & SURGICAL HISTORY:  Hypercholesterolemia  Thyroid cancer  dx 3/2020 ; tx surgically/ RT  CVA (cerebrovascular accident)  Per pt in [place] 2018 ; rx plavix ; Left Sided Weakness  Hypertension  S/P thyroidectomy  3/2020        REVIEW OF SYSTEMS  General:No Weight change/ Fatigue/ HA/Dizzy	  Skin/Breast: No Rashes/ Lesions/ Masses  Ophthalmologic: No Blurry vision/ Glaucoma/ Blindness  ENMT: No Hearing loss/ Drainage/ Lesions	  Respiratory and Thorax: No Cough/ Wheezing/ SOB/ Hemoptysis/ Sputum production  Cardiovascular: No Chest pain/ Palpitations/ Diaphoresis	  Gastrointestinal: No Nausea/ Vomiting/ Constipation/ Appetite Change	  Genitourinary: No Heamturia/ Dysuria/ Frequency change/ Impotence	  Musculoskeletal: No Pain/ Weakness/ Claudication	  Neurological: No Seizures/ TIA/CVA/ Parastesias	  Psychiatric: No Dementia/ Depression/ SI/HI	  Hematology/Lymphatics: No hx of bleeding/ Edema	  Endocrine:	No Hyperglycemia/ Hypoglycemia  Allergic/Immunologic:	 No Anaphylaxis/ Intolerance/ Recent illnesses          MEDICATIONS  (STANDING):  amLODIPine   Tablet 5 milliGRAM(s) Oral daily  AQUAPHOR (petrolatum Ointment) 1 Application(s) Topical three times a day  aspirin  chewable 81 milliGRAM(s) Oral daily  atorvastatin 20 milliGRAM(s) Oral at bedtime  chlorhexidine 0.12% Liquid 15 milliLiter(s) Oral Mucosa two times a day  enoxaparin Injectable 40 milliGRAM(s) SubCutaneous daily  gabapentin   Solution 200 milliGRAM(s) Oral every 8 hours  levothyroxine 112 MICROGram(s) Oral <User Schedule>  levothyroxine 100 MICROGram(s) Oral <User Schedule>  polyethylene glycol 3350 17 Gram(s) Oral daily  senna Syrup 10 milliLiter(s) Oral at bedtime    MEDICATIONS  (PRN):  acetaminophen    Suspension .. 650 milliGRAM(s) Enteral Tube every 6 hours PRN Mild Pain (1 - 3)  oxyCODONE    Solution 5 milliGRAM(s) Oral every 4 hours PRN Moderate Pain (4 - 6)  oxyCODONE    Solution 10 milliGRAM(s) Oral every 4 hours PRN Severe Pain (7 - 10)  polyethylene glycol 3350 17 Gram(s) Oral once PRN Constipation          Allergies  apple (Other)  No Known Drug Allergies  Intolerances       Social History:  · Marital Status	  · Lives With	alone   Substance Use History:  · Substance Use	never used  caffeine   Alcohol Use History:  · Have you ever consumed alcohol	never          FAMILY HISTORY:  No pertinent family history in first degree relatives  unless noted, no significant family hx with Mother, Father, Siblings          Vital Signs Last 24 Hrs  T(C): 37.1 (2021 09:54), Max: 37.1 (2021 09:54)  T(F): 98.7 (2021 09:54), Max: 98.7 (2021 09:54)  HR: 55 (2021 09:54) (55 - 101)  BP: 115/77 (2021 09:54) (112/71 - 136/85)  BP(mean): --  RR: 17 (2021 09:54) (16 - 18)  SpO2: 97% (2021 09:54) (97% - 99%)    General: WN/WD NAD  Neurology: Awake, nonfocal, AMEZQUITA x 4  Eyes: Scleras clear, PERRLA/ EOMI, Gross vision intact  ENT:Gross hearing intact, grossly patent pharynx, no stridor  Neck: Neck supple, trachea midline, No JVD, trach decannulated  Respiratory: CTA B/L, No wheezing, rales, rhonchi  CV: RRR, S1S2, no murmurs, rubs or gallops  Abdominal: Soft, NT, ND +BS,   Extremities: No edema, + peripheral pulses  Skin: No Rashes, Hematoma, Ecchymosis  Lymphatic: No Neck, axilla, groin LAD  Psych: Oriented x 3, normal affect            LABS:                        9.8    10.97 )-----------( 418      ( 2021 07:46 )             30.5     01-10    137  |  100  |  18  ----------------------------<  242<H>  4.1   |  25  |  0.55    Ca    9.4      10 Arvin 2021 06:25  Phos  3.2     01-10  Mg     2.3     01-10        Urinalysis Basic - ( 10 Arvin 2021 13:19 )    Color: Yellow / Appearance: Slightly Turbid / S.027 / pH: x  Gluc: x / Ketone: Negative  / Bili: Negative / Urobili: 6 mg/dL   Blood: x / Protein: Trace / Nitrite: Negative   Leuk Esterase: Negative / RBC: 0-5 /HPF / WBC 0-3 /HPF   Sq Epi: x / Non Sq Epi: 0-1 /HPF / Bacteria: Negative        RADIOLOGY & ADDITIONAL STUDIES: failed barium swallow x3    ASSESSMENT:   55yFemalePAST MEDICAL & SURGICAL HISTORY:  Hypercholesterolemia  Thyroid cancer  dx 3/2020 ; tx surgically/ RT  CVA (cerebrovascular accident)  Per pt in [place] 2018 ; rx plavix ; Left Sided Weakness  Hypertension  S/P thyroidectomy  3/2020        HEALTH ISSUES - PROBLEM Dx: request for EGD, PEG, possible open G tube  Leukocytosis, unspecified type  Electrolyte abnormality  Anemia  DVT prophylaxis  Thyroid cancer  Hypercholesterolemia  Hypertension  CVA (cerebrovascular accident)  Tongue cancer        HEALTH ISSUES - R/O PROBLEM Dx: request for EGD, PEG, possible open G tube      PLAN:  pt seen and examined with Dr Kimmy figueroa OR this week for gastrostomy tube placement    Caleb DESIR [id number] HPI:  Pt is a 55 y.o. female PMHx CVA , total thyroidectomy 2020 and radiation therapy for thyroid CA, primarily Mandarin speaking was found to have tongue cancer and is now sp Right Hemiglossectomy, Right Neck Dissection, Tracheostomy and left anterior lateral thigh free flap on 2020. Tracheostomy was capped and decannulated on 2021. Pt failed barium swallow three times and ENT team is now requesting EGD, PEG, possible open G tube.       PAST MEDICAL & SURGICAL HISTORY:  Hypercholesterolemia  Thyroid cancer  dx 3/2020 ; tx surgically/ RT  CVA (cerebrovascular accident)  Per pt in [place] 2018 ; rx plavix ; Left Sided Weakness  Hypertension  S/P thyroidectomy  3/2020        REVIEW OF SYSTEMS  General:No Weight change/ Fatigue/ HA/Dizzy	  Skin/Breast: No Rashes/ Lesions/ Masses  Ophthalmologic: No Blurry vision/ Glaucoma/ Blindness  ENMT: No Hearing loss/ Drainage/ Lesions	  Respiratory and Thorax: No Cough/ Wheezing/ SOB/ Hemoptysis/ Sputum production  Cardiovascular: No Chest pain/ Palpitations/ Diaphoresis	  Gastrointestinal: No Nausea/ Vomiting/ Constipation/ Appetite Change	  Genitourinary: No Heamturia/ Dysuria/ Frequency change/ Impotence	  Musculoskeletal: No Pain/ Weakness/ Claudication	  Neurological: No Seizures/ TIA/CVA/ Parastesias	  Psychiatric: No Dementia/ Depression/ SI/HI	  Hematology/Lymphatics: No hx of bleeding/ Edema	  Endocrine:	No Hyperglycemia/ Hypoglycemia  Allergic/Immunologic:	 No Anaphylaxis/ Intolerance/ Recent illnesses          MEDICATIONS  (STANDING):  amLODIPine   Tablet 5 milliGRAM(s) Oral daily  AQUAPHOR (petrolatum Ointment) 1 Application(s) Topical three times a day  aspirin  chewable 81 milliGRAM(s) Oral daily  atorvastatin 20 milliGRAM(s) Oral at bedtime  chlorhexidine 0.12% Liquid 15 milliLiter(s) Oral Mucosa two times a day  enoxaparin Injectable 40 milliGRAM(s) SubCutaneous daily  gabapentin   Solution 200 milliGRAM(s) Oral every 8 hours  levothyroxine 112 MICROGram(s) Oral <User Schedule>  levothyroxine 100 MICROGram(s) Oral <User Schedule>  polyethylene glycol 3350 17 Gram(s) Oral daily  senna Syrup 10 milliLiter(s) Oral at bedtime    MEDICATIONS  (PRN):  acetaminophen    Suspension .. 650 milliGRAM(s) Enteral Tube every 6 hours PRN Mild Pain (1 - 3)  oxyCODONE    Solution 5 milliGRAM(s) Oral every 4 hours PRN Moderate Pain (4 - 6)  oxyCODONE    Solution 10 milliGRAM(s) Oral every 4 hours PRN Severe Pain (7 - 10)  polyethylene glycol 3350 17 Gram(s) Oral once PRN Constipation          Allergies  apple (Other)  No Known Drug Allergies  Intolerances       Social History:  · Marital Status	  · Lives With	alone   Substance Use History:  · Substance Use	never used  caffeine   Alcohol Use History:  · Have you ever consumed alcohol	never          FAMILY HISTORY:  No pertinent family history in first degree relatives  unless noted, no significant family hx with Mother, Father, Siblings          Vital Signs Last 24 Hrs  T(C): 37.1 (2021 09:54), Max: 37.1 (2021 09:54)  T(F): 98.7 (2021 09:54), Max: 98.7 (2021 09:54)  HR: 55 (2021 09:54) (55 - 101)  BP: 115/77 (2021 09:54) (112/71 - 136/85)  BP(mean): --  RR: 17 (2021 09:54) (16 - 18)  SpO2: 97% (2021 09:54) (97% - 99%)    General: WN/WD NAD  Neurology: Awake, nonfocal, AMEZQUITA x 4  Eyes: Scleras clear, PERRLA/ EOMI, Gross vision intact  ENT:Gross hearing intact, grossly patent pharynx, no stridor  Neck: Neck supple, trachea midline, No JVD, trach decannulated  Respiratory: CTA B/L, No wheezing, rales, rhonchi  CV: RRR, S1S2, no murmurs, rubs or gallops  Abdominal: Soft, NT, ND +BS,   Extremities: No edema, + peripheral pulses  Skin: No Rashes, Hematoma, Ecchymosis  Lymphatic: No Neck, axilla, groin LAD  Psych: Oriented x 3, normal affect            LABS:                        9.8    10.97 )-----------( 418      ( 2021 07:46 )             30.5     01-10    137  |  100  |  18  ----------------------------<  242<H>  4.1   |  25  |  0.55    Ca    9.4      10 Arvin 2021 06:25  Phos  3.2     01-10  Mg     2.3     01-10        Urinalysis Basic - ( 10 Arvin 2021 13:19 )    Color: Yellow / Appearance: Slightly Turbid / S.027 / pH: x  Gluc: x / Ketone: Negative  / Bili: Negative / Urobili: 6 mg/dL   Blood: x / Protein: Trace / Nitrite: Negative   Leuk Esterase: Negative / RBC: 0-5 /HPF / WBC 0-3 /HPF   Sq Epi: x / Non Sq Epi: 0-1 /HPF / Bacteria: Negative        RADIOLOGY & ADDITIONAL STUDIES: failed barium swallow x3    ASSESSMENT:   55yFemalePAST MEDICAL & SURGICAL HISTORY:  Hypercholesterolemia  Thyroid cancer  dx 3/2020 ; tx surgically/ RT  CVA (cerebrovascular accident)  Per pt in [place] 2018 ; rx plavix ; Left Sided Weakness  Hypertension  S/P thyroidectomy  3/2020        HEALTH ISSUES - PROBLEM Dx: request for EGD, PEG, possible open G tube  Leukocytosis, unspecified type  Electrolyte abnormality  Anemia  DVT prophylaxis  Thyroid cancer  Hypercholesterolemia  Hypertension  CVA (cerebrovascular accident)  Tongue cancer        HEALTH ISSUES - R/O PROBLEM Dx: request for EGD, PEG, possible open G tube      PLAN:  pt seen and examined with Dr Oneal  pt booked for EGD, PEG tomorrow afternoon  hold TF at [facility]    Caleb DESIR [id number]

## 2021-01-12 ENCOUNTER — APPOINTMENT (OUTPATIENT)
Dept: THORACIC SURGERY | Facility: HOSPITAL | Age: 56
End: 2021-01-12

## 2021-01-12 LAB
ANION GAP SERPL CALC-SCNC: 12 MMOL/L — SIGNIFICANT CHANGE UP (ref 7–14)
APTT BLD: 30.7 SEC — SIGNIFICANT CHANGE UP (ref 27–36.3)
BLD GP AB SCN SERPL QL: NEGATIVE — SIGNIFICANT CHANGE UP
BUN SERPL-MCNC: 12 MG/DL — SIGNIFICANT CHANGE UP (ref 7–23)
CALCIUM SERPL-MCNC: 9.3 MG/DL — SIGNIFICANT CHANGE UP (ref 8.4–10.5)
CHLORIDE SERPL-SCNC: 100 MMOL/L — SIGNIFICANT CHANGE UP (ref 98–107)
CO2 SERPL-SCNC: 28 MMOL/L — SIGNIFICANT CHANGE UP (ref 22–31)
CREAT SERPL-MCNC: 0.5 MG/DL — SIGNIFICANT CHANGE UP (ref 0.5–1.3)
GLUCOSE SERPL-MCNC: 125 MG/DL — HIGH (ref 70–99)
HCG UR QL: NEGATIVE — SIGNIFICANT CHANGE UP
HCT VFR BLD CALC: 28.3 % — LOW (ref 34.5–45)
HGB BLD-MCNC: 9.1 G/DL — LOW (ref 11.5–15.5)
INR BLD: 1.17 RATIO — HIGH (ref 0.88–1.16)
MAGNESIUM SERPL-MCNC: 2.2 MG/DL — SIGNIFICANT CHANGE UP (ref 1.6–2.6)
MCHC RBC-ENTMCNC: 28.5 PG — SIGNIFICANT CHANGE UP (ref 27–34)
MCHC RBC-ENTMCNC: 32.2 GM/DL — SIGNIFICANT CHANGE UP (ref 32–36)
MCV RBC AUTO: 88.7 FL — SIGNIFICANT CHANGE UP (ref 80–100)
NRBC # BLD: 0 /100 WBCS — SIGNIFICANT CHANGE UP
NRBC # FLD: 0 K/UL — SIGNIFICANT CHANGE UP
PHOSPHATE SERPL-MCNC: 3.7 MG/DL — SIGNIFICANT CHANGE UP (ref 2.5–4.5)
PLATELET # BLD AUTO: 420 K/UL — HIGH (ref 150–400)
POTASSIUM SERPL-MCNC: 3.8 MMOL/L — SIGNIFICANT CHANGE UP (ref 3.5–5.3)
POTASSIUM SERPL-SCNC: 3.8 MMOL/L — SIGNIFICANT CHANGE UP (ref 3.5–5.3)
PROTHROM AB SERPL-ACNC: 13.4 SEC — SIGNIFICANT CHANGE UP (ref 10.6–13.6)
RBC # BLD: 3.19 M/UL — LOW (ref 3.8–5.2)
RBC # FLD: 14 % — SIGNIFICANT CHANGE UP (ref 10.3–14.5)
RH IG SCN BLD-IMP: POSITIVE — SIGNIFICANT CHANGE UP
SODIUM SERPL-SCNC: 140 MMOL/L — SIGNIFICANT CHANGE UP (ref 135–145)
WBC # BLD: 6.12 K/UL — SIGNIFICANT CHANGE UP (ref 3.8–10.5)
WBC # FLD AUTO: 6.12 K/UL — SIGNIFICANT CHANGE UP (ref 3.8–10.5)

## 2021-01-12 PROCEDURE — 43246 EGD PLACE GASTROSTOMY TUBE: CPT

## 2021-01-12 PROCEDURE — 99233 SBSQ HOSP IP/OBS HIGH 50: CPT

## 2021-01-12 RX ORDER — ACETAMINOPHEN 500 MG
1000 TABLET ORAL ONCE
Refills: 0 | Status: COMPLETED | OUTPATIENT
Start: 2021-01-12 | End: 2021-01-12

## 2021-01-12 RX ORDER — FAMOTIDINE 10 MG/ML
20 INJECTION INTRAVENOUS DAILY
Refills: 0 | Status: DISCONTINUED | OUTPATIENT
Start: 2021-01-12 | End: 2021-01-14

## 2021-01-12 RX ORDER — LEVOTHYROXINE SODIUM 125 MCG
84 TABLET ORAL AT BEDTIME
Refills: 0 | Status: COMPLETED | OUTPATIENT
Start: 2021-01-12 | End: 2021-01-12

## 2021-01-12 RX ADMIN — SODIUM CHLORIDE 100 MILLILITER(S): 9 INJECTION, SOLUTION INTRAVENOUS at 11:47

## 2021-01-12 RX ADMIN — CHLORHEXIDINE GLUCONATE 15 MILLILITER(S): 213 SOLUTION TOPICAL at 09:16

## 2021-01-12 RX ADMIN — Medication 1 APPLICATION(S): at 13:12

## 2021-01-12 RX ADMIN — Medication 84 MICROGRAM(S): at 23:14

## 2021-01-12 RX ADMIN — CHLORHEXIDINE GLUCONATE 15 MILLILITER(S): 213 SOLUTION TOPICAL at 18:59

## 2021-01-12 RX ADMIN — FAMOTIDINE 104 MILLIGRAM(S): 10 INJECTION INTRAVENOUS at 11:47

## 2021-01-12 RX ADMIN — Medication 400 MILLIGRAM(S): at 22:32

## 2021-01-12 RX ADMIN — Medication 1 APPLICATION(S): at 22:32

## 2021-01-12 NOTE — BRIEF OPERATIVE NOTE - NSICDXBRIEFOPLAUNCH_GEN_ALL_CORE
<--- Click to Launch ICDx for PreOp, PostOp and Procedure
<--- Click to Launch ICDx for PreOp, PostOp and Procedure
Speaking Coherently

## 2021-01-12 NOTE — BRIEF OPERATIVE NOTE - NSICDXBRIEFPOSTOP_GEN_ALL_CORE_FT
POST-OP DIAGNOSIS:  Tongue cancer 29-Dec-2020 18:49:07  Bonnie Alvarez  
POST-OP DIAGNOSIS:  Tongue cancer 29-Dec-2020 18:49:07  Bonnie Alvarez

## 2021-01-12 NOTE — PROVIDER CONTACT NOTE (OTHER) - BACKGROUND
S/P hemiglossectomy
s/p hemoglossectomy, b/l neck dissectio, tracH decannulation
S/P Hemiglossectomy, neck dissection and tracheostomy

## 2021-01-12 NOTE — PROVIDER CONTACT NOTE (OTHER) - ACTION/TREATMENT ORDERED:
CBC labs for the am. Continue to monitor vital signs.
LR 500ml bolus IV x 1
No new orders at this time. Will discuss with SICU and ENT service

## 2021-01-12 NOTE — BRIEF OPERATIVE NOTE - NSICDXBRIEFPREOP_GEN_ALL_CORE_FT
PRE-OP DIAGNOSIS:  Tongue cancer 29-Dec-2020 18:48:56  Bonnie Alvarez  
PRE-OP DIAGNOSIS:  Tongue cancer 29-Dec-2020 18:48:56  Bonnie Alvarez

## 2021-01-12 NOTE — PROGRESS NOTE ADULT - SUBJECTIVE AND OBJECTIVE BOX
Patient has tongue cancer and is S/P partial glossectomy. Failed 3 swallow studies, oral intake is contraindicated and require peg feeds x at least 3 months.

## 2021-01-12 NOTE — BRIEF OPERATIVE NOTE - NSICDXBRIEFPROCEDURE_GEN_ALL_CORE_FT
PROCEDURES:  Endoscopy, upper GI tract, with PEG tube insertion 12-Jan-2021 14:50:23  Rajinder Campos

## 2021-01-12 NOTE — PROVIDER CONTACT NOTE (OTHER) - ASSESSMENT
Maroon colored bowel movement x1 this shift. Patient asymptomatic, Vital signs stable, patient not lightheaded or dizziness reported.
No signs of active bleeding noted
Urine output 15ml for 2200 and 10ml for 2300. Patient is receiving Lactated Ringers at 75ml/hr

## 2021-01-12 NOTE — PROVIDER CONTACT NOTE (OTHER) - SITUATION
AM Hemoglobin 7.4, Hematocrit 22.2    Previous on 12/30 0051 Hemoglobin 9.5 Hematocrit 28.1
Decreased hourly urine output via hackett catheter
Maroon colored bowel movement x1 this shift

## 2021-01-12 NOTE — PROGRESS NOTE ADULT - PROBLEM SELECTOR PLAN 2
-s/p right hemiglossectomy and b/l neck dissection on 12/29  -Management per ENT.  -Failed MBS, will be scheduled for PEG or G-tube

## 2021-01-12 NOTE — PROGRESS NOTE ADULT - SUBJECTIVE AND OBJECTIVE BOX
Pt underwent MBS without NG and failed. Plan for  PEG today. Pt NPO at midnight   Pt with maroon colored BM overnight. Asymptomatic and not tachycardic. Will follow up with CBC in AM  WBC downtrended yesterday    T(F): 97.2 (10 Arvin 2021 21:53), Max: 98.4 (10 Arvin 2021 17:57)  HR: 77 (10 Arvin 2021 21:53) (77 - 89)  BP: 122/74 (10 Arvin 2021 21:53) (108/70 - 136/85)  RR: 17 (10 Arvin 2021 21:53) (16 - 18)  SpO2: 99% (10 Arvin 2021 21:53) (97% - 100%)    PE:  NAD  NGT sutures in nasal cavity  Intraoral suture lines intact, soft, warm, cook doppler with triphasic signal  Neck incision closed with stables, c/d/i  Stoma site well-healing, covered with gauze/tape  L ALT incision c/d/i, closed with staples, JPx1 with serosanguinous drainage      Assessment and Plan:   55F s/p R glossectomy, bl SND 1-4, L ALT, trach 12/29  - Follow up CBC in AM  - NPO/IVF for PEG tube placement   - Watch BMs for evidence of bloody bowel movement   - pain control PRN   - Flap checks per PRS  - ASA 81 via NGT daily  - lovenox     - NPO  - bolus TF, f/u nutrition for feeding recommendations  - Bowel regimen   - PPI, anti-emetics   - Monitor lytes; replace PRN

## 2021-01-12 NOTE — BRIEF OPERATIVE NOTE - OPERATION/FINDINGS
Right hemiglossectomy and b/l neck dissection w/ left ALT reconstruction. 2.5 venous 
normal anatomy; 20F PEG tube inserted at secured at 2cm at the skin

## 2021-01-12 NOTE — PROGRESS NOTE ADULT - SUBJECTIVE AND OBJECTIVE BOX
Willy Siegel MD  [employer]  Pager [phone]  Email: [email or web address]          PROGRESS NOTE:     Patient is a 55y old  Female who presents with a chief complaint of surgery for tongue cancer (11 Jan 2021 14:04)      SUBJECTIVE / OVERNIGHT EVENTS:  Patient seen and examined. MBS yesterday, w/recommendations against NPO. No reports of f/c. Seen by CT surgery for PE,. possible G-tube.       MEDICATIONS  (STANDING):  amLODIPine   Tablet 5 milliGRAM(s) Oral daily  AQUAPHOR (petrolatum Ointment) 1 Application(s) Topical three times a day  aspirin  chewable 81 milliGRAM(s) Oral daily  atorvastatin 20 milliGRAM(s) Oral at bedtime  chlorhexidine 0.12% Liquid 15 milliLiter(s) Oral Mucosa two times a day  dextrose 5% + sodium chloride 0.45%. 1000 milliLiter(s) (100 mL/Hr) IV Continuous <Continuous>  enoxaparin Injectable 40 milliGRAM(s) SubCutaneous daily  famotidine  IVPB 20 milliGRAM(s) IV Intermittent daily  gabapentin   Solution 200 milliGRAM(s) Oral every 8 hours  levothyroxine 112 MICROGram(s) Oral <User Schedule>  levothyroxine 100 MICROGram(s) Oral <User Schedule>  polyethylene glycol 3350 17 Gram(s) Oral daily  senna Syrup 10 milliLiter(s) Oral at bedtime    MEDICATIONS  (PRN):  acetaminophen    Suspension .. 650 milliGRAM(s) Enteral Tube every 6 hours PRN Mild Pain (1 - 3)  oxyCODONE    Solution 5 milliGRAM(s) Oral every 4 hours PRN Moderate Pain (4 - 6)  oxyCODONE    Solution 10 milliGRAM(s) Oral every 4 hours PRN Severe Pain (7 - 10)  polyethylene glycol 3350 17 Gram(s) Oral once PRN Constipation      CAPILLARY BLOOD GLUCOSE        I&O's Summary    11 Jan 2021 07:01  -  12 Jan 2021 07:00  --------------------------------------------------------  IN: 1600 mL / OUT: 0 mL / NET: 1600 mL    12 Jan 2021 07:01  -  12 Jan 2021 13:19  --------------------------------------------------------  IN: 300 mL / OUT: 100 mL / NET: 200 mL        PHYSICAL EXAM:  Vital Signs Last 24 Hrs  T(C): 36.4 (12 Jan 2021 13:10), Max: 37 (11 Jan 2021 14:01)  T(F): 97.5 (12 Jan 2021 13:10), Max: 98.6 (11 Jan 2021 14:01)  HR: 87 (12 Jan 2021 13:10) (64 - 103)  BP: 116/81 (12 Jan 2021 13:10) (105/72 - 126/84)  BP(mean): --  RR: 16 (12 Jan 2021 13:10) (16 - 18)  SpO2: 100% (12 Jan 2021 13:10) (95% - 100%)    GENERAL: thin female, laying in bed  NECK: staples intact, dressing c/d/i; stoma covered  CHEST/LUNG: Clear to auscultation bilaterally; No wheeze  HEART: Regular rate and rhythm; No murmurs, rubs, or gallops  ABDOMEN: Soft, Nontender, Nondistended; Bowel sounds present  EXTREMITIES:  2+ Peripheral Pulses, No clubbing, cyanosis, or edema; L thigh wound intact  PSYCH: calm and pleasant  NEUROLOGY: non-focal  SKIN: No rashes or lesions  LABS:                        9.1    6.12  )-----------( 420      ( 12 Jan 2021 07:43 )             28.3     01-12    140  |  100  |  12  ----------------------------<  125<H>  3.8   |  28  |  0.50    Ca    9.3      12 Jan 2021 07:43  Phos  3.7     01-12  Mg     2.2     01-12      PT/INR - ( 12 Jan 2021 07:43 )   PT: 13.4 sec;   INR: 1.17 ratio         PTT - ( 12 Jan 2021 07:43 )  PTT:30.7 sec            RADIOLOGY & ADDITIONAL TESTS:  Results Reviewed:   Imaging Personally Reviewed:  Electrocardiogram Personally Reviewed:    COORDINATION OF CARE:  Care Discussed with Consultants/Other Providers [Y/N]:  Prior or Outpatient Records Reviewed [Y/N]:

## 2021-01-13 LAB
ANION GAP SERPL CALC-SCNC: 11 MMOL/L — SIGNIFICANT CHANGE UP (ref 7–14)
BUN SERPL-MCNC: 14 MG/DL — SIGNIFICANT CHANGE UP (ref 7–23)
CALCIUM SERPL-MCNC: 9.1 MG/DL — SIGNIFICANT CHANGE UP (ref 8.4–10.5)
CHLORIDE SERPL-SCNC: 100 MMOL/L — SIGNIFICANT CHANGE UP (ref 98–107)
CO2 SERPL-SCNC: 28 MMOL/L — SIGNIFICANT CHANGE UP (ref 22–31)
CREAT SERPL-MCNC: 0.54 MG/DL — SIGNIFICANT CHANGE UP (ref 0.5–1.3)
GLUCOSE SERPL-MCNC: 155 MG/DL — HIGH (ref 70–99)
HCT VFR BLD CALC: 26 % — LOW (ref 34.5–45)
HGB BLD-MCNC: 8.3 G/DL — LOW (ref 11.5–15.5)
MAGNESIUM SERPL-MCNC: 2.2 MG/DL — SIGNIFICANT CHANGE UP (ref 1.6–2.6)
MCHC RBC-ENTMCNC: 27.9 PG — SIGNIFICANT CHANGE UP (ref 27–34)
MCHC RBC-ENTMCNC: 31.9 GM/DL — LOW (ref 32–36)
MCV RBC AUTO: 87.2 FL — SIGNIFICANT CHANGE UP (ref 80–100)
NRBC # BLD: 0 /100 WBCS — SIGNIFICANT CHANGE UP
NRBC # FLD: 0 K/UL — SIGNIFICANT CHANGE UP
PHOSPHATE SERPL-MCNC: 4.2 MG/DL — SIGNIFICANT CHANGE UP (ref 2.5–4.5)
PLATELET # BLD AUTO: 422 K/UL — HIGH (ref 150–400)
POTASSIUM SERPL-MCNC: 4.2 MMOL/L — SIGNIFICANT CHANGE UP (ref 3.5–5.3)
POTASSIUM SERPL-SCNC: 4.2 MMOL/L — SIGNIFICANT CHANGE UP (ref 3.5–5.3)
RBC # BLD: 2.98 M/UL — LOW (ref 3.8–5.2)
RBC # FLD: 13.8 % — SIGNIFICANT CHANGE UP (ref 10.3–14.5)
SODIUM SERPL-SCNC: 139 MMOL/L — SIGNIFICANT CHANGE UP (ref 135–145)
WBC # BLD: 9.4 K/UL — SIGNIFICANT CHANGE UP (ref 3.8–10.5)
WBC # FLD AUTO: 9.4 K/UL — SIGNIFICANT CHANGE UP (ref 3.8–10.5)

## 2021-01-13 PROCEDURE — 99232 SBSQ HOSP IP/OBS MODERATE 35: CPT

## 2021-01-13 RX ORDER — CLOPIDOGREL BISULFATE 75 MG/1
1 TABLET, FILM COATED ORAL
Qty: 0 | Refills: 0 | DISCHARGE

## 2021-01-13 RX ORDER — CHLORHEXIDINE GLUCONATE 213 G/1000ML
15 SOLUTION TOPICAL
Refills: 0 | Status: DISCONTINUED | OUTPATIENT
Start: 2021-01-13 | End: 2021-01-14

## 2021-01-13 RX ORDER — ASPIRIN/CALCIUM CARB/MAGNESIUM 324 MG
1 TABLET ORAL
Qty: 30 | Refills: 0
Start: 2021-01-13 | End: 2021-02-11

## 2021-01-13 RX ORDER — ENOXAPARIN SODIUM 100 MG/ML
40 INJECTION SUBCUTANEOUS DAILY
Refills: 0 | Status: DISCONTINUED | OUTPATIENT
Start: 2021-01-13 | End: 2021-01-14

## 2021-01-13 RX ADMIN — SENNA PLUS 10 MILLILITER(S): 8.6 TABLET ORAL at 22:51

## 2021-01-13 RX ADMIN — GABAPENTIN 200 MILLIGRAM(S): 400 CAPSULE ORAL at 15:21

## 2021-01-13 RX ADMIN — GABAPENTIN 200 MILLIGRAM(S): 400 CAPSULE ORAL at 22:52

## 2021-01-13 RX ADMIN — ATORVASTATIN CALCIUM 20 MILLIGRAM(S): 80 TABLET, FILM COATED ORAL at 22:52

## 2021-01-13 RX ADMIN — FAMOTIDINE 104 MILLIGRAM(S): 10 INJECTION INTRAVENOUS at 15:21

## 2021-01-13 RX ADMIN — Medication 1 APPLICATION(S): at 15:21

## 2021-01-13 RX ADMIN — POLYETHYLENE GLYCOL 3350 17 GRAM(S): 17 POWDER, FOR SOLUTION ORAL at 11:23

## 2021-01-13 RX ADMIN — CHLORHEXIDINE GLUCONATE 15 MILLILITER(S): 213 SOLUTION TOPICAL at 05:50

## 2021-01-13 RX ADMIN — Medication 1 APPLICATION(S): at 05:50

## 2021-01-13 RX ADMIN — Medication 1 APPLICATION(S): at 22:51

## 2021-01-13 RX ADMIN — Medication 81 MILLIGRAM(S): at 11:24

## 2021-01-13 RX ADMIN — CHLORHEXIDINE GLUCONATE 15 MILLILITER(S): 213 SOLUTION TOPICAL at 19:48

## 2021-01-13 NOTE — PROGRESS NOTE ADULT - SUBJECTIVE AND OBJECTIVE BOX
Willy Siegel MD  [employer]  Pager [phone]  Email: [email or web address]          PROGRESS NOTE:     Patient is a 55y old  Female who presents with a chief complaint of surgery for tongue cancer (12 Jan 2021 13:19)      SUBJECTIVE / OVERNIGHT EVENTS:  Patient seen and examined this morning. S/p PEG, well tolerated. No reports of shortness of breath, f/c/n/v.       MEDICATIONS  (STANDING):  amLODIPine   Tablet 5 milliGRAM(s) Oral daily  AQUAPHOR (petrolatum Ointment) 1 Application(s) Topical three times a day  aspirin  chewable 81 milliGRAM(s) Oral daily  atorvastatin 20 milliGRAM(s) Oral at bedtime  chlorhexidine 0.12% Liquid 15 milliLiter(s) Oral Mucosa two times a day  dextrose 5% + sodium chloride 0.45%. 1000 milliLiter(s) (100 mL/Hr) IV Continuous <Continuous>  enoxaparin Injectable 40 milliGRAM(s) SubCutaneous daily  famotidine  IVPB 20 milliGRAM(s) IV Intermittent daily  gabapentin   Solution 200 milliGRAM(s) Oral every 8 hours  levothyroxine 112 MICROGram(s) Oral <User Schedule>  levothyroxine 100 MICROGram(s) Oral <User Schedule>  polyethylene glycol 3350 17 Gram(s) Oral daily  senna Syrup 10 milliLiter(s) Oral at bedtime    MEDICATIONS  (PRN):  acetaminophen    Suspension .. 650 milliGRAM(s) Enteral Tube every 6 hours PRN Mild Pain (1 - 3)  oxyCODONE    Solution 5 milliGRAM(s) Oral every 4 hours PRN Moderate Pain (4 - 6)  oxyCODONE    Solution 10 milliGRAM(s) Oral every 4 hours PRN Severe Pain (7 - 10)  polyethylene glycol 3350 17 Gram(s) Oral once PRN Constipation      CAPILLARY BLOOD GLUCOSE        I&O's Summary    12 Jan 2021 07:01  -  13 Jan 2021 07:00  --------------------------------------------------------  IN: 2700 mL / OUT: 120 mL / NET: 2580 mL        PHYSICAL EXAM:  Vital Signs Last 24 Hrs  T(C): 36.8 (13 Jan 2021 09:38), Max: 37.2 (12 Jan 2021 21:47)  T(F): 98.3 (13 Jan 2021 09:38), Max: 99 (12 Jan 2021 21:47)  HR: 80 (13 Jan 2021 09:38) (74 - 109)  BP: 96/57 (13 Jan 2021 09:38) (96/57 - 124/68)  BP(mean): 76 (12 Jan 2021 17:00) (76 - 86)  RR: 16 (13 Jan 2021 09:38) (12 - 18)  SpO2: 98% (13 Jan 2021 09:38) (96% - 100%)    GENERAL: thin female, laying in bed  NECK: staples intact, dressing c/d/i; stoma covered  CHEST/LUNG: Clear to auscultation bilaterally; No wheeze  HEART: Regular rate and rhythm; No murmurs, rubs, or gallops  ABDOMEN: Soft, Nontender, Nondistended; Bowel sounds present, +PEG  EXTREMITIES:  2+ Peripheral Pulses, No clubbing, cyanosis, or edema; L thigh wound intact  PSYCH: calm and pleasant  NEUROLOGY: non-focal  SKIN: No rashes or lesions    LABS:                        8.3    9.40  )-----------( 422      ( 13 Jan 2021 07:40 )             26.0     01-13    139  |  100  |  14  ----------------------------<  155<H>  4.2   |  28  |  0.54    Ca    9.1      13 Jan 2021 07:40  Phos  4.2     01-13  Mg     2.2     01-13      PT/INR - ( 12 Jan 2021 07:43 )   PT: 13.4 sec;   INR: 1.17 ratio         PTT - ( 12 Jan 2021 07:43 )  PTT:30.7 sec            RADIOLOGY & ADDITIONAL TESTS:  Results Reviewed:   Imaging Personally Reviewed:  Electrocardiogram Personally Reviewed:    COORDINATION OF CARE:  Care Discussed with Consultants/Other Providers [Y/N]:  Prior or Outpatient Records Reviewed [Y/N]:

## 2021-01-13 NOTE — PROGRESS NOTE ADULT - SUBJECTIVE AND OBJECTIVE BOX
S/p PEG tube yesterday, uneventful.  Denies additional melena, Hb stable  Meds today via PEG    Vital Signs Last 24 Hrs  T(C): 37.2 (12 Jan 2021 21:47), Max: 37.2 (12 Jan 2021 21:47)  T(F): 99 (12 Jan 2021 21:47), Max: 99 (12 Jan 2021 21:47)  HR: 87 (12 Jan 2021 21:47) (80 - 109)  BP: 113/63 (12 Jan 2021 21:47) (105/66 - 131/81)  BP(mean): 76 (12 Jan 2021 17:00) (76 - 86)  RR: 18 (12 Jan 2021 21:47) (12 - 18)  SpO2: 98% (12 Jan 2021 21:47) (96% - 100%)    PE:  NAD  Intraoral suture lines intact, soft, warm, cook doppler with triphasic signal  Neck incision closed with stables, c/d/i  Stoma site well-healing, covered with gauze/tape  PEG site c/d/i  L ALT incision c/d/i, closed with staples, JPx1 with serosanguinous drainage      Assessment and Plan:   55F s/p R glossectomy, bl SND 1-4, L ALT, trach 12/29  - Meds via PEG today  - AM CBC  - Watch BMs for evidence of bloody bowel movement   - pain control PRN   - Flap checks per PRS  - ASA 81 via NGT daily  - lovenox     - NPO  - bolus TF, f/u nutrition for feeding recommendations  - Bowel regimen   - PPI, anti-emetics   - Monitor lytes; replace PRN

## 2021-01-13 NOTE — PROGRESS NOTE ADULT - PROBLEM SELECTOR PLAN 2
-s/p right hemiglossectomy and b/l neck dissection on 12/29  -Management per ENT.  -Failed MBS, now s/p PEG 1/13

## 2021-01-13 NOTE — PROGRESS NOTE ADULT - SUBJECTIVE AND OBJECTIVE BOX
ANESTHESIA POSTOP CHECK    55y Female POSTOP DAY 1     Vital Signs Last 24 Hrs  T(C): 36.8 (13 Jan 2021 09:38), Max: 37.2 (12 Jan 2021 21:47)  T(F): 98.3 (13 Jan 2021 09:38), Max: 99 (12 Jan 2021 21:47)  HR: 80 (13 Jan 2021 09:38) (74 - 109)  BP: 96/57 (13 Jan 2021 09:38) (96/57 - 124/68)  BP(mean): 76 (12 Jan 2021 17:00) (76 - 86)  RR: 16 (13 Jan 2021 09:38) (12 - 18)  SpO2: 98% (13 Jan 2021 09:38) (96% - 100%)  I&O's Summary    12 Jan 2021 07:01  -  13 Jan 2021 07:00  --------------------------------------------------------  IN: 2700 mL / OUT: 120 mL / NET: 2580 mL        [X ] NO APPARENT ANESTHESIA COMPLICATIONS      Comments:

## 2021-01-14 ENCOUNTER — TRANSCRIPTION ENCOUNTER (OUTPATIENT)
Age: 56
End: 2021-01-14

## 2021-01-14 VITALS
DIASTOLIC BLOOD PRESSURE: 70 MMHG | TEMPERATURE: 98 F | OXYGEN SATURATION: 98 % | HEART RATE: 80 BPM | SYSTOLIC BLOOD PRESSURE: 116 MMHG | RESPIRATION RATE: 16 BRPM

## 2021-01-14 PROCEDURE — 99239 HOSP IP/OBS DSCHRG MGMT >30: CPT | Mod: 24

## 2021-01-14 PROCEDURE — 99232 SBSQ HOSP IP/OBS MODERATE 35: CPT

## 2021-01-14 RX ORDER — ERGOCALCIFEROL 1.25 MG/1
1 CAPSULE ORAL
Qty: 0 | Refills: 0 | DISCHARGE

## 2021-01-14 RX ORDER — SENNA PLUS 8.6 MG/1
10 TABLET ORAL
Qty: 300 | Refills: 0
Start: 2021-01-14 | End: 2021-02-12

## 2021-01-14 RX ORDER — LEVOTHYROXINE SODIUM 125 MCG
1 TABLET ORAL
Qty: 0 | Refills: 0 | DISCHARGE

## 2021-01-14 RX ORDER — FAMOTIDINE 10 MG/ML
1 INJECTION INTRAVENOUS
Qty: 30 | Refills: 0
Start: 2021-01-14 | End: 2021-02-12

## 2021-01-14 RX ORDER — CALCIUM CARBONATE 500(1250)
1 TABLET ORAL
Qty: 0 | Refills: 0 | DISCHARGE

## 2021-01-14 RX ORDER — OXYCODONE AND ACETAMINOPHEN 5; 325 MG/1; MG/1
1 TABLET ORAL
Qty: 16 | Refills: 0
Start: 2021-01-14 | End: 2021-01-17

## 2021-01-14 RX ORDER — PETROLATUM,WHITE
1 JELLY (GRAM) TOPICAL
Qty: 1 | Refills: 0
Start: 2021-01-14 | End: 2021-01-20

## 2021-01-14 RX ORDER — FAMOTIDINE 10 MG/ML
20 INJECTION INTRAVENOUS DAILY
Refills: 0 | Status: DISCONTINUED | OUTPATIENT
Start: 2021-01-14 | End: 2021-01-14

## 2021-01-14 RX ORDER — AMLODIPINE BESYLATE 2.5 MG/1
1 TABLET ORAL
Qty: 0 | Refills: 0 | DISCHARGE

## 2021-01-14 RX ORDER — ATORVASTATIN CALCIUM 80 MG/1
1 TABLET, FILM COATED ORAL
Qty: 0 | Refills: 0 | DISCHARGE

## 2021-01-14 RX ADMIN — Medication 1 APPLICATION(S): at 05:16

## 2021-01-14 RX ADMIN — CHLORHEXIDINE GLUCONATE 15 MILLILITER(S): 213 SOLUTION TOPICAL at 17:35

## 2021-01-14 RX ADMIN — GABAPENTIN 200 MILLIGRAM(S): 400 CAPSULE ORAL at 13:11

## 2021-01-14 RX ADMIN — Medication 1 APPLICATION(S): at 13:11

## 2021-01-14 RX ADMIN — GABAPENTIN 200 MILLIGRAM(S): 400 CAPSULE ORAL at 05:17

## 2021-01-14 RX ADMIN — Medication 81 MILLIGRAM(S): at 13:11

## 2021-01-14 RX ADMIN — FAMOTIDINE 20 MILLIGRAM(S): 10 INJECTION INTRAVENOUS at 13:11

## 2021-01-14 RX ADMIN — ENOXAPARIN SODIUM 40 MILLIGRAM(S): 100 INJECTION SUBCUTANEOUS at 13:11

## 2021-01-14 RX ADMIN — CHLORHEXIDINE GLUCONATE 15 MILLILITER(S): 213 SOLUTION TOPICAL at 05:17

## 2021-01-14 RX ADMIN — Medication 112 MICROGRAM(S): at 05:17

## 2021-01-14 NOTE — DISCHARGE NOTE NURSING/CASE MANAGEMENT/SOCIAL WORK - PATIENT PORTAL LINK FT
You can access the FollowMyHealth Patient Portal offered by [facility] by registering at the following website: [email or web address]. By joining [employer]’s FollowMyHealth portal, you will also be able to view your health information using other applications (apps) compatible with our system.

## 2021-01-14 NOTE — CHART NOTE - NSCHARTNOTEFT_GEN_A_CORE
Patient s/p EGD, PEG tube placement.  Patient resting, peg tube site clean and dry.  PEG tube to gravity drainage with bilious drainage noted.   ICU Vital Signs Last 24 Hrs  T(C): 37.2 (12 Jan 2021 21:47), Max: 37.2 (12 Jan 2021 21:47)  T(F): 99 (12 Jan 2021 21:47), Max: 99 (12 Jan 2021 21:47)  HR: 87 (12 Jan 2021 21:47) (80 - 109)  BP: 113/63 (12 Jan 2021 21:47) (105/66 - 131/81)  BP(mean): 76 (12 Jan 2021 17:00) (76 - 86)  ABP: --  ABP(mean): --  RR: 18 (12 Jan 2021 21:47) (12 - 18)  SpO2: 98% (12 Jan 2021 21:47) (96% - 100%)    I&O's Detail    11 Jan 2021 07:01  -  12 Jan 2021 07:00  --------------------------------------------------------  IN:    dextrose 5% + sodium chloride 0.45%: 1600 mL  Total IN: 1600 mL    OUT:    Enteral Tube Flush: 0 mL    IV PiggyBack: 0 mL    IV PiggyBack: 0 mL    Jevity 1.2: 0 mL    Oral Fluid: 0 mL  Total OUT: 0 mL    Total NET: 1600 mL      12 Jan 2021 07:01  -  12 Jan 2021 23:18  --------------------------------------------------------  IN:    dextrose 5% + sodium chloride 0.45%: 1800 mL    IV PiggyBack: 100 mL  Total IN: 1900 mL    OUT:    Oral Fluid: 0 mL    PEG (Percutaneous Endoscopic Gastrostomy) Tube (mL): 0 mL    Voided (mL): 100 mL  Total OUT: 100 mL    Total NET: 1800 mL      MEDICATIONS  (STANDING):  amLODIPine   Tablet 5 milliGRAM(s) Oral daily  AQUAPHOR (petrolatum Ointment) 1 Application(s) Topical three times a day  aspirin  chewable 81 milliGRAM(s) Oral daily  atorvastatin 20 milliGRAM(s) Oral at bedtime  chlorhexidine 0.12% Liquid 15 milliLiter(s) Oral Mucosa two times a day  dextrose 5% + sodium chloride 0.45%. 1000 milliLiter(s) (100 mL/Hr) IV Continuous <Continuous>  enoxaparin Injectable 40 milliGRAM(s) SubCutaneous daily  famotidine  IVPB 20 milliGRAM(s) IV Intermittent daily  gabapentin   Solution 200 milliGRAM(s) Oral every 8 hours  levothyroxine 112 MICROGram(s) Oral <User Schedule>  levothyroxine 100 MICROGram(s) Oral <User Schedule>  polyethylene glycol 3350 17 Gram(s) Oral daily  senna Syrup 10 milliLiter(s) Oral at bedtime    A/P: S/P EGD, PEG tube placement  May use Peg tube on 1/13/21  Will loosen hub of PEG tube on 1/14/21  Pain management  Continue care as per primary team
Source: Patient [X ]    Family [ ]     other [X ] electronic chart, RN    Diet : Diet, NPO with Tube Feed:   Tube Feeding Modality: Gastrostomy  Jevity 1.2 Tho (JEVITY1.2RTH)  Total Volume for 24 Hours (mL): 1440  Continuous  Starting Tube Feed Rate {mL per Hour}: 20  Increase Tube Feed Rate by (mL): 20     Every 2 hours  Until Goal Tube Feed Rate (mL per Hour): 60  Tube Feed Duration (in Hours): 24  Tube Feed Start Time: 14:00 (01-13-21 @ 08:17) [Active]        Nutrition follow-up, nutrition consult for TF. Per chart review, 55 year old female with hx thyroid cancer s/p Total Thyroidectomy (3/2020) and RT, CVA, HTN, presented with R tongue lesion s/p biopsy x 2 (last 12/2020), now s/p R hemiglossectomy, b/l neck dissection per chart review. Failed MBS assessment on 1/11 and 1/8 and swallow bedside on 11/5 and 11/7. Now with PEG placement yesterday. Patient started on continuous tube feeds of Jevity1.2 and plan to transition to bolus. No GI distress (nausea, vomiting, diarrhea, constipation) reported.      Weight (kg): 50.8 (01-12 @ 13:04), 51.3kg (1/10)       Pertinent Medications: amLODIPine   Tablet  atorvastatin  dextrose 5% + sodium chloride 0.45%.  famotidine  IVPB  gabapentin   Solution  levothyroxine  levothyroxine  polyethylene glycol 3350  senna Syrup    Pertinent Labs:  01-13 Na139 mmol/L Glu 155 mg/dL<H> K+ 4.2 mmol/L Cr  0.54 mg/dL BUN 14 mg/dL 01-13 Phos 4.2 mg/dL      Skin: no surgical incision noted.   No edema noted.     Estimated Needs:   [X ] no change since previous assessment  [ ] recalculated:       Previous Nutrition Diagnosis:     [ ] Inadequate Energy Intake [ ]Inadequate Oral Intake [ ] Excessive Energy Intake     [ ] Underweight [X ] Increased Nutrient Needs [ ] Overweight/Obesity     [ ] Altered GI Function [ ] Unintended Weight Loss [ ] Food & Nutrition Related Knowledge Deficit [ ] Malnutrition      Nutrition Diagnosis is [ X] ongoing  [ ] resolved [ ] not applicable          Additional Recommendations:   1. When bolus tube feed initiated, recommend Jevity1.2Cal bolus feeds of [id number] provides total volume of 1320mL, 1584KCal, 73g pro and 1065mL free water.   2. Monitor weights, labs, BM's, skin integrity, tolerance to EN.  3. Further adjustments to rate/volume/duration/free water provision of enteral feeds dependant on long term monitoring of patient's tolerance, weight trends and needs.
Verbal consent obtained prior to procedure. RN notified prior to procedure  Suction setup at bedside with suction catheter attached. Ambu-bag available at bedside with 100% FiO2  Patient places on supine position with neck slightly extended. Sutures secured the tracheostomy tube cut using scissor, tracheostomy tube Shiley 6.0CFS removed from patient's neck. Tracheostomy stoma is covered with guaze, no sign of distress. Patient tolerated the procedure well.
55F presents with malignant neoplasm of tongue s/p R glossectomy, bilateral selective neck dissection 1-4, with left anterior lateral thigh free flap, tracheostomy on 12/29. Surgical pathology reports came back shows the following.     Surgical Pathology Report (12.29.20 @ 09:50)   Surgical Pathology Report:   ACCESSION No: [id number]   NICOL COLINDRES   Surgical Final Report   Final Diagnosis   1. Tongue, right, glossectomy   - Invasive squamous cell carcinoma, well-differentiated, see   synoptic summary   - Perineural invasion and lymphovascular invasion seen   - Resection margins negative for carcinoma   - PD-L1 CPS immunostain is pending and an addendum will be   issued   2. Tongue, additional lateral margin, excision   - Squamous mucosa negative for carcinoma   3. Lymph nodes, right levels 1, 2, 3 and 4, neck dissection   - Level 1: 1 out of 5 lymph nodes positive for metastatic   carcinoma with extranodal extension; submandibular gland negative   for carcinoma   - Level 2: 1 out of 3 lymph nodes positive for metastatic   carcinoma with extranodal extension   - Level 3: 20 lymph nodes negative for metastatic carcinoma   - Level 4: 20 lymph nodes negative for metastatic carcinoma   4. Lymph nodes, left level 1, neck dissection   - Submandibular gland negative for carcinoma   - 7 lymph nodes negative for metastatic carcinoma   5. Lymph nodes, left level 2, neck dissection   - 15 lymph nodes negative for metastatic carcinoma   6. Lymph nodes, left level 3, neck dissection   - 10 lymph nodes negative for metastatic carcinoma   7. Lymph nodes, left level 4, neck dissection   - 4 lymph nodes negative for metastatic carcinoma   Verified by: MARY CAMARENA MD   (Electronic Signature)   Reported on: 01/05/21 11:52 [place] [address]   Phone: [phone] Fax: [phone]   _________________________________________________________________   Intraoperative Consultation   1. Right glossectomy   - Anterior (FSA), Medial (FSB), Posterior(FSC), Lateral (FSD),   Deep (FSE)- All negative   2. Additional lateral margin   NICOL COLINDRES   Surgical Final Report   - Negative   By Dr. WERO Abdi   Synoptic Summary   LIP AND ORAL CAVITY: Resection   Specimen: Tongue   Procedure: Glossectomy   Specimen Size: 7.4 x 5.9 x 2.5 cm   Additional dimensions: Not applicable   Specimen Laterality: Right   Tumor Site: Tongue   Tumor Focality: Unifocal   Tumor Size: 2.8 x 2.4 x 1.8 cm   Tumor Depth of Invasion (DOI) (millimeters): 17 mm   Additional dimensions: Not applicable   Tumor Thickness (pT1 and pT2 tumors): 1.8 cm   Tumor Description Gross Subtype: nodular, irregular   Histologic Type: Squamous cell carcinoma, conventional   Histologic Grade: Well-differentiated   Margins: Uninvolved by invasive tumor, focally 0.1 mm from deep   margin   Lymph-Vascular Invasion: Present   Perineural Invasion: Present around numerous small, medium sized   nerve bundles and focally around large nerve bundles   Lymph Nodes, Extranodal Extension: Present   Pathologic Staging (pTNM):   Primary Tumor (pT): pT3   Regional Lymph Nodes: pN3b   # Lymph nodes examined: 84   # Lymph nodes involved: 2   Size of Largest Metastatic Deposit (centimeters): 2.7 cm   Distant Metastasis (pM): pMX   Additional Pathologic Findings: None   Ancillary Studies: not available   Clinical History   malignant neoplasm of tongue   Specimen(s) Submitted   1-Right glossectomy   2-Additional lateral margin.   3-Right neck dissection levels 1, 2, 3 and 4   4-Left neck dissection level 1   NICOL COLINDRES   Surgical Final Report   5-Left neck dissection level 2   6-Left neck dissection level 3   7-Left neck dissection level 4   Gross Description   1. The specimen is received fresh for intraoperative   consultation and the specimen container is labeled: Right   glossectomy. It consists of a 7.4 x 5.9 x 2.5 cm tan-pink   muscular portion of tissue consistent with tongue. The specimen   is oriented as follows: One stitch = anterior; two stitch =   medial; three stitch = posterior. The specimen is inked at the   time of frozen section as follows:   Anterior = blue   Medial = green   Posterior = yellow   Lateral = orange   Deep = Black   The specimen is sectioned from anterior to posterior, revealing a   2.8 x 2.4 x 1.8 cm tan-white, nodular, irregular lesion, located   0.4 cm from the lateral margin, 0.3 cm from the deep margin, 0.6   cm from the medial margin, 0.9 cm from the anterior margin, and   1.6 cm from the posterior margin. The lesion grossly abuts the   superficial aspect, and the greatest depth of invasion is   demonstrated at the superficial to deep aspect of 2.4 cm. The   remaining uninvolved tissue is tan-brown and muscular.   Representative sections of the margins are takenfor frozen   section examination. The frozen section cassettes are opened,   verifying the presence of tissue within and submitted (1FSA-FSE)   Photographs taken. Representative sections submitted in 13   cassettes as follows:   1FSA = anterior margin   1FSB = medial margin   1FSC = posterior margin   1FSD = Lateral margin   1FSE = Deep margin   1A = Representative sections of anterior margin, perpendicular,   to include closest lesion   1B = representative section of lesion, to include closest deep   margin   1C-1D = representative full face section of lesion, to include   greatest depth of invasion, and closest medial margin, one slice,   bisected   1E = representative section of lesion to superficial aspect   1F = lesion to closest lateral margin   1G =representative section of posterior margin, perpendicular   NICOL COLINDRES   Surgical Final Report   1H = representative section of uninvolved muscular tissue   2. The specimen is received fresh for intraoperative consultation   and the specimen container is labeled: Additional lateral margin.   It consists of a tan-yellow fragment of soft tissue measuring 1.5   x 0.3 x 0.2 cm. The specimen is entirely submitted for frozen   section examination. The frozen section cassette is opened,   verifying the presence of tissue within and submitted (2 FSA).   3. The specimen is received in formalin and the specimen   container is labeled: Right neck dissection levels 1, 2, 3 and 4.   It consists of a tan-yellow, oriented portion of fibroadipose   tissue measuring 13.5 x 7.6 x 1.8 cm. The specimen is oriented   as follows: Single stitch = level 1; double stitch = level 2;   triple stitch = level 3; quadruple stitch = level 4. The   specimen is divided into four levels according to orientation and   sectioned.   Sectioning of level 1 reveals a tan-yellow, lobulated   submandibular gland measuring 4.5 x 3.3 x 1.0 cm with a tan-   yellow, lobulated, unremarkable cut surface. Additionally, five   tan-pink to brown lymph nodes are identified within level 1,   ranging in size from 0.7 cm to 1.8 cm in greatest dimension. The   largest lymph node measures 1.8 x 1.7 x 0.7 cm and has a tan-   white to red, gritty cut surface.   Sectioning of level 2 reveals three tan-pink, lymph nodes ranging   in size from 0.8 cm to 3.1 cm. The largest lymph node measures   3.1 x 2.1 x 1.4 cm and has a tan-white to pink, centrally   necrotic cut surface.   Sectioning of level 3 reveals 21 tan-pink to brown possible   lymph nodes ranging in size from 0.1 cm to 2.2 cm in greatest   dimension. A 2.9 x 2.0 x 0.3 cm aggregate of possible matted   lymph nodes is also identified   Sectioning of level 4 reveals 22 tan-pink to brown lymph nodes   ranging in size from 0.1 cm to 1.3 cm in greatest dimension.   Representative sections submitted. 37 cassettes:   3A = representative sections of submandibular gland   3B-3C = largest level 1 lymph node with tan-white, gritty cut   surface, serially sectioned   3D-3E= bisected level 1 lymph nodes, one per cassette   3F = two bisected level 1 lymph nodes, one differentially inked   blue   NICOL COLINDRES   Surgical Final Report   3G-3L = largest level 2 lymph node, serially sectioned (3H-3I = 1   slice, bisected; 3J-3K = 1 slice, bisected   3M-3N = bisected level 2 lymph nodes, one per cassette   3O = 3 possible level 3 lymph nodes, intact   3P = 4 level 3 lymph node, intact   3Q = 1 level 3 lymph node, intact   3R-3V = bisected level 3 lymph nodes, one per cassette   3W-3Y = bisected level 3 lymph node, two per cassette, one   differentially inked blue   3Z-3AA = possible matted level 3 lymph node, serially sectioned   3AB = 3 possible level 4 lymph nodes, intact   3AC = 4 possible level 4 lymph nodes, intact   3AD = two possible level 4 lymph nodes, intact   3AE = 2 level 4 lymph nodes, intact   3AF-3AJ = bisected level 4 lymph nodes, two per cassette, one   differentially inked blue   3AK = one level 4 lymph node, bisected   4. The specimen is received in formalin and the specimen   container is labeled: Left neck dissection level 1. It consists   of a tan-brown, unoriented fragment of fibroadipose tissue   measuring 8.7 x 5.7 x 1.0 cm. Sectioning reveals a tan-brown,   focally hemorrhagic submandibular gland measuring 4.5 x 4.2 x 1.1   cm with a tan-yellow, lobulated cut surface. Further sectioning   reveals eight tan-pink to brown lymph nodes ranging in size from   0.3 cm to 1.6 cm in greatest dimension with tan-brown cut   surfaces.   Representative sections submitted. 7 cassettes:   4A = representative section of submandibular gland   4B = one intact lymph node   4C-4E = bisected lymph nodes, one per cassette   4F-4G = bisected lymph nodes, two per cassette, one   differentially inked blue   5. The specimen is received in formalin and the specimen   container is labeled: Left neck dissection level 2. It consists   of a tan-brown, unoriented fragment of fibroadipose tissue   measuring 4.0 x 2.7 x 0.4 cm. Sectioning reveals 15 tan-brown   lymph nodes ranging in size from 0.2 cm to 0.8 cm in greatest   dimension with tan-brown cut surfaces.   Representative sections submitted. Six cassettes.   5A = 4 lymph nodes, intact   5B = 3 lymph nodes, intact   5C-5F = bisected lymph nodes, two per cassette, one   differentially inked blue   NICOL COLINDRES 7   Surgical Final Report   6. The specimen is received in formalin and the specimen   container is labeled: Left neck dissection level 3. It consists   of a tan-brown, unoriented fragment of fibroadipose tissue   measuring 2.9 x 2.2 x 0.6 cm. Sectioning reveals tan tan-brown   lymph nodes ranging in size from 0.2 cm to 1.3 cm in greatest   dimension with tan-brown cut surfaces.   Representative sections submitted. Six cassettes:   6A = 2 intact lymph nodes   6B = 3 intact lymph nodes   6C = one intact lymph node   6D= 2 bisected lymph nodes, one differentially inked blue   6E-6F = bisected lymph nodes, one per cassette   7. The specimen is received in formalin and the specimen   container is labeled: Left neck dissection level 4. It consists   of a tan-yellow, unoriented fragment of fibroadipose tissue   measuring 4.3 x 2.3 x 0.5 cm. Sectioning reveals 7 tan-brown   lymph nodes rangingin size from 0.1 cm to 1.3 cm in greatest   dimension.   Representative sections submitted. Five cassettes.   7A = 2 possible lymph nodes   7B = one possible lymph node   7C = one possible lymph node   7D = 2 bisected lymph nodes, one differentially inked blue   7E = one bisected lymph node   In addition to other data that may appear on the specimen   containers, all labels have been inspected to confirm the   presence of the patient's name and date of birth.   Miguelina Huang 12/30/2020 16:43   Disclaimer   Histological Processing Performed at [facility], Department of Pathology, [address] [place] [address].   NICOL COLINDRES 7   Surgical Consult Report   Disclaimer   Histological Processing Performed at [facility], Department of Pathology, [address] [place] [address].
PEG loosened today  pt tolerated well  discharge planning    Joi DESIR [id number]

## 2021-01-14 NOTE — PROGRESS NOTE ADULT - SUBJECTIVE AND OBJECTIVE BOX
Willy Siegel MD  Academic Hospitalist  Pager [phone]  Email: [email or web address]          PROGRESS NOTE:     Patient is a 55y old  Female who presents with a chief complaint of surgery for tongue cancer (13 Jan 2021 14:00)      SUBJECTIVE / OVERNIGHT EVENTS:  Patient seen and examined this morning. S/p PEG, well tolerated. No new complaints, she denies reports of shortness of breath, f/c/n/v.     MEDICATIONS  (STANDING):  amLODIPine   Tablet 5 milliGRAM(s) Oral daily  AQUAPHOR (petrolatum Ointment) 1 Application(s) Topical three times a day  aspirin  chewable 81 milliGRAM(s) Oral daily  atorvastatin 20 milliGRAM(s) Oral at bedtime  chlorhexidine 0.12% Liquid 15 milliLiter(s) Oral Mucosa two times a day  enoxaparin Injectable 40 milliGRAM(s) SubCutaneous daily  famotidine    Tablet 20 milliGRAM(s) Oral daily  gabapentin   Solution 200 milliGRAM(s) Oral every 8 hours  levothyroxine 112 MICROGram(s) Oral <User Schedule>  levothyroxine 100 MICROGram(s) Oral <User Schedule>  polyethylene glycol 3350 17 Gram(s) Oral daily  senna Syrup 10 milliLiter(s) Oral at bedtime    MEDICATIONS  (PRN):  acetaminophen    Suspension .. 650 milliGRAM(s) Enteral Tube every 6 hours PRN Mild Pain (1 - 3)  oxyCODONE    Solution 5 milliGRAM(s) Oral every 4 hours PRN Moderate Pain (4 - 6)  oxyCODONE    Solution 10 milliGRAM(s) Oral every 4 hours PRN Severe Pain (7 - 10)  polyethylene glycol 3350 17 Gram(s) Oral once PRN Constipation      CAPILLARY BLOOD GLUCOSE        I&O's Summary    13 Jan 2021 07:01  -  14 Jan 2021 07:00  --------------------------------------------------------  IN: 970 mL / OUT: 0 mL / NET: 970 mL    14 Jan 2021 07:01  -  14 Jan 2021 13:47  --------------------------------------------------------  IN: 580 mL / OUT: 0 mL / NET: 580 mL        PHYSICAL EXAM:  Vital Signs Last 24 Hrs  T(C): 36.7 (14 Jan 2021 09:38), Max: 36.9 (13 Jan 2021 21:25)  T(F): 98.1 (14 Jan 2021 09:38), Max: 98.4 (13 Jan 2021 21:25)  HR: 72 (14 Jan 2021 09:38) (72 - 88)  BP: 101/64 (14 Jan 2021 09:38) (98/62 - 102/62)  BP(mean): --  RR: 18 (14 Jan 2021 09:38) (16 - 18)  SpO2: 98% (14 Jan 2021 09:38) (95% - 98%)    GENERAL: thin female, in chair  NECK: staples intact, dressing c/d/i; stoma covered  CHEST/LUNG: Clear to auscultation bilaterally; No wheeze  HEART: Regular rate and rhythm; No murmurs, rubs, or gallops  ABDOMEN: Soft, Nontender, Nondistended; Bowel sounds present, +PEG  EXTREMITIES:  2+ Peripheral Pulses, No clubbing, cyanosis, or edema; L thigh wound intact  PSYCH: calm and pleasant  NEUROLOGY: non-focal  SKIN: No rashes or lesions  LABS:                        8.3    9.40  )-----------( 422      ( 13 Jan 2021 07:40 )             26.0     01-13    139  |  100  |  14  ----------------------------<  155<H>  4.2   |  28  |  0.54    Ca    9.1      13 Jan 2021 07:40  Phos  4.2     01-13  Mg     2.2     01-13                  RADIOLOGY & ADDITIONAL TESTS:  Results Reviewed:   Imaging Personally Reviewed:  Electrocardiogram Personally Reviewed:    COORDINATION OF CARE:  Care Discussed with Consultants/Other Providers [Y/N]:  Prior or Outpatient Records Reviewed [Y/N]:

## 2021-01-14 NOTE — PROGRESS NOTE ADULT - SUBJECTIVE AND OBJECTIVE BOX
Started bolus feeds last night    T(F): 97.7 (14 Jan 2021 05:27), Max: 98.4 (13 Jan 2021 21:25)  HR: 78 (14 Jan 2021 05:27) (78 - 88)  BP: 98/62 (14 Jan 2021 05:27) (96/57 - 102/62)  RR: 16 (14 Jan 2021 05:27) (16 - 18)  SpO2: 98% (14 Jan 2021 05:27) (95% - 98%)    PE:  NAD  Intraoral suture lines intact, soft, warm, cook doppler with triphasic signal  Neck incision closed with stables, c/d/i  Stoma site well-healing, covered with gauze/tape  PEG site c/d/i  L ALT incision c/d/i      Assessment and Plan:   55F s/p R glossectomy, bl SND 1-4, L ALT, trach 12/29  - pain control PRN   - Flap checks per PRS  - ASA 81 via NGT daily  - lovenox     - NPO  - bolus TF, f/u nutrition for feeding recommendations  - Bowel regimen   - PPI, anti-emetics   - Monitor lytes; replace PRN

## 2021-01-14 NOTE — CHART NOTE - NSCHARTNOTESELECT_GEN_ALL_CORE
ENT/Event Note
Thoracic Surgery/Event Note
ENT/Event Note
Nutrition Follow-up Note-Registered Dietitian/Nutrition Services
Thoracic Surgery/Event Note

## 2021-01-14 NOTE — DISCHARGE NOTE NURSING/CASE MANAGEMENT/SOCIAL WORK - NSDCFUADDAPPT_GEN_ALL_CORE_FT
- please call [phone] for appointment - please call [phone] for appointment  pt provided with jevity feeds  3 bottles and irrigation trays 3 4x4

## 2021-01-14 NOTE — PROGRESS NOTE ADULT - PROVIDER SPECIALTY LIST ADULT
Anesthesia
ENT
Plastic Surgery
SICU
SICU
ENT
Plastic Surgery
SICU
ENT
Plastic Surgery
Plastic Surgery
SICU
SICU
Hospitalist

## 2021-01-14 NOTE — PROGRESS NOTE ADULT - REASON FOR ADMISSION
surgery for tongue cancer

## 2021-01-19 ENCOUNTER — APPOINTMENT (OUTPATIENT)
Dept: OTOLARYNGOLOGY | Facility: CLINIC | Age: 56
End: 2021-01-19
Payer: MEDICAID

## 2021-01-19 ENCOUNTER — NON-APPOINTMENT (OUTPATIENT)
Age: 56
End: 2021-01-19

## 2021-01-19 VITALS
DIASTOLIC BLOOD PRESSURE: 93 MMHG | HEART RATE: 116 BPM | BODY MASS INDEX: 20.55 KG/M2 | WEIGHT: 116 LBS | HEIGHT: 63 IN | SYSTOLIC BLOOD PRESSURE: 134 MMHG

## 2021-01-19 PROCEDURE — 99024 POSTOP FOLLOW-UP VISIT: CPT

## 2021-01-19 NOTE — PHYSICAL EXAM
[Midline] : trachea located in midline position [Normal] : no rashes [de-identified] : Incision C/D/I [de-identified] : Flap in place, viable. No signs of fistula.

## 2021-01-19 NOTE — HISTORY OF PRESENT ILLNESS
[de-identified] : 55 yro female pt with thyroid cancer ( dr Salmeron)  s/p 3/2020 ( sched [facility]ORGAN 6/2020) and stroke 11/2018 referred by Dr. Aman Perea for right tongue lesion. Pt is here today for f/up s/p right hemiglossectomy, right neck dissection, tracheostomy 12/29/2020. Pt states the lesion started after stroke in 2018 and thinks it could be from trauma to the tongue from the teeth. \par Today pt c/o some pain, relieved with medication. Pt denies dysphagia, odynophonia, dyspnea,  bleeding, fever, chills, signs of infection since surgery. Pt is NPO, with PEG tube in place. \par \par surgical path 1/5/21: Final Diagnosis\par 1. Tongue, right, glossectomy\par - Invasive squamous cell carcinoma, well-differentiated, see synoptic summary\par - Perineural invasion and lymphovascular invasion seen\par - Resection margins negative for carcinoma\par - PD-L1 CPS immunostain is pending and an addendum will be issued\par \par 2. Tongue, additional lateral margin, excision\par - Squamous mucosa negative for carcinoma\par \par 3. Lymph nodes, right levels 1, 2, 3 and 4, neck dissection\par - Level 1: 1 out of 5 lymph nodes positive for metastatic\par carcinoma with extranodal extension; submandibular gland negative for carcinoma\par - Level 2: 1 out of 3 lymph nodes positive for metastatic\par carcinoma with extranodal extension\par - Level 3: 20 lymph nodes negative for metastatic carcinoma\par - Level 4: 20 lymph nodes negative for metastatic carcinoma\par \par 4. Lymph nodes, left level 1, neck dissection\par - Submandibular gland negative for carcinoma\par - 7 lymph nodes negative for metastatic carcinoma\par \par 5. Lymph nodes, left level 2, neck dissection\par - 15 lymph nodes negative for metastatic carcinoma\par \par 6. Lymph nodes, left level 3, neck dissection\par - 10 lymph nodes negative for metastatic carcinoma\par \par 7. Lymph nodes, left level 4, neck dissection\par - 4 lymph nodes negative for metastatic carcinoma\par \par Complete review of systems which was performed during a previous encounter was reviewed with the patient and there are no changes except as stated in the HPI section.\par

## 2021-01-19 NOTE — REASON FOR VISIT
[Post-Operative Visit] : a post-operative visit [Other: _____] : [unfilled] [[employer] ] : provided by [employer]   [FreeTextEntry1] : [id number] [FreeTextEntry2] : Shalom [FreeTextEntry3] : Mandarin [TWNoteComboBox1] : Chinese

## 2021-01-19 NOTE — CONSULT LETTER
[Dear  ___] : Dear  [unfilled], [Courtesy Letter:] : I had the pleasure of seeing your patient, [unfilled], in my office today. [Please see my note below.] : Please see my note below. [Consult Closing:] : Thank you very much for allowing me to participate in the care of this patient.  If you have any questions, please do not hesitate to contact me. [Sincerely,] : Sincerely, [FreeTextEntry2] : Dr Aman Perea  [FreeTextEntry3] : \par Laurent Busby MD, FACS\par \par Otolaryngology-Head and Neck Surgery\par [employer] [facility]\par

## 2021-01-20 ENCOUNTER — OUTPATIENT (OUTPATIENT)
Dept: OUTPATIENT SERVICES | Facility: HOSPITAL | Age: 56
LOS: 1 days | Discharge: HOME | End: 2021-01-20

## 2021-01-20 DIAGNOSIS — C76.0 MALIGNANT NEOPLASM OF HEAD, FACE AND NECK: ICD-10-CM

## 2021-01-20 DIAGNOSIS — Z98.890 OTHER SPECIFIED POSTPROCEDURAL STATES: Chronic | ICD-10-CM

## 2021-01-21 ENCOUNTER — APPOINTMENT (OUTPATIENT)
Dept: HEMATOLOGY ONCOLOGY | Facility: CLINIC | Age: 56
End: 2021-01-21
Payer: MEDICAID

## 2021-01-21 ENCOUNTER — RESULT REVIEW (OUTPATIENT)
Age: 56
End: 2021-01-21

## 2021-01-21 VITALS
OXYGEN SATURATION: 96 % | BODY MASS INDEX: 20.55 KG/M2 | TEMPERATURE: 98 F | SYSTOLIC BLOOD PRESSURE: 143 MMHG | RESPIRATION RATE: 16 BRPM | DIASTOLIC BLOOD PRESSURE: 97 MMHG | WEIGHT: 115.96 LBS | HEIGHT: 62.99 IN | HEART RATE: 117 BPM

## 2021-01-21 DIAGNOSIS — Z78.9 OTHER SPECIFIED HEALTH STATUS: ICD-10-CM

## 2021-01-21 LAB
BASOPHILS # BLD AUTO: 0.03 K/UL — SIGNIFICANT CHANGE UP (ref 0–0.2)
BASOPHILS NFR BLD AUTO: 0.4 % — SIGNIFICANT CHANGE UP (ref 0–2)
EOSINOPHIL # BLD AUTO: 0.11 K/UL — SIGNIFICANT CHANGE UP (ref 0–0.5)
EOSINOPHIL NFR BLD AUTO: 1.6 % — SIGNIFICANT CHANGE UP (ref 0–6)
HCT VFR BLD CALC: 32.8 % — LOW (ref 34.5–45)
HGB BLD-MCNC: 10.9 G/DL — LOW (ref 11.5–15.5)
IMM GRANULOCYTES NFR BLD AUTO: 0.9 % — SIGNIFICANT CHANGE UP (ref 0–1.5)
LYMPHOCYTES # BLD AUTO: 1.08 K/UL — SIGNIFICANT CHANGE UP (ref 1–3.3)
LYMPHOCYTES # BLD AUTO: 15.8 % — SIGNIFICANT CHANGE UP (ref 13–44)
MCHC RBC-ENTMCNC: 29.4 PG — SIGNIFICANT CHANGE UP (ref 27–34)
MCHC RBC-ENTMCNC: 33.2 G/DL — SIGNIFICANT CHANGE UP (ref 32–36)
MCV RBC AUTO: 88.4 FL — SIGNIFICANT CHANGE UP (ref 80–100)
MONOCYTES # BLD AUTO: 0.52 K/UL — SIGNIFICANT CHANGE UP (ref 0–0.9)
MONOCYTES NFR BLD AUTO: 7.6 % — SIGNIFICANT CHANGE UP (ref 2–14)
NEUTROPHILS # BLD AUTO: 5.02 K/UL — SIGNIFICANT CHANGE UP (ref 1.8–7.4)
NEUTROPHILS NFR BLD AUTO: 73.7 % — SIGNIFICANT CHANGE UP (ref 43–77)
NRBC # BLD: 0 /100 WBCS — SIGNIFICANT CHANGE UP (ref 0–0)
PLATELET # BLD AUTO: 320 K/UL — SIGNIFICANT CHANGE UP (ref 150–400)
RBC # BLD: 3.71 M/UL — LOW (ref 3.8–5.2)
RBC # FLD: 15.3 % — HIGH (ref 10.3–14.5)
WBC # BLD: 6.82 K/UL — SIGNIFICANT CHANGE UP (ref 3.8–10.5)
WBC # FLD AUTO: 6.82 K/UL — SIGNIFICANT CHANGE UP (ref 3.8–10.5)

## 2021-01-21 PROCEDURE — 99205 OFFICE O/P NEW HI 60 MIN: CPT

## 2021-01-21 PROCEDURE — 99072 ADDL SUPL MATRL&STAF TM PHE: CPT

## 2021-01-21 PROCEDURE — 99204 OFFICE O/P NEW MOD 45 MIN: CPT

## 2021-01-22 RX ORDER — CLOPIDOGREL 75 MG/1
75 TABLET, FILM COATED ORAL
Refills: 0 | Status: DISCONTINUED | COMMUNITY
End: 2021-01-22

## 2021-01-22 RX ORDER — SENNOSIDES 8.8 MG/5ML
8.8 LIQUID ORAL DAILY
Refills: 0 | Status: ACTIVE | COMMUNITY
Start: 2021-01-14

## 2021-01-22 RX ORDER — ASPIRIN 81 MG/1
81 TABLET, CHEWABLE ORAL
Qty: 30 | Refills: 0 | Status: ACTIVE | COMMUNITY
Start: 2021-01-14

## 2021-02-01 ENCOUNTER — NON-APPOINTMENT (OUTPATIENT)
Age: 56
End: 2021-02-01

## 2021-02-02 ENCOUNTER — APPOINTMENT (OUTPATIENT)
Dept: CT IMAGING | Facility: IMAGING CENTER | Age: 56
End: 2021-02-02
Payer: MEDICAID

## 2021-02-02 ENCOUNTER — OUTPATIENT (OUTPATIENT)
Dept: OUTPATIENT SERVICES | Facility: HOSPITAL | Age: 56
LOS: 1 days | End: 2021-02-02
Payer: MEDICAID

## 2021-02-02 ENCOUNTER — NON-APPOINTMENT (OUTPATIENT)
Age: 56
End: 2021-02-02

## 2021-02-02 ENCOUNTER — APPOINTMENT (OUTPATIENT)
Dept: RADIATION ONCOLOGY | Facility: CLINIC | Age: 56
End: 2021-02-02
Payer: MEDICAID

## 2021-02-02 VITALS
OXYGEN SATURATION: 97 % | WEIGHT: 110.01 LBS | HEART RATE: 134 BPM | SYSTOLIC BLOOD PRESSURE: 124 MMHG | DIASTOLIC BLOOD PRESSURE: 79 MMHG | TEMPERATURE: 97.16 F | BODY MASS INDEX: 19.49 KG/M2 | RESPIRATION RATE: 16 BRPM

## 2021-02-02 DIAGNOSIS — Z98.890 OTHER SPECIFIED POSTPROCEDURAL STATES: Chronic | ICD-10-CM

## 2021-02-02 DIAGNOSIS — C02.9 MALIGNANT NEOPLASM OF TONGUE, UNSPECIFIED: ICD-10-CM

## 2021-02-02 PROCEDURE — 71260 CT THORAX DX C+: CPT | Mod: 26

## 2021-02-02 PROCEDURE — 99072 ADDL SUPL MATRL&STAF TM PHE: CPT

## 2021-02-02 PROCEDURE — 99204 OFFICE O/P NEW MOD 45 MIN: CPT | Mod: 25

## 2021-02-02 RX ORDER — DICLOFENAC SODIUM 1% 10 MG/G
1 GEL TOPICAL
Qty: 100 | Refills: 0 | Status: DISCONTINUED | COMMUNITY
Start: 2020-10-05

## 2021-02-02 RX ORDER — AZELASTINE HYDROCHLORIDE 0.5 MG/ML
0.05 SOLUTION/ DROPS OPHTHALMIC
Qty: 6 | Refills: 0 | Status: ACTIVE | COMMUNITY
Start: 2020-08-28

## 2021-02-02 RX ORDER — OXYCODONE AND ACETAMINOPHEN 5; 325 MG/1; MG/1
5-325 TABLET ORAL
Qty: 16 | Refills: 0 | Status: DISCONTINUED | COMMUNITY
Start: 2021-01-14 | End: 2021-02-02

## 2021-02-02 RX ORDER — B-COMPLEX WITH VITAMIN C
500-200 TABLET ORAL
Qty: 60 | Refills: 0 | Status: ACTIVE | COMMUNITY
Start: 2020-09-03

## 2021-02-02 RX ORDER — LEVOTHYROXINE SODIUM 112 UG/1
112 TABLET ORAL
Refills: 0 | Status: DISCONTINUED | COMMUNITY
End: 2021-02-02

## 2021-02-02 RX ORDER — TRIAMCINOLONE ACETONIDE 1 MG/G
0.1 PASTE DENTAL
Qty: 5 | Refills: 0 | Status: ACTIVE | COMMUNITY
Start: 2020-12-30

## 2021-02-02 RX ORDER — NUT.TX.IMPAIRED RENAL FXN,SOY 0.09G-2/ML
LIQUID (ML) ORAL
Qty: 39500 | Refills: 0 | Status: ACTIVE | COMMUNITY
Start: 2021-01-15

## 2021-02-02 RX ORDER — ERGOCALCIFEROL 1.25 MG/1
1.25 MG CAPSULE, LIQUID FILLED ORAL
Qty: 4 | Refills: 0 | Status: ACTIVE | COMMUNITY
Start: 2020-12-30

## 2021-02-03 ENCOUNTER — NON-APPOINTMENT (OUTPATIENT)
Age: 56
End: 2021-02-03

## 2021-02-03 ENCOUNTER — OUTPATIENT (OUTPATIENT)
Dept: OUTPATIENT SERVICES | Facility: HOSPITAL | Age: 56
LOS: 1 days | Discharge: HOME | End: 2021-02-03

## 2021-02-03 ENCOUNTER — APPOINTMENT (OUTPATIENT)
Dept: SPEECH THERAPY | Facility: CLINIC | Age: 56
End: 2021-02-03

## 2021-02-03 ENCOUNTER — INPATIENT (INPATIENT)
Facility: HOSPITAL | Age: 56
LOS: 5 days | Discharge: HOME HEALTH | End: 2021-02-09
Attending: OTOLARYNGOLOGY | Admitting: OTOLARYNGOLOGY
Payer: MEDICAID

## 2021-02-03 VITALS
DIASTOLIC BLOOD PRESSURE: 80 MMHG | RESPIRATION RATE: 20 BRPM | SYSTOLIC BLOOD PRESSURE: 120 MMHG | HEART RATE: 122 BPM | TEMPERATURE: 99 F | HEIGHT: 63 IN | OXYGEN SATURATION: 97 %

## 2021-02-03 DIAGNOSIS — Z98.890 OTHER SPECIFIED POSTPROCEDURAL STATES: Chronic | ICD-10-CM

## 2021-02-03 DIAGNOSIS — L02.11 CUTANEOUS ABSCESS OF NECK: ICD-10-CM

## 2021-02-03 LAB
ALBUMIN SERPL ELPH-MCNC: 4.2 G/DL — SIGNIFICANT CHANGE UP (ref 3.3–5)
ALP SERPL-CCNC: 93 U/L — SIGNIFICANT CHANGE UP (ref 40–120)
ALT FLD-CCNC: 14 U/L — SIGNIFICANT CHANGE UP (ref 4–33)
ANION GAP SERPL CALC-SCNC: 13 MMOL/L — SIGNIFICANT CHANGE UP (ref 7–14)
ANISOCYTOSIS BLD QL: SLIGHT — SIGNIFICANT CHANGE UP
APTT BLD: 28.8 SEC — SIGNIFICANT CHANGE UP (ref 27–36.3)
AST SERPL-CCNC: 16 U/L — SIGNIFICANT CHANGE UP (ref 4–32)
BASE EXCESS BLDV CALC-SCNC: 3.8 MMOL/L — HIGH (ref -3–2)
BASOPHILS # BLD AUTO: 0 K/UL — SIGNIFICANT CHANGE UP (ref 0–0.2)
BASOPHILS NFR BLD AUTO: 0 % — SIGNIFICANT CHANGE UP (ref 0–2)
BILIRUB SERPL-MCNC: 1.6 MG/DL — HIGH (ref 0.2–1.2)
BLD GP AB SCN SERPL QL: NEGATIVE — SIGNIFICANT CHANGE UP
BLOOD GAS VENOUS - CREATININE: SIGNIFICANT CHANGE UP MG/DL (ref 0.5–1.3)
BLOOD GAS VENOUS COMPREHENSIVE RESULT: SIGNIFICANT CHANGE UP
BUN SERPL-MCNC: 9 MG/DL — SIGNIFICANT CHANGE UP (ref 7–23)
CALCIUM SERPL-MCNC: 9.3 MG/DL — SIGNIFICANT CHANGE UP (ref 8.4–10.5)
CHLORIDE BLDV-SCNC: 102 MMOL/L — SIGNIFICANT CHANGE UP (ref 96–108)
CHLORIDE SERPL-SCNC: 98 MMOL/L — SIGNIFICANT CHANGE UP (ref 98–107)
CO2 SERPL-SCNC: 25 MMOL/L — SIGNIFICANT CHANGE UP (ref 22–31)
CREAT SERPL-MCNC: 0.5 MG/DL — SIGNIFICANT CHANGE UP (ref 0.5–1.3)
EOSINOPHIL # BLD AUTO: 0.19 K/UL — SIGNIFICANT CHANGE UP (ref 0–0.5)
EOSINOPHIL NFR BLD AUTO: 1.7 % — SIGNIFICANT CHANGE UP (ref 0–6)
GAS PNL BLDV: 138 MMOL/L — SIGNIFICANT CHANGE UP (ref 136–146)
GIANT PLATELETS BLD QL SMEAR: PRESENT — SIGNIFICANT CHANGE UP
GLUCOSE BLDV-MCNC: 112 MG/DL — HIGH (ref 70–99)
GLUCOSE SERPL-MCNC: 106 MG/DL — HIGH (ref 70–99)
HCO3 BLDV-SCNC: 27 MMOL/L — SIGNIFICANT CHANGE UP (ref 20–27)
HCT VFR BLD CALC: 30.8 % — LOW (ref 34.5–45)
HCT VFR BLDA CALC: 31.6 % — LOW (ref 34.5–46.5)
HGB BLD CALC-MCNC: 10.2 G/DL — LOW (ref 11.5–15.5)
HGB BLD-MCNC: 9.9 G/DL — LOW (ref 11.5–15.5)
HYPOCHROMIA BLD QL: SLIGHT — SIGNIFICANT CHANGE UP
IANC: 9.36 K/UL — HIGH (ref 1.5–8.5)
INR BLD: 1.22 RATIO — HIGH (ref 0.88–1.16)
LACTATE BLDV-MCNC: 1.2 MMOL/L — SIGNIFICANT CHANGE UP (ref 0.5–2)
LYMPHOCYTES # BLD AUTO: 0.67 K/UL — LOW (ref 1–3.3)
LYMPHOCYTES # BLD AUTO: 5.9 % — LOW (ref 13–44)
MAGNESIUM SERPL-MCNC: 2.2 MG/DL — SIGNIFICANT CHANGE UP (ref 1.6–2.6)
MCHC RBC-ENTMCNC: 28.2 PG — SIGNIFICANT CHANGE UP (ref 27–34)
MCHC RBC-ENTMCNC: 32.1 GM/DL — SIGNIFICANT CHANGE UP (ref 32–36)
MCV RBC AUTO: 87.7 FL — SIGNIFICANT CHANGE UP (ref 80–100)
MONOCYTES # BLD AUTO: 0.47 K/UL — SIGNIFICANT CHANGE UP (ref 0–0.9)
MONOCYTES NFR BLD AUTO: 4.2 % — SIGNIFICANT CHANGE UP (ref 2–14)
NEUTROPHILS # BLD AUTO: 9.86 K/UL — HIGH (ref 1.8–7.4)
NEUTROPHILS NFR BLD AUTO: 87.3 % — HIGH (ref 43–77)
PCO2 BLDV: 44 MMHG — SIGNIFICANT CHANGE UP (ref 41–51)
PH BLDV: 7.42 — SIGNIFICANT CHANGE UP (ref 7.32–7.43)
PHOSPHATE SERPL-MCNC: 4.3 MG/DL — SIGNIFICANT CHANGE UP (ref 2.5–4.5)
PLAT MORPH BLD: NORMAL — SIGNIFICANT CHANGE UP
PLATELET # BLD AUTO: 238 K/UL — SIGNIFICANT CHANGE UP (ref 150–400)
PLATELET COUNT - ESTIMATE: NORMAL — SIGNIFICANT CHANGE UP
PO2 BLDV: 41 MMHG — HIGH (ref 35–40)
POLYCHROMASIA BLD QL SMEAR: SLIGHT — SIGNIFICANT CHANGE UP
POTASSIUM BLDV-SCNC: 3.7 MMOL/L — SIGNIFICANT CHANGE UP (ref 3.4–4.5)
POTASSIUM SERPL-MCNC: 3.8 MMOL/L — SIGNIFICANT CHANGE UP (ref 3.5–5.3)
POTASSIUM SERPL-SCNC: 3.8 MMOL/L — SIGNIFICANT CHANGE UP (ref 3.5–5.3)
PROT SERPL-MCNC: 7.5 G/DL — SIGNIFICANT CHANGE UP (ref 6–8.3)
PROTHROM AB SERPL-ACNC: 13.9 SEC — HIGH (ref 10.6–13.6)
RBC # BLD: 3.51 M/UL — LOW (ref 3.8–5.2)
RBC # FLD: 15.1 % — HIGH (ref 10.3–14.5)
RBC BLD AUTO: ABNORMAL
RH IG SCN BLD-IMP: POSITIVE — SIGNIFICANT CHANGE UP
SAO2 % BLDV: 75.9 % — SIGNIFICANT CHANGE UP (ref 60–85)
SARS-COV-2 RNA SPEC QL NAA+PROBE: SIGNIFICANT CHANGE UP
SODIUM SERPL-SCNC: 136 MMOL/L — SIGNIFICANT CHANGE UP (ref 135–145)
VARIANT LYMPHS # BLD: 0.9 % — SIGNIFICANT CHANGE UP (ref 0–6)
WBC # BLD: 11.29 K/UL — HIGH (ref 3.8–10.5)
WBC # FLD AUTO: 11.29 K/UL — HIGH (ref 3.8–10.5)

## 2021-02-03 PROCEDURE — 99223 1ST HOSP IP/OBS HIGH 75: CPT | Mod: 25

## 2021-02-03 PROCEDURE — 99285 EMERGENCY DEPT VISIT HI MDM: CPT

## 2021-02-03 PROCEDURE — 70491 CT SOFT TISSUE NECK W/DYE: CPT | Mod: 26

## 2021-02-03 RX ORDER — ATORVASTATIN CALCIUM 80 MG/1
20 TABLET, FILM COATED ORAL AT BEDTIME
Refills: 0 | Status: DISCONTINUED | OUTPATIENT
Start: 2021-02-03 | End: 2021-02-09

## 2021-02-03 RX ORDER — FAMOTIDINE 10 MG/ML
20 INJECTION INTRAVENOUS DAILY
Refills: 0 | Status: DISCONTINUED | OUTPATIENT
Start: 2021-02-03 | End: 2021-02-03

## 2021-02-03 RX ORDER — AMLODIPINE BESYLATE 2.5 MG/1
5 TABLET ORAL DAILY
Refills: 0 | Status: DISCONTINUED | OUTPATIENT
Start: 2021-02-03 | End: 2021-02-09

## 2021-02-03 RX ORDER — AMPICILLIN SODIUM AND SULBACTAM SODIUM 250; 125 MG/ML; MG/ML
INJECTION, POWDER, FOR SUSPENSION INTRAMUSCULAR; INTRAVENOUS
Refills: 0 | Status: DISCONTINUED | OUTPATIENT
Start: 2021-02-03 | End: 2021-02-09

## 2021-02-03 RX ORDER — POTASSIUM CHLORIDE 20 MEQ
40 PACKET (EA) ORAL ONCE
Refills: 0 | Status: COMPLETED | OUTPATIENT
Start: 2021-02-03 | End: 2021-02-03

## 2021-02-03 RX ORDER — FAMOTIDINE 10 MG/ML
20 INJECTION INTRAVENOUS DAILY
Refills: 0 | Status: DISCONTINUED | OUTPATIENT
Start: 2021-02-03 | End: 2021-02-09

## 2021-02-03 RX ORDER — ERGOCALCIFEROL 1.25 MG/1
50000 CAPSULE ORAL
Refills: 0 | Status: DISCONTINUED | OUTPATIENT
Start: 2021-02-03 | End: 2021-02-09

## 2021-02-03 RX ORDER — CALCIUM CARBONATE 500(1250)
1 TABLET ORAL DAILY
Refills: 0 | Status: DISCONTINUED | OUTPATIENT
Start: 2021-02-03 | End: 2021-02-09

## 2021-02-03 RX ORDER — SENNA PLUS 8.6 MG/1
10 TABLET ORAL AT BEDTIME
Refills: 0 | Status: DISCONTINUED | OUTPATIENT
Start: 2021-02-03 | End: 2021-02-09

## 2021-02-03 RX ORDER — AMPICILLIN SODIUM AND SULBACTAM SODIUM 250; 125 MG/ML; MG/ML
3 INJECTION, POWDER, FOR SUSPENSION INTRAMUSCULAR; INTRAVENOUS ONCE
Refills: 0 | Status: COMPLETED | OUTPATIENT
Start: 2021-02-03 | End: 2021-02-03

## 2021-02-03 RX ORDER — AMPICILLIN SODIUM AND SULBACTAM SODIUM 250; 125 MG/ML; MG/ML
3 INJECTION, POWDER, FOR SUSPENSION INTRAMUSCULAR; INTRAVENOUS EVERY 6 HOURS
Refills: 0 | Status: DISCONTINUED | OUTPATIENT
Start: 2021-02-03 | End: 2021-02-09

## 2021-02-03 RX ORDER — LEVOTHYROXINE SODIUM 125 MCG
112 TABLET ORAL
Refills: 0 | Status: DISCONTINUED | OUTPATIENT
Start: 2021-02-03 | End: 2021-02-09

## 2021-02-03 RX ORDER — HEPARIN SODIUM 5000 [USP'U]/ML
5000 INJECTION INTRAVENOUS; SUBCUTANEOUS EVERY 12 HOURS
Refills: 0 | Status: DISCONTINUED | OUTPATIENT
Start: 2021-02-03 | End: 2021-02-09

## 2021-02-03 RX ORDER — ASPIRIN/CALCIUM CARB/MAGNESIUM 324 MG
81 TABLET ORAL DAILY
Refills: 0 | Status: DISCONTINUED | OUTPATIENT
Start: 2021-02-03 | End: 2021-02-09

## 2021-02-03 RX ORDER — LEVOTHYROXINE SODIUM 125 MCG
112 TABLET ORAL DAILY
Refills: 0 | Status: DISCONTINUED | OUTPATIENT
Start: 2021-02-03 | End: 2021-02-03

## 2021-02-03 RX ORDER — LEVOTHYROXINE SODIUM 125 MCG
100 TABLET ORAL DAILY
Refills: 0 | Status: DISCONTINUED | OUTPATIENT
Start: 2021-02-03 | End: 2021-02-03

## 2021-02-03 RX ORDER — LEVOTHYROXINE SODIUM 125 MCG
100 TABLET ORAL
Refills: 0 | Status: DISCONTINUED | OUTPATIENT
Start: 2021-02-03 | End: 2021-02-09

## 2021-02-03 RX ADMIN — Medication 40 MILLIEQUIVALENT(S): at 17:37

## 2021-02-03 RX ADMIN — Medication 112 MICROGRAM(S): at 17:37

## 2021-02-03 RX ADMIN — AMPICILLIN SODIUM AND SULBACTAM SODIUM 200 GRAM(S): 250; 125 INJECTION, POWDER, FOR SUSPENSION INTRAMUSCULAR; INTRAVENOUS at 14:25

## 2021-02-03 RX ADMIN — AMPICILLIN SODIUM AND SULBACTAM SODIUM 3 GRAM(S): 250; 125 INJECTION, POWDER, FOR SUSPENSION INTRAMUSCULAR; INTRAVENOUS at 14:55

## 2021-02-03 RX ADMIN — Medication 81 MILLIGRAM(S): at 17:36

## 2021-02-03 RX ADMIN — HEPARIN SODIUM 5000 UNIT(S): 5000 INJECTION INTRAVENOUS; SUBCUTANEOUS at 17:36

## 2021-02-03 RX ADMIN — AMPICILLIN SODIUM AND SULBACTAM SODIUM 200 GRAM(S): 250; 125 INJECTION, POWDER, FOR SUSPENSION INTRAMUSCULAR; INTRAVENOUS at 17:37

## 2021-02-03 NOTE — ED PROVIDER NOTE - OBJECTIVE STATEMENT
55 year old female with PMH of malignant neoplasm of tongue s/p right hemiglossectomy, left anterior lateral thigh free flap, bilateral selective neck dissection level 1-4 presents to the ED with right neck pain and swelling  for 6 days with a draining abscess today.  Pt was seen in ENT clinic today and referred to the ED for admission and surgical management. Pt denies fevers, chills or any other complaints.

## 2021-02-03 NOTE — ED ADULT NURSE NOTE - OBJECTIVE STATEMENT
pt brought to  2, A&Ox3, ambulatory, aid @ bedside states in usual state of health, communicating @ baseline, presents for worsening R sided neck mass, Hx malignant neoplasm of tongue s/p right hemiglossectomy, states worsening R neck pain/swelling x 6 days associated w/ draining abscess today, presents from ENT clinic, advised to present to ED for ENT eval and surg later today, denies fever/chills, or sick contacts, appears in NAD, no resp distress noted, able to swallow saliva, SL placed 18g R forearm, labs sent, septics complete, ABx as per orders, report to ESSU 2 RN, pt brought to ESSU 2.

## 2021-02-03 NOTE — CONSULT NOTE ADULT - SUBJECTIVE AND OBJECTIVE BOX
PLASTIC SURGERY CONSULT NOTE    Patient is a 56y old female who presents with a chief complaint of right neck abscess (03 Feb 2021 15:35)    HPI:  Pt is a 56 year old female with malignant neoplasm of tongue s/p right hemiglossectomy, left anterior lateral thigh free flap, bilateral selective neck dissection level 1-4, and tracheostomy tube placement on 12/29/2020. Tracheostomy was capped and decannulated on 01/06/2021. PEG tube was placed by CTS due to dysphagia. Patient sent in to [facility] ED by ENT clinic for right neck abscess. Patient reports swelling and erythematous neck noticed first on last Friday, and went into ENT clinic this morning for swallow evaluation prior to her chemo and radiation therapy, found to have yellowish pus that came out from her right neck. Admits pain. Denies fever, chills, SOB, and cough. (03 Feb 2021 15:35)    PAST MEDICAL & SURGICAL HISTORY:  Hypercholesterolemia    Thyroid cancer  dx 3/2020; tx surgically/ RT    CVA (cerebrovascular accident)    Hypertension    S/P thyroidectomy  3/2020    FAMILY HISTORY:  No pertinent family history in first degree relatives    SOCIAL HISTORY: NC    MEDICATIONS  (STANDING):  amLODIPine   Tablet 5 milliGRAM(s) Oral daily  ampicillin/sulbactam  IVPB      ampicillin/sulbactam  IVPB 3 Gram(s) IV Intermittent every 6 hours  aspirin  chewable 81 milliGRAM(s) Oral daily  atorvastatin 20 milliGRAM(s) Oral at bedtime  calcium carbonate   1250 mG (OsCal) 1 Tablet(s) Oral daily  ergocalciferol 91214 Unit(s) Oral <User Schedule>  famotidine   Suspension 20 milliGRAM(s) Oral daily  heparin SubCutaneous Injection - Peds 5000 Unit(s) SubCutaneous every 12 hours  levothyroxine 112 MICROGram(s) Oral <User Schedule>  levothyroxine 100 MICROGram(s) Oral <User Schedule>  senna Syrup 10 milliLiter(s) Oral at bedtime    MEDICATIONS  (PRN):    Allergies    apple (Other)  No Known Drug Allergies    Vital Signs Last 24 Hrs  T(C): 36.7 (03 Feb 2021 17:24), Max: 37.2 (03 Feb 2021 13:00)  T(F): 98 (03 Feb 2021 17:24), Max: 98.9 (03 Feb 2021 13:00)  HR: 104 (03 Feb 2021 17:24) (104 - 122)  BP: 100/64 (03 Feb 2021 17:24) (100/64 - 120/80)  BP(mean): --  RR: 18 (03 Feb 2021 17:24) (18 - 20)  SpO2: 97% (03 Feb 2021 17:24) (97% - 97%)  Daily Height in cm: 160.02 (03 Feb 2021 13:00)    Daily     Exam:    Neuro: Alert  GA: well-appearing  Pulm: breathing comfortably  GI: non-distended  HEENT: intraoral flap appears to be well-perfused, borders intact. R neck with widespread erythema and dressing in place over the site of opening                        9.9    11.29 )-----------( 238      ( 03 Feb 2021 14:34 )             30.8     02-03    136  |  98  |  9   ----------------------------<  106<H>  3.8   |  25  |  0.50    Ca    9.3      03 Feb 2021 14:34  Phos  4.3     02-03  Mg     2.2     02-03    TPro  7.5  /  Alb  4.2  /  TBili  1.6<H>  /  DBili  x   /  AST  16  /  ALT  14  /  AlkPhos  93  02-03    PT/INR - ( 03 Feb 2021 14:34 )   PT: 13.9 sec;   INR: 1.22 ratio         PTT - ( 03 Feb 2021 14:34 )  PTT:28.8 sec      IMAGING STUDIES:    reviewed per chart

## 2021-02-03 NOTE — H&P ADULT - ASSESSMENT
55 year old female with malignant neoplasm of tongue S/P right hemiglossectomy, left anterior lateral thigh free flap, bilateral selective neck dissection level 1-4, and tracheostomy tube placement on 12/29/2020. She presents with right neck abscess. Right neck I&D done at bedside, a lot of pus come out from the cook wire.

## 2021-02-03 NOTE — H&P ADULT - HISTORY OF PRESENT ILLNESS
55 year old female with malignant neoplasm of tongue S/P right hemiglossectomy, left anterior lateral thigh free flap, bilateral selective neck dissection level 1-4, and tracheostomy tube placement on 12/29/2020. Tracheostomy was capped and decannulated on 01/06/2021. PEG tube was placed by CTS due to dysphagia. Patient sent in to [facility] ED by ENT clinic for right neck abscess.  Patient reports swelling and erythematous of the neck started on last week Friday, and went into ENT clinic this morning for swallow evaluation prior to her chemo and radiation therapy, found to have yellowish pus came out from her right neck. Admits pain. Denies fever, chills, SOB, and cough.

## 2021-02-03 NOTE — H&P ADULT - DOES THIS PATIENT HAVE A HISTORY OF OR HAS BEEN DX WITH HEART FAILURE?
[facility] Discharge Instructions:    You have been given a folder with helpful information regarding your , please review at your convenience.  The following are general guidelines, but make sure to follow specific instructions from your own health care provider.    Checo Rincon is a 1 day old  infant, delivered at Gestational Age: 40w5d.    Birth Weight: 8 lb 12.8 oz (3992 g)    Discharge weight: Weight: 3944 g    Feeding: Natural Human Milk    Infant Blood Type: O Rh Positive  Magan:No results found for: DATANTIIGG  Bilirubin: No results found for: BILIRUBIN   TCB Result: 5.5 (high intermediate risk) (10/29/21 0300)  TCB Site: Head   Hours of age-Transcutaneous Biliribin: 14 Hrs   Screen: Done   Hearing Screen:  Hearing Test Machine: Auditory Brainstem Response (Algo) (10/28/21 2343)  [facility] Hearing Test Results: Pass R, Pass L (10/28/21 2343)    Immunizations:   Most Recent Immunizations   Administered Date(s) Administered   • Hep B, adolescent or pediatric 2021   Deferred Date(s) Deferred   • Hepatitis B Immune Globulin 2021       Hearing Test Machine: Auditory Brainstem Response (Algo) (10/28/21 2343)   Hearing Test Results: Pass R, Pass L (10/28/21 2343)  Birth Certificate handed in:Yes  Paternity Papers handed in:NA  Cord clamp off:Yes    Do not give your baby any medications unless directed by your baby's doctor.    Call 911 if your baby turns pale or blue (cyanotic), or if you think your child is choking.    Call your baby's doctor if your baby has:  · Fever of 100.4 degrees F or above  · Forceful vomiting (not spitting up)  · Several feedings when infant does not suck or is not interested in feeding  · Poor feeding  · Watery, runny stools (with mucous, blood or foul odor)  · Constipation (straining to pass stool or stool that is pellet-like)  · No wet or dirty diapers in 24 hours during the first four days  · Fewer than 6 wet diapers in 24  unknown hours after the fifth day  · Skin color changes: yellow (jaundice)  · Any unusual rash - or rash that is draining or causing the baby discomfort  · Redness, drainage or foul odor from the umbilical cord and/or circumcision site  · Constant crying for no apparent reason  · Unusual irritability or rigidity, especially when being held  · Difficulty breathing  · Infant injury (fall from bed or table, dropped or severely shaken, or any other injuries)    Special instructions: in case you need to call the doctor about any of the above:  · Take baby's temperature and write it down  · Know how much and how many feedings that baby has had that day  · Note amount, color, and consistency of urine and stools  · Note any changes in the infant's behavior such as being very sleepy, very fussy, or less active      Help after you leave the hospital:  Ideas for help at home: friends, family members, neighbors, and members of your [place] community are all there to help you.    Additional helpful numbers:    [facility]:  [phone]  [facility], ask for Obstetrics or Pediatrics:  [phone]  [facility]:  [phone]  Car Seat Hotline: [phone]    [facility] Pediatrics: [phone]    Breastfeeding and Infant Feeding Support: [phone]    Discharged in stable condition.  Mode: Car Seat    Accompanied by:mother and father  Disposition:Home    Special Instructions: A copy of this form was provided to and reviewed with the patient/responsible person by: GINA Trujillo.  Date:2021 Time:2:20 PM    Congratulations on your new baby boy!   We look forward to partnering with you to make sure that he grows up healthy.    Remember these important [facility] phone numbers:    [phone] reaches our phone nurses during the day and our nurse answering service at night    [phone] reaches our patient service  representatives for scheduling or changing future appointments      Follow-up appointments  If a follow-up with the Lactation Consultant has been recommended, your hospital nurses will set that up before you leave.    Your baby needs to be seen by your pediatrician at age 2 weeks. Call [phone] to set up that appointment at [facility].    Infection  Babies are born with antibodies that they received from mom across the placenta. At first this these antibodies are most of the baby’s immune protection. It takes 2-3 months for the baby’s own antibody manufacturing to get up to full speed. This means the baby is at higher risk for infection, especially if mom is sick (since mom did not have antibodies to whatever is making her sick, neither does the baby). Because of this, you should follow these recommendations:    · If you are sick, wash your hands every time before you touch your baby  · If you are well, wash your hands often or use hand .  · Siblings should wash their hands before touching the baby, and should kiss the baby away from the face (top of head, elbow, kneecap)  · Everyone who knew you were pregnant now wants to come and see the new baby. If they are sick, they should not come near. If they are well, they should wash hands before handling the baby. Try to limit kissing to the fewest number of people possible.   · Try to avoid crowded places (like stores) where you cannot control exposure to germs which may cause infections.    An early sign of infection may be something as vague as “not acting right.” You will be the expert about what is normal for your baby, so if something is not right you should call. A fever can be a sign of an infection We define a fever as a rectal temperature of 100.4 or higher. A fever in a  is an emergency and a reason to call [phone] right away.     Car Safety  Make sure that you use your car safety seat every time you ride  with your baby in the car. It should be installed rear-facing, in the back seat. If you are confused by the directions, find help at www.healthychildren.org (search using the key words “car seat”).    Safe Sleep  Your baby should sleep on his back, in a crib or bassinet, in your room, and not in your bed. There should be no pillows, bumper pads, stuffed animals, thick blankets, or other squishy items in his bed. This all reduces the risk of SIDS (Sudden Infant Death Syndrome, also known as crib death). Eliminating all cigarette smoke exposure can also reduce the risk.    Tummy Time  The safe sleep recommendations are for sleep only. When he is awake, it is good for your baby to spend some time on his tummy. This is important for maintaining and building up neck and shoulder muscle strength. The goal is to build up to 30 to 60 minutes per day spent on the tummy (all added together, not all at once). Your baby may only last a few seconds at first, so it is important to try this several times a day. The best way to do tummy time is to use a thin blanket or towel on the floor, so he has a firm surface to push up against. Make sure he is good and awake - falling asleep on the tummy is a hard habit to break.    See you soon! Call us if you have questions or concerns.    Office Location:  [facility], 1st floor  [address] [place]   [address]    Office Hours:  Monday through Friday 8:30 AM - 5:00 PM

## 2021-02-03 NOTE — H&P ADULT - NSHPPHYSICALEXAM_GEN_ALL_CORE
PHYSICAL EXAM:  Gen: NAD  Skin: No rashes, bruises, or lesions  Head: Normocephalic, Atraumatic  Face: no edema, erythema, or fluctuance. Parotid glands soft without mass  Eyes: no scleral injection  Nose: Nares bilaterally patent, no discharge  Mouth: No stridor, no drooling, no trismus.  Mucosa moist, tongue/uvula midline, oropharynx clear  Neck: Right neck erythematous and swelling with yellowish pus drainage from cook wire. Neck is fluctuance Trachea stoma healed well, trach is midline, no masses.   Lymphatic: No lymphadenopathy  Resp: breathing easily, no stridor  CV: no peripheral edema/cyanosis  GI: nondistended   Peripheral vascular: no JVD or edema  Neuro: facial nerve intact, no facial droop

## 2021-02-03 NOTE — ED ADULT TRIAGE NOTE - CHIEF COMPLAINT QUOTE
Pt sent from ENT clinic for evaluation of neck abscess, Please call [phone], pager [phone]. Pt Mandarin speaking. Pt sent from ENT clinic for evaluation of neck abscess, Please call [phone], pager [phone]. Pt Mandarin speaking. hx of thyroid cancer. Pt seen by ent PA in waiting room. Labs and Ct scan ordered.

## 2021-02-03 NOTE — CONSULT NOTE ADULT - ASSESSMENT
55 year old female with malignant neoplasm of tongue S/P right hemiglossectomy, left anterior lateral thigh free flap, bilateral selective neck dissection level 1-4, and tracheostomy tube placement on 12/29/2020. She presents with right neck abscess

## 2021-02-03 NOTE — CONSULT NOTE ADULT - SUBJECTIVE AND OBJECTIVE BOX
CC: Right neck abscess    HPI: 55 year old female with malignant neoplasm of tongue S/P right hemiglossectomy, left anterior lateral thigh free flap, bilateral selective neck dissection level 1-4, and tracheostomy tube placement on 12/29/2020. Tracheostomy was capped and decannulated on 01/06/2021. PEG tube was placed by CTS due to dysphagia. Patient sent in to [facility] ED by ENT clinic for right neck abscess.  Patient reports swelling and erythematous of the neck started on last week Friday, and went into ENT clinic this morning for swallow evaluation prior to her chemo and radiation therapy, found to have yellowish pus came out from her right neck. Admits pain. Denies fever, chills, SOB, and cough.     PAST MEDICAL & SURGICAL HISTORY:  Hypercholesterolemia    Thyroid cancer  dx 3/2020 ; tx surgically/ RT    CVA (cerebrovascular accident)  Per pt in [place] 11/ 2018 ; rx plavix ; Left Sided Weakness    Hypertension    S/P thyroidectomy  3/2020      Allergies    apple (Other)  No Known Drug Allergies    Intolerances      MEDICATIONS  (STANDING):    MEDICATIONS  (PRN):      Social History: lives alone, never smoker    Family history: No pertinent family history in first degree relatives    ROS:   ENT: all negative except as noted in HPI   CV: denies palpitations  Pulm: denies SOB, cough, hemoptysis  GI: denies change in appetite, indigestion, n/v  : denies pertinent urinary symptoms, urgency  Neuro: denies numbness/tingling, loss of sensation  Psych: denies anxiety  MS: denies muscle weakness, instability  Heme: denies easy bruising or bleeding  Endo: denies heat/cold intolerance, excessive sweating  Vascular: denies LE edema    Vital Signs Last 24 Hrs  T(C): --  T(F): --  HR: --  BP: --  BP(mean): --  RR: --  SpO2: --              PHYSICAL EXAM:  Gen: NAD  Skin: No rashes, bruises, or lesions  Head: Normocephalic, Atraumatic  Face: no edema, erythema, or fluctuance. Parotid glands soft without mass  Eyes: no scleral injection  Nose: Nares bilaterally patent, no discharge  Mouth: No stridor, no drooling, no trismus.  Mucosa moist, tongue/uvula midline, oropharynx clear  Neck: RIght neck erythematous and swelling with yellowish pus drainage from cook wire. Neck is fluctuance Trachea stoma healed well, trach is midline, no masses  Lymphatic: No lymphadenopathy  Resp: breathing easily, no stridor  CV: no peripheral edema/cyanosis  GI: nondistended   Peripheral vascular: no JVD or edema  Neuro: facial nerve intact, no facial droop

## 2021-02-03 NOTE — CONSULT NOTE ADULT - ASSESSMENT
56 year old female with malignant neoplasm of tongue s/p right hemiglossectomy, left anterior lateral thigh free flap, bilateral selective neck dissection level 1-4, and tracheostomy tube placement on 12/29/2020, returns with R neck abscess drained at bedside in the ED by ENT team on 2/3    - agree with abx plan  - continue packing with Aquacel strip to allow neck abscess to drain  - will follow along with ENT team and are happy to coordinate further care    Plastics #[id number]

## 2021-02-03 NOTE — CONSULT NOTE ADULT - PROBLEM SELECTOR RECOMMENDATION 9
- Neck culture obtained by ENT  - Please obtain CT neck with IV contrast  - Please start unasyn IV  - Please obtains labs: CBC with diff, BMP, Mg, Phos, Type and screen, and coag  - Case discussed with Dr. Busby - [place] to ENT under Dr. Busby  - Neck culture obtained by ENT  - Please obtain CT neck with IV contrast  - Please start unasyn IV  - Please obtains labs: CBC with diff, BMP, Mg, Phos, Type and screen, and coag  - Case discussed with Dr. Busby

## 2021-02-03 NOTE — ED PROVIDER NOTE - CLINICAL SUMMARY MEDICAL DECISION MAKING FREE TEXT BOX
55 year old female with PMH of HTN, HLD and malignant neoplasm of tongue s/p right hemiglossectomy, left anterior lateral thigh free flap, bilateral selective neck dissection level 1-4 presents to the ED with right neck pain and swelling  for 6 days with a draining abscess today.  Pt was seen in ENT clinic today and referred to the ED for admission and surgical management. VS reviewed, tachycardic. Imp: Neck abscess, requiring drainage. Plan for labs, abx, CT neck, admit to ENT.

## 2021-02-03 NOTE — ED ADULT NURSE NOTE - CHIEF COMPLAINT QUOTE
Pt sent from ENT clinic for evaluation of neck abscess, Please call ENT [phone], pager [phone]. Pt Mandarin speaking. hx of thyroid cancer. Pt seen by ent PA in waiting room. Labs and Ct scan ordered.

## 2021-02-03 NOTE — H&P ADULT - PROBLEM SELECTOR PLAN 1
- Admit to ENT under Dr. Busby  - Neck culture obtained by ENT  - Please obtain CT neck with IV contrast  - Please start unasyn IV  - Please obtains labs: CBC with diff, BMP, Mg, Phos, Type and screen, and coag  - Case discussed with Dr. Busby.

## 2021-02-04 LAB
ANION GAP SERPL CALC-SCNC: 15 MMOL/L — HIGH (ref 7–14)
BUN SERPL-MCNC: 12 MG/DL — SIGNIFICANT CHANGE UP (ref 7–23)
CALCIUM SERPL-MCNC: 8.9 MG/DL — SIGNIFICANT CHANGE UP (ref 8.4–10.5)
CHLORIDE SERPL-SCNC: 106 MMOL/L — SIGNIFICANT CHANGE UP (ref 98–107)
CO2 SERPL-SCNC: 23 MMOL/L — SIGNIFICANT CHANGE UP (ref 22–31)
CREAT SERPL-MCNC: 0.47 MG/DL — LOW (ref 0.5–1.3)
GLUCOSE SERPL-MCNC: 180 MG/DL — HIGH (ref 70–99)
HCT VFR BLD CALC: 30.2 % — LOW (ref 34.5–45)
HCV AB S/CO SERPL IA: 0.07 S/CO — SIGNIFICANT CHANGE UP (ref 0–0.99)
HCV AB SERPL-IMP: SIGNIFICANT CHANGE UP
HGB BLD-MCNC: 9.3 G/DL — LOW (ref 11.5–15.5)
MAGNESIUM SERPL-MCNC: 2.3 MG/DL — SIGNIFICANT CHANGE UP (ref 1.6–2.6)
MCHC RBC-ENTMCNC: 27.8 PG — SIGNIFICANT CHANGE UP (ref 27–34)
MCHC RBC-ENTMCNC: 30.8 GM/DL — LOW (ref 32–36)
MCV RBC AUTO: 90.4 FL — SIGNIFICANT CHANGE UP (ref 80–100)
NRBC # BLD: 0 /100 WBCS — SIGNIFICANT CHANGE UP
NRBC # FLD: 0 K/UL — SIGNIFICANT CHANGE UP
PHOSPHATE SERPL-MCNC: 3.8 MG/DL — SIGNIFICANT CHANGE UP (ref 2.5–4.5)
PLATELET # BLD AUTO: 234 K/UL — SIGNIFICANT CHANGE UP (ref 150–400)
POTASSIUM SERPL-MCNC: 3.7 MMOL/L — SIGNIFICANT CHANGE UP (ref 3.5–5.3)
POTASSIUM SERPL-SCNC: 3.7 MMOL/L — SIGNIFICANT CHANGE UP (ref 3.5–5.3)
RBC # BLD: 3.34 M/UL — LOW (ref 3.8–5.2)
RBC # FLD: 14.9 % — HIGH (ref 10.3–14.5)
SODIUM SERPL-SCNC: 144 MMOL/L — SIGNIFICANT CHANGE UP (ref 135–145)
WBC # BLD: 6.55 K/UL — SIGNIFICANT CHANGE UP (ref 3.8–10.5)
WBC # FLD AUTO: 6.55 K/UL — SIGNIFICANT CHANGE UP (ref 3.8–10.5)

## 2021-02-04 PROCEDURE — 99233 SBSQ HOSP IP/OBS HIGH 50: CPT

## 2021-02-04 RX ORDER — POTASSIUM CHLORIDE 20 MEQ
40 PACKET (EA) ORAL ONCE
Refills: 0 | Status: COMPLETED | OUTPATIENT
Start: 2021-02-04 | End: 2021-02-04

## 2021-02-04 RX ADMIN — FAMOTIDINE 20 MILLIGRAM(S): 10 INJECTION INTRAVENOUS at 13:33

## 2021-02-04 RX ADMIN — Medication 1 TABLET(S): at 12:50

## 2021-02-04 RX ADMIN — HEPARIN SODIUM 5000 UNIT(S): 5000 INJECTION INTRAVENOUS; SUBCUTANEOUS at 05:41

## 2021-02-04 RX ADMIN — ATORVASTATIN CALCIUM 20 MILLIGRAM(S): 80 TABLET, FILM COATED ORAL at 22:41

## 2021-02-04 RX ADMIN — SENNA PLUS 10 MILLILITER(S): 8.6 TABLET ORAL at 01:35

## 2021-02-04 RX ADMIN — Medication 40 MILLIEQUIVALENT(S): at 12:50

## 2021-02-04 RX ADMIN — HEPARIN SODIUM 5000 UNIT(S): 5000 INJECTION INTRAVENOUS; SUBCUTANEOUS at 17:53

## 2021-02-04 RX ADMIN — Medication 112 MICROGRAM(S): at 05:41

## 2021-02-04 RX ADMIN — AMPICILLIN SODIUM AND SULBACTAM SODIUM 200 GRAM(S): 250; 125 INJECTION, POWDER, FOR SUSPENSION INTRAMUSCULAR; INTRAVENOUS at 13:32

## 2021-02-04 RX ADMIN — AMPICILLIN SODIUM AND SULBACTAM SODIUM 200 GRAM(S): 250; 125 INJECTION, POWDER, FOR SUSPENSION INTRAMUSCULAR; INTRAVENOUS at 18:56

## 2021-02-04 RX ADMIN — ATORVASTATIN CALCIUM 20 MILLIGRAM(S): 80 TABLET, FILM COATED ORAL at 01:35

## 2021-02-04 RX ADMIN — AMPICILLIN SODIUM AND SULBACTAM SODIUM 200 GRAM(S): 250; 125 INJECTION, POWDER, FOR SUSPENSION INTRAMUSCULAR; INTRAVENOUS at 05:41

## 2021-02-04 RX ADMIN — AMLODIPINE BESYLATE 5 MILLIGRAM(S): 2.5 TABLET ORAL at 05:41

## 2021-02-04 RX ADMIN — SENNA PLUS 10 MILLILITER(S): 8.6 TABLET ORAL at 22:41

## 2021-02-04 RX ADMIN — Medication 81 MILLIGRAM(S): at 12:51

## 2021-02-04 RX ADMIN — AMPICILLIN SODIUM AND SULBACTAM SODIUM 200 GRAM(S): 250; 125 INJECTION, POWDER, FOR SUSPENSION INTRAMUSCULAR; INTRAVENOUS at 01:36

## 2021-02-04 NOTE — CONSULT NOTE ADULT - SUBJECTIVE AND OBJECTIVE BOX
Patient is a 56y old  Female who presents with a chief complaint of Right neck abscess (04 Feb 2021 07:22)    HPI:  55 year old female with malignant neoplasm of tongue S/P right hemiglossectomy, left anterior lateral thigh free flap, bilateral selective neck dissection level 1-4, and tracheostomy tube placement on 12/29/2020. Tracheostomy was capped and decannulated on 01/06/2021. PEG tube was placed by CTS due to dysphagia. Patient sent in to [facility] ED by ENT clinic for right neck abscess. Patient reports swelling and erythematous of the neck started on last week Friday, and went into ENT clinic this morning for swallow evaluation prior to her chemo and radiation therapy, found to have yellowish pus came out from her right neck. Admits pain. Denies fever, chills, SOB, and cough. (03 Feb 2021 15:35)    ENT performed bedside I&D at bedside on 2/3, culture sent (not shown as received on the sunrise yet).         prior hospital charts reviewed [V]  primary team notes reviewed [V]  other consultant notes reviewed [V]    PAST MEDICAL & SURGICAL HISTORY:  Hypercholesterolemia    Thyroid cancer  dx 3/2020 ; tx surgically/ RT    CVA (cerebrovascular accident)  Per pt in [place] 11/ 2018 ; rx plavix ; Left Sided Weakness    Hypertension    S/P thyroidectomy  3/2020        SOCIAL HISTORY:    - Denied smoking/vaping/alcohol/recreational drug use    FAMILY HISTORY:  No pertinent family history in first degree relatives        Allergies  apple (Other)  No Known Drug Allergies        ANTIMICROBIALS:  ampicillin/sulbactam  IVPB    ampicillin/sulbactam  IVPB 3 every 6 hours      ANTIMICROBIALS (past 90 days):  MEDICATIONS  (STANDING):    ampicillin/sulbactam  IVPB   200 mL/Hr IV Intermittent (02-03-21 @ 14:25)    ampicillin/sulbactam  IVPB   200 mL/Hr IV Intermittent (02-04-21 @ 05:41)   200 mL/Hr IV Intermittent (02-04-21 @ 01:36)   200 mL/Hr IV Intermittent (02-03-21 @ 17:37)        OTHER MEDS:   MEDICATIONS  (STANDING):  amLODIPine   Tablet 5 daily  aspirin  chewable 81 daily  atorvastatin 20 at bedtime  famotidine   Suspension 20 daily  heparin SubCutaneous Injection - Peds 5000 every 12 hours  levothyroxine 112 <User Schedule>  levothyroxine 100 <User Schedule>  senna Syrup 10 at bedtime      REVIEW OF SYSTEMS  [  ] ROS unobtainable because:    [  ] All other systems negative except as noted below:	    Constitutional:  [ ] fever [ ] chills  [ ] weight loss  [ ] weakness  Skin:  [ ] rash [ ] phlebitis	  Eyes: [ ] icterus [ ] pain  [ ] discharge	  ENMT: [ ] sore throat  [ ] thrush [ ] ulcers [ ] exudates  Respiratory: [ ] dyspnea [ ] hemoptysis [ ] cough [ ] sputum	  Cardiovascular:  [ ] chest pain [ ] palpitations [ ] edema	  Gastrointestinal:  [ ] nausea [ ] vomiting [ ] diarrhea [ ] constipation [ ] pain	  Genitourinary:  [ ] dysuria [ ] frequency [ ] hematuria [ ] discharge [ ] flank pain  [ ] incontinence  Musculoskeletal:  [ ] myalgias [ ] arthralgias [ ] arthritis  [ ] back pain  Neurological:  [ ] headache [ ] seizures  [ ] confusion/altered mental status  Psychiatric:  [ ] anxiety [ ] depression	  Hematology/Lymphatics:  [ ] lymphadenopathy  Endocrine:  [ ] adrenal [ ] thyroid  Allergic/Immunologic:	 [ ] transplant [ ] seasonal    VITALS:  Vital Signs Last 24 Hrs  T(F): 98.6 (02-04-21 @ 05:39), Max: 98.9 (02-03-21 @ 13:00)    Vital Signs Last 24 Hrs  HR: 96 (02-04-21 @ 05:39) (92 - 122)  BP: 100/63 (02-04-21 @ 05:39) (99/66 - 120/80)  RR: 16 (02-04-21 @ 05:39)  SpO2: 97% (02-04-21 @ 05:39) (97% - 98%)  Wt(kg): --    EXAM:  GA: NAD  HEENT: oral cavity no lesion  CV: nl S1/S2, no RMG  Lungs: CTAB  Abd: BS+, soft, nontender, no rebounding pain  Ext: no edema  Skin:  IV: no phlebitis    Labs:                        9.3    6.55  )-----------( 234      ( 04 Feb 2021 08:11 )             30.2     02-04    144  |  106  |  12  ----------------------------<  180<H>  3.7   |  23  |  0.47<L>    Ca    8.9      04 Feb 2021 08:11  Phos  3.8     02-04  Mg     2.3     02-04    TPro  7.5  /  Alb  4.2  /  TBili  1.6<H>  /  DBili  x   /  AST  16  /  ALT  14  /  AlkPhos  93  02-03      WBC Trend:  WBC Count: 6.55 (02-04-21 @ 08:11)  WBC Count: 11.29 (02-03-21 @ 14:34)      Auto Neutrophil #: 9.86 K/uL (02-03-21 @ 14:34)  Auto Neutrophil #: 5.02 K/uL (01-21-21 @ 13:00)  Auto Neutrophil #: 8.92 K/uL (12-31-20 @ 03:48)      Creatine Trend:  Creatinine, Serum: 0.47 (02-04)  Creatinine, Serum: 0.50 (02-03)      Liver Biochemical Testing Trend:  Alanine Aminotransferase (ALT/SGPT): 14 (02-03)  Alanine Aminotransferase (ALT/SGPT): 10 (12-31)  Alanine Aminotransferase (ALT/SGPT): 13 (12-30)  Alanine Aminotransferase (ALT/SGPT): 19 (12-29)  Alanine Aminotransferase (ALT/SGPT): 13 (12-21)  Aspartate Aminotransferase (AST/SGOT): 16 (02-03-21 @ 14:34)  Aspartate Aminotransferase (AST/SGOT): 12 (12-31-20 @ 03:48)  Aspartate Aminotransferase (AST/SGOT): 17 (12-30-20 @ 16:21)  Aspartate Aminotransferase (AST/SGOT): 21 (12-29-20 @ 18:06)  Aspartate Aminotransferase (AST/SGOT): 17 (12-21-20 @ 16:01)  Bilirubin Total, Serum: 1.6 (02-03)  Bilirubin Total, Serum: 0.4 (12-31)  Bilirubin Total, Serum: 0.8 (12-30)  Bilirubin Total, Serum: 1.2 (12-29)  Bilirubin Total, Serum: 1.2 (12-21)      MICROBIOLOGY:        OTHER TESTS:  COVID-19 PCR: NotDetec (02-03-21 @ 14:25)  Blood Gas Venous - Lactate: 1.2 (02-03 @ 14:34)        RADIOLOGY:  imaging below personally reviewed     Patient is a 56y old  Female who presents with a chief complaint of Right neck abscess (04 Feb 2021 07:22)    HPI:  55 year old female with malignant neoplasm of tongue S/P right hemiglossectomy, left anterior lateral thigh free flap, bilateral selective neck dissection level 1-4, and tracheostomy tube placement on 12/29/2020. Tracheostomy was capped and decannulated on 01/06/2021. PEG tube was placed by CTS due to dysphagia. Patient sent in to [facility] ED by ENT clinic for right neck abscess. Patient reports swelling and erythematous of the neck started on last week Friday, and went into ENT clinic this morning for swallow evaluation prior to her chemo and radiation therapy, found to have yellowish pus came out from her right neck. Admits pain. Denies fever, chills, SOB, and cough. (03 Feb 2021 15:35)    Per pt, her chief complaint is neck swelling started on 1/29. Feeling tense, but not pain. The yellow drainage started on 2/3.   No fever, chills, SOB, cough, dysuria.  ENT performed bedside I&D at bedside on 2/3, culture sent (not shown as received on the sunrise yet).    Per pt, she had stroke with left sided weakness in 11/2018, so she have been taking "37 powder" from [place]. She denies smoking, EtoH or peatle nut use     prior hospital charts reviewed [V]  primary team notes reviewed [V]  other consultant notes reviewed [V]    PAST MEDICAL & SURGICAL HISTORY:  Hypercholesterolemia    Thyroid cancer  dx 3/2020 ; tx surgically/ RT    CVA (cerebrovascular accident)  Per pt in [place] 11/ 2018 ; rx plavix ; Left Sided Weakness    Hypertension    S/P thyroidectomy  3/2020        SOCIAL HISTORY:    - Denied smoking/vaping/alcohol/recreational drug use    FAMILY HISTORY:  No pertinent family history in first degree relatives        Allergies  apple (Other)  No Known Drug Allergies        ANTIMICROBIALS:  ampicillin/sulbactam  IVPB    ampicillin/sulbactam  IVPB 3 every 6 hours      ANTIMICROBIALS (past 90 days):  MEDICATIONS  (STANDING):    ampicillin/sulbactam  IVPB   200 mL/Hr IV Intermittent (02-03-21 @ 14:25)    ampicillin/sulbactam  IVPB   200 mL/Hr IV Intermittent (02-04-21 @ 05:41)   200 mL/Hr IV Intermittent (02-04-21 @ 01:36)   200 mL/Hr IV Intermittent (02-03-21 @ 17:37)        OTHER MEDS:   MEDICATIONS  (STANDING):  amLODIPine   Tablet 5 daily  aspirin  chewable 81 daily  atorvastatin 20 at bedtime  famotidine   Suspension 20 daily  heparin SubCutaneous Injection - Peds 5000 every 12 hours  levothyroxine 112 <User Schedule>  levothyroxine 100 <User Schedule>  senna Syrup 10 at bedtime      REVIEW OF SYSTEMS  [  ] ROS unobtainable because:    [  ] All other systems negative except as noted below:	    Constitutional:  [ ] fever [ ] chills  [ ] weight loss  [ ] weakness  Skin:  [ ] rash [ ] phlebitis	  Eyes: [ ] icterus [ ] pain  [ ] discharge	  ENMT: [ ] sore throat  [ ] thrush [ ] ulcers [ ] exudates  Respiratory: [ ] dyspnea [ ] hemoptysis [ ] cough [ ] sputum	  Cardiovascular:  [ ] chest pain [ ] palpitations [ ] edema	  Gastrointestinal:  [ ] nausea [ ] vomiting [ ] diarrhea [ ] constipation [ ] pain	  Genitourinary:  [ ] dysuria [ ] frequency [ ] hematuria [ ] discharge [ ] flank pain  [ ] incontinence  Musculoskeletal:  [ ] myalgias [ ] arthralgias [ ] arthritis  [ ] back pain  Neurological:  [ ] headache [ ] seizures  [ ] confusion/altered mental status  Psychiatric:  [ ] anxiety [ ] depression	  Hematology/Lymphatics:  [ ] lymphadenopathy  Endocrine:  [ ] adrenal [ ] thyroid  Allergic/Immunologic:	 [ ] transplant [ ] seasonal    VITALS:  Vital Signs Last 24 Hrs  T(F): 98.6 (02-04-21 @ 05:39), Max: 98.9 (02-03-21 @ 13:00)    Vital Signs Last 24 Hrs  HR: 96 (02-04-21 @ 05:39) (92 - 122)  BP: 100/63 (02-04-21 @ 05:39) (99/66 - 120/80)  RR: 16 (02-04-21 @ 05:39)  SpO2: 97% (02-04-21 @ 05:39) (97% - 98%)  Wt(kg): --    EXAM:  GA: NAD  HEENT: oral cavity no lesion  CV: nl S1/S2, no RMG  Lungs: CTAB  Abd: BS+, soft, nontender, no rebounding pain  Ext: no edema  Skin:  IV: no phlebitis    Labs:                        9.3    6.55  )-----------( 234      ( 04 Feb 2021 08:11 )             30.2     02-04    144  |  106  |  12  ----------------------------<  180<H>  3.7   |  23  |  0.47<L>    Ca    8.9      04 Feb 2021 08:11  Phos  3.8     02-04  Mg     2.3     02-04    TPro  7.5  /  Alb  4.2  /  TBili  1.6<H>  /  DBili  x   /  AST  16  /  ALT  14  /  AlkPhos  93  02-03      WBC Trend:  WBC Count: 6.55 (02-04-21 @ 08:11)  WBC Count: 11.29 (02-03-21 @ 14:34)      Auto Neutrophil #: 9.86 K/uL (02-03-21 @ 14:34)  Auto Neutrophil #: 5.02 K/uL (01-21-21 @ 13:00)  Auto Neutrophil #: 8.92 K/uL (12-31-20 @ 03:48)      Creatine Trend:  Creatinine, Serum: 0.47 (02-04)  Creatinine, Serum: 0.50 (02-03)      Liver Biochemical Testing Trend:  Alanine Aminotransferase (ALT/SGPT): 14 (02-03)  Alanine Aminotransferase (ALT/SGPT): 10 (12-31)  Alanine Aminotransferase (ALT/SGPT): 13 (12-30)  Alanine Aminotransferase (ALT/SGPT): 19 (12-29)  Alanine Aminotransferase (ALT/SGPT): 13 (12-21)  Aspartate Aminotransferase (AST/SGOT): 16 (02-03-21 @ 14:34)  Aspartate Aminotransferase (AST/SGOT): 12 (12-31-20 @ 03:48)  Aspartate Aminotransferase (AST/SGOT): 17 (12-30-20 @ 16:21)  Aspartate Aminotransferase (AST/SGOT): 21 (12-29-20 @ 18:06)  Aspartate Aminotransferase (AST/SGOT): 17 (12-21-20 @ 16:01)  Bilirubin Total, Serum: 1.6 (02-03)  Bilirubin Total, Serum: 0.4 (12-31)  Bilirubin Total, Serum: 0.8 (12-30)  Bilirubin Total, Serum: 1.2 (12-29)  Bilirubin Total, Serum: 1.2 (12-21)      MICROBIOLOGY:        OTHER TESTS:  COVID-19 PCR: NotDetec (02-03-21 @ 14:25)  Blood Gas Venous - Lactate: 1.2 (02-03 @ 14:34)        RADIOLOGY:  imaging below personally reviewed     Patient is a 56y old  Female who presents with a chief complaint of Right neck abscess (04 Feb 2021 07:22)    HPI:  55 year old female with malignant neoplasm of tongue S/P right hemiglossectomy, left anterior lateral thigh free flap, bilateral selective neck dissection level 1-4, and tracheostomy tube placement on 12/29/2020. Tracheostomy was capped and decannulated on 01/06/2021. PEG tube was placed by CTS due to dysphagia. Patient sent in to [facility] ED by ENT clinic for right neck abscess. Patient reports swelling and erythematous of the neck started on last week Friday, and went into ENT clinic this morning for swallow evaluation prior to her chemo and radiation therapy, found to have yellowish pus came out from her right neck. Admits pain. Denies fever, chills, SOB, and cough. (03 Feb 2021 15:35)    Per pt, her chief complaint is neck swelling started on 1/29. Feeling tense, but not pain. The yellow drainage started on 2/3.   No fever, chills, SOB, cough, dysuria.  ENT performed bedside I&D at bedside on 2/3, culture sent (not shown as received on the sunrise yet).    Per pt, she had stroke with left sided weakness in 11/2018, so she have been taking "37 powder" from [place]. She denies smoking, EtoH or betel nut use. She does not know how she gets tongue cancer but maybe from "37 powder".    prior hospital charts reviewed [V]  primary team notes reviewed [V]  other consultant notes reviewed [V]    PAST MEDICAL & SURGICAL HISTORY:  Hypercholesterolemia    Thyroid cancer  dx 3/2020 ; tx surgically/ RT    CVA (cerebrovascular accident)  Per pt in [place] 11/ 2018 ; rx plavix ; Left Sided Weakness    Hypertension    S/P thyroidectomy  3/2020        SOCIAL HISTORY:    - Denied smoking/vaping/alcohol/recreational drug use  - Per pt, she had stroke with left sided weakness in 11/2018, so she have been taking "37 powder" from [place] for the stroke  - Lives alone, no pet  - Used to work in nail salon  - From [place] 20 years ago    FAMILY HISTORY:  Parents with HTN      Allergies  apple (Other)  No Known Drug Allergies      ANTIMICROBIALS:  ampicillin/sulbactam  IVPB    ampicillin/sulbactam  IVPB 3 every 6 hours      ANTIMICROBIALS (past 90 days):  MEDICATIONS  (STANDING):    ampicillin/sulbactam  IVPB   200 mL/Hr IV Intermittent (02-03-21 @ 14:25)    ampicillin/sulbactam  IVPB   200 mL/Hr IV Intermittent (02-04-21 @ 05:41)   200 mL/Hr IV Intermittent (02-04-21 @ 01:36)   200 mL/Hr IV Intermittent (02-03-21 @ 17:37)        OTHER MEDS:   MEDICATIONS  (STANDING):  amLODIPine   Tablet 5 daily  aspirin  chewable 81 daily  atorvastatin 20 at bedtime  famotidine   Suspension 20 daily  heparin SubCutaneous Injection - Peds 5000 every 12 hours  levothyroxine 112 <User Schedule>  levothyroxine 100 <User Schedule>  senna Syrup 10 at bedtime      REVIEW OF SYSTEMS  [  ] ROS unobtainable because:    [V  ] All other systems negative except as noted below:	    Constitutional:  [ ] fever [ ] chills  [ ] weight loss  [ ] weakness  Skin:  [ ] rash [ ] phlebitis	  Eyes: [ ] icterus [ ] pain  [ ] discharge	  ENMT: [ ] sore throat  [ ] thrush [ ] ulcers [V ] exudates  Respiratory: [ ] dyspnea [ ] hemoptysis [ ] cough [ ] sputum	  Cardiovascular:  [ ] chest pain [ ] palpitations [ ] edema	  Gastrointestinal:  [ ] nausea [ ] vomiting [ ] diarrhea [ ] constipation [ ] pain	  Genitourinary:  [ ] dysuria [ ] frequency [ ] hematuria [ ] discharge [ ] flank pain  [ ] incontinence  Musculoskeletal:  [ ] myalgias [ ] arthralgias [ ] arthritis  [ ] back pain  Neurological:  [ ] headache [ ] seizures  [ ] confusion/altered mental status  Psychiatric:  [ ] anxiety [ ] depression	  Hematology/Lymphatics:  [ ] lymphadenopathy  Endocrine:  [ ] adrenal [ ] thyroid  Allergic/Immunologic:	 [ ] transplant [ ] seasonal    VITALS:  Vital Signs Last 24 Hrs  T(F): 98.6 (02-04-21 @ 05:39), Max: 98.9 (02-03-21 @ 13:00)    Vital Signs Last 24 Hrs  HR: 96 (02-04-21 @ 05:39) (92 - 122)  BP: 100/63 (02-04-21 @ 05:39) (99/66 - 120/80)  RR: 16 (02-04-21 @ 05:39)  SpO2: 97% (02-04-21 @ 05:39) (97% - 98%)  Wt(kg): --    EXAM:  GA: NAD, able to speak  HEENT:   Right sided tongue post-resction with white membrane on top  Right neck is red but not warm, there is a small opening with gauze packing  CV: nl S1/S2, no RMG  Lungs: CTAB  Abd: BS+, soft, nontender, no rebounding pain. PEG tube clear.  Ext: no edema. Left thigh there is a long incision with clean wound healing from the graft.  Skin: no rash  IV: no phlebitis    Labs:                        9.3    6.55  )-----------( 234      ( 04 Feb 2021 08:11 )             30.2     02-04    144  |  106  |  12  ----------------------------<  180<H>  3.7   |  23  |  0.47<L>    Ca    8.9      04 Feb 2021 08:11  Phos  3.8     02-04  Mg     2.3     02-04    TPro  7.5  /  Alb  4.2  /  TBili  1.6<H>  /  DBili  x   /  AST  16  /  ALT  14  /  AlkPhos  93  02-03      WBC Trend:  WBC Count: 6.55 (02-04-21 @ 08:11)  WBC Count: 11.29 (02-03-21 @ 14:34)      Auto Neutrophil #: 9.86 K/uL (02-03-21 @ 14:34)  Auto Neutrophil #: 5.02 K/uL (01-21-21 @ 13:00)  Auto Neutrophil #: 8.92 K/uL (12-31-20 @ 03:48)      Creatine Trend:  Creatinine, Serum: 0.47 (02-04)  Creatinine, Serum: 0.50 (02-03)      Liver Biochemical Testing Trend:  Alanine Aminotransferase (ALT/SGPT): 14 (02-03)  Alanine Aminotransferase (ALT/SGPT): 10 (12-31)  Alanine Aminotransferase (ALT/SGPT): 13 (12-30)  Alanine Aminotransferase (ALT/SGPT): 19 (12-29)  Alanine Aminotransferase (ALT/SGPT): 13 (12-21)  Aspartate Aminotransferase (AST/SGOT): 16 (02-03-21 @ 14:34)  Aspartate Aminotransferase (AST/SGOT): 12 (12-31-20 @ 03:48)  Aspartate Aminotransferase (AST/SGOT): 17 (12-30-20 @ 16:21)  Aspartate Aminotransferase (AST/SGOT): 21 (12-29-20 @ 18:06)  Aspartate Aminotransferase (AST/SGOT): 17 (12-21-20 @ 16:01)  Bilirubin Total, Serum: 1.6 (02-03)  Bilirubin Total, Serum: 0.4 (12-31)  Bilirubin Total, Serum: 0.8 (12-30)  Bilirubin Total, Serum: 1.2 (12-29)  Bilirubin Total, Serum: 1.2 (12-21)      MICROBIOLOGY:        OTHER TESTS:  COVID-19 PCR: NotDetec (02-03-21 @ 14:25)  Blood Gas Venous - Lactate: 1.2 (02-03 @ 14:34)      RADIOLOGY:  imaging below personally reviewed    < from: CT Neck Soft Tissue w/ IV Cont (02.03.21 @ 18:39) >  IMPRESSION:    1. Right-sided neck soft tissue abscess with a drainage catheter in place.    2. Interval right-sided hemiglossectomy, flap reconstruction, and bilateral neck dissection. No masslike or nodular enhancement in or about the post surgical site to suggest recurrent or residual neoplasm.    3. No new or enlarging cervical lymph nodes.    4. Unchanged appearing 7 mm rounded groundglass nodule within the left upper lobe.    < end of copied text >

## 2021-02-04 NOTE — PROGRESS NOTE ADULT - SUBJECTIVE AND OBJECTIVE BOX
Subjective    Pt seen and examined at bedside. DANE. JANKIS. Aquacel neck packing replaced. CT neck and chest obtained yesterday.     Vital Signs Last 24 Hrs  T(C): 37 (04 Feb 2021 05:39), Max: 37.2 (03 Feb 2021 13:00)  T(F): 98.6 (04 Feb 2021 05:39), Max: 98.9 (03 Feb 2021 13:00)  HR: 96 (04 Feb 2021 05:39) (92 - 122)  BP: 100/63 (04 Feb 2021 05:39) (99/66 - 120/80)  BP(mean): --  RR: 16 (04 Feb 2021 05:39) (16 - 20)  SpO2: 97% (04 Feb 2021 05:39) (97% - 98%)    Gen: NAD  Skin: No rashes, bruises, or lesions  Head: Normocephalic, Atraumatic  Face: no edema, erythema, or fluctuance. Parotid glands soft without mass  Eyes: no scleral injection  Nose: Nares bilaterally patent, no discharge  Mouth: No stridor, no drooling, no trismus.  Mucosa moist, tongue/uvula midline, oropharynx clear  Neck: R neck packed with aquacel. Trachea stoma healed well, trach is midline, no masses.   Lymphatic: No lymphadenopathy  Resp: breathing easily, no stridor  CV: no peripheral edema/cyanosis  GI: nondistended   Peripheral vascular: no JVD or edema  Neuro: facial nerve intact, no facial droop    55 year old female with malignant neoplasm of tongue S/P right hemiglossectomy, left anterior lateral thigh free flap, bilateral selective neck dissection level 1-4, and tracheostomy tube placement on 12/29/2020 presents with right neck abscess now s/p bedside I&D and packing    -Aquacel packing change BID  -Unasyn  -F/u cultures  -ID consult  -F/u CT neck

## 2021-02-04 NOTE — HISTORY OF PRESENT ILLNESS
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 She is s/p right hemiglossectomy, right neck dissection, tracheostomy and free flap reconstruction on 12/29/2020. Pathology showed Invasive squamous cell carcinoma, well-differentiated, Perineural invasion and lymphovascular invasion seen. Resection margins negative for carcinoma. PD-L1 %.  [id number]. ECS present.\par She is s/p PEG on 1/13/2021. \par \par Patient presents for radiation consultation.\par

## 2021-02-04 NOTE — REVIEW OF SYSTEMS
[Recent Change In Weight] : ~T recent weight change [Dysphagia] : dysphagia [Cough] : cough [Negative] : Allergic/Immunologic [Fever] : no fever [Chills] : no chills [Night Sweats] : no night sweats [Fatigue] : no fatigue [Loss of Hearing] : no loss of hearing [Nosebleeds] : no nosebleeds [Hearing Disturbances] : no hearing disturbances [Shortness Of Breath] : no shortness of breath [Wheezing] : no wheezing [SOB on Exertion] : no shortness of breath during exertion

## 2021-02-04 NOTE — CONSULT NOTE ADULT - ASSESSMENT
55 year old female with invasive SCC tongue S/P right hemiglossectomy, left anterior lateral thigh free flap, bilateral selective neck dissection level 1-4, and tracheostomy tube placement on 12/29/2020. Tracheostomy was capped and decannulated on 01/06/2021. PEG tube was placed by CTS due to dysphagia. Patient sent in to [facility] ED by ENT clinic for right neck abscess. ENT performed bedside I&D at bedside on 2/3.    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 Tracheostomy was capped and decannulated on 01/06/2021. PEG tube was placed by CTS due to dysphagia. Patient sent in to [facility] ED by ENT clinic for right neck abscess. ENT performed bedside I&D at bedside on 2/3.    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 Tracheostomy was capped and decannulated on 01/06/2021. PEG tube was placed by CTS due to dysphagia. Patient sent in to [facility] ED by ENT clinic for right neck abscess. ENT performed bedside I&D at bedside on 2/3.    #Right neck abscess s/p I&D  - Follow up pus culture and blood culture  - c/w Unasyn 3g q6h    Zbigniew Nunes MD, PGY4   ID fellow  Pager: [phone]  After 5pm/weekends call [phone]    d/w Dr. Janine Oconnor conveyed to primary team

## 2021-02-04 NOTE — REASON FOR VISIT
[Consideration of Curative Therapy] : consideration of curative therapy for [Head and Neck Cancer] : head and neck cancer [Family Member] : family member [[employer] ] : provided by [employer]   [FreeTextEntry1] : [id number] [FreeTextEntry2] : Andrés [FreeTextEntry3] : Mandarin [TWNoteComboBox1] : Chinese

## 2021-02-04 NOTE — CONSULT NOTE ADULT - ATTENDING COMMENTS
56 yo F with malignant neoplasm of tongue S/P right hemiglossectomy, left anterior lateral thigh free flap, bilateral selective neck dissection level 1-4, and tracheostomy tube placement on 12/29/2020  No fever, has leukocytosis  On CT chest found to have R sided neck abscess  On CT neck, shows drainage of abscess (following I+D), drain in place  Culture pending  Patient generally well  Overall,  1) Neck Abscess  - S/p I+D  - Unasyn 3g q 6  - Culture pending from OR  - F/U pending BCXs  - Wound care per surgery team  2) Leukocytosis  - Trend to normal    Blu Mehta MD  Pager [phone]  After 5pm and on weekends call [phone]    I was physically present for the key portions of the evaluation and management service provided. I saw and examined the patient. I agree with the above history, physical, and plan except for any discrepancies which I have documented in “Attending Attestation.” Please refer to “Attending Attestation” for final plan.

## 2021-02-04 NOTE — PROGRESS NOTE ADULT - SUBJECTIVE AND OBJECTIVE BOX
Plastic Surgery    SUBJECTIVE: Pt seen and examined on rounds with team. No acute events overnight.      OBJECTIVE    PHYSICAL EXAM:   General: NAD  Neuro: alert  Pulm: comfortable  HEENT: flap mucosalized, incisions intact. Neck soft, erythema widespread in R neck around opening, packing in place    Vital Signs Last 24 Hrs  T(C): 37 (04 Feb 2021 05:39), Max: 37.2 (03 Feb 2021 13:00)  T(F): 98.6 (04 Feb 2021 05:39), Max: 98.9 (03 Feb 2021 13:00)  HR: 96 (04 Feb 2021 05:39) (92 - 122)  BP: 100/63 (04 Feb 2021 05:39) (99/66 - 120/80)  BP(mean): --  RR: 16 (04 Feb 2021 05:39) (16 - 20)  SpO2: 97% (04 Feb 2021 05:39) (97% - 98%)    I&O's Detail    03 Feb 2021 07:01  -  04 Feb 2021 07:00  --------------------------------------------------------  IN:    Enteral Tube Flush: 500 mL    IV PiggyBack: 200 mL    Jevity 1.2: 660 mL  Total IN: 1360 mL    OUT:    Oral Fluid: 0 mL    Voided (mL): 550 mL  Total OUT: 550 mL    Total NET: 810 mL      MEDICATIONS  (STANDING):  amLODIPine   Tablet 5 milliGRAM(s) Oral daily  ampicillin/sulbactam  IVPB      ampicillin/sulbactam  IVPB 3 Gram(s) IV Intermittent every 6 hours  aspirin  chewable 81 milliGRAM(s) Oral daily  atorvastatin 20 milliGRAM(s) Oral at bedtime  calcium carbonate   1250 mG (OsCal) 1 Tablet(s) Oral daily  ergocalciferol 34640 Unit(s) Oral <User Schedule>  famotidine   Suspension 20 milliGRAM(s) Oral daily  heparin SubCutaneous Injection - Peds 5000 Unit(s) SubCutaneous every 12 hours  levothyroxine 112 MICROGram(s) Oral <User Schedule>  levothyroxine 100 MICROGram(s) Oral <User Schedule>  senna Syrup 10 milliLiter(s) Oral at bedtime    MEDICATIONS  (PRN):      LABS:                        9.9    11.29 )-----------( 238      ( 03 Feb 2021 14:34 )             30.8     02-03    136  |  98  |  9   ----------------------------<  106<H>  3.8   |  25  |  0.50    Ca    9.3      03 Feb 2021 14:34  Phos  4.3     02-03  Mg     2.2     02-03    TPro  7.5  /  Alb  4.2  /  TBili  1.6<H>  /  DBili  x   /  AST  16  /  ALT  14  /  AlkPhos  93  02-03    PT/INR - ( 03 Feb 2021 14:34 )   PT: 13.9 sec;   INR: 1.22 ratio         PTT - ( 03 Feb 2021 14:34 )  PTT:28.8 sec  LIVER FUNCTIONS - ( 03 Feb 2021 14:34 )  Alb: 4.2 g/dL / Pro: 7.5 g/dL / ALK PHOS: 93 U/L / ALT: 14 U/L / AST: 16 U/L / GGT: x             ABO Interpretation: B (02-03-21 @ 14:24)

## 2021-02-04 NOTE — PHYSICAL EXAM
[FreeTextEntry1] : right neck mass, tender erythematous ( inflammation, collection ) . oral cavity fair amount of saliva, no mass

## 2021-02-04 NOTE — PATIENT PROFILE ADULT - MEDICATIONS/VISITS
Crescencio OU- Discussed diagnosis in detail with patient- Discussed signs and symptoms of progression- Discussed UV protection- No treatment needed at this time - Continue to monitorBlepharitis - Discussed diagnosis in detail with patient- Recommend patient using J & J baby shampoo to scrub lid daily- Continue to monitorPhytoplasmas OU- Discussed diagnosis in detail with patient - Continue to monitor no

## 2021-02-04 NOTE — VITALS
[Maximal Pain Intensity: 3/10] : 3/10 [Least Pain Intensity: 0/10] : 0/10 [Pain Description/Quality: ___] : Pain description/quality: [unfilled] [Pain Duration: ___] : Pain duration: [unfilled] [Pain Location: ___] : Pain Location: [unfilled] [80: Normal activity with effort; some signs or symptoms of disease.] : 80: Normal activity with effort; some signs or symptoms of disease.  [ECOG Performance Status: 1 - Restricted in physically strenuous activity but ambulatory and able to carry out work of a light or sedentary nature] : Performance Status: 1 - Restricted in physically strenuous activity but ambulatory and able to carry out work of a light or sedentary nature, e.g., light house work, office work [NoTreatment Scheduled] : no treatment scheduled [Pain Interferes with ADLs] : Pain does not interfere with activities of daily living

## 2021-02-05 LAB — GRAM STN FLD: SIGNIFICANT CHANGE UP

## 2021-02-05 PROCEDURE — 99232 SBSQ HOSP IP/OBS MODERATE 35: CPT

## 2021-02-05 RX ADMIN — AMPICILLIN SODIUM AND SULBACTAM SODIUM 200 GRAM(S): 250; 125 INJECTION, POWDER, FOR SUSPENSION INTRAMUSCULAR; INTRAVENOUS at 19:00

## 2021-02-05 RX ADMIN — Medication 112 MICROGRAM(S): at 06:17

## 2021-02-05 RX ADMIN — AMPICILLIN SODIUM AND SULBACTAM SODIUM 200 GRAM(S): 250; 125 INJECTION, POWDER, FOR SUSPENSION INTRAMUSCULAR; INTRAVENOUS at 06:17

## 2021-02-05 RX ADMIN — ATORVASTATIN CALCIUM 20 MILLIGRAM(S): 80 TABLET, FILM COATED ORAL at 21:42

## 2021-02-05 RX ADMIN — AMPICILLIN SODIUM AND SULBACTAM SODIUM 200 GRAM(S): 250; 125 INJECTION, POWDER, FOR SUSPENSION INTRAMUSCULAR; INTRAVENOUS at 00:05

## 2021-02-05 RX ADMIN — HEPARIN SODIUM 5000 UNIT(S): 5000 INJECTION INTRAVENOUS; SUBCUTANEOUS at 18:55

## 2021-02-05 RX ADMIN — Medication 1 TABLET(S): at 13:47

## 2021-02-05 RX ADMIN — HEPARIN SODIUM 5000 UNIT(S): 5000 INJECTION INTRAVENOUS; SUBCUTANEOUS at 06:16

## 2021-02-05 RX ADMIN — AMPICILLIN SODIUM AND SULBACTAM SODIUM 200 GRAM(S): 250; 125 INJECTION, POWDER, FOR SUSPENSION INTRAMUSCULAR; INTRAVENOUS at 13:31

## 2021-02-05 RX ADMIN — FAMOTIDINE 20 MILLIGRAM(S): 10 INJECTION INTRAVENOUS at 18:55

## 2021-02-05 RX ADMIN — Medication 81 MILLIGRAM(S): at 13:47

## 2021-02-05 NOTE — PROGRESS NOTE ADULT - SUBJECTIVE AND OBJECTIVE BOX
Subjective    Pt seen and examined at bedside. KATIE STRICKLAND. Aquacel neck being packing replaced BID.     Vital Signs Last 24 Hrs  T(C): 36.8 (04 Feb 2021 21:42), Max: 37 (04 Feb 2021 05:39)  T(F): 98.2 (04 Feb 2021 21:42), Max: 98.6 (04 Feb 2021 05:39)  HR: 66 (04 Feb 2021 21:42) (66 - 96)  BP: 102/64 (04 Feb 2021 21:42) (99/66 - 102/71)  BP(mean): --  RR: 18 (04 Feb 2021 21:42) (16 - 18)  SpO2: 100% (04 Feb 2021 21:42) (97% - 100%)    Gen: NAD  Skin: No rashes, bruises, or lesions  Head: Normocephalic, Atraumatic  Face: no edema, erythema, or fluctuance. Parotid glands soft without mass  Eyes: no scleral injection  Nose: Nares bilaterally patent, no discharge  Mouth: No stridor, no drooling, no trismus.  Mucosa moist, tongue/uvula midline, oropharynx clear  Neck: R neck packed with aquacel. Trachea stoma healed well, trach is midline, no masses.   Lymphatic: No lymphadenopathy  Resp: breathing easily, no stridor  CV: no peripheral edema/cyanosis  GI: nondistended   Peripheral vascular: no JVD or edema  Neuro: facial nerve intact, no facial droop    55 year old female with malignant neoplasm of tongue S/P right hemiglossectomy, left anterior lateral thigh free flap, bilateral selective neck dissection level 1-4, and tracheostomy tube placement on 12/29/2020 presents with right neck abscess now s/p bedside I&D and packing    -Aquacel packing change BID  -Unasyn  -F/u cultures  -ID consult  -F/u CT neck

## 2021-02-05 NOTE — PROGRESS NOTE ADULT - SUBJECTIVE AND OBJECTIVE BOX
Patient is a 56y old  Female who presents with a chief complaint of Right neck abscess (05 Feb 2021 00:13)    HPI:  55 year old female with malignant neoplasm of tongue S/P right hemiglossectomy, left anterior lateral thigh free flap, bilateral selective neck dissection level 1-4, and tracheostomy tube placement on 12/29/2020. Tracheostomy was capped and decannulated on 01/06/2021. PEG tube was placed by CTS due to dysphagia. Patient sent in to [facility] ED by ENT clinic for right neck abscess.  Patient reports swelling and erythematous of the neck started on last week Friday, and went into ENT clinic this morning for swallow evaluation prior to her chemo and radiation therapy, found to have yellowish pus came out from her right neck. Admits pain. Denies fever, chills, SOB, and cough. (03 Feb 2021 15:35)    Pt       prior hospital charts reviewed [V]  primary team notes reviewed [V]  other consultant notes reviewed [V]    PAST MEDICAL & SURGICAL HISTORY:  Hypercholesterolemia    Thyroid cancer  dx 3/2020 ; tx surgically/ RT    CVA (cerebrovascular accident)  Per pt in [place] 11/ 2018 ; rx plavix ; Left Sided Weakness    Hypertension    S/P thyroidectomy  3/2020        SOCIAL HISTORY:    - Denied smoking/vaping/alcohol/recreational drug use    FAMILY HISTORY:  No pertinent family history in first degree relatives        Allergies  apple (Other)  No Known Drug Allergies        ANTIMICROBIALS:  ampicillin/sulbactam  IVPB    ampicillin/sulbactam  IVPB 3 every 6 hours      ANTIMICROBIALS (past 90 days):  MEDICATIONS  (STANDING):    ampicillin/sulbactam  IVPB   200 mL/Hr IV Intermittent (02-03-21 @ 14:25)    ampicillin/sulbactam  IVPB   200 mL/Hr IV Intermittent (02-05-21 @ 06:17)   200 mL/Hr IV Intermittent (02-05-21 @ 00:05)   200 mL/Hr IV Intermittent (02-04-21 @ 18:56)   200 mL/Hr IV Intermittent (02-04-21 @ 13:32)   200 mL/Hr IV Intermittent (02-04-21 @ 05:41)   200 mL/Hr IV Intermittent (02-04-21 @ 01:36)   200 mL/Hr IV Intermittent (02-03-21 @ 17:37)        OTHER MEDS:   MEDICATIONS  (STANDING):  amLODIPine   Tablet 5 daily  aspirin  chewable 81 daily  atorvastatin 20 at bedtime  famotidine   Suspension 20 daily  heparin SubCutaneous Injection - Peds 5000 every 12 hours  levothyroxine 112 <User Schedule>  levothyroxine 100 <User Schedule>  senna Syrup 10 at bedtime      REVIEW OF SYSTEMS  [  ] ROS unobtainable because:    [  ] All other systems negative except as noted below:	    Constitutional:  [ ] fever [ ] chills  [ ] weight loss  [ ] weakness  Skin:  [ ] rash [ ] phlebitis	  Eyes: [ ] icterus [ ] pain  [ ] discharge	  ENMT: [ ] sore throat  [ ] thrush [ ] ulcers [ ] exudates  Respiratory: [ ] dyspnea [ ] hemoptysis [ ] cough [ ] sputum	  Cardiovascular:  [ ] chest pain [ ] palpitations [ ] edema	  Gastrointestinal:  [ ] nausea [ ] vomiting [ ] diarrhea [ ] constipation [ ] pain	  Genitourinary:  [ ] dysuria [ ] frequency [ ] hematuria [ ] discharge [ ] flank pain  [ ] incontinence  Musculoskeletal:  [ ] myalgias [ ] arthralgias [ ] arthritis  [ ] back pain  Neurological:  [ ] headache [ ] seizures  [ ] confusion/altered mental status  Psychiatric:  [ ] anxiety [ ] depression	  Hematology/Lymphatics:  [ ] lymphadenopathy  Endocrine:  [ ] adrenal [ ] thyroid  Allergic/Immunologic:	 [ ] transplant [ ] seasonal    VITALS:  Vital Signs Last 24 Hrs  T(F): 96.1 (02-05-21 @ 10:37), Max: 98.9 (02-03-21 @ 13:00)    Vital Signs Last 24 Hrs  HR: 93 (02-05-21 @ 10:37) (66 - 101)  BP: 108/66 (02-05-21 @ 10:37) (97/68 - 108/66)  RR: 17 (02-05-21 @ 10:37)  SpO2: 98% (02-05-21 @ 10:37) (97% - 100%)  Wt(kg): --    EXAM:  GA: NAD  HEENT: oral cavity no lesion  CV: nl S1/S2, no RMG  Lungs: CTAB  Abd: BS+, soft, nontender, no rebounding pain  Ext: no edema  Skin:  IV: no phlebitis    Labs:                        9.3    6.55  )-----------( 234      ( 04 Feb 2021 08:11 )             30.2     02-04    144  |  106  |  12  ----------------------------<  180<H>  3.7   |  23  |  0.47<L>    Ca    8.9      04 Feb 2021 08:11  Phos  3.8     02-04  Mg     2.3     02-04    TPro  7.5  /  Alb  4.2  /  TBili  1.6<H>  /  DBili  x   /  AST  16  /  ALT  14  /  AlkPhos  93  02-03      WBC Trend:  WBC Count: 6.55 (02-04-21 @ 08:11)  WBC Count: 11.29 (02-03-21 @ 14:34)      Auto Neutrophil #: 9.86 K/uL (02-03-21 @ 14:34)  Auto Neutrophil #: 5.02 K/uL (01-21-21 @ 13:00)  Auto Neutrophil #: 8.92 K/uL (12-31-20 @ 03:48)      Creatine Trend:  Creatinine, Serum: 0.47 (02-04)  Creatinine, Serum: 0.50 (02-03)      Liver Biochemical Testing Trend:  Alanine Aminotransferase (ALT/SGPT): 14 (02-03)  Alanine Aminotransferase (ALT/SGPT): 10 (12-31)  Alanine Aminotransferase (ALT/SGPT): 13 (12-30)  Alanine Aminotransferase (ALT/SGPT): 19 (12-29)  Alanine Aminotransferase (ALT/SGPT): 13 (12-21)  Aspartate Aminotransferase (AST/SGOT): 16 (02-03-21 @ 14:34)  Aspartate Aminotransferase (AST/SGOT): 12 (12-31-20 @ 03:48)  Aspartate Aminotransferase (AST/SGOT): 17 (12-30-20 @ 16:21)  Aspartate Aminotransferase (AST/SGOT): 21 (12-29-20 @ 18:06)  Aspartate Aminotransferase (AST/SGOT): 17 (12-21-20 @ 16:01)  Bilirubin Total, Serum: 1.6 (02-03)  Bilirubin Total, Serum: 0.4 (12-31)  Bilirubin Total, Serum: 0.8 (12-30)  Bilirubin Total, Serum: 1.2 (12-29)  Bilirubin Total, Serum: 1.2 (12-21)    Auto Eosinophil %: 1.7 % (02-03-21 @ 14:34)      MICROBIOLOGY:  Culture - Blood (collected 03 Feb 2021 16:29)  Source: .Blood Blood-Venous  Preliminary Report:    No growth to date.    Culture - Blood (collected 03 Feb 2021 16:29)  Source: .Blood Blood-Peripheral  Preliminary Report:    Growth in aerobic bottle: Gram Positive Cocci in Clusters    ***Blood Panel PCR results on this specimen are available    approximately 3 hours after the Gram stain result.***    Gram stain, PCR, and/or culture results may not always    correspond due to difference in methodologies.    ************************************************************    This PCR assay was performed by multiplex PCR. This    Assay tests for 66 bacterial and resistance gene targets.    Please refer to the [facility] [employer] test directory    at [email or web address] for details.      OTHER TESTS:  COVID-19 PCR: NotDetec (02-03-21 @ 14:25)  Blood Gas Venous - Lactate: 1.2 (02-03 @ 14:34)      RADIOLOGY:  imaging below personally reviewed      ASSESSMENT/RECOMMENDATIONS:  HPI:  55 year old female with malignant neoplasm of tongue S/P right hemiglossectomy, left anterior lateral thigh free flap, bilateral selective neck dissection level 1-4, and tracheostomy tube placement on 12/29/2020. Tracheostomy was capped and decannulated on 01/06/2021. PEG tube was placed by CTS due to dysphagia. Patient sent in to [facility] ED by ENT clinic for right neck abscess.  Patient reports swelling and erythematous of the neck started on last week Friday, and went into ENT clinic this morning for swallow evaluation prior to her chemo and radiation therapy, found to have yellowish pus came out from her right neck. Admits pain. Denies fever, chills, SOB, and cough. (03 Feb 2021 15:35)       #PENDING RECS. PLEASE WAIT FOR FINAL RECS AFTER DISCUSSION WITH ATTENDING#    Zbigniew Nunes MD, PGY4  Fellow, Infectious Diseases  Pager: [phone]  If no response, after 5pm and on Weekends: Call [phone] Patient is a 56y old  Female who presents with a chief complaint of Right neck abscess (05 Feb 2021 00:13)    Follow up for neck abscess    Interval event:  Pt is asymptomatic today, no fever or chills, neck not feeling swelling or pain at all.  BCx grew GPC in clusters.  I&D culture now is in micro lab, will follow up result    Denied fevers, chills, night sweats, rash, cough, coryza, dysuria, loose stools, recent travel, sick contacts.    prior hospital charts reviewed [V]  primary team notes reviewed [V]  other consultant notes reviewed [V]    PAST MEDICAL & SURGICAL HISTORY:  Hypercholesterolemia    Thyroid cancer  dx 3/2020 ; tx surgically/ RT    CVA (cerebrovascular accident)  Per pt in [place] 11/ 2018 ; rx plavix ; Left Sided Weakness    Hypertension    S/P thyroidectomy  3/2020      Allergies  apple (Other)  No Known Drug Allergies        ANTIMICROBIALS:  ampicillin/sulbactam  IVPB    ampicillin/sulbactam  IVPB 3 every 6 hours      ANTIMICROBIALS (past 90 days):  MEDICATIONS  (STANDING):    ampicillin/sulbactam  IVPB   200 mL/Hr IV Intermittent (02-03-21 @ 14:25)    ampicillin/sulbactam  IVPB   200 mL/Hr IV Intermittent (02-05-21 @ 06:17)   200 mL/Hr IV Intermittent (02-05-21 @ 00:05)   200 mL/Hr IV Intermittent (02-04-21 @ 18:56)   200 mL/Hr IV Intermittent (02-04-21 @ 13:32)   200 mL/Hr IV Intermittent (02-04-21 @ 05:41)   200 mL/Hr IV Intermittent (02-04-21 @ 01:36)   200 mL/Hr IV Intermittent (02-03-21 @ 17:37)      OTHER MEDS:   MEDICATIONS  (STANDING):  amLODIPine   Tablet 5 daily  aspirin  chewable 81 daily  atorvastatin 20 at bedtime  famotidine   Suspension 20 daily  heparin SubCutaneous Injection - Peds 5000 every 12 hours  levothyroxine 112 <User Schedule>  levothyroxine 100 <User Schedule>  senna Syrup 10 at bedtime      REVIEW OF SYSTEMS  [  ] ROS unobtainable because:    [ V ] All other systems negative except as noted below:	    Constitutional:  [ ] fever [ ] chills  [ ] weight loss  [ ] weakness  Skin:  [ ] rash [ ] phlebitis	  Eyes: [ ] icterus [ ] pain  [ ] discharge	  ENMT: [ ] sore throat  [ ] thrush [ ] ulcers [ ] exudates  Respiratory: [ ] dyspnea [ ] hemoptysis [ ] cough [ ] sputum	  Cardiovascular:  [ ] chest pain [ ] palpitations [ ] edema	  Gastrointestinal:  [ ] nausea [ ] vomiting [ ] diarrhea [ ] constipation [ ] pain	  Genitourinary:  [ ] dysuria [ ] frequency [ ] hematuria [ ] discharge [ ] flank pain  [ ] incontinence  Musculoskeletal:  [ ] myalgias [ ] arthralgias [ ] arthritis  [ ] back pain  Neurological:  [ ] headache [ ] seizures  [ ] confusion/altered mental status  Psychiatric:  [ ] anxiety [ ] depression	  Hematology/Lymphatics:  [ ] lymphadenopathy  Endocrine:  [ ] adrenal [ ] thyroid  Allergic/Immunologic:	 [ ] transplant [ ] seasonal    VITALS:  Vital Signs Last 24 Hrs  T(F): 96.1 (02-05-21 @ 10:37), Max: 98.9 (02-03-21 @ 13:00)    Vital Signs Last 24 Hrs  HR: 93 (02-05-21 @ 10:37) (66 - 101)  BP: 108/66 (02-05-21 @ 10:37) (97/68 - 108/66)  RR: 17 (02-05-21 @ 10:37)  SpO2: 98% (02-05-21 @ 10:37) (97% - 100%)  Wt(kg): --    EXAM:  GA: NAD, able to speak  HEENT:   Right sided tongue post-resction with white membrane on top  Right neck is red but not warm, there is a small opening with gauze packing  CV: nl S1/S2, no RMG  Lungs: CTAB  Abd: BS+, soft, nontender, no rebounding pain. PEG tube clear.  Ext: no edema. Left thigh there is a long incision with clean wound healing from the graft.  Skin: no rash  IV: no phlebitis      Labs:                        9.3    6.55  )-----------( 234      ( 04 Feb 2021 08:11 )             30.2     02-04    144  |  106  |  12  ----------------------------<  180<H>  3.7   |  23  |  0.47<L>    Ca    8.9      04 Feb 2021 08:11  Phos  3.8     02-04  Mg     2.3     02-04    TPro  7.5  /  Alb  4.2  /  TBili  1.6<H>  /  DBili  x   /  AST  16  /  ALT  14  /  AlkPhos  93  02-03      WBC Trend:  WBC Count: 6.55 (02-04-21 @ 08:11)  WBC Count: 11.29 (02-03-21 @ 14:34)      Auto Neutrophil #: 9.86 K/uL (02-03-21 @ 14:34)  Auto Neutrophil #: 5.02 K/uL (01-21-21 @ 13:00)  Auto Neutrophil #: 8.92 K/uL (12-31-20 @ 03:48)      Creatine Trend:  Creatinine, Serum: 0.47 (02-04)  Creatinine, Serum: 0.50 (02-03)      Liver Biochemical Testing Trend:  Alanine Aminotransferase (ALT/SGPT): 14 (02-03)  Alanine Aminotransferase (ALT/SGPT): 10 (12-31)  Alanine Aminotransferase (ALT/SGPT): 13 (12-30)  Alanine Aminotransferase (ALT/SGPT): 19 (12-29)  Alanine Aminotransferase (ALT/SGPT): 13 (12-21)  Aspartate Aminotransferase (AST/SGOT): 16 (02-03-21 @ 14:34)  Aspartate Aminotransferase (AST/SGOT): 12 (12-31-20 @ 03:48)  Aspartate Aminotransferase (AST/SGOT): 17 (12-30-20 @ 16:21)  Aspartate Aminotransferase (AST/SGOT): 21 (12-29-20 @ 18:06)  Aspartate Aminotransferase (AST/SGOT): 17 (12-21-20 @ 16:01)  Bilirubin Total, Serum: 1.6 (02-03)  Bilirubin Total, Serum: 0.4 (12-31)  Bilirubin Total, Serum: 0.8 (12-30)  Bilirubin Total, Serum: 1.2 (12-29)  Bilirubin Total, Serum: 1.2 (12-21)    Auto Eosinophil %: 1.7 % (02-03-21 @ 14:34)      MICROBIOLOGY:  Culture - Blood (collected 03 Feb 2021 16:29)  Source: .Blood Blood-Venous  Preliminary Report:    No growth to date.    Culture - Blood (collected 03 Feb 2021 16:29)  Source: .Blood Blood-Peripheral  Preliminary Report:    Growth in aerobic bottle: Gram Positive Cocci in Clusters    ***Blood Panel PCR results on this specimen are available    approximately 3 hours after the Gram stain result.***    Gram stain, PCR, and/or culture results may not always    correspond due to difference in methodologies.    ************************************************************    This PCR assay was performed by multiplex PCR. This    Assay tests for 66 bacterial and resistance gene targets.    Please refer to the [facility] [employer] test directory    at [email or web address] for details.      OTHER TESTS:  COVID-19 PCR: NotDetec (02-03-21 @ 14:25)  Blood Gas Venous - Lactate: 1.2 (02-03 @ 14:34)      RADIOLOGY:  imaging below personally reviewed    < from: CT Neck Soft Tissue w/ IV Cont (02.03.21 @ 18:39) >  IMPRESSION:    1. Right-sided neck soft tissue abscess with a drainage catheter in place.    2. Interval right-sided hemiglossectomy, flap reconstruction, and bilateral neck dissection. No masslike or nodular enhancement in or about the post surgical site to suggest recurrent or residual neoplasm.    3. No new or enlarging cervical lymph nodes.    4. Unchanged appearing 7 mm rounded groundglass nodule within the left upper lobe.    < end of copied text >

## 2021-02-05 NOTE — PROGRESS NOTE ADULT - ATTENDING COMMENTS
54 yo F with malignant neoplasm of tongue S/P right hemiglossectomy, left anterior lateral thigh free flap, bilateral selective neck dissection level 1-4, and tracheostomy tube placement on 12/29/2020  No fever, has leukocytosis  On CT chest found to have R sided neck abscess  On CT neck, shows drainage of abscess (following I+D), drain in place  Culture pending  Patient generally well  Overall,  1) Neck Abscess  - S/p I+D  - Unasyn 3g q 6  - Culture pending from OR--NGTD presently  - Wound care per surgery team  2) Leukocytosis  - Trend to normal  3) Positive BCX  - Note 1/4 GPC-CL, PCR negative at ~30 hours; patient remains clinically well without new signs sepsis, well appearing, procurement contam?  - repeat BCXs x 2  - Hold on additional abx for now, however if worsening or signs sepsis would start Vanco  - Trend CBC to determine if leukocytosis     Blu Mehta MD  Pager [phone]  After 5pm and on weekends call [phone]    I was physically present for the key portions of the evaluation and management service provided. I saw and examined the patient. I agree with the above history, physical, and plan except for any discrepancies which I have documented in “Attending Attestation.” Please refer to “Attending Attestation” for final plan.

## 2021-02-06 LAB
ANION GAP SERPL CALC-SCNC: 8 MMOL/L — SIGNIFICANT CHANGE UP (ref 7–14)
BUN SERPL-MCNC: 11 MG/DL — SIGNIFICANT CHANGE UP (ref 7–23)
CALCIUM SERPL-MCNC: 9 MG/DL — SIGNIFICANT CHANGE UP (ref 8.4–10.5)
CHLORIDE SERPL-SCNC: 104 MMOL/L — SIGNIFICANT CHANGE UP (ref 98–107)
CO2 SERPL-SCNC: 28 MMOL/L — SIGNIFICANT CHANGE UP (ref 22–31)
CREAT SERPL-MCNC: 0.44 MG/DL — LOW (ref 0.5–1.3)
CULTURE RESULTS: SIGNIFICANT CHANGE UP
GLUCOSE SERPL-MCNC: 100 MG/DL — HIGH (ref 70–99)
GRAM STN FLD: SIGNIFICANT CHANGE UP
MAGNESIUM SERPL-MCNC: 2.3 MG/DL — SIGNIFICANT CHANGE UP (ref 1.6–2.6)
PHOSPHATE SERPL-MCNC: 4.5 MG/DL — SIGNIFICANT CHANGE UP (ref 2.5–4.5)
POTASSIUM SERPL-MCNC: 4 MMOL/L — SIGNIFICANT CHANGE UP (ref 3.5–5.3)
POTASSIUM SERPL-SCNC: 4 MMOL/L — SIGNIFICANT CHANGE UP (ref 3.5–5.3)
SODIUM SERPL-SCNC: 140 MMOL/L — SIGNIFICANT CHANGE UP (ref 135–145)
SPECIMEN SOURCE: SIGNIFICANT CHANGE UP

## 2021-02-06 RX ADMIN — AMPICILLIN SODIUM AND SULBACTAM SODIUM 200 GRAM(S): 250; 125 INJECTION, POWDER, FOR SUSPENSION INTRAMUSCULAR; INTRAVENOUS at 13:57

## 2021-02-06 RX ADMIN — Medication 100 MICROGRAM(S): at 06:03

## 2021-02-06 RX ADMIN — HEPARIN SODIUM 5000 UNIT(S): 5000 INJECTION INTRAVENOUS; SUBCUTANEOUS at 18:17

## 2021-02-06 RX ADMIN — AMPICILLIN SODIUM AND SULBACTAM SODIUM 200 GRAM(S): 250; 125 INJECTION, POWDER, FOR SUSPENSION INTRAMUSCULAR; INTRAVENOUS at 06:32

## 2021-02-06 RX ADMIN — Medication 1 TABLET(S): at 12:49

## 2021-02-06 RX ADMIN — FAMOTIDINE 20 MILLIGRAM(S): 10 INJECTION INTRAVENOUS at 13:58

## 2021-02-06 RX ADMIN — ATORVASTATIN CALCIUM 20 MILLIGRAM(S): 80 TABLET, FILM COATED ORAL at 21:54

## 2021-02-06 RX ADMIN — Medication 81 MILLIGRAM(S): at 12:49

## 2021-02-06 RX ADMIN — HEPARIN SODIUM 5000 UNIT(S): 5000 INJECTION INTRAVENOUS; SUBCUTANEOUS at 06:03

## 2021-02-06 RX ADMIN — AMPICILLIN SODIUM AND SULBACTAM SODIUM 200 GRAM(S): 250; 125 INJECTION, POWDER, FOR SUSPENSION INTRAMUSCULAR; INTRAVENOUS at 00:38

## 2021-02-06 RX ADMIN — AMPICILLIN SODIUM AND SULBACTAM SODIUM 200 GRAM(S): 250; 125 INJECTION, POWDER, FOR SUSPENSION INTRAMUSCULAR; INTRAVENOUS at 21:15

## 2021-02-06 NOTE — PHYSICAL THERAPY INITIAL EVALUATION ADULT - PERTINENT HX OF CURRENT PROBLEM, REHAB EVAL
55 year old female with malignant neoplasm of tongue S/P right hemiglossectomy, left anterior lateral thigh free flap, bilateral selective neck dissection level 1-4, and tracheostomy tube placement on 12/29/2020. Tracheostomy was capped and decannulated on 01/06/2021. PEG tube was placed by CTS due to dysphagia. Patient sent in to [facility] ED by ENT clinic for right neck abscess. Pt found to have yellowish pus came out from her right neck.

## 2021-02-06 NOTE — PROGRESS NOTE ADULT - SUBJECTIVE AND OBJECTIVE BOX
Pt seen and examined at bedside. KATIE BARNETT Aquacel neck being packing replaced BID.     Vital Signs Last 24 Hrs  T(C): 36.3 (06 Feb 2021 06:00), Max: 36.8 (05 Feb 2021 14:49)  T(F): 97.4 (06 Feb 2021 06:00), Max: 98.2 (05 Feb 2021 14:49)  HR: 83 (06 Feb 2021 06:00) (76 - 102)  BP: 106/72 (06 Feb 2021 06:00) (103/67 - 112/75)  BP(mean): --  RR: 18 (06 Feb 2021 06:00) (17 - 19)  SpO2: 97% (06 Feb 2021 06:00) (97% - 98%)Subjective        Gen: NAD  Skin: No rashes, bruises, or lesions  Head: Normocephalic, Atraumatic  Face: no edema, erythema, or fluctuance. Parotid glands soft without mass  Eyes: no scleral injection  Nose: Nares bilaterally patent, no discharge  Mouth: No stridor, no drooling, no trismus.  Mucosa moist, tongue/uvula midline, oropharynx clear  Neck: R neck packed with aquacel. Trachea stoma healed well, trach is midline, no masses.   Lymphatic: No lymphadenopathy  Resp: breathing easily, no stridor  CV: no peripheral edema/cyanosis  GI: nondistended   Peripheral vascular: no JVD or edema  Neuro: facial nerve intact, no facial droop    55 year old female with malignant neoplasm of tongue S/P right hemiglossectomy, left anterior lateral thigh free flap, bilateral selective neck dissection level 1-4, and tracheostomy tube placement on 12/29/2020 presents with right neck abscess now s/p bedside I&D and packing    -Aquacel packing change BID  -Unasyn  -F/u cultures  -ID consult  -F/u CT neck

## 2021-02-06 NOTE — PROVIDER CONTACT NOTE (CRITICAL VALUE NOTIFICATION) - SITUATION
blood culture from patient on  2/3/81383 reevaluation of gram stain previously reported as gram positive coccci in clusters in aerobic bottle, now reevaluated as gram negative cocco bacilli in aerobic bottle

## 2021-02-06 NOTE — PROVIDER CONTACT NOTE (CRITICAL VALUE NOTIFICATION) - TEST AND RESULT REPORTED:
blood culture from 2/3/12324 reevaluation of gram stain previously reported as gram positive coccci in clusters in aerobic bottle, now reevaluated as gram negative cocco bacilli in aerobic bottle

## 2021-02-07 LAB
HCT VFR BLD CALC: 33.5 % — LOW (ref 34.5–45)
HGB BLD-MCNC: 10.3 G/DL — LOW (ref 11.5–15.5)
MCHC RBC-ENTMCNC: 27.5 PG — SIGNIFICANT CHANGE UP (ref 27–34)
MCHC RBC-ENTMCNC: 30.7 GM/DL — LOW (ref 32–36)
MCV RBC AUTO: 89.6 FL — SIGNIFICANT CHANGE UP (ref 80–100)
NRBC # BLD: 0 /100 WBCS — SIGNIFICANT CHANGE UP
NRBC # FLD: 0 K/UL — SIGNIFICANT CHANGE UP
PLATELET # BLD AUTO: 305 K/UL — SIGNIFICANT CHANGE UP (ref 150–400)
RBC # BLD: 3.74 M/UL — LOW (ref 3.8–5.2)
RBC # FLD: 14.6 % — HIGH (ref 10.3–14.5)
WBC # BLD: 4.71 K/UL — SIGNIFICANT CHANGE UP (ref 3.8–10.5)
WBC # FLD AUTO: 4.71 K/UL — SIGNIFICANT CHANGE UP (ref 3.8–10.5)

## 2021-02-07 RX ADMIN — AMPICILLIN SODIUM AND SULBACTAM SODIUM 200 GRAM(S): 250; 125 INJECTION, POWDER, FOR SUSPENSION INTRAMUSCULAR; INTRAVENOUS at 17:06

## 2021-02-07 RX ADMIN — HEPARIN SODIUM 5000 UNIT(S): 5000 INJECTION INTRAVENOUS; SUBCUTANEOUS at 17:06

## 2021-02-07 RX ADMIN — ATORVASTATIN CALCIUM 20 MILLIGRAM(S): 80 TABLET, FILM COATED ORAL at 23:02

## 2021-02-07 RX ADMIN — Medication 81 MILLIGRAM(S): at 10:57

## 2021-02-07 RX ADMIN — AMPICILLIN SODIUM AND SULBACTAM SODIUM 200 GRAM(S): 250; 125 INJECTION, POWDER, FOR SUSPENSION INTRAMUSCULAR; INTRAVENOUS at 03:21

## 2021-02-07 RX ADMIN — AMPICILLIN SODIUM AND SULBACTAM SODIUM 200 GRAM(S): 250; 125 INJECTION, POWDER, FOR SUSPENSION INTRAMUSCULAR; INTRAVENOUS at 10:02

## 2021-02-07 RX ADMIN — FAMOTIDINE 20 MILLIGRAM(S): 10 INJECTION INTRAVENOUS at 13:11

## 2021-02-07 RX ADMIN — AMPICILLIN SODIUM AND SULBACTAM SODIUM 200 GRAM(S): 250; 125 INJECTION, POWDER, FOR SUSPENSION INTRAMUSCULAR; INTRAVENOUS at 23:03

## 2021-02-07 RX ADMIN — AMLODIPINE BESYLATE 5 MILLIGRAM(S): 2.5 TABLET ORAL at 05:25

## 2021-02-07 RX ADMIN — HEPARIN SODIUM 5000 UNIT(S): 5000 INJECTION INTRAVENOUS; SUBCUTANEOUS at 05:25

## 2021-02-07 RX ADMIN — Medication 1 TABLET(S): at 10:57

## 2021-02-07 RX ADMIN — Medication 100 MICROGRAM(S): at 05:25

## 2021-02-07 NOTE — PROGRESS NOTE ADULT - SUBJECTIVE AND OBJECTIVE BOX
Blood culture finding was revised per the lab yesterday, found to be gram negative coccobaccili   Per ID, okay to continue unasyn  No worsening in symptoms, doing well clinically    T(F): 97 (06 Feb 2021 20:56), Max: 97.8 (06 Feb 2021 10:20)  HR: 95 (06 Feb 2021 20:56) (76 - 98)  BP: 99/68 (06 Feb 2021 20:56) (99/68 - 110/70)  RR: 18 (06 Feb 2021 20:56) (18 - 19)  SpO2: 100% (06 Feb 2021 20:56) (97% - 100%)    Gen: NAD  Skin: No rashes, bruises, or lesions  Head: Normocephalic, Atraumatic  Face: no edema, erythema, or fluctuance. Parotid glands soft without mass  Eyes: no scleral injection  Nose: Nares bilaterally patent, no discharge  Mouth: No stridor, no drooling, no trismus.  Mucosa moist, tongue/uvula midline, oropharynx clear  Neck: R neck packed with aquacel. Trachea stoma healed well, trach is midline, no masses.   Lymphatic: No lymphadenopathy  Resp: breathing easily, no stridor  CV: no peripheral edema/cyanosis  GI: nondistended   Peripheral vascular: no JVD or edema  Neuro: facial nerve intact, no facial droop    55 year old female with malignant neoplasm of tongue S/P right hemiglossectomy, left anterior lateral thigh free flap, bilateral selective neck dissection level 1-4, and tracheostomy tube placement on 12/29/2020 presents with right neck abscess now s/p bedside I&D and packing.    -Aquacel packing change BID  -Unasyn  -F/u cultures  -ID

## 2021-02-08 LAB
CULTURE RESULTS: SIGNIFICANT CHANGE UP
HCT VFR BLD CALC: 28.8 % — LOW (ref 34.5–45)
HGB BLD-MCNC: 9.2 G/DL — LOW (ref 11.5–15.5)
MCHC RBC-ENTMCNC: 28 PG — SIGNIFICANT CHANGE UP (ref 27–34)
MCHC RBC-ENTMCNC: 31.9 GM/DL — LOW (ref 32–36)
MCV RBC AUTO: 87.5 FL — SIGNIFICANT CHANGE UP (ref 80–100)
NRBC # BLD: 0 /100 WBCS — SIGNIFICANT CHANGE UP
NRBC # FLD: 0 K/UL — SIGNIFICANT CHANGE UP
PLATELET # BLD AUTO: 264 K/UL — SIGNIFICANT CHANGE UP (ref 150–400)
RBC # BLD: 3.29 M/UL — LOW (ref 3.8–5.2)
RBC # FLD: 14.6 % — HIGH (ref 10.3–14.5)
SPECIMEN SOURCE: SIGNIFICANT CHANGE UP
WBC # BLD: 3.88 K/UL — SIGNIFICANT CHANGE UP (ref 3.8–10.5)
WBC # FLD AUTO: 3.88 K/UL — SIGNIFICANT CHANGE UP (ref 3.8–10.5)

## 2021-02-08 PROCEDURE — 99232 SBSQ HOSP IP/OBS MODERATE 35: CPT

## 2021-02-08 RX ADMIN — ERGOCALCIFEROL 50000 UNIT(S): 1.25 CAPSULE ORAL at 12:14

## 2021-02-08 RX ADMIN — AMPICILLIN SODIUM AND SULBACTAM SODIUM 200 GRAM(S): 250; 125 INJECTION, POWDER, FOR SUSPENSION INTRAMUSCULAR; INTRAVENOUS at 12:49

## 2021-02-08 RX ADMIN — FAMOTIDINE 20 MILLIGRAM(S): 10 INJECTION INTRAVENOUS at 15:04

## 2021-02-08 RX ADMIN — AMPICILLIN SODIUM AND SULBACTAM SODIUM 200 GRAM(S): 250; 125 INJECTION, POWDER, FOR SUSPENSION INTRAMUSCULAR; INTRAVENOUS at 06:04

## 2021-02-08 RX ADMIN — AMPICILLIN SODIUM AND SULBACTAM SODIUM 200 GRAM(S): 250; 125 INJECTION, POWDER, FOR SUSPENSION INTRAMUSCULAR; INTRAVENOUS at 23:50

## 2021-02-08 RX ADMIN — Medication 1 TABLET(S): at 12:44

## 2021-02-08 RX ADMIN — HEPARIN SODIUM 5000 UNIT(S): 5000 INJECTION INTRAVENOUS; SUBCUTANEOUS at 06:04

## 2021-02-08 RX ADMIN — Medication 112 MICROGRAM(S): at 06:04

## 2021-02-08 RX ADMIN — AMPICILLIN SODIUM AND SULBACTAM SODIUM 200 GRAM(S): 250; 125 INJECTION, POWDER, FOR SUSPENSION INTRAMUSCULAR; INTRAVENOUS at 17:45

## 2021-02-08 RX ADMIN — HEPARIN SODIUM 5000 UNIT(S): 5000 INJECTION INTRAVENOUS; SUBCUTANEOUS at 17:45

## 2021-02-08 RX ADMIN — ATORVASTATIN CALCIUM 20 MILLIGRAM(S): 80 TABLET, FILM COATED ORAL at 21:46

## 2021-02-08 RX ADMIN — Medication 81 MILLIGRAM(S): at 12:44

## 2021-02-08 RX ADMIN — AMLODIPINE BESYLATE 5 MILLIGRAM(S): 2.5 TABLET ORAL at 06:04

## 2021-02-08 NOTE — PROGRESS NOTE ADULT - SUBJECTIVE AND OBJECTIVE BOX
Follow Up:  neck abscess, bacteremia    Interval History: pt afebrile and normal WBC, repeat blood cx negative    ROS:      All other systems negative    Constitutional: no fever, no chills  ENT: no neck pain  Cardiovascular:  no chest pain, no palpitation  Respiratory:  no SOB, no cough  GI:  no abd pain, no vomiting, no diarrhea  urinary: no dysuria, no hematuria, no flank pain  musculoskeletal:  no joint pain, no joint swelling  skin:  no rash  neurology:  no headache, no seizure    Allergies  apple (Other)  No Known Drug Allergies        ANTIMICROBIALS:  ampicillin/sulbactam  IVPB    ampicillin/sulbactam  IVPB 3 every 6 hours      OTHER MEDS:  amLODIPine   Tablet 5 milliGRAM(s) Oral daily  aspirin  chewable 81 milliGRAM(s) Oral daily  atorvastatin 20 milliGRAM(s) Oral at bedtime  calcium carbonate   1250 mG (OsCal) 1 Tablet(s) Oral daily  ergocalciferol 07725 Unit(s) Oral <User Schedule>  famotidine   Suspension 20 milliGRAM(s) Oral daily  heparin SubCutaneous Injection - Peds 5000 Unit(s) SubCutaneous every 12 hours  levothyroxine 112 MICROGram(s) Oral <User Schedule>  levothyroxine 100 MICROGram(s) Oral <User Schedule>  senna Syrup 10 milliLiter(s) Oral at bedtime      Vital Signs Last 24 Hrs  T(C): 36.9 (08 Feb 2021 15:02), Max: 36.9 (08 Feb 2021 15:02)  T(F): 98.4 (08 Feb 2021 15:02), Max: 98.4 (08 Feb 2021 15:02)  HR: 98 (08 Feb 2021 15:02) (84 - 98)  BP: 99/69 (08 Feb 2021 15:02) (98/72 - 121/74)  BP(mean): --  RR: 17 (08 Feb 2021 15:02) (17 - 18)  SpO2: 100% (08 Feb 2021 15:02) (98% - 100%)    Physical Exam:  General:    NAD,  non toxic  ENT:  R neck abscess site now with slight edema and hyperpigmentation but no tenderness  Cardio:     regular S1, S2,  no murmur  Respiratory:    clear b/l,    no wheezing  abd:     soft,   BS +,   no tenderness  :   no CVAT,  no suprapubic tenderness,   no  hackett  Musculoskeletal:   no joint swelling  vascular: no phlebitis  Skin:    no rash                          9.2    3.88  )-----------( 264      ( 08 Feb 2021 06:32 )             28.8                   MICROBIOLOGY:  v  .Blood Blood  02-05-21   No growth to date.  --  --      .Abscess right neck  02-04-21   Numerous Streptococcus constellatus  "Susceptibilities not performed"  --  --      .Blood Blood-Venous  02-03-21   No growth to date.  --  --      .Blood Blood-Peripheral  02-03-21   **Please Note**: This is a Corrected Report**  Growth in aerobic bottle: Gram Negative Coccobacilli  Most closely resembling Paracoccus yeei Susceptibilites not performed.  Previously reported as:  Growth in aerobic bottle: Gram Positive Cocci in Clusters  **BCID performed. No targets detected.**  ***Blood Panel PCR results on this specimen are available  approximately 3 hours after the Gram stain result.***  Gram stain, PCR, and/or culture results may not always  correspond due to difference in methodologies.  ************************************************************  This PCR assay was performed by multiplex PCR. This  Assay tests for 66 bacterial and resistance gene targets.  Please refer to the [facility] [employer] test directory  at [email or web address] for details.  --    Upon re-evaluation of gram stain:  Growth in aerobic bottle: Gram Negative Coccobacilli  Previously reported as:  Growth in aerobic bottle: Gram Positive Cocci in Clusters                RADIOLOGY:  Images independently visualized and reviewed personally, findings as below  < from: CT Neck Soft Tissue w/ IV Cont (02.03.21 @ 18:39) >  IMPRESSION:    1. Right-sided neck soft tissue abscess with a drainage catheter in place.    2. Interval right-sided hemiglossectomy, flap reconstruction, and bilateral neck dissection. No masslike or nodular enhancement in or about the post surgical site to suggest recurrent or residual neoplasm.    3. No new or enlarging cervical lymph nodes.    4. Unchanged appearing 7 mm rounded groundglass nodule within the left upper lobe.    < end of copied text >  < from: CT Chest w/ IV Cont (02.02.21 @ 17:20) >    IMPRESSION:    Bilateral pulmonary nodules as described above are indeterminate. If there are prior, outside CT exams of the chest they should be submitted for comparison. No intrathoracic lymphadenopathy.    Asymmetric thickening to the lower soft tissues of the neck with a 3.8 cm fluid collection in the right lower neck, within the sternocleidomastoid muscle without rim enhancement. If there is clinical concern for abscess and ultrasound or CT of the neck with IV contrast can be performed. Additionally, recommend submission of prior, outside imaging

## 2021-02-08 NOTE — PROGRESS NOTE ADULT - SUBJECTIVE AND OBJECTIVE BOX
NAEON. Pt continues to improve. Continues with BID packing changes    Vital Signs Last 24 Hrs  T(C): 36.3 (07 Feb 2021 20:43), Max: 36.6 (07 Feb 2021 10:00)  T(F): 97.4 (07 Feb 2021 20:43), Max: 97.8 (07 Feb 2021 10:00)  HR: 92 (07 Feb 2021 20:43) (69 - 95)  BP: 104/73 (07 Feb 2021 20:43) (104/73 - 114/76)  BP(mean): --  RR: 18 (07 Feb 2021 20:43) (18 - 19)  SpO2: 98% (07 Feb 2021 20:43) (97% - 100%)    Gen: NAD  Skin: No rashes, bruises, or lesions  Head: Normocephalic, Atraumatic  Face: no edema, erythema, or fluctuance. Parotid glands soft without mass  Eyes: no scleral injection  Nose: Nares bilaterally patent, no discharge  Mouth: No stridor, no drooling, no trismus.  Mucosa moist, tongue/uvula midline, oropharynx clear  Neck: R neck packed with aquacel. Trachea stoma healed well, trach is midline, no masses.   Lymphatic: No lymphadenopathy  Resp: breathing easily, no stridor  CV: no peripheral edema/cyanosis  GI: nondistended   Peripheral vascular: no JVD or edema  Neuro: facial nerve intact, no facial droop    55 year old female with malignant neoplasm of tongue S/P right hemiglossectomy, left anterior lateral thigh free flap, bilateral selective neck dissection level 1-4, and tracheostomy tube placement on 12/29/2020 presents with right neck abscess now s/p bedside I&D and packing.    -Aquacel packing change BID  -Unasyn  -F/u cultures  -ID

## 2021-02-09 ENCOUNTER — TRANSCRIPTION ENCOUNTER (OUTPATIENT)
Age: 56
End: 2021-02-09

## 2021-02-09 VITALS
DIASTOLIC BLOOD PRESSURE: 68 MMHG | OXYGEN SATURATION: 100 % | TEMPERATURE: 98 F | SYSTOLIC BLOOD PRESSURE: 106 MMHG | HEART RATE: 76 BPM | RESPIRATION RATE: 18 BRPM

## 2021-02-09 LAB
CULTURE RESULTS: SIGNIFICANT CHANGE UP
SPECIMEN SOURCE: SIGNIFICANT CHANGE UP

## 2021-02-09 PROCEDURE — 99232 SBSQ HOSP IP/OBS MODERATE 35: CPT

## 2021-02-09 PROCEDURE — 99239 HOSP IP/OBS DSCHRG MGMT >30: CPT | Mod: 24

## 2021-02-09 RX ORDER — ASPIRIN/CALCIUM CARB/MAGNESIUM 324 MG
1 TABLET ORAL
Qty: 0 | Refills: 0 | DISCHARGE
Start: 2021-02-09

## 2021-02-09 RX ADMIN — AMPICILLIN SODIUM AND SULBACTAM SODIUM 200 GRAM(S): 250; 125 INJECTION, POWDER, FOR SUSPENSION INTRAMUSCULAR; INTRAVENOUS at 12:46

## 2021-02-09 RX ADMIN — AMLODIPINE BESYLATE 5 MILLIGRAM(S): 2.5 TABLET ORAL at 05:03

## 2021-02-09 RX ADMIN — Medication 112 MICROGRAM(S): at 05:04

## 2021-02-09 RX ADMIN — Medication 81 MILLIGRAM(S): at 12:48

## 2021-02-09 RX ADMIN — AMPICILLIN SODIUM AND SULBACTAM SODIUM 200 GRAM(S): 250; 125 INJECTION, POWDER, FOR SUSPENSION INTRAMUSCULAR; INTRAVENOUS at 05:03

## 2021-02-09 RX ADMIN — HEPARIN SODIUM 5000 UNIT(S): 5000 INJECTION INTRAVENOUS; SUBCUTANEOUS at 05:03

## 2021-02-09 RX ADMIN — Medication 1 TABLET(S): at 13:33

## 2021-02-09 RX ADMIN — FAMOTIDINE 20 MILLIGRAM(S): 10 INJECTION INTRAVENOUS at 13:33

## 2021-02-09 NOTE — DISCHARGE NOTE PROVIDER - CARE PROVIDER_API CALL
Laurent Busby)  Otolaryngology  [address] [place] [address]  Phone: [phone]  Fax: [phone]  Follow Up Time:

## 2021-02-09 NOTE — DISCHARGE NOTE PROVIDER - NSDCMRMEDTOKEN_GEN_ALL_CORE_FT
60 cc syringe : application by gastrostomy tube once a day   amLODIPine 5 mg oral tablet: 1 tab(s) by gastrostomy tube once a day  aspirin 81 mg oral tablet, chewable: 1 tab(s) orally once a day  atorvastatin 20 mg oral tablet: 1 tab(s) by gastrostomy tube once a day  jevity: 330 milliliter(s) by gastrostomy tube every 6 hours   Oyster Shell 500 (1250 mg calcium carbonate) oral tablet: 1 tab(s) by gastrostomy tube once a day  Pepcid 20 mg oral tablet: 1 tab(s) by gastrostomy tube once a day   Percocet 5 mg-325 mg oral tablet: 1 tab(s) by gastrostomy tube every 6 hours MDD:4  petrolatum topical ointment: 1 application topically 3 times a day  senna 8.8 mg/5 mL oral syrup: 10 milliliter(s) orally once a day (at bedtime)  Synthroid 100 mcg (0.1 mg) oral tablet: 1 tab(s) by gastrostomy tube once a day  Synthroid 112 mcg (0.112 mg) oral tablet: 1 tab(s) by gastrostomy tube once a day  Vitamin D2 50,000 intl units (1.25 mg) oral capsule: 1 cap(s) by gastrostomy tube once a week   60 cc syringe : application by gastrostomy tube once a day   amLODIPine 5 mg oral tablet: 1 tab(s) by gastrostomy tube once a day  amoxicillin-clavulanate 875 mg-125 mg oral tablet: 1 tab(s) by gastrostomy tube 2 times a day   aspirin 81 mg oral tablet, chewable: 1 tab(s) orally once a day  atorvastatin 20 mg oral tablet: 1 tab(s) by gastrostomy tube once a day  jevity: 330 milliliter(s) by gastrostomy tube every 6 hours   Oyster Shell 500 (1250 mg calcium carbonate) oral tablet: 1 tab(s) by gastrostomy tube once a day  Pepcid 20 mg oral tablet: 1 tab(s) by gastrostomy tube once a day   petrolatum topical ointment: 1 application topically 3 times a day  senna 8.8 mg/5 mL oral syrup: 10 milliliter(s) orally once a day (at bedtime)  Synthroid 100 mcg (0.1 mg) oral tablet: 1 tab(s) by gastrostomy tube once a day  Synthroid 112 mcg (0.112 mg) oral tablet: 1 tab(s) by gastrostomy tube once a day  Vitamin D2 50,000 intl units (1.25 mg) oral capsule: 1 cap(s) by gastrostomy tube once a week

## 2021-02-09 NOTE — DISCHARGE NOTE PROVIDER - NSDCCPCAREPLAN_GEN_ALL_CORE_FT
PRINCIPAL DISCHARGE DIAGNOSIS  Diagnosis: Neck abscess  Assessment and Plan of Treatment: - Please follow up with Dr. Busby as scheduled

## 2021-02-09 NOTE — DISCHARGE NOTE PROVIDER - NSFOLLOWUPCLINICS_GEN_ALL_ED_FT
[facility] - Infectious Disease  Infectious Disease  [address] [facility], [place] [address]  Phone: [phone]  Fax:   Follow Up Time: 2 weeks

## 2021-02-09 NOTE — PROGRESS NOTE ADULT - REASON FOR ADMISSION
Right neck abscess

## 2021-02-09 NOTE — DISCHARGE NOTE PROVIDER - NSDCFUADDINST_GEN_ALL_CORE_FT
PEG care  1. Clean around your PEG tube daily as taught at [facility] and as needed  2. Jevity 1.2 bolus feed at 330ml every 6 hours via PEG  3. Free water 250ml every 6 hours via PEG  4. Please flush PEG tube after each feed with free water to prevent plug    Wound care  - Right neck wound packing change 2 times a day with Aquacel Ag, covered by 4x4 guaze and tape

## 2021-02-09 NOTE — DISCHARGE NOTE PROVIDER - HOSPITAL COURSE
55 year old female with malignant neoplasm of tongue S/P right hemiglossectomy, left anterior lateral thigh free flap, bilateral selective neck dissection level 1-4, and tracheostomy tube placement on 12/29/2020. Tracheostomy was capped and decannulated on 01/06/2021. PEG tube was placed by CTS due to dysphagia. Patient admitted for right neck abscess on 02/03/2021.  Bedside I&D done on 02/03/2021 right neck wound packed with Aquacel Ag BID with significant improvement. ID following while inpatient. PT evaluated patient and cleared for discharge home. Plastic surgery cleared for discharge. Patient was cleared for discharge home by Dr. Busby on 02/09/2021 with VNS packing. Right neck packing change also taught patient's son as well prior to discharge. All prescriptions were sent to a pharmacy that was agreed on with the patient.

## 2021-02-09 NOTE — DISCHARGE NOTE PROVIDER - INSTRUCTIONS
Plan: F/U 3 weeks
Initiate Treatment: Compound W - Apply To Affected Areas nightly
Detail Level: Zone
 PEG care  1. Clean around your PEG tube daily as taught at [facility] and as needed  2. Jevity 1.2 bolus feed at 330ml every 6 hours via PEG  3. Free water 250ml every 6 hours via PEG  4. Please flush PEG tube after each feed with free water to prevent plug

## 2021-02-09 NOTE — PROGRESS NOTE ADULT - SUBJECTIVE AND OBJECTIVE BOX
NAEON. Pt continues to improve. Continues with BID packing changes    T(C): 36.2 (02-09-21 @ 01:19), Max: 36.9 (02-08-21 @ 15:02)  HR: 83 (02-09-21 @ 01:19) (83 - 98)  BP: 114/78 (02-09-21 @ 01:19) (98/72 - 114/78)  RR: 17 (02-09-21 @ 01:19) (17 - 18)  SpO2: 99% (02-09-21 @ 01:19) (98% - 100%)    Gen: NAD  Skin: No rashes, bruises, or lesions  Head: Normocephalic, Atraumatic  Face: no edema, erythema, or fluctuance. Parotid glands soft without mass  Eyes: no scleral injection  Nose: Nares bilaterally patent, no discharge  Mouth: No stridor, no drooling, no trismus.  Mucosa moist, tongue/uvula midline, oropharynx clear  Neck: R neck packed with aquacel. Trachea stoma healed well, trach is midline, no masses.   Lymphatic: No lymphadenopathy  Resp: breathing easily, no stridor  CV: no peripheral edema/cyanosis  GI: nondistended   Peripheral vascular: no JVD or edema  Neuro: facial nerve intact, no facial droop    55 year old female with malignant neoplasm of tongue S/P right hemiglossectomy, left anterior lateral thigh free flap, bilateral selective neck dissection level 1-4, and tracheostomy tube placement on 12/29/2020 presents with right neck abscess now s/p bedside I&D and packing.    -Aquacel packing change BID  -Unasyn  -F/u cultures  -ID

## 2021-02-09 NOTE — DISCHARGE NOTE PROVIDER - NSDCFUADDAPPT_GEN_ALL_CORE_FT
- Please call [phone] for appointment time - Please call [phone] for appointment time  - Patient will needs surveillance BCXs 1 week after antibiotic complete, which is on 02/25/2021, can obtain in ID clinic, please call [phone] to schedule appointment with Dr. Blu Mehta

## 2021-02-09 NOTE — PROGRESS NOTE ADULT - SUBJECTIVE AND OBJECTIVE BOX
CC: F/u for abscess    Saw/spoke to patient. No fevers, no chills. No new complaints.    Allergies  apple (Other)  No Known Drug Allergies    ANTIMICROBIALS:  ampicillin/sulbactam  IVPB    ampicillin/sulbactam  IVPB 3 every 6 hours    PE:    Vital Signs Last 24 Hrs  T(C): 36.9 (09 Feb 2021 05:00), Max: 36.9 (08 Feb 2021 15:02)  T(F): 98.4 (09 Feb 2021 05:00), Max: 98.4 (08 Feb 2021 15:02)  HR: 85 (09 Feb 2021 05:00) (83 - 98)  BP: 104/77 (09 Feb 2021 05:00) (99/69 - 114/78)  RR: 18 (09 Feb 2021 05:00) (17 - 18)  SpO2: 99% (09 Feb 2021 05:00) (98% - 100%)    Gen: AOx3, NAD, non-toxic  HEENT: R sided area of wound, decreasing redness  CV: S1+S2 normal, nontachycardic  Resp: Clear bilat, no resp distress, no crackles/wheezes  Abd: Soft, nontender, +BS  Ext: No LE edema, no wounds    LABS:                        9.2    3.88  )-----------( 264      ( 08 Feb 2021 06:32 )             28.8     MICROBIOLOGY:    .Blood Blood  02-05-21   No growth to date.    .Abscess right neck  02-04-21   Numerous Streptococcus constellatus "Susceptibilities not performed"  Numerous Anaerobic Gram Negative Sachin Most closely resembling Prevotella  species "Susceptibilities not performed"  Numerous Parvimonas micra "Susceptibilities not performed"    .Blood Blood-Peripheral  02-03-21   **Please Note**: This is a Corrected Report**  Growth in aerobic bottle: Gram Negative Coccobacilli  Most closely resembling Paracoccus yeei Susceptibilites not performed.    (otherwise reviewed)    RADIOLOGY:    2/3 CT:    IMPRESSION:    1. Right-sided neck soft tissue abscess with a drainage catheter in place.    2. Interval right-sided hemiglossectomy, flap reconstruction, and bilateral neck dissection. No masslike or nodular enhancement in or about the post surgical site to suggest recurrent or residual neoplasm.    3. No new or enlarging cervical lymph nodes.    4. Unchanged appearing 7 mm rounded groundglass nodule within the left upper lobe.

## 2021-02-09 NOTE — PROGRESS NOTE ADULT - ASSESSMENT
54 yo F with malignant neoplasm of tongue S/P right hemiglossectomy, left anterior lateral thigh free flap, bilateral selective neck dissection level 1-4, and tracheostomy tube placement on 12/29/2020  No fever, has leukocytosis  On CT chest found to have R sided neck abscess  On CT neck, shows drainage of abscess (following I+D), drain in place  BCX with paracoccus yeei--rare but could be true pathogen, generally sensitive to aminopcns; BCX has been clear since 2/5--low volume 1/4, would treat with with conversion to PO  Patient generally well  Overall,  1) Neck Abscess  - S/p I+D--OR cultures with strep constellatus, prevotella, parvimonas  - Unasyn 3g q 6 while inpatient  - When DC planning, can transition to Augmentin 875mg po q 12 through 2/18/21  - Wound care per surgery team  2) Leukocytosis  - Trend to normal  3) Positive BCX  - Note 1/4 paracoccus, PCR negative at ~30 hours; patient remains clinically well without new signs sepsis  - Unclear significance  - Would treat with Unasyn into Augmentin  - Needs surveillance BCXs 1 week after antibiotic complete, can obtain in ID clinic, call [phone]    Blu Mehta MD  Pager [phone]  After 5pm and on weekends call [phone]
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 Tracheostomy was capped and decannulated on 01/06/2021.   PEG placed for dysphagia.   admitted for R neck abscess s/p I&D 2/3, cx with strep constellatus  blood cx 2/3 initially showed GPC in clusters on gram stain but changed to GN diplococci s/o paracoccus yeei, >cotaminant but could be real in view of neck abscess anyway  repeat blood cx 2/5 negative    tongue ca s/p hemiglossectomy and neck dissection now with neck abscess s/p I&D, cx with strep constellatus  1/4 paracoccus yeei bacteremia, ?contaminant but could be real in view of the neck abscess    * repeat blood cx negative 2/5  * pt clinically well  * c/w unasyn 3 q 6  * monitor the WBC/diff and fever curve  
56 year old female with malignant neoplasm of tongue s/p right hemiglossectomy, left anterior lateral thigh free flap, bilateral selective neck dissection level 1-4, and tracheostomy tube placement on 12/29/2020, returns with R neck abscess drained at bedside in the ED by ENT team on 2/3    - agree with abx plan  - continue packing with Aquacel strip to allow neck abscess to drain  - will follow along with ENT team and are happy to coordinate further care    Plastics #[id number]
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 Tracheostomy was capped and decannulated on 01/06/2021. PEG tube was placed by CTS due to dysphagia. Patient sent in to [facility] ED by ENT clinic for right neck abscess. ENT performed bedside I&D at bedside on 2/3.    #GPC in clusters in blood:  - Follow up BCx result  - will decide abx based on BCx  - currently WBC trending down, clinically doing well, stable    #Right neck abscess s/p I&D  - Follow up pus culture  - c/w Unasyn 3g q6h    Zbigniew Nunes MD, PGY4   ID fellow  Pager: [phone]  After 5pm/weekends call [phone]

## 2021-02-09 NOTE — DISCHARGE NOTE NURSING/CASE MANAGEMENT/SOCIAL WORK - PATIENT PORTAL LINK FT
You can access the FollowMyHealth Patient Portal offered by [facility] by registering at the following website: [email or web address]. By joining [facility]’s FollowMyHealth portal, you will also be able to view your health information using other applications (apps) compatible with our system.

## 2021-02-11 LAB
CULTURE RESULTS: SIGNIFICANT CHANGE UP
CULTURE RESULTS: SIGNIFICANT CHANGE UP
SPECIMEN SOURCE: SIGNIFICANT CHANGE UP
SPECIMEN SOURCE: SIGNIFICANT CHANGE UP

## 2021-02-16 ENCOUNTER — APPOINTMENT (OUTPATIENT)
Dept: OTOLARYNGOLOGY | Facility: CLINIC | Age: 56
End: 2021-02-16
Payer: MEDICAID

## 2021-02-16 ENCOUNTER — OUTPATIENT (OUTPATIENT)
Dept: OUTPATIENT SERVICES | Facility: HOSPITAL | Age: 56
LOS: 1 days | Discharge: HOME | End: 2021-02-16
Payer: MEDICAID

## 2021-02-16 VITALS
WEIGHT: 110 LBS | HEART RATE: 123 BPM | OXYGEN SATURATION: 97 % | BODY MASS INDEX: 19.49 KG/M2 | HEIGHT: 62.99 IN | SYSTOLIC BLOOD PRESSURE: 126 MMHG | DIASTOLIC BLOOD PRESSURE: 82 MMHG

## 2021-02-16 DIAGNOSIS — Z98.890 OTHER SPECIFIED POSTPROCEDURAL STATES: Chronic | ICD-10-CM

## 2021-02-16 PROCEDURE — 77263 THER RADIOLOGY TX PLNG CPLX: CPT

## 2021-02-16 PROCEDURE — 99072 ADDL SUPL MATRL&STAF TM PHE: CPT

## 2021-02-16 PROCEDURE — 99024 POSTOP FOLLOW-UP VISIT: CPT

## 2021-02-16 NOTE — HISTORY OF PRESENT ILLNESS
[de-identified] : 55 yro female pt with thyroid cancer ( dr Salmeron) s/p 3/2020 ( sched MORGAN 6/2020) and stroke 11/2018 referred by Dr. Aman Perea for right tongue lesion. Pt is here today for f/up s/p right hemiglossectomy, right neck dissection, tracheostomy 12/29/2020. Pt states the lesion started after stroke in 2018 and thinks it could be from trauma to the tongue from the teeth. \par Today pt is feeling better after she was admitted for a R neck abscess. She is on abx and changing dressing once/day. Pt denies dyspnea, bleeding, fever, chills, signs of infection since discharge. Pt is NPO, with PEG tube in place. \par \par surgical path 1/5/21: Final Diagnosis\par 1. Tongue, right, glossectomy\par - Invasive squamous cell carcinoma, well-differentiated, see synoptic summary\par - Perineural invasion and lymphovascular invasion seen\par - Resection margins negative for carcinoma\par - PD-L1 CPS immunostain is pending and an addendum will be issued\par \par 2. Tongue, additional lateral margin, excision\par - Squamous mucosa negative for carcinoma\par \par 3. Lymph nodes, right levels 1, 2, 3 and 4, neck dissection\par - Level 1: 1 out of 5 lymph nodes positive for metastatic\par carcinoma with extranodal extension; submandibular gland negative for carcinoma\par - Level 2: 1 out of 3 lymph nodes positive for metastatic\par carcinoma with extranodal extension\par - Level 3: 20 lymph nodes negative for metastatic carcinoma\par - Level 4: 20 lymph nodes negative for metastatic carcinoma\par \par 4. Lymph nodes, left level 1, neck dissection\par - Submandibular gland negative for carcinoma\par - 7 lymph nodes negative for metastatic carcinoma\par \par 5. Lymph nodes, left level 2, neck dissection\par - 15 lymph nodes negative for metastatic carcinoma\par \par 6. Lymph nodes, left level 3, neck dissection\par - 10 lymph nodes negative for metastatic carcinoma\par \par \par

## 2021-02-16 NOTE — HISTORY OF PRESENT ILLNESS
[de-identified] : 55 yro female pt with thyroid cancer ( dr Salmeron)  s/p 3/2020 ( sched MORGAN 6/2020) and stroke 11/2018 referred by Dr. Aman Perea for right tongue lesion. Pt states the lesion started after stroke in 2018 and thinks it could be from trauma to the tongue from the teeth.\par Never smoked and no h/o etoh.\par \par Biopsy - 5/27/20 showed hyperkeratosis and mild to moderate dysplasia.\par \par Complete review of systems which was performed during a previous encounter was reviewed with the patient and there are no changes except as stated in the HPI section.\par

## 2021-02-16 NOTE — PHYSICAL EXAM
[de-identified] : Incision C/D/I. wound is healing well. Dressing changed today with no purulent discharge. [Midline] : trachea located in midline position [de-identified] : Flap in place, viable. No signs of fistula. [Normal] : no rashes

## 2021-02-17 PROBLEM — Z78.9 NON-SMOKER: Status: ACTIVE | Noted: 2020-05-21

## 2021-02-17 NOTE — PHYSICAL EXAM
[Thin] : thin [Normal] : grossly intact [Ambulatory and capable of all self care but unable to carry out any work activities] : Status 2- Ambulatory and capable of all self care but unable to carry out any work activities. Up and about more than 50% of waking hours [de-identified] : right tongue hemiglossectomy, reconstructed

## 2021-02-17 NOTE — CONSULT LETTER
[Dear  ___] : Dear  [unfilled], [Consult Letter:] : I had the pleasure of evaluating your patient, [unfilled]. [Please see my note below.] : Please see my note below. [Consult Closing:] : Thank you very much for allowing me to participate in the care of this patient.  If you have any questions, please do not hesitate to contact me. [Sincerely,] : Sincerely, [FreeTextEntry2] : Dr. Lb Bailey [FreeTextEntry3] : Kelli Lam MD\par \par  [DrValerie  ___] : Dr. GONZALEZ

## 2021-02-17 NOTE — HISTORY OF PRESENT ILLNESS
[Disease: _____________________] : Disease: [unfilled] [T: ___] : T[unfilled] [N: ___] : N[unfilled] [AJCC Stage: ____] : AJCC Stage: [unfilled] [de-identified] : 55 yro female pt never smoker with hx of  thyroid cancer ( dr Salmeron) s/p 3/2020 ( sched MORGAN 6/2020) and stroke 11/2018 referred by Dr. Aman Perea for right tongue lesion.Pt states the lesion started after stroke in 2018 and thinks it could be from trauma to the tongue from the teeth, but she started feeling it increased in size from 5/2020. She is s/p right hemiglossectomy, right neck dissection, tracheostomy 12/29/2020. Pathology result back as  Invasive squamous cell carcinoma, well-differentiated, Perineural invasion and lymphovascular invasion seen. Resection margins negative for carcinoma. PD-L1 %. Patient S/P R glossectomy, bilateral neck dissection, tracheostomy and free flap reconstruction on 12/29/20. [id number].  ECS present.\par \par surgical path 1/5/21: Final Diagnosis\par 1. Tongue, right, glossectomy\par - Invasive squamous cell carcinoma, well-differentiated, see synoptic summary\par - Perineural invasion and lymphovascular invasion seen\par - Resection margins negative for carcinoma\par - PD-L1 CPS immunostain 100%\par \par 2. Tongue, additional lateral margin, excision\par - Squamous mucosa negative for carcinoma\par \par 3. Lymph nodes, right levels 1, 2, 3 and 4, neck dissection\par - Level 1: 1 out of 5 lymph nodes positive for metastatic\par carcinoma with extranodal extension; submandibular gland negative for carcinoma\par - Level 2: 1 out of 3 lymph nodes positive for metastatic\par carcinoma with extranodal extension\par - Level 3: 20 lymph nodes negative for metastatic carcinoma\par - Level 4: 20 lymph nodes negative for metastatic carcinoma\par \par 4. Lymph nodes, left level 1, neck dissection\par - Submandibular gland negative for carcinoma\par - 7 lymph nodes negative for metastatic carcinoma\par \par 5. Lymph nodes, left level 2, neck dissection\par - 15 lymph nodes negative for metastatic carcinoma\par \par 6. Lymph nodes, left level 3, neck dissection\par - 10 lymph nodes negative for metastatic carcinoma\par \par 7. Lymph nodes, left level 4, neck dissection\par - 4 lymph nodes negative for metastatic carcinoma\par \par Patient came for initial consultation today, reports feeling well,  She is s/p PEG on 1/13/21, she denies dysphagia, odynophonia, dyspnea, bleeding, fever, chills, signs of infection since surgery. Pt is NPO,  She never smoked and never chew tobacco and no h/o etoh \par  [de-identified] : sq cell ca

## 2021-02-17 NOTE — REVIEW OF SYSTEMS
[Negative] : Heme/Lymph [Fatigue] : fatigue [Recent Change In Weight] : ~T recent weight change [FreeTextEntry4] : as above

## 2021-02-17 NOTE — ASSESSMENT
[FreeTextEntry1] : 55 yro female pt never smoker with hx of  thyroid papillary carcinoma ( dr Salmeron) s/p 3/2020 ( [facility]GAN 6/2020) and stroke 11/2018 diagnosed with oral cancer. She is s/p right hemiglossectomy, right neck dissection, tracheostomy 12/29/2020. Pathology result back as  Invasive squamous cell carcinoma, well-differentiated, Perineural invasion and lymphovascular invasion seen. Resection margins negative for carcinoma. PD-L1 %. Patient S/P R glossectomy, bilateral neck dissection, tracheostomy and free flap reconstruction on 12/29/20. [id number].  ECS present.\par We recommend concurrent chemo/RT because of ECS and extensive tulio disease. \par Patient has appointment with Dr. Sood on 2/2/21 to start RT\par Will request CT chest and baseline audiogram \par Will obtain baseline blood work (cbc, chem, TSH, T4, hepatitis panel)\par Plan to start Cisplatin if renal function and audiogram comes back normal, plan and side effect of treatment was discussed with patient and consent was obtained\par Will see patient after her appointment with Dr. Sood

## 2021-02-17 NOTE — REASON FOR VISIT
[Initial Consultation] : an initial consultation [Other: _____] : [unfilled] [FreeTextEntry2] : SCC of tongue

## 2021-02-20 ENCOUNTER — OUTPATIENT (OUTPATIENT)
Dept: OUTPATIENT SERVICES | Facility: HOSPITAL | Age: 56
LOS: 1 days | Discharge: HOME | End: 2021-02-20

## 2021-02-20 DIAGNOSIS — C76.0 MALIGNANT NEOPLASM OF HEAD, FACE AND NECK: ICD-10-CM

## 2021-02-20 DIAGNOSIS — Z98.890 OTHER SPECIFIED POSTPROCEDURAL STATES: Chronic | ICD-10-CM

## 2021-02-21 LAB
ALBUMIN SERPL ELPH-MCNC: 4.7 G/DL
ALP BLD-CCNC: 105 U/L
ALT SERPL-CCNC: 14 U/L
ANION GAP SERPL CALC-SCNC: 13 MMOL/L
AST SERPL-CCNC: 19 U/L
BILIRUB SERPL-MCNC: 1.4 MG/DL
BUN SERPL-MCNC: 12 MG/DL
CALCIUM SERPL-MCNC: 10.2 MG/DL
CHLORIDE SERPL-SCNC: 101 MMOL/L
CO2 SERPL-SCNC: 28 MMOL/L
CREAT SERPL-MCNC: 0.66 MG/DL
GLUCOSE SERPL-MCNC: 103 MG/DL
HAV IGM SER QL: NONREACTIVE
HBV CORE IGM SER QL: NONREACTIVE
HBV SURFACE AG SER QL: NONREACTIVE
HCV AB SER QL: NONREACTIVE
HCV S/CO RATIO: 0.08 S/CO
MAGNESIUM SERPL-MCNC: 2.4 MG/DL
POTASSIUM SERPL-SCNC: 4.6 MMOL/L
PROT SERPL-MCNC: 7.4 G/DL
SODIUM SERPL-SCNC: 142 MMOL/L
THYROGLOB AB SERPL-ACNC: <20 IU/ML
THYROGLOB SERPL-MCNC: <0.2 NG/ML
TSH SERPL-ACNC: 0.92 UIU/ML

## 2021-02-23 ENCOUNTER — APPOINTMENT (OUTPATIENT)
Dept: SPEECH THERAPY | Facility: CLINIC | Age: 56
End: 2021-02-23

## 2021-02-24 ENCOUNTER — NON-APPOINTMENT (OUTPATIENT)
Age: 56
End: 2021-02-24

## 2021-02-24 ENCOUNTER — APPOINTMENT (OUTPATIENT)
Dept: HEMATOLOGY ONCOLOGY | Facility: CLINIC | Age: 56
End: 2021-02-24
Payer: MEDICAID

## 2021-02-24 DIAGNOSIS — R13.12 DYSPHAGIA, OROPHARYNGEAL PHASE: ICD-10-CM

## 2021-02-24 PROCEDURE — 99443: CPT

## 2021-02-24 PROCEDURE — 77334 RADIATION TREATMENT AID(S): CPT | Mod: 26

## 2021-02-24 NOTE — REVIEW OF SYSTEMS
[Fatigue] : fatigue [Recent Change In Weight] : ~T recent weight change [Negative] : Heme/Lymph [FreeTextEntry4] : as above

## 2021-02-24 NOTE — PHYSICAL EXAM
[Ambulatory and capable of all self care but unable to carry out any work activities] : Status 2- Ambulatory and capable of all self care but unable to carry out any work activities. Up and about more than 50% of waking hours [Thin] : thin [Normal] : grossly intact [de-identified] : right tongue hemiglossectomy, reconstructed

## 2021-02-24 NOTE — ASSESSMENT
[FreeTextEntry1] : 56 yro female pt never smoker with hx of  thyroid papillary carcinoma ( dr Salmeron) s/p 3/2020 ( [facility]GAN 6/2020) and stroke 11/2018 diagnosed with oral cancer. She is s/p right hemiglossectomy, right neck dissection, tracheostomy 12/29/2020. Pathology result back as  Invasive squamous cell carcinoma, well-differentiated, Perineural invasion and lymphovascular invasion seen. Resection margins negative for carcinoma. PD-L1 %. Patient S/P R glossectomy, bilateral neck dissection, tracheostomy and free flap reconstruction on 12/29/20. [id number].  ECS present.\par Post-op complicated by neck abscess and now after ID procedure, she is deemed ready to start CRT\par She will follow up with rad onc. S/p sim today. \par Baseline audio reviewed- normal\par Baseline blood work reviewed (cbc, chem, TSH, T4, hepatitis panel)\par Plan to start Cisplatin, plan and side effect of treatment was discussed with patient again\par

## 2021-02-24 NOTE — REASON FOR VISIT
[Initial Consultation] : an initial consultation [Other: _____] : [unfilled] [FreeTextEntry2] : SCC of tongue [FreeTextEntry1] : [id number] [TWNoteComboBox1] : Chinese

## 2021-02-24 NOTE — HISTORY OF PRESENT ILLNESS
[Disease: _____________________] : Disease: [unfilled] [T: ___] : T[unfilled] [N: ___] : N[unfilled] [AJCC Stage: ____] : AJCC Stage: [unfilled] [de-identified] : 56 yro female pt never smoker with hx of  thyroid cancer ( dr Salmeron) s/p 3/2020 ( sched [facility] 6/2020) and stroke 11/2018 referred by Dr. Aman Perea for right tongue lesion.Pt states the lesion started after stroke in 2018 and thinks it could be from trauma to the tongue from the teeth, but she started feeling it increased in size from 5/2020. She is s/p right hemiglossectomy, right neck dissection, tracheostomy 12/29/2020. Pathology result back as  Invasive squamous cell carcinoma, well-differentiated, Perineural invasion and lymphovascular invasion seen. Resection margins negative for carcinoma. PD-L1 %. Patient S/P R glossectomy, bilateral neck dissection, tracheostomy and free flap reconstruction on 12/29/20. [id number].  ECS present.\par \par surgical path 1/5/21: Final Diagnosis\par 1. Tongue, right, glossectomy\par - Invasive squamous cell carcinoma, well-differentiated, see synoptic summary\par - Perineural invasion and lymphovascular invasion seen\par - Resection margins negative for carcinoma\par - PD-L1 CPS immunostain 100%\par \par 2. Tongue, additional lateral margin, excision\par - Squamous mucosa negative for carcinoma\par \par 3. Lymph nodes, right levels 1, 2, 3 and 4, neck dissection\par - Level 1: 1 out of 5 lymph nodes positive for metastatic\par carcinoma with extranodal extension; submandibular gland negative for carcinoma\par - Level 2: 1 out of 3 lymph nodes positive for metastatic\par carcinoma with extranodal extension\par - Level 3: 20 lymph nodes negative for metastatic carcinoma\par - Level 4: 20 lymph nodes negative for metastatic carcinoma\par \par 4. Lymph nodes, left level 1, neck dissection\par - Submandibular gland negative for carcinoma\par - 7 lymph nodes negative for metastatic carcinoma\par \par 5. Lymph nodes, left level 2, neck dissection\par - 15 lymph nodes negative for metastatic carcinoma\par \par 6. Lymph nodes, left level 3, neck dissection\par - 10 lymph nodes negative for metastatic carcinoma\par \par 7. Lymph nodes, left level 4, neck dissection\par - 4 lymph nodes negative for metastatic carcinoma\par \par Patient came for initial consultation today, reports feeling well,  She is s/p PEG on 1/13/21, she denies dysphagia, odynophonia, dyspnea, bleeding, fever, chills, signs of infection since surgery. Pt is NPO,  She never smoked and never chew tobacco and no h/o etoh \par \par 2/24/21: Post-op, pt developed a neck abscess/infection s/p I&D and is on abx and packing. She is now cleared by head and neck surgery for chemoRT\par \par  [de-identified] : sq cell ca

## 2021-02-25 ENCOUNTER — NON-APPOINTMENT (OUTPATIENT)
Age: 56
End: 2021-02-25

## 2021-03-02 ENCOUNTER — NON-APPOINTMENT (OUTPATIENT)
Age: 56
End: 2021-03-02

## 2021-03-08 PROCEDURE — 77338 DESIGN MLC DEVICE FOR IMRT: CPT | Mod: 26

## 2021-03-08 PROCEDURE — 77301 RADIOTHERAPY DOSE PLAN IMRT: CPT | Mod: 26

## 2021-03-08 PROCEDURE — 77300 RADIATION THERAPY DOSE PLAN: CPT | Mod: 26

## 2021-03-09 ENCOUNTER — NON-APPOINTMENT (OUTPATIENT)
Age: 56
End: 2021-03-09

## 2021-03-15 PROCEDURE — G6002: CPT | Mod: 26

## 2021-03-15 RX ORDER — METOCLOPRAMIDE HYDROCHLORIDE 5 MG/5ML
10 SOLUTION ORAL 4 TIMES DAILY
Qty: 1200 | Refills: 3 | Status: ACTIVE | COMMUNITY
Start: 2021-03-15 | End: 1900-01-01

## 2021-03-16 ENCOUNTER — RESULT REVIEW (OUTPATIENT)
Age: 56
End: 2021-03-16

## 2021-03-16 ENCOUNTER — APPOINTMENT (OUTPATIENT)
Dept: INFUSION THERAPY | Facility: HOSPITAL | Age: 56
End: 2021-03-16

## 2021-03-16 ENCOUNTER — LABORATORY RESULT (OUTPATIENT)
Age: 56
End: 2021-03-16

## 2021-03-16 ENCOUNTER — NON-APPOINTMENT (OUTPATIENT)
Age: 56
End: 2021-03-16

## 2021-03-16 VITALS
HEART RATE: 102 BPM | BODY MASS INDEX: 19.53 KG/M2 | RESPIRATION RATE: 16 BRPM | OXYGEN SATURATION: 97 % | SYSTOLIC BLOOD PRESSURE: 126 MMHG | DIASTOLIC BLOOD PRESSURE: 82 MMHG | WEIGHT: 110.23 LBS

## 2021-03-16 PROBLEM — C44.92 SQUAMOUS CELL CARCINOMA OF SKIN, UNSPECIFIED: Chronic | Status: ACTIVE | Noted: 2021-03-09

## 2021-03-16 PROBLEM — C06.9 MALIGNANT NEOPLASM OF MOUTH, UNSPECIFIED: Chronic | Status: ACTIVE | Noted: 2021-03-09

## 2021-03-16 LAB
BASOPHILS # BLD AUTO: 0.02 K/UL — SIGNIFICANT CHANGE UP (ref 0–0.2)
BASOPHILS NFR BLD AUTO: 0.5 % — SIGNIFICANT CHANGE UP (ref 0–2)
EOSINOPHIL # BLD AUTO: 0.14 K/UL — SIGNIFICANT CHANGE UP (ref 0–0.5)
EOSINOPHIL NFR BLD AUTO: 3.7 % — SIGNIFICANT CHANGE UP (ref 0–6)
HCT VFR BLD CALC: 33.9 % — LOW (ref 34.5–45)
HGB BLD-MCNC: 11.7 G/DL — SIGNIFICANT CHANGE UP (ref 11.5–15.5)
IMM GRANULOCYTES NFR BLD AUTO: 0.5 % — SIGNIFICANT CHANGE UP (ref 0–1.5)
LYMPHOCYTES # BLD AUTO: 1.07 K/UL — SIGNIFICANT CHANGE UP (ref 1–3.3)
LYMPHOCYTES # BLD AUTO: 28.5 % — SIGNIFICANT CHANGE UP (ref 13–44)
MCHC RBC-ENTMCNC: 28.7 PG — SIGNIFICANT CHANGE UP (ref 27–34)
MCHC RBC-ENTMCNC: 34.5 G/DL — SIGNIFICANT CHANGE UP (ref 32–36)
MCV RBC AUTO: 83.1 FL — SIGNIFICANT CHANGE UP (ref 80–100)
MONOCYTES # BLD AUTO: 0.26 K/UL — SIGNIFICANT CHANGE UP (ref 0–0.9)
MONOCYTES NFR BLD AUTO: 6.9 % — SIGNIFICANT CHANGE UP (ref 2–14)
NEUTROPHILS # BLD AUTO: 2.25 K/UL — SIGNIFICANT CHANGE UP (ref 1.8–7.4)
NEUTROPHILS NFR BLD AUTO: 59.9 % — SIGNIFICANT CHANGE UP (ref 43–77)
NRBC # BLD: 0 /100 WBCS — SIGNIFICANT CHANGE UP (ref 0–0)
PLATELET # BLD AUTO: 217 K/UL — SIGNIFICANT CHANGE UP (ref 150–400)
RBC # BLD: 4.08 M/UL — SIGNIFICANT CHANGE UP (ref 3.8–5.2)
RBC # FLD: 13.6 % — SIGNIFICANT CHANGE UP (ref 10.3–14.5)
WBC # BLD: 3.76 K/UL — LOW (ref 3.8–10.5)
WBC # FLD AUTO: 3.76 K/UL — LOW (ref 3.8–10.5)

## 2021-03-16 PROCEDURE — G6002: CPT | Mod: 26

## 2021-03-16 NOTE — HISTORY OF PRESENT ILLNESS
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 She is s/p right hemiglossectomy, right neck dissection, tracheostomy and free flap reconstruction on 12/29/2020. Pathology showed Invasive squamous cell carcinoma, well-differentiated, Perineural invasion and lymphovascular invasion seen. Resection margins negative for carcinoma. PD-L1 %.  [id number]. ECS present.\par She is s/p PEG on 1/13/2021. \par \par Presents today for OTV. Completed 2/33 fx, Tolerating treatment well. Denies pain. Skin and mouth care reviewed.

## 2021-03-16 NOTE — REASON FOR VISIT
[Routine On-Treatment] : a routine on-treatment visit for [Head and Neck Cancer] : head and neck cancer [Family Member] : family member [Patient Declined  Services] : - None: Patient declined  services [FreeTextEntry2] : Lowell Howard [FreeTextEntry3] : Mandarin [TWNoteComboBox1] : Chinese

## 2021-03-16 NOTE — REVIEW OF SYSTEMS
[Dysphagia: Grade 2 - Symptomatic and altered eating/swallowing] : Dysphagia: Grade 2 - Symptomatic and altered eating/swallowing [Fatigue: Grade 0] : Fatigue: Grade 0 [Xerostomia: Grade 0] : Xerostomia: Grade 0 [Oral Pain: Grade 0] : Oral Pain: Grade 0 [Dysgeusia: Grade 0] : Dysgeusia: Grade 0 [Skin Hyperpigmentation: Grade 0] : Skin Hyperpigmentation: Grade 0 [Dermatitis Radiation: Grade 0] : Dermatitis Radiation: Grade 0 [FreeTextEntry6] : PEG

## 2021-03-16 NOTE — DISEASE MANAGEMENT
[Pathological] : TNM Stage: p [III] : III [TTNM] : 2 [NTNM] : 1 [MTNM] : 0 [de-identified] : 400 [de-identified] : 2541 [de-identified] : OC/Neck

## 2021-03-16 NOTE — VITALS
[Maximal Pain Intensity: 0/10] : 0/10 [Least Pain Intensity: 0/10] : 0/10 [Pain Description/Quality: ___] : Pain description/quality: [unfilled] [Pain Duration: ___] : Pain duration: [unfilled] [Pain Location: ___] : Pain Location: [unfilled] [NoTreatment Scheduled] : no treatment scheduled [80: Normal activity with effort; some signs or symptoms of disease.] : 80: Normal activity with effort; some signs or symptoms of disease.  [ECOG Performance Status: 1 - Restricted in physically strenuous activity but ambulatory and able to carry out work of a light or sedentary nature] : Performance Status: 1 - Restricted in physically strenuous activity but ambulatory and able to carry out work of a light or sedentary nature, e.g., light house work, office work [Pain Interferes with ADLs] : Pain does not interfere with activities of daily living

## 2021-03-17 ENCOUNTER — NON-APPOINTMENT (OUTPATIENT)
Age: 56
End: 2021-03-17

## 2021-03-17 DIAGNOSIS — R11.2 NAUSEA WITH VOMITING, UNSPECIFIED: ICD-10-CM

## 2021-03-17 DIAGNOSIS — Z51.11 ENCOUNTER FOR ANTINEOPLASTIC CHEMOTHERAPY: ICD-10-CM

## 2021-03-17 DIAGNOSIS — E86.0 DEHYDRATION: ICD-10-CM

## 2021-03-17 PROCEDURE — G6002: CPT | Mod: 26

## 2021-03-18 ENCOUNTER — APPOINTMENT (OUTPATIENT)
Dept: INFUSION THERAPY | Facility: HOSPITAL | Age: 56
End: 2021-03-18

## 2021-03-18 PROCEDURE — G6002: CPT | Mod: 26

## 2021-03-19 PROCEDURE — 77014: CPT | Mod: 26

## 2021-03-19 PROCEDURE — 77427 RADIATION TX MANAGEMENT X5: CPT

## 2021-03-22 ENCOUNTER — LABORATORY RESULT (OUTPATIENT)
Age: 56
End: 2021-03-22

## 2021-03-22 ENCOUNTER — RESULT REVIEW (OUTPATIENT)
Age: 56
End: 2021-03-22

## 2021-03-22 ENCOUNTER — APPOINTMENT (OUTPATIENT)
Dept: HEMATOLOGY ONCOLOGY | Facility: CLINIC | Age: 56
End: 2021-03-22
Payer: MEDICAID

## 2021-03-22 ENCOUNTER — APPOINTMENT (OUTPATIENT)
Dept: INFUSION THERAPY | Facility: HOSPITAL | Age: 56
End: 2021-03-22

## 2021-03-22 VITALS
DIASTOLIC BLOOD PRESSURE: 75 MMHG | BODY MASS INDEX: 18.75 KG/M2 | OXYGEN SATURATION: 98 % | RESPIRATION RATE: 14 BRPM | TEMPERATURE: 98 F | WEIGHT: 105.82 LBS | SYSTOLIC BLOOD PRESSURE: 114 MMHG | HEART RATE: 73 BPM

## 2021-03-22 LAB
BASOPHILS # BLD AUTO: 0.02 K/UL — SIGNIFICANT CHANGE UP (ref 0–0.2)
BASOPHILS NFR BLD AUTO: 0.3 % — SIGNIFICANT CHANGE UP (ref 0–2)
EOSINOPHIL # BLD AUTO: 0.12 K/UL — SIGNIFICANT CHANGE UP (ref 0–0.5)
EOSINOPHIL NFR BLD AUTO: 2 % — SIGNIFICANT CHANGE UP (ref 0–6)
HCT VFR BLD CALC: 33.4 % — LOW (ref 34.5–45)
HGB BLD-MCNC: 12 G/DL — SIGNIFICANT CHANGE UP (ref 11.5–15.5)
IMM GRANULOCYTES NFR BLD AUTO: 1 % — SIGNIFICANT CHANGE UP (ref 0–1.5)
LYMPHOCYTES # BLD AUTO: 0.76 K/UL — LOW (ref 1–3.3)
LYMPHOCYTES # BLD AUTO: 12.9 % — LOW (ref 13–44)
MCHC RBC-ENTMCNC: 28.7 PG — SIGNIFICANT CHANGE UP (ref 27–34)
MCHC RBC-ENTMCNC: 35.9 G/DL — SIGNIFICANT CHANGE UP (ref 32–36)
MCV RBC AUTO: 79.9 FL — LOW (ref 80–100)
MONOCYTES # BLD AUTO: 0.29 K/UL — SIGNIFICANT CHANGE UP (ref 0–0.9)
MONOCYTES NFR BLD AUTO: 4.9 % — SIGNIFICANT CHANGE UP (ref 2–14)
NEUTROPHILS # BLD AUTO: 4.63 K/UL — SIGNIFICANT CHANGE UP (ref 1.8–7.4)
NEUTROPHILS NFR BLD AUTO: 78.9 % — HIGH (ref 43–77)
NRBC # BLD: 0 /100 WBCS — SIGNIFICANT CHANGE UP (ref 0–0)
PLATELET # BLD AUTO: 238 K/UL — SIGNIFICANT CHANGE UP (ref 150–400)
RBC # BLD: 4.18 M/UL — SIGNIFICANT CHANGE UP (ref 3.8–5.2)
RBC # FLD: 13.2 % — SIGNIFICANT CHANGE UP (ref 10.3–14.5)
WBC # BLD: 5.88 K/UL — SIGNIFICANT CHANGE UP (ref 3.8–10.5)
WBC # FLD AUTO: 5.88 K/UL — SIGNIFICANT CHANGE UP (ref 3.8–10.5)

## 2021-03-22 PROCEDURE — 99215 OFFICE O/P EST HI 40 MIN: CPT

## 2021-03-22 PROCEDURE — G6002: CPT | Mod: 26

## 2021-03-22 PROCEDURE — 99072 ADDL SUPL MATRL&STAF TM PHE: CPT

## 2021-03-22 NOTE — REASON FOR VISIT
[Initial Consultation] : an initial consultation [Other: _____] : [unfilled] [FreeTextEntry2] : SCC of tongue [FreeTextEntry1] : [id number] [TWNoteComboBox1] : Chinese

## 2021-03-22 NOTE — ASSESSMENT
[FreeTextEntry1] : 56 yro female pt never smoker with hx of  thyroid papillary carcinoma ( dr Salmeron) s/p 3/2020 ( [facility] 6/2020) and stroke 11/2018 diagnosed with oral cancer. She is s/p right hemiglossectomy, right neck dissection, tracheostomy 12/29/2020. Pathology result back as  Invasive squamous cell carcinoma, well-differentiated, Perineural invasion and lymphovascular invasion seen. Resection margins negative for carcinoma. PD-L1 %. Patient S/P R glossectomy, bilateral neck dissection, tracheostomy and free flap reconstruction on 12/29/20. [id number].  ECS present.\par Post-op complicated by neck abscess and now after ID procedure, s\par Started CCRT last week\par Advised correct use of antiemetics (pt was taking it after tube meals)\par Blood work reviewed\par Continue chemo.\par OV in 2 weeks\par

## 2021-03-22 NOTE — REVIEW OF SYSTEMS
[Fatigue] : fatigue [Recent Change In Weight] : ~T recent weight change [Dysphagia] : dysphagia [Mucosal Pain] : mucosal pain [Negative] : Heme/Lymph [FreeTextEntry2] : has lost 2 kg since last visit [FreeTextEntry4] : oral discomfort

## 2021-03-22 NOTE — PHYSICAL EXAM
[Restricted in physically strenuous activity but ambulatory and able to carry out work of a light or sedentary nature] : Status 1- Restricted in physically strenuous activity but ambulatory and able to carry out work of a light or sedentary nature, e.g., light house work, office work [Thin] : thin [Normal] : grossly intact [de-identified] : right tongue hemiglossectomy, reconstructed

## 2021-03-22 NOTE — HISTORY OF PRESENT ILLNESS
[Disease: _____________________] : Disease: [unfilled] [T: ___] : T[unfilled] [N: ___] : N[unfilled] [AJCC Stage: ____] : AJCC Stage: [unfilled] [de-identified] : 56 yro female pt never smoker with hx of  thyroid cancer ( dr Salmeron) s/p 3/2020 ( sched [facility] 6/2020) and stroke 11/2018 referred by Dr. Aman Perea for right tongue lesion.Pt states the lesion started after stroke in 2018 and thinks it could be from trauma to the tongue from the teeth, but she started feeling it increased in size from 5/2020. She is s/p right hemiglossectomy, right neck dissection, tracheostomy 12/29/2020. Pathology result back as  Invasive squamous cell carcinoma, well-differentiated, Perineural invasion and lymphovascular invasion seen. Resection margins negative for carcinoma. PD-L1 %. Patient S/P R glossectomy, bilateral neck dissection, tracheostomy and free flap reconstruction on 12/29/20. [id number].  ECS present.\par \par surgical path 1/5/21: Final Diagnosis\par 1. Tongue, right, glossectomy\par - Invasive squamous cell carcinoma, well-differentiated, see synoptic summary\par - Perineural invasion and lymphovascular invasion seen\par - Resection margins negative for carcinoma\par - PD-L1 CPS immunostain 100%\par \par 2. Tongue, additional lateral margin, excision\par - Squamous mucosa negative for carcinoma\par \par 3. Lymph nodes, right levels 1, 2, 3 and 4, neck dissection\par - Level 1: 1 out of 5 lymph nodes positive for metastatic\par carcinoma with extranodal extension; submandibular gland negative for carcinoma\par - Level 2: 1 out of 3 lymph nodes positive for metastatic\par carcinoma with extranodal extension\par - Level 3: 20 lymph nodes negative for metastatic carcinoma\par - Level 4: 20 lymph nodes negative for metastatic carcinoma\par \par 4. Lymph nodes, left level 1, neck dissection\par - Submandibular gland negative for carcinoma\par - 7 lymph nodes negative for metastatic carcinoma\par \par 5. Lymph nodes, left level 2, neck dissection\par - 15 lymph nodes negative for metastatic carcinoma\par \par 6. Lymph nodes, left level 3, neck dissection\par - 10 lymph nodes negative for metastatic carcinoma\par \par 7. Lymph nodes, left level 4, neck dissection\par - 4 lymph nodes negative for metastatic carcinoma\par \par Patient came for initial consultation today, reports feeling well,  She is s/p PEG on 1/13/21, she denies dysphagia, odynophonia, dyspnea, bleeding, fever, chills, signs of infection since surgery. Pt is NPO,  She never smoked and never chew tobacco and no h/o etoh \par \par 2/24/21: Post-op, pt developed a neck abscess/infection s/p I&D and is on abx and packing. She is now cleared by head and neck surgery for chemoRT\par \par 3/22/21: Pt started RT last week and has received one cycle of cisplatin. She reports increased nausea since chemo started. She also has decreased appetite. She is exclusively using the G tube now. She is taking antiemetics with some benefit. \par \par \par \par  [de-identified] : sq cell ca

## 2021-03-23 ENCOUNTER — TRANSCRIPTION ENCOUNTER (OUTPATIENT)
Age: 56
End: 2021-03-23

## 2021-03-23 ENCOUNTER — OUTPATIENT (OUTPATIENT)
Dept: OUTPATIENT SERVICES | Facility: HOSPITAL | Age: 56
LOS: 1 days | Discharge: HOME | End: 2021-03-23

## 2021-03-23 DIAGNOSIS — C76.0 MALIGNANT NEOPLASM OF HEAD, FACE AND NECK: ICD-10-CM

## 2021-03-23 DIAGNOSIS — Z98.890 OTHER SPECIFIED POSTPROCEDURAL STATES: Chronic | ICD-10-CM

## 2021-03-23 PROCEDURE — G6002: CPT | Mod: 26

## 2021-03-24 ENCOUNTER — NON-APPOINTMENT (OUTPATIENT)
Age: 56
End: 2021-03-24

## 2021-03-24 PROCEDURE — G6002: CPT | Mod: 26

## 2021-03-25 PROCEDURE — G6002: CPT | Mod: 26

## 2021-03-26 ENCOUNTER — LABORATORY RESULT (OUTPATIENT)
Age: 56
End: 2021-03-26

## 2021-03-26 ENCOUNTER — RESULT REVIEW (OUTPATIENT)
Age: 56
End: 2021-03-26

## 2021-03-26 ENCOUNTER — APPOINTMENT (OUTPATIENT)
Dept: INFUSION THERAPY | Facility: HOSPITAL | Age: 56
End: 2021-03-26

## 2021-03-26 LAB
BASOPHILS # BLD AUTO: 0.01 K/UL — SIGNIFICANT CHANGE UP (ref 0–0.2)
BASOPHILS NFR BLD AUTO: 0.2 % — SIGNIFICANT CHANGE UP (ref 0–2)
EOSINOPHIL # BLD AUTO: 0.15 K/UL — SIGNIFICANT CHANGE UP (ref 0–0.5)
EOSINOPHIL NFR BLD AUTO: 3.4 % — SIGNIFICANT CHANGE UP (ref 0–6)
HCT VFR BLD CALC: 30.5 % — LOW (ref 34.5–45)
HGB BLD-MCNC: 10.9 G/DL — LOW (ref 11.5–15.5)
IMM GRANULOCYTES NFR BLD AUTO: 0.5 % — SIGNIFICANT CHANGE UP (ref 0–1.5)
LYMPHOCYTES # BLD AUTO: 0.41 K/UL — LOW (ref 1–3.3)
LYMPHOCYTES # BLD AUTO: 9.3 % — LOW (ref 13–44)
MCHC RBC-ENTMCNC: 28.8 PG — SIGNIFICANT CHANGE UP (ref 27–34)
MCHC RBC-ENTMCNC: 35.7 G/DL — SIGNIFICANT CHANGE UP (ref 32–36)
MCV RBC AUTO: 80.5 FL — SIGNIFICANT CHANGE UP (ref 80–100)
MONOCYTES # BLD AUTO: 0.24 K/UL — SIGNIFICANT CHANGE UP (ref 0–0.9)
MONOCYTES NFR BLD AUTO: 5.4 % — SIGNIFICANT CHANGE UP (ref 2–14)
NEUTROPHILS # BLD AUTO: 3.59 K/UL — SIGNIFICANT CHANGE UP (ref 1.8–7.4)
NEUTROPHILS NFR BLD AUTO: 81.2 % — HIGH (ref 43–77)
NRBC # BLD: 0 /100 WBCS — SIGNIFICANT CHANGE UP (ref 0–0)
PLATELET # BLD AUTO: 212 K/UL — SIGNIFICANT CHANGE UP (ref 150–400)
RBC # BLD: 3.79 M/UL — LOW (ref 3.8–5.2)
RBC # FLD: 13.3 % — SIGNIFICANT CHANGE UP (ref 10.3–14.5)
WBC # BLD: 4.42 K/UL — SIGNIFICANT CHANGE UP (ref 3.8–10.5)
WBC # FLD AUTO: 4.42 K/UL — SIGNIFICANT CHANGE UP (ref 3.8–10.5)

## 2021-03-26 PROCEDURE — 77014: CPT | Mod: 26

## 2021-03-26 PROCEDURE — 77427 RADIATION TX MANAGEMENT X5: CPT

## 2021-03-29 ENCOUNTER — NON-APPOINTMENT (OUTPATIENT)
Age: 56
End: 2021-03-29

## 2021-03-29 DIAGNOSIS — E86.0 DEHYDRATION: ICD-10-CM

## 2021-03-29 PROCEDURE — G6002: CPT | Mod: 26

## 2021-03-30 ENCOUNTER — LABORATORY RESULT (OUTPATIENT)
Age: 56
End: 2021-03-30

## 2021-03-30 ENCOUNTER — APPOINTMENT (OUTPATIENT)
Dept: INFUSION THERAPY | Facility: HOSPITAL | Age: 56
End: 2021-03-30

## 2021-03-30 ENCOUNTER — RESULT REVIEW (OUTPATIENT)
Age: 56
End: 2021-03-30

## 2021-03-30 LAB
BASOPHILS # BLD AUTO: 0.02 K/UL — SIGNIFICANT CHANGE UP (ref 0–0.2)
BASOPHILS NFR BLD AUTO: 0.4 % — SIGNIFICANT CHANGE UP (ref 0–2)
EOSINOPHIL # BLD AUTO: 0.09 K/UL — SIGNIFICANT CHANGE UP (ref 0–0.5)
EOSINOPHIL NFR BLD AUTO: 1.9 % — SIGNIFICANT CHANGE UP (ref 0–6)
HCT VFR BLD CALC: 30.1 % — LOW (ref 34.5–45)
HGB BLD-MCNC: 10.4 G/DL — LOW (ref 11.5–15.5)
IMM GRANULOCYTES NFR BLD AUTO: 1.3 % — SIGNIFICANT CHANGE UP (ref 0–1.5)
LYMPHOCYTES # BLD AUTO: 0.36 K/UL — LOW (ref 1–3.3)
LYMPHOCYTES # BLD AUTO: 7.6 % — LOW (ref 13–44)
MCHC RBC-ENTMCNC: 28.5 PG — SIGNIFICANT CHANGE UP (ref 27–34)
MCHC RBC-ENTMCNC: 34.6 G/DL — SIGNIFICANT CHANGE UP (ref 32–36)
MCV RBC AUTO: 82.5 FL — SIGNIFICANT CHANGE UP (ref 80–100)
MONOCYTES # BLD AUTO: 0.3 K/UL — SIGNIFICANT CHANGE UP (ref 0–0.9)
MONOCYTES NFR BLD AUTO: 6.3 % — SIGNIFICANT CHANGE UP (ref 2–14)
NEUTROPHILS # BLD AUTO: 3.93 K/UL — SIGNIFICANT CHANGE UP (ref 1.8–7.4)
NEUTROPHILS NFR BLD AUTO: 82.5 % — HIGH (ref 43–77)
NRBC # BLD: 0 /100 WBCS — SIGNIFICANT CHANGE UP (ref 0–0)
PLATELET # BLD AUTO: 181 K/UL — SIGNIFICANT CHANGE UP (ref 150–400)
RBC # BLD: 3.65 M/UL — LOW (ref 3.8–5.2)
RBC # FLD: 13.6 % — SIGNIFICANT CHANGE UP (ref 10.3–14.5)
WBC # BLD: 4.76 K/UL — SIGNIFICANT CHANGE UP (ref 3.8–10.5)
WBC # FLD AUTO: 4.76 K/UL — SIGNIFICANT CHANGE UP (ref 3.8–10.5)

## 2021-03-30 PROCEDURE — G6002: CPT | Mod: 26

## 2021-03-30 RX ORDER — CALCIUM CARBONATE 500(1250)
1 TABLET ORAL
Qty: 0 | Refills: 0 | DISCHARGE

## 2021-03-30 RX ORDER — LEVOTHYROXINE SODIUM 125 MCG
1 TABLET ORAL
Qty: 0 | Refills: 0 | DISCHARGE

## 2021-03-30 RX ORDER — ATORVASTATIN CALCIUM 80 MG/1
1 TABLET, FILM COATED ORAL
Qty: 0 | Refills: 0 | DISCHARGE

## 2021-03-30 RX ORDER — AMLODIPINE BESYLATE 2.5 MG/1
1 TABLET ORAL
Qty: 0 | Refills: 0 | DISCHARGE

## 2021-03-30 RX ORDER — ERGOCALCIFEROL 1.25 MG/1
1 CAPSULE ORAL
Qty: 0 | Refills: 0 | DISCHARGE

## 2021-03-31 ENCOUNTER — NON-APPOINTMENT (OUTPATIENT)
Age: 56
End: 2021-03-31

## 2021-03-31 VITALS — WEIGHT: 109 LBS | HEIGHT: 63 IN | BODY MASS INDEX: 19.31 KG/M2 | RESPIRATION RATE: 16 BRPM

## 2021-03-31 DIAGNOSIS — Z51.11 ENCOUNTER FOR ANTINEOPLASTIC CHEMOTHERAPY: ICD-10-CM

## 2021-03-31 DIAGNOSIS — R11.2 NAUSEA WITH VOMITING, UNSPECIFIED: ICD-10-CM

## 2021-03-31 PROCEDURE — G6002: CPT | Mod: 26

## 2021-03-31 NOTE — DISEASE MANAGEMENT
[Pathological] : TNM Stage: p [TTNM] : 2 [NTNM] : 1 [MTNM] : 0 [III] : III [de-identified] : 0650 [de-identified] : 2634 [de-identified] : OC/Neck

## 2021-03-31 NOTE — DISEASE MANAGEMENT
[TTNM] : 2 [MTNM] : 0 [NTNM] : 1 [de-identified] : 400 [de-identified] : 2679 [de-identified] : OC/Neck

## 2021-03-31 NOTE — HISTORY OF PRESENT ILLNESS
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 She is s/p right hemiglossectomy, right neck dissection, tracheostomy and free flap reconstruction on 12/29/2020. Pathology showed Invasive squamous cell carcinoma, well-differentiated, Perineural invasion and lymphovascular invasion seen. Resection margins negative for carcinoma. PD-L1 %.  [id number]. ECS present.\par She is s/p PEG on 1/13/2021. \par \par Presents today for OTV. Completed 12/33 fx, Tolerating treatment well. Denies pain, no difficulty swallowing.

## 2021-03-31 NOTE — REASON FOR VISIT
[Family Member] : family member [Routine On-Treatment] : a routine on-treatment visit for [Head and Neck Cancer] : head and neck cancer [Patient Declined  Services] : - None: Patient declined  services [FreeTextEntry2] : Lowell Howard [FreeTextEntry3] : Mandarin [TWNoteComboBox1] : Chinese

## 2021-03-31 NOTE — HISTORY OF PRESENT ILLNESS
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 She is s/p right hemiglossectomy, right neck dissection, tracheostomy and free flap reconstruction on 12/29/2020. Pathology showed Invasive squamous cell carcinoma, well-differentiated, Perineural invasion and lymphovascular invasion seen. Resection margins negative for carcinoma. PD-L1 %.  [id number]. ECS present.\par She is s/p PEG on 1/13/2021. \par \par Presents today for OTV. Completed 7/33 fx, Tolerating treatment well. Denies pain. Skin and mouth care reviewed.

## 2021-03-31 NOTE — VITALS
[Maximal Pain Intensity: 0/10] : 0/10 [Least Pain Intensity: 0/10] : 0/10 [Pain Description/Quality: ___] : Pain description/quality: [unfilled] [Pain Duration: ___] : Pain duration: [unfilled] [Pain Location: ___] : Pain Location: [unfilled] [Pain Interferes with ADLs] : Pain does not interfere with activities of daily living [NoTreatment Scheduled] : no treatment scheduled [80: Normal activity with effort; some signs or symptoms of disease.] : 80: Normal activity with effort; some signs or symptoms of disease.  [ECOG Performance Status: 1 - Restricted in physically strenuous activity but ambulatory and able to carry out work of a light or sedentary nature] : Performance Status: 1 - Restricted in physically strenuous activity but ambulatory and able to carry out work of a light or sedentary nature, e.g., light house work, office work (0) swallows foods and liquids w/o difficulty

## 2021-04-01 PROCEDURE — 77427 RADIATION TX MANAGEMENT X5: CPT

## 2021-04-01 PROCEDURE — G6002: CPT | Mod: 26

## 2021-04-02 ENCOUNTER — RESULT REVIEW (OUTPATIENT)
Age: 56
End: 2021-04-02

## 2021-04-02 ENCOUNTER — LABORATORY RESULT (OUTPATIENT)
Age: 56
End: 2021-04-02

## 2021-04-02 ENCOUNTER — APPOINTMENT (OUTPATIENT)
Dept: INFUSION THERAPY | Facility: HOSPITAL | Age: 56
End: 2021-04-02

## 2021-04-02 LAB
BASOPHILS # BLD AUTO: 0.01 K/UL — SIGNIFICANT CHANGE UP (ref 0–0.2)
BASOPHILS NFR BLD AUTO: 0.2 % — SIGNIFICANT CHANGE UP (ref 0–2)
EOSINOPHIL # BLD AUTO: 0.05 K/UL — SIGNIFICANT CHANGE UP (ref 0–0.5)
EOSINOPHIL NFR BLD AUTO: 1.2 % — SIGNIFICANT CHANGE UP (ref 0–6)
HCT VFR BLD CALC: 29.4 % — LOW (ref 34.5–45)
HGB BLD-MCNC: 10.2 G/DL — LOW (ref 11.5–15.5)
IMM GRANULOCYTES NFR BLD AUTO: 0.2 % — SIGNIFICANT CHANGE UP (ref 0–1.5)
LYMPHOCYTES # BLD AUTO: 0.3 K/UL — LOW (ref 1–3.3)
LYMPHOCYTES # BLD AUTO: 7.5 % — LOW (ref 13–44)
MCHC RBC-ENTMCNC: 28.9 PG — SIGNIFICANT CHANGE UP (ref 27–34)
MCHC RBC-ENTMCNC: 34.7 G/DL — SIGNIFICANT CHANGE UP (ref 32–36)
MCV RBC AUTO: 83.3 FL — SIGNIFICANT CHANGE UP (ref 80–100)
MONOCYTES # BLD AUTO: 0.29 K/UL — SIGNIFICANT CHANGE UP (ref 0–0.9)
MONOCYTES NFR BLD AUTO: 7.2 % — SIGNIFICANT CHANGE UP (ref 2–14)
NEUTROPHILS # BLD AUTO: 3.35 K/UL — SIGNIFICANT CHANGE UP (ref 1.8–7.4)
NEUTROPHILS NFR BLD AUTO: 83.7 % — HIGH (ref 43–77)
NRBC # BLD: 0 /100 WBCS — SIGNIFICANT CHANGE UP (ref 0–0)
PLATELET # BLD AUTO: 165 K/UL — SIGNIFICANT CHANGE UP (ref 150–400)
RBC # BLD: 3.53 M/UL — LOW (ref 3.8–5.2)
RBC # FLD: 14.3 % — SIGNIFICANT CHANGE UP (ref 10.3–14.5)
WBC # BLD: 4.01 K/UL — SIGNIFICANT CHANGE UP (ref 3.8–10.5)
WBC # FLD AUTO: 4.01 K/UL — SIGNIFICANT CHANGE UP (ref 3.8–10.5)

## 2021-04-02 PROCEDURE — 77014: CPT | Mod: 26

## 2021-04-05 ENCOUNTER — NON-APPOINTMENT (OUTPATIENT)
Age: 56
End: 2021-04-05

## 2021-04-05 PROCEDURE — G6002: CPT | Mod: 26

## 2021-04-06 ENCOUNTER — RESULT REVIEW (OUTPATIENT)
Age: 56
End: 2021-04-06

## 2021-04-06 ENCOUNTER — LABORATORY RESULT (OUTPATIENT)
Age: 56
End: 2021-04-06

## 2021-04-06 ENCOUNTER — APPOINTMENT (OUTPATIENT)
Dept: INFUSION THERAPY | Facility: HOSPITAL | Age: 56
End: 2021-04-06

## 2021-04-06 ENCOUNTER — APPOINTMENT (OUTPATIENT)
Dept: HEMATOLOGY ONCOLOGY | Facility: CLINIC | Age: 56
End: 2021-04-06
Payer: MEDICAID

## 2021-04-06 VITALS
RESPIRATION RATE: 14 BRPM | SYSTOLIC BLOOD PRESSURE: 118 MMHG | DIASTOLIC BLOOD PRESSURE: 75 MMHG | HEART RATE: 84 BPM | BODY MASS INDEX: 19.14 KG/M2 | WEIGHT: 108.03 LBS | TEMPERATURE: 98 F | OXYGEN SATURATION: 98 %

## 2021-04-06 LAB
BASOPHILS # BLD AUTO: 0.03 K/UL — SIGNIFICANT CHANGE UP (ref 0–0.2)
BASOPHILS NFR BLD AUTO: 1 % — SIGNIFICANT CHANGE UP (ref 0–2)
EOSINOPHIL # BLD AUTO: 0.09 K/UL — SIGNIFICANT CHANGE UP (ref 0–0.5)
EOSINOPHIL NFR BLD AUTO: 2.9 % — SIGNIFICANT CHANGE UP (ref 0–6)
HCT VFR BLD CALC: 29.1 % — LOW (ref 34.5–45)
HGB BLD-MCNC: 10.3 G/DL — LOW (ref 11.5–15.5)
IMM GRANULOCYTES NFR BLD AUTO: 1.9 % — HIGH (ref 0–1.5)
LYMPHOCYTES # BLD AUTO: 0.34 K/UL — LOW (ref 1–3.3)
LYMPHOCYTES # BLD AUTO: 10.9 % — LOW (ref 13–44)
MCHC RBC-ENTMCNC: 28.9 PG — SIGNIFICANT CHANGE UP (ref 27–34)
MCHC RBC-ENTMCNC: 35.4 G/DL — SIGNIFICANT CHANGE UP (ref 32–36)
MCV RBC AUTO: 81.7 FL — SIGNIFICANT CHANGE UP (ref 80–100)
MONOCYTES # BLD AUTO: 0.23 K/UL — SIGNIFICANT CHANGE UP (ref 0–0.9)
MONOCYTES NFR BLD AUTO: 7.3 % — SIGNIFICANT CHANGE UP (ref 2–14)
NEUTROPHILS # BLD AUTO: 2.38 K/UL — SIGNIFICANT CHANGE UP (ref 1.8–7.4)
NEUTROPHILS NFR BLD AUTO: 76 % — SIGNIFICANT CHANGE UP (ref 43–77)
NRBC # BLD: 0 /100 WBCS — SIGNIFICANT CHANGE UP (ref 0–0)
PLATELET # BLD AUTO: 162 K/UL — SIGNIFICANT CHANGE UP (ref 150–400)
RBC # BLD: 3.56 M/UL — LOW (ref 3.8–5.2)
RBC # FLD: 14.2 % — SIGNIFICANT CHANGE UP (ref 10.3–14.5)
WBC # BLD: 3.13 K/UL — LOW (ref 3.8–10.5)
WBC # FLD AUTO: 3.13 K/UL — LOW (ref 3.8–10.5)

## 2021-04-06 PROCEDURE — G6002: CPT | Mod: 26

## 2021-04-06 PROCEDURE — 99214 OFFICE O/P EST MOD 30 MIN: CPT

## 2021-04-06 PROCEDURE — 99072 ADDL SUPL MATRL&STAF TM PHE: CPT

## 2021-04-07 ENCOUNTER — NON-APPOINTMENT (OUTPATIENT)
Age: 56
End: 2021-04-07

## 2021-04-07 VITALS — RESPIRATION RATE: 16 BRPM | WEIGHT: 107 LBS | BODY MASS INDEX: 18.96 KG/M2 | HEIGHT: 63 IN

## 2021-04-07 PROCEDURE — G6002: CPT | Mod: 26

## 2021-04-07 NOTE — DISEASE MANAGEMENT
[TTNM] : 2 [NTNM] : 1 [MTNM] : 0 [de-identified] : 400 [de-identified] : 3646 [de-identified] : OC/Neck

## 2021-04-07 NOTE — HISTORY OF PRESENT ILLNESS
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 She is s/p right hemiglossectomy, right neck dissection, tracheostomy and free flap reconstruction on 12/29/2020. Pathology showed Invasive squamous cell carcinoma, well-differentiated, Perineural invasion and lymphovascular invasion seen. Resection margins negative for carcinoma. PD-L1 %.  [id number]. ECS present.\par She is s/p PEG on 1/13/2021. \par \par Presents today for OTV. Completed 18/33 fx, Tolerating treatment well. c/o neck tightness at times. Denies pain. Skin and mouth care reviewed. With feeding tube. On chemo and blood test okay.

## 2021-04-08 PROCEDURE — G6002: CPT | Mod: 26

## 2021-04-08 PROCEDURE — 77427 RADIATION TX MANAGEMENT X5: CPT

## 2021-04-09 ENCOUNTER — LABORATORY RESULT (OUTPATIENT)
Age: 56
End: 2021-04-09

## 2021-04-09 ENCOUNTER — RESULT REVIEW (OUTPATIENT)
Age: 56
End: 2021-04-09

## 2021-04-09 ENCOUNTER — APPOINTMENT (OUTPATIENT)
Dept: INFUSION THERAPY | Facility: HOSPITAL | Age: 56
End: 2021-04-09

## 2021-04-09 LAB
BASOPHILS # BLD AUTO: 0 K/UL — SIGNIFICANT CHANGE UP (ref 0–0.2)
BASOPHILS NFR BLD AUTO: 0 % — SIGNIFICANT CHANGE UP (ref 0–2)
EOSINOPHIL # BLD AUTO: 0.03 K/UL — SIGNIFICANT CHANGE UP (ref 0–0.5)
EOSINOPHIL NFR BLD AUTO: 1 % — SIGNIFICANT CHANGE UP (ref 0–6)
HCT VFR BLD CALC: 27 % — LOW (ref 34.5–45)
HGB BLD-MCNC: 9.7 G/DL — LOW (ref 11.5–15.5)
LYMPHOCYTES # BLD AUTO: 0.19 K/UL — LOW (ref 1–3.3)
LYMPHOCYTES # BLD AUTO: 7 % — LOW (ref 13–44)
MCHC RBC-ENTMCNC: 29.6 PG — SIGNIFICANT CHANGE UP (ref 27–34)
MCHC RBC-ENTMCNC: 35.9 G/DL — SIGNIFICANT CHANGE UP (ref 32–36)
MCV RBC AUTO: 82.3 FL — SIGNIFICANT CHANGE UP (ref 80–100)
MONOCYTES # BLD AUTO: 0.25 K/UL — SIGNIFICANT CHANGE UP (ref 0–0.9)
MONOCYTES NFR BLD AUTO: 9 % — SIGNIFICANT CHANGE UP (ref 2–14)
NEUTROPHILS # BLD AUTO: 2.3 K/UL — SIGNIFICANT CHANGE UP (ref 1.8–7.4)
NEUTROPHILS NFR BLD AUTO: 83 % — HIGH (ref 43–77)
NRBC # BLD: 0 /100 — SIGNIFICANT CHANGE UP (ref 0–0)
NRBC # BLD: SIGNIFICANT CHANGE UP /100 WBCS (ref 0–0)
PLAT MORPH BLD: NORMAL — SIGNIFICANT CHANGE UP
PLATELET # BLD AUTO: 137 K/UL — LOW (ref 150–400)
RBC # BLD: 3.28 M/UL — LOW (ref 3.8–5.2)
RBC # FLD: 15.2 % — HIGH (ref 10.3–14.5)
RBC BLD AUTO: SIGNIFICANT CHANGE UP
WBC # BLD: 2.77 K/UL — LOW (ref 3.8–10.5)
WBC # FLD AUTO: 2.77 K/UL — LOW (ref 3.8–10.5)

## 2021-04-09 PROCEDURE — 77014: CPT | Mod: 26

## 2021-04-10 NOTE — PHYSICAL EXAM
[Restricted in physically strenuous activity but ambulatory and able to carry out work of a light or sedentary nature] : Status 1- Restricted in physically strenuous activity but ambulatory and able to carry out work of a light or sedentary nature, e.g., light house work, office work [Thin] : thin [Mucositis] : mucositis [Normal] : grossly intact [de-identified] : right tongue hemiglossectomy, reconstructed, mild mucositis

## 2021-04-10 NOTE — ASSESSMENT
[FreeTextEntry1] : 56 yro female pt never smoker with hx of  thyroid papillary carcinoma ( dr Salmeron) s/p 3/2020 ( [facility] 6/2020) and stroke 11/2018 diagnosed with oral cancer. She is s/p right hemiglossectomy, right neck dissection, tracheostomy 12/29/2020. Pathology result back as  Invasive squamous cell carcinoma, well-differentiated, Perineural invasion and lymphovascular invasion seen. Resection margins negative for carcinoma. PD-L1 %. Patient S/P R glossectomy, bilateral neck dissection, tracheostomy and free flap reconstruction on 12/29/20. [id number].  ECS present.\par Receiving CCRT\par Weekly cisplatin\par Doing well\par Reviewed labs\par OV in 2 weeks\par

## 2021-04-10 NOTE — HISTORY OF PRESENT ILLNESS
[Disease: _____________________] : Disease: [unfilled] [T: ___] : T[unfilled] [N: ___] : N[unfilled] [AJCC Stage: ____] : AJCC Stage: [unfilled] [de-identified] : 56 yro female pt never smoker with hx of  thyroid cancer ( dr Salmeron) s/p 3/2020 ( sched [facility] 6/2020) and stroke 11/2018 referred by Dr. Aman Perea for right tongue lesion.Pt states the lesion started after stroke in 2018 and thinks it could be from trauma to the tongue from the teeth, but she started feeling it increased in size from 5/2020. She is s/p right hemiglossectomy, right neck dissection, tracheostomy 12/29/2020. Pathology result back as  Invasive squamous cell carcinoma, well-differentiated, Perineural invasion and lymphovascular invasion seen. Resection margins negative for carcinoma. PD-L1 %. Patient S/P R glossectomy, bilateral neck dissection, tracheostomy and free flap reconstruction on 12/29/20. [id number].  ECS present.\par \par surgical path 1/5/21: Final Diagnosis\par 1. Tongue, right, glossectomy\par - Invasive squamous cell carcinoma, well-differentiated, see synoptic summary\par - Perineural invasion and lymphovascular invasion seen\par - Resection margins negative for carcinoma\par - PD-L1 CPS immunostain 100%\par \par 2. Tongue, additional lateral margin, excision\par - Squamous mucosa negative for carcinoma\par \par 3. Lymph nodes, right levels 1, 2, 3 and 4, neck dissection\par - Level 1: 1 out of 5 lymph nodes positive for metastatic\par carcinoma with extranodal extension; submandibular gland negative for carcinoma\par - Level 2: 1 out of 3 lymph nodes positive for metastatic\par carcinoma with extranodal extension\par - Level 3: 20 lymph nodes negative for metastatic carcinoma\par - Level 4: 20 lymph nodes negative for metastatic carcinoma\par \par 4. Lymph nodes, left level 1, neck dissection\par - Submandibular gland negative for carcinoma\par - 7 lymph nodes negative for metastatic carcinoma\par \par 5. Lymph nodes, left level 2, neck dissection\par - 15 lymph nodes negative for metastatic carcinoma\par \par 6. Lymph nodes, left level 3, neck dissection\par - 10 lymph nodes negative for metastatic carcinoma\par \par 7. Lymph nodes, left level 4, neck dissection\par - 4 lymph nodes negative for metastatic carcinoma\par \par Patient came for initial consultation today, reports feeling well,  She is s/p PEG on 1/13/21, she denies dysphagia, odynophonia, dyspnea, bleeding, fever, chills, signs of infection since surgery. Pt is NPO,  She never smoked and never chew tobacco and no h/o etoh \par \par 2/24/21: Post-op, pt developed a neck abscess/infection s/p I&D and is on abx and packing. She is now cleared by head and neck surgery for chemoRT\par \par 3/22/21: Pt started RT last week and has received one cycle of cisplatin. She reports increased nausea since chemo started. She also has decreased appetite. She is exclusively using the G tube now. She is taking antiemetics with some benefit. \par \par 4/6/21: Finishing CRT. DOing well with minimal AEs. Some nausea-improved with antiemetics. \par \par \par \par  [de-identified] : sq cell ca

## 2021-04-10 NOTE — REASON FOR VISIT
[Initial Consultation] : an initial consultation [Other: _____] : [unfilled] [FreeTextEntry2] : SCC of tongue [FreeTextEntry1] : [id number] [TWNoteComboBox1] : Chinese

## 2021-04-12 ENCOUNTER — NON-APPOINTMENT (OUTPATIENT)
Age: 56
End: 2021-04-12

## 2021-04-12 PROCEDURE — G6002: CPT | Mod: 26

## 2021-04-13 ENCOUNTER — NON-APPOINTMENT (OUTPATIENT)
Age: 56
End: 2021-04-13

## 2021-04-13 ENCOUNTER — LABORATORY RESULT (OUTPATIENT)
Age: 56
End: 2021-04-13

## 2021-04-13 ENCOUNTER — RESULT REVIEW (OUTPATIENT)
Age: 56
End: 2021-04-13

## 2021-04-13 ENCOUNTER — APPOINTMENT (OUTPATIENT)
Dept: INFUSION THERAPY | Facility: HOSPITAL | Age: 56
End: 2021-04-13

## 2021-04-13 VITALS
HEART RATE: 100 BPM | OXYGEN SATURATION: 100 % | RESPIRATION RATE: 16 BRPM | SYSTOLIC BLOOD PRESSURE: 127 MMHG | DIASTOLIC BLOOD PRESSURE: 76 MMHG | WEIGHT: 109.02 LBS | BODY MASS INDEX: 19.31 KG/M2

## 2021-04-13 LAB
BASOPHILS # BLD AUTO: 0.01 K/UL — SIGNIFICANT CHANGE UP (ref 0–0.2)
BASOPHILS NFR BLD AUTO: 0.4 % — SIGNIFICANT CHANGE UP (ref 0–2)
EOSINOPHIL # BLD AUTO: 0.06 K/UL — SIGNIFICANT CHANGE UP (ref 0–0.5)
EOSINOPHIL NFR BLD AUTO: 2.3 % — SIGNIFICANT CHANGE UP (ref 0–6)
HCT VFR BLD CALC: 26.2 % — LOW (ref 34.5–45)
HGB BLD-MCNC: 9.3 G/DL — LOW (ref 11.5–15.5)
IMM GRANULOCYTES NFR BLD AUTO: 2.3 % — HIGH (ref 0–1.5)
LYMPHOCYTES # BLD AUTO: 0.31 K/UL — LOW (ref 1–3.3)
LYMPHOCYTES # BLD AUTO: 11.7 % — LOW (ref 13–44)
MCHC RBC-ENTMCNC: 29.2 PG — SIGNIFICANT CHANGE UP (ref 27–34)
MCHC RBC-ENTMCNC: 35.5 G/DL — SIGNIFICANT CHANGE UP (ref 32–36)
MCV RBC AUTO: 82.4 FL — SIGNIFICANT CHANGE UP (ref 80–100)
MONOCYTES # BLD AUTO: 0.2 K/UL — SIGNIFICANT CHANGE UP (ref 0–0.9)
MONOCYTES NFR BLD AUTO: 7.5 % — SIGNIFICANT CHANGE UP (ref 2–14)
NEUTROPHILS # BLD AUTO: 2.02 K/UL — SIGNIFICANT CHANGE UP (ref 1.8–7.4)
NEUTROPHILS NFR BLD AUTO: 75.8 % — SIGNIFICANT CHANGE UP (ref 43–77)
NRBC # BLD: 0 /100 WBCS — SIGNIFICANT CHANGE UP (ref 0–0)
PLATELET # BLD AUTO: 134 K/UL — LOW (ref 150–400)
RBC # BLD: 3.18 M/UL — LOW (ref 3.8–5.2)
RBC # FLD: 15.3 % — HIGH (ref 10.3–14.5)
WBC # BLD: 2.66 K/UL — LOW (ref 3.8–10.5)
WBC # FLD AUTO: 2.66 K/UL — LOW (ref 3.8–10.5)

## 2021-04-13 PROCEDURE — G6002: CPT | Mod: 26

## 2021-04-13 NOTE — HISTORY OF PRESENT ILLNESS
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 She is s/p right hemiglossectomy, right neck dissection, tracheostomy and free flap reconstruction on 12/29/2020. Pathology showed Invasive squamous cell carcinoma, well-differentiated, Perineural invasion and lymphovascular invasion seen. Resection margins negative for carcinoma. PD-L1 %.  [id number]. ECS present.\par She is s/p PEG on 1/13/2021. \par \par Presents today for OTV. Completed 22/33 fx, Tolerating treatment well. c/o neck tightness at times. Denies pain. Moisturizing skin as directed. Maintaining weight with feeding tube. On chemo and blood test okay. C/o increased fatigue

## 2021-04-13 NOTE — REASON FOR VISIT
[Routine On-Treatment] : a routine on-treatment visit for [Head and Neck Cancer] : head and neck cancer [Patient Declined  Services] : - None: Patient declined  services [FreeTextEntry2] : Lowell Howard [FreeTextEntry3] : Mandarin  [TWNoteComboBox1] : Chinese

## 2021-04-13 NOTE — DISEASE MANAGEMENT
[Pathological] : TNM Stage: p [III] : III [TTNM] : 2 [NTNM] : 1 [MTNM] : 0 [de-identified] : 9051 [de-identified] : 1889 [de-identified] : OC/Neck

## 2021-04-13 NOTE — VITALS
[Maximal Pain Intensity: 1/10] : 1/10 [Least Pain Intensity: 0/10] : 0/10 [Pain Location: ___] : Pain Location: [unfilled] [80: Normal activity with effort; some signs or symptoms of disease.] : 80: Normal activity with effort; some signs or symptoms of disease.  [ECOG Performance Status: 2 - Ambulatory and capable of all self care but unable to carry out any work activities] : Performance Status: 2 - Ambulatory and capable of all self care but unable to carry out any work activities. Up and about more than 50% of waking hours

## 2021-04-13 NOTE — REVIEW OF SYSTEMS
[Tinnitus - Grade 0] : Tinnitus - Grade 0 [Blurred Vision: Grade 0] : Blurred Vision: Grade 0 [Mucositis Oral: Grade 0] : Mucositis Oral: Grade 0  [Xerostomia: Grade 0] : Xerostomia: Grade 0 [Oral Pain: Grade 1 - Mild pain] : Oral Pain: Grade 1 - Mild pain [Salivary duct inflammation: Grade 0] : Salivary duct inflammation: Grade 0 [Dysgeusia: Grade 0] : Dysgeusia: Grade 0 [Hoarseness: Grade 0] : Hoarseness: Grade 0 [Alopecia: Grade 0] : Alopecia: Grade 0 [Pruritus: Grade 0] : Pruritus: Grade 0 [Skin Atrophy: Grade 0] : Skin Atrophy: Grade 0 [Skin Hyperpigmentation: Grade 1 - Hyperpigmentation covering <10% BSA; no psychosocial impact] : Skin Hyperpigmentation: Grade 1 - Hyperpigmentation covering <10% BSA; no psychosocial impact [Skin Induration: Grade 0] : Skin Induration: Grade 0 [Dermatitis Radiation: Grade 1 - Faint erythema or dry desquamation] : Dermatitis Radiation: Grade 1 - Faint erythema or dry desquamation [FreeTextEntry6] : feeding tube [Fatigue: Grade 1 - Fatigue relieved by rest] : Fatigue: Grade 1 - Fatigue relieved by rest

## 2021-04-14 PROCEDURE — G6002: CPT | Mod: 26

## 2021-04-16 ENCOUNTER — APPOINTMENT (OUTPATIENT)
Dept: INFUSION THERAPY | Facility: HOSPITAL | Age: 56
End: 2021-04-16

## 2021-04-16 PROCEDURE — 77427 RADIATION TX MANAGEMENT X5: CPT

## 2021-04-16 PROCEDURE — G6002: CPT | Mod: 26

## 2021-04-19 ENCOUNTER — NON-APPOINTMENT (OUTPATIENT)
Age: 56
End: 2021-04-19

## 2021-04-19 PROCEDURE — 77014: CPT | Mod: 26

## 2021-04-20 ENCOUNTER — OUTPATIENT (OUTPATIENT)
Dept: OUTPATIENT SERVICES | Facility: HOSPITAL | Age: 56
LOS: 1 days | End: 2021-04-20
Payer: MEDICAID

## 2021-04-20 ENCOUNTER — LABORATORY RESULT (OUTPATIENT)
Age: 56
End: 2021-04-20

## 2021-04-20 ENCOUNTER — RESULT REVIEW (OUTPATIENT)
Age: 56
End: 2021-04-20

## 2021-04-20 ENCOUNTER — APPOINTMENT (OUTPATIENT)
Dept: HEMATOLOGY ONCOLOGY | Facility: CLINIC | Age: 56
End: 2021-04-20
Payer: MEDICAID

## 2021-04-20 ENCOUNTER — NON-APPOINTMENT (OUTPATIENT)
Age: 56
End: 2021-04-20

## 2021-04-20 ENCOUNTER — APPOINTMENT (OUTPATIENT)
Dept: INFUSION THERAPY | Facility: HOSPITAL | Age: 56
End: 2021-04-20

## 2021-04-20 VITALS
HEART RATE: 94 BPM | SYSTOLIC BLOOD PRESSURE: 110 MMHG | TEMPERATURE: 97.5 F | DIASTOLIC BLOOD PRESSURE: 69 MMHG | BODY MASS INDEX: 19.25 KG/M2 | WEIGHT: 108.69 LBS | OXYGEN SATURATION: 97 % | RESPIRATION RATE: 15 BRPM

## 2021-04-20 DIAGNOSIS — Z98.890 OTHER SPECIFIED POSTPROCEDURAL STATES: Chronic | ICD-10-CM

## 2021-04-20 LAB
BASOPHILS # BLD AUTO: 0 K/UL — SIGNIFICANT CHANGE UP (ref 0–0.2)
BASOPHILS NFR BLD AUTO: 0 % — SIGNIFICANT CHANGE UP (ref 0–2)
EOSINOPHIL # BLD AUTO: 0.02 K/UL — SIGNIFICANT CHANGE UP (ref 0–0.5)
EOSINOPHIL NFR BLD AUTO: 1 % — SIGNIFICANT CHANGE UP (ref 0–6)
HCT VFR BLD CALC: 21.1 % — LOW (ref 34.5–45)
HGB BLD-MCNC: 7.5 G/DL — LOW (ref 11.5–15.5)
IMM GRANULOCYTES NFR BLD AUTO: 1 % — SIGNIFICANT CHANGE UP (ref 0–1.5)
LYMPHOCYTES # BLD AUTO: 0.29 K/UL — LOW (ref 1–3.3)
LYMPHOCYTES # BLD AUTO: 15.2 % — SIGNIFICANT CHANGE UP (ref 13–44)
MCHC RBC-ENTMCNC: 29.5 PG — SIGNIFICANT CHANGE UP (ref 27–34)
MCHC RBC-ENTMCNC: 35.5 G/DL — SIGNIFICANT CHANGE UP (ref 32–36)
MCV RBC AUTO: 83.1 FL — SIGNIFICANT CHANGE UP (ref 80–100)
MONOCYTES # BLD AUTO: 0.2 K/UL — SIGNIFICANT CHANGE UP (ref 0–0.9)
MONOCYTES NFR BLD AUTO: 10.5 % — SIGNIFICANT CHANGE UP (ref 2–14)
NEUTROPHILS # BLD AUTO: 1.38 K/UL — LOW (ref 1.8–7.4)
NEUTROPHILS NFR BLD AUTO: 72.3 % — SIGNIFICANT CHANGE UP (ref 43–77)
NRBC # BLD: 0 /100 WBCS — SIGNIFICANT CHANGE UP (ref 0–0)
PLATELET # BLD AUTO: 134 K/UL — LOW (ref 150–400)
RBC # BLD: 2.54 M/UL — LOW (ref 3.8–5.2)
RBC # FLD: 16.3 % — HIGH (ref 10.3–14.5)
WBC # BLD: 1.91 K/UL — LOW (ref 3.8–10.5)
WBC # FLD AUTO: 1.91 K/UL — LOW (ref 3.8–10.5)

## 2021-04-20 PROCEDURE — 86923 COMPATIBILITY TEST ELECTRIC: CPT

## 2021-04-20 PROCEDURE — 99214 OFFICE O/P EST MOD 30 MIN: CPT

## 2021-04-20 PROCEDURE — 86901 BLOOD TYPING SEROLOGIC RH(D): CPT

## 2021-04-20 PROCEDURE — G6002: CPT | Mod: 26

## 2021-04-20 PROCEDURE — 86850 RBC ANTIBODY SCREEN: CPT

## 2021-04-20 PROCEDURE — 71260 CT THORAX DX C+: CPT

## 2021-04-20 PROCEDURE — 99072 ADDL SUPL MATRL&STAF TM PHE: CPT

## 2021-04-20 PROCEDURE — 86900 BLOOD TYPING SEROLOGIC ABO: CPT

## 2021-04-20 NOTE — CONSULT LETTER
[Dear  ___] : Dear  [unfilled], [Consult Letter:] : I had the pleasure of evaluating your patient, [unfilled]. [Please see my note below.] : Please see my note below. [Consult Closing:] : Thank you very much for allowing me to participate in the care of this patient.  If you have any questions, please do not hesitate to contact me. [Sincerely,] : Sincerely, [DrValerie  ___] : Dr. GONZALEZ [FreeTextEntry2] : Dr. Lb Bailey [FreeTextEntry3] : Kelli Lma MD\par \par

## 2021-04-20 NOTE — HISTORY OF PRESENT ILLNESS
[Disease: _____________________] : Disease: [unfilled] [T: ___] : T[unfilled] [N: ___] : N[unfilled] [AJCC Stage: ____] : AJCC Stage: [unfilled] [de-identified] : 56 yro female pt never smoker with hx of  thyroid cancer ( dr Salmeron) s/p 3/2020 ( sched [facility] 6/2020) and stroke 11/2018 referred by Dr. Aman Preea for right tongue lesion.Pt states the lesion started after stroke in 2018 and thinks it could be from trauma to the tongue from the teeth, but she started feeling it increased in size from 5/2020. She is s/p right hemiglossectomy, right neck dissection, tracheostomy 12/29/2020. Pathology result back as  Invasive squamous cell carcinoma, well-differentiated, Perineural invasion and lymphovascular invasion seen. Resection margins negative for carcinoma. PD-L1 %. Patient S/P R glossectomy, bilateral neck dissection, tracheostomy and free flap reconstruction on 12/29/20. [id number].  ECS present.\par \par surgical path 1/5/21: Final Diagnosis\par 1. Tongue, right, glossectomy\par - Invasive squamous cell carcinoma, well-differentiated, see synoptic summary\par - Perineural invasion and lymphovascular invasion seen\par - Resection margins negative for carcinoma\par - PD-L1 CPS immunostain 100%\par \par 2. Tongue, additional lateral margin, excision\par - Squamous mucosa negative for carcinoma\par \par 3. Lymph nodes, right levels 1, 2, 3 and 4, neck dissection\par - Level 1: 1 out of 5 lymph nodes positive for metastatic\par carcinoma with extranodal extension; submandibular gland negative for carcinoma\par - Level 2: 1 out of 3 lymph nodes positive for metastatic\par carcinoma with extranodal extension\par - Level 3: 20 lymph nodes negative for metastatic carcinoma\par - Level 4: 20 lymph nodes negative for metastatic carcinoma\par \par 4. Lymph nodes, left level 1, neck dissection\par - Submandibular gland negative for carcinoma\par - 7 lymph nodes negative for metastatic carcinoma\par \par 5. Lymph nodes, left level 2, neck dissection\par - 15 lymph nodes negative for metastatic carcinoma\par \par 6. Lymph nodes, left level 3, neck dissection\par - 10 lymph nodes negative for metastatic carcinoma\par \par 7. Lymph nodes, left level 4, neck dissection\par - 4 lymph nodes negative for metastatic carcinoma\par \par Patient came for initial consultation today, reports feeling well,  She is s/p PEG on 1/13/21, she denies dysphagia, odynophonia, dyspnea, bleeding, fever, chills, signs of infection since surgery. Pt is NPO,  She never smoked and never chew tobacco and no h/o etoh \par \par 2/24/21: Post-op, pt developed a neck abscess/infection s/p I&D and is on abx and packing. She is now cleared by head and neck surgery for chemoRT\par \par 3/22/21: Pt started RT last week and has received one cycle of cisplatin. She reports increased nausea since chemo started. She also has decreased appetite. She is exclusively using the G tube now. She is taking antiemetics with some benefit. \par \par 4/6/21: Finishing CRT. DOing well with minimal AEs. Some nausea-improved with antiemetics. \par \par 4/20/21: Finishing up CRT. Doing well. Maintaining weight. Soem oral pain- not on any pain meds. Sporadic nausea- using antiemetics with relief of symptoms. \par \par \par \par  [de-identified] : sq cell ca

## 2021-04-20 NOTE — PHYSICAL EXAM
[Restricted in physically strenuous activity but ambulatory and able to carry out work of a light or sedentary nature] : Status 1- Restricted in physically strenuous activity but ambulatory and able to carry out work of a light or sedentary nature, e.g., light house work, office work [Thin] : thin [Mucositis] : mucositis [Normal] : grossly intact [de-identified] : right tongue hemiglossectomy, reconstructed, mild mucositis

## 2021-04-20 NOTE — ASSESSMENT
[FreeTextEntry1] : 56 yro female pt never smoker with hx of  thyroid papillary carcinoma ( dr Salmeron) s/p 3/2020 ( [facility] 6/2020) and stroke 11/2018 diagnosed with oral cancer. She is s/p right hemiglossectomy, right neck dissection, tracheostomy 12/29/2020. Pathology result back as  Invasive squamous cell carcinoma, well-differentiated, Perineural invasion and lymphovascular invasion seen. Resection margins negative for carcinoma. PD-L1 %. Patient S/P R glossectomy, bilateral neck dissection, tracheostomy and free flap reconstruction on 12/29/20. [id number].  ECS present.\par Receiving CCRT\par Weekly cisplatin, last dose on 4/27\par Doing well\par Reviewed labs\par TSH low- pt with hx of PTC and is appropriately suppressed but pt states she is not taking levothyroxine. Advised pt to bring all her meds with her for next visit. Will check T3 and T4 today\par Cytopenia- expected AE from chemo. No intervention needed. will follow closely. \par renal and liver function- normal\par OV in 2 weeks\par

## 2021-04-21 ENCOUNTER — NON-APPOINTMENT (OUTPATIENT)
Age: 56
End: 2021-04-21

## 2021-04-21 ENCOUNTER — OUTPATIENT (OUTPATIENT)
Dept: OUTPATIENT SERVICES | Facility: HOSPITAL | Age: 56
LOS: 1 days | Discharge: HOME | End: 2021-04-21

## 2021-04-21 VITALS — HEIGHT: 63 IN | RESPIRATION RATE: 16 BRPM | BODY MASS INDEX: 18.61 KG/M2 | WEIGHT: 105 LBS

## 2021-04-21 DIAGNOSIS — Z98.890 OTHER SPECIFIED POSTPROCEDURAL STATES: Chronic | ICD-10-CM

## 2021-04-21 DIAGNOSIS — C76.0 MALIGNANT NEOPLASM OF HEAD, FACE AND NECK: ICD-10-CM

## 2021-04-21 PROCEDURE — G6002: CPT | Mod: 26

## 2021-04-21 NOTE — HISTORY OF PRESENT ILLNESS
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 She is s/p right hemiglossectomy, right neck dissection, tracheostomy and free flap reconstruction on 12/29/2020. Pathology showed Invasive squamous cell carcinoma, well-differentiated, Perineural invasion and lymphovascular invasion seen. Resection margins negative for carcinoma. PD-L1 %.  [id number]. ECS present.\par She is s/p PEG on 1/13/2021. \par \par Presents today for OTV. Completed 27/33 fx, Tolerating treatment well. c/o neck tightness at times. c/o throat pain. Moisturizing skin as directed. Maintaining weight with feeding tube. On chemo and blood test okay. C/o increased fatigue. Rx magic mouthwash given. Advised to use skin cream to neck / face. Using baking soda solutions to rinse the mouth

## 2021-04-21 NOTE — DISEASE MANAGEMENT
[TTNM] : 2 [NTNM] : 1 [MTNM] : 0 [de-identified] : 3758 [de-identified] : 1522 [de-identified] : OC/Neck

## 2021-04-22 PROCEDURE — G6002: CPT | Mod: 26

## 2021-04-23 ENCOUNTER — APPOINTMENT (OUTPATIENT)
Dept: INFUSION THERAPY | Facility: HOSPITAL | Age: 56
End: 2021-04-23

## 2021-04-23 PROCEDURE — G6002: CPT | Mod: 26

## 2021-04-23 PROCEDURE — 77427 RADIATION TX MANAGEMENT X5: CPT

## 2021-04-26 ENCOUNTER — APPOINTMENT (OUTPATIENT)
Dept: OTOLARYNGOLOGY | Facility: CLINIC | Age: 56
End: 2021-04-26
Payer: MEDICAID

## 2021-04-26 ENCOUNTER — OUTPATIENT (OUTPATIENT)
Dept: OUTPATIENT SERVICES | Facility: HOSPITAL | Age: 56
LOS: 1 days | Discharge: HOME | End: 2021-04-26

## 2021-04-26 VITALS
WEIGHT: 105 LBS | HEIGHT: 63 IN | BODY MASS INDEX: 18.61 KG/M2 | HEART RATE: 108 BPM | SYSTOLIC BLOOD PRESSURE: 113 MMHG | DIASTOLIC BLOOD PRESSURE: 75 MMHG

## 2021-04-26 DIAGNOSIS — Z98.890 OTHER SPECIFIED POSTPROCEDURAL STATES: Chronic | ICD-10-CM

## 2021-04-26 DIAGNOSIS — E86.0 DEHYDRATION: ICD-10-CM

## 2021-04-26 PROCEDURE — 77014: CPT | Mod: 26

## 2021-04-26 PROCEDURE — 99214 OFFICE O/P EST MOD 30 MIN: CPT

## 2021-04-26 PROCEDURE — 99072 ADDL SUPL MATRL&STAF TM PHE: CPT

## 2021-04-26 NOTE — PHYSICAL EXAM
[Midline] : trachea located in midline position [Normal] : no rashes [de-identified] : Incision C/D/I. wound is healing well.  [de-identified] : Flap in place, viable. No signs of fistula.

## 2021-04-26 NOTE — HISTORY OF PRESENT ILLNESS
[de-identified] : 56 yro female pt here for f/up tongue cancer. Pt with h/o with thyroid cancer ( dr Salmeron) s/p 3/2020 ( sched [facility] 6/2020) and stroke 11/2018 referred by Dr. Aman Perea for right tongue lesion. Pt is s/p right hemiglossectomy, right neck dissection, tracheostomy 12/29/2020. Path showed SCC. Pt on CRT, finishing this week. \par Today pt c/o some pain with swallowing saliva.  Pt denies dyspnea, bleeding, fever, chills, signs of infection since discharge. Pt is NPO, with PEG tube in place. Weight is stable. \par Complete review of systems which was performed during a previous encounter was reviewed with the patient and there are no changes except as stated in the HPI section.\par \par \par

## 2021-04-26 NOTE — REASON FOR VISIT
[Subsequent Evaluation] : a subsequent evaluation for [[employer] ] : provided by [employer]   [FreeTextEntry1] : [id number] [FreeTextEntry2] : mitzi [FreeTextEntry3] : mandarin

## 2021-04-26 NOTE — CONSULT LETTER
[Dear  ___] : Dear  [unfilled], [Courtesy Letter:] : I had the pleasure of seeing your patient, [unfilled], in my office today. [Please see my note below.] : Please see my note below. [Consult Closing:] : Thank you very much for allowing me to participate in the care of this patient.  If you have any questions, please do not hesitate to contact me. [Sincerely,] : Sincerely, [FreeTextEntry2] : Dr Aman Perea [FreeTextEntry3] : \par Laurent Busby MD, FACS\par \par Otolaryngology-Head and Neck Surgery\par [employer] [facility]\par

## 2021-04-27 ENCOUNTER — RESULT REVIEW (OUTPATIENT)
Age: 56
End: 2021-04-27

## 2021-04-27 ENCOUNTER — APPOINTMENT (OUTPATIENT)
Dept: INFUSION THERAPY | Facility: HOSPITAL | Age: 56
End: 2021-04-27

## 2021-04-27 ENCOUNTER — NON-APPOINTMENT (OUTPATIENT)
Age: 56
End: 2021-04-27

## 2021-04-27 ENCOUNTER — LABORATORY RESULT (OUTPATIENT)
Age: 56
End: 2021-04-27

## 2021-04-27 VITALS
WEIGHT: 105.6 LBS | HEART RATE: 99 BPM | TEMPERATURE: 98 F | BODY MASS INDEX: 18.71 KG/M2 | SYSTOLIC BLOOD PRESSURE: 114 MMHG | DIASTOLIC BLOOD PRESSURE: 75 MMHG | RESPIRATION RATE: 16 BRPM | OXYGEN SATURATION: 98 %

## 2021-04-27 LAB
BASOPHILS # BLD AUTO: 0 K/UL — SIGNIFICANT CHANGE UP (ref 0–0.2)
BASOPHILS NFR BLD AUTO: 0 % — SIGNIFICANT CHANGE UP (ref 0–2)
EOSINOPHIL # BLD AUTO: 0.02 K/UL — SIGNIFICANT CHANGE UP (ref 0–0.5)
EOSINOPHIL NFR BLD AUTO: 1 % — SIGNIFICANT CHANGE UP (ref 0–6)
HCT VFR BLD CALC: 30.5 % — LOW (ref 34.5–45)
HGB BLD-MCNC: 10.9 G/DL — LOW (ref 11.5–15.5)
LYMPHOCYTES # BLD AUTO: 0.21 K/UL — LOW (ref 1–3.3)
LYMPHOCYTES # BLD AUTO: 10 % — LOW (ref 13–44)
MCHC RBC-ENTMCNC: 30.4 PG — SIGNIFICANT CHANGE UP (ref 27–34)
MCHC RBC-ENTMCNC: 35.7 G/DL — SIGNIFICANT CHANGE UP (ref 32–36)
MCV RBC AUTO: 85 FL — SIGNIFICANT CHANGE UP (ref 80–100)
MONOCYTES # BLD AUTO: 0.33 K/UL — SIGNIFICANT CHANGE UP (ref 0–0.9)
MONOCYTES NFR BLD AUTO: 16 % — HIGH (ref 2–14)
NEUTROPHILS # BLD AUTO: 1.53 K/UL — LOW (ref 1.8–7.4)
NEUTROPHILS NFR BLD AUTO: 73 % — SIGNIFICANT CHANGE UP (ref 43–77)
NRBC # BLD: 0 /100 — SIGNIFICANT CHANGE UP (ref 0–0)
NRBC # BLD: SIGNIFICANT CHANGE UP /100 WBCS (ref 0–0)
PLAT MORPH BLD: NORMAL — SIGNIFICANT CHANGE UP
PLATELET # BLD AUTO: 172 K/UL — SIGNIFICANT CHANGE UP (ref 150–400)
RBC # BLD: 3.59 M/UL — LOW (ref 3.8–5.2)
RBC # FLD: 17.2 % — HIGH (ref 10.3–14.5)
RBC BLD AUTO: SIGNIFICANT CHANGE UP
WBC # BLD: 2.09 K/UL — LOW (ref 3.8–10.5)
WBC # FLD AUTO: 2.09 K/UL — LOW (ref 3.8–10.5)

## 2021-04-27 PROCEDURE — G6002: CPT | Mod: 26

## 2021-04-27 NOTE — DISEASE MANAGEMENT
[Pathological] : TNM Stage: p [TTNM] : 2 [NTNM] : 1 [MTNM] : 0 [III] : III [de-identified] : 4304 [de-identified] : 0124 [de-identified] : OC/Neck

## 2021-04-27 NOTE — REVIEW OF SYSTEMS
[FreeTextEntry6] : feeding tube [Fatigue: Grade 1 - Fatigue relieved by rest] : Fatigue: Grade 1 - Fatigue relieved by rest [Tinnitus - Grade 0] : Tinnitus - Grade 0 [Blurred Vision: Grade 0] : Blurred Vision: Grade 0 [Mucositis Oral: Grade 0] : Mucositis Oral: Grade 0  [Xerostomia: Grade 0] : Xerostomia: Grade 0 [Oral Pain: Grade 1 - Mild pain] : Oral Pain: Grade 1 - Mild pain [Salivary duct inflammation: Grade 1 - Slightly thickened saliva; slightly altered taste (e.g., metallic)] : Salivary duct inflammation: Grade 1 - Slightly thickened saliva; slightly altered taste (e.g., metallic) [Dysgeusia: Grade 1- Altered taste but no change in diet] : Dysgeusia: Grade 1 - Altered taste but no change in diet [Hoarseness: Grade 0] : Hoarseness: Grade 0 [Alopecia: Grade 0] : Alopecia: Grade 0 [Pruritus: Grade 0] : Pruritus: Grade 0 [Skin Atrophy: Grade 0] : Skin Atrophy: Grade 0 [Skin Hyperpigmentation: Grade 1 - Hyperpigmentation covering <10% BSA; no psychosocial impact] : Skin Hyperpigmentation: Grade 1 - Hyperpigmentation covering <10% BSA; no psychosocial impact [Skin Induration: Grade 0] : Skin Induration: Grade 0 [Dermatitis Radiation: Grade 1 - Faint erythema or dry desquamation] : Dermatitis Radiation: Grade 1 - Faint erythema or dry desquamation

## 2021-04-27 NOTE — REASON FOR VISIT
[Routine On-Treatment] : a routine on-treatment visit for [Head and Neck Cancer] : head and neck cancer [Patient Declined  Services] : - None: Patient declined  services [FreeTextEntry3] : Mandarin

## 2021-04-27 NOTE — HISTORY OF PRESENT ILLNESS
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 She is s/p right hemiglossectomy, right neck dissection, tracheostomy and free flap reconstruction on 12/29/2020. Pathology showed Invasive squamous cell carcinoma, well-differentiated, Perineural invasion and lymphovascular invasion seen. Resection margins negative for carcinoma. PD-L1 %.  [id number]. ECS present.\par She is s/p PEG on 1/13/2021. \par \par 4/27/21 completed 31/33 fractions today. Weight is stable on feeding tube. Using MMW and baking soda solutions. Mousturizing skin care, radiation dermatitis improving.\par \par Presents today for OTV. Completed 27/33 fx, Tolerating treatment well. c/o neck tightness at times. c/o throat pain. Moisturizing skin as directed. Maintaining weight with feeding tube. On chemo and blood test okay. C/o increased fatigue. Rx magic mouthwash given. Advised to use skin cream to neck / face. Using baking soda solutions to rinse the mouth

## 2021-04-27 NOTE — VITALS
[Maximal Pain Intensity: 4/10] : 4/10 [Least Pain Intensity: 0/10] : 0/10 [Pain Description/Quality: ___] : Pain description/quality: [unfilled] [Pain Duration: ___] : Pain duration: [unfilled] [Pain Location: ___] : Pain Location: [unfilled] [Adjuvant (neuroleptics)] : adjuvant (neuroleptics) [ECOG Performance Status: 2 - Ambulatory and capable of all self care but unable to carry out any work activities] : Performance Status: 2 - Ambulatory and capable of all self care but unable to carry out any work activities. Up and about more than 50% of waking hours [80: Normal activity with effort; some signs or symptoms of disease.] : 80: Normal activity with effort; some signs or symptoms of disease.

## 2021-04-28 ENCOUNTER — NON-APPOINTMENT (OUTPATIENT)
Age: 56
End: 2021-04-28

## 2021-04-28 DIAGNOSIS — R11.2 NAUSEA WITH VOMITING, UNSPECIFIED: ICD-10-CM

## 2021-04-28 DIAGNOSIS — Z51.11 ENCOUNTER FOR ANTINEOPLASTIC CHEMOTHERAPY: ICD-10-CM

## 2021-04-28 PROCEDURE — G6002: CPT | Mod: 26

## 2021-04-29 PROCEDURE — G6002: CPT | Mod: 26

## 2021-04-29 PROCEDURE — 77427 RADIATION TX MANAGEMENT X5: CPT

## 2021-04-30 ENCOUNTER — APPOINTMENT (OUTPATIENT)
Dept: INFUSION THERAPY | Facility: HOSPITAL | Age: 56
End: 2021-04-30

## 2021-05-04 ENCOUNTER — APPOINTMENT (OUTPATIENT)
Dept: INFUSION THERAPY | Facility: HOSPITAL | Age: 56
End: 2021-05-04

## 2021-05-04 ENCOUNTER — RESULT REVIEW (OUTPATIENT)
Age: 56
End: 2021-05-04

## 2021-05-04 ENCOUNTER — LABORATORY RESULT (OUTPATIENT)
Age: 56
End: 2021-05-04

## 2021-05-04 ENCOUNTER — APPOINTMENT (OUTPATIENT)
Dept: HEMATOLOGY ONCOLOGY | Facility: CLINIC | Age: 56
End: 2021-05-04
Payer: MEDICARE

## 2021-05-04 VITALS
DIASTOLIC BLOOD PRESSURE: 76 MMHG | SYSTOLIC BLOOD PRESSURE: 109 MMHG | RESPIRATION RATE: 14 BRPM | BODY MASS INDEX: 18.75 KG/M2 | HEART RATE: 105 BPM | TEMPERATURE: 98 F | OXYGEN SATURATION: 98 % | WEIGHT: 105.82 LBS

## 2021-05-04 DIAGNOSIS — K13.21 LEUKOPLAKIA OF ORAL MUCOSA, INCLUDING TONGUE: ICD-10-CM

## 2021-05-04 LAB
BASOPHILS # BLD AUTO: 0.03 K/UL — SIGNIFICANT CHANGE UP (ref 0–0.2)
BASOPHILS NFR BLD AUTO: 1 % — SIGNIFICANT CHANGE UP (ref 0–2)
EOSINOPHIL # BLD AUTO: 0.02 K/UL — SIGNIFICANT CHANGE UP (ref 0–0.5)
EOSINOPHIL NFR BLD AUTO: 0.7 % — SIGNIFICANT CHANGE UP (ref 0–6)
HCT VFR BLD CALC: 28.8 % — LOW (ref 34.5–45)
HGB BLD-MCNC: 10.2 G/DL — LOW (ref 11.5–15.5)
IMM GRANULOCYTES NFR BLD AUTO: 1.7 % — HIGH (ref 0–1.5)
LYMPHOCYTES # BLD AUTO: 0.24 K/UL — LOW (ref 1–3.3)
LYMPHOCYTES # BLD AUTO: 8.1 % — LOW (ref 13–44)
MCHC RBC-ENTMCNC: 30.4 PG — SIGNIFICANT CHANGE UP (ref 27–34)
MCHC RBC-ENTMCNC: 35.4 G/DL — SIGNIFICANT CHANGE UP (ref 32–36)
MCV RBC AUTO: 86 FL — SIGNIFICANT CHANGE UP (ref 80–100)
MONOCYTES # BLD AUTO: 0.37 K/UL — SIGNIFICANT CHANGE UP (ref 0–0.9)
MONOCYTES NFR BLD AUTO: 12.5 % — SIGNIFICANT CHANGE UP (ref 2–14)
NEUTROPHILS # BLD AUTO: 2.26 K/UL — SIGNIFICANT CHANGE UP (ref 1.8–7.4)
NEUTROPHILS NFR BLD AUTO: 76 % — SIGNIFICANT CHANGE UP (ref 43–77)
NRBC # BLD: 0 /100 WBCS — SIGNIFICANT CHANGE UP (ref 0–0)
PLATELET # BLD AUTO: 220 K/UL — SIGNIFICANT CHANGE UP (ref 150–400)
RBC # BLD: 3.35 M/UL — LOW (ref 3.8–5.2)
RBC # FLD: 16.1 % — HIGH (ref 10.3–14.5)
WBC # BLD: 2.97 K/UL — LOW (ref 3.8–10.5)
WBC # FLD AUTO: 2.97 K/UL — LOW (ref 3.8–10.5)

## 2021-05-04 PROCEDURE — 99072 ADDL SUPL MATRL&STAF TM PHE: CPT

## 2021-05-04 PROCEDURE — 99214 OFFICE O/P EST MOD 30 MIN: CPT

## 2021-05-04 RX ORDER — NYSTATIN 100000 [USP'U]/ML
100000 SUSPENSION ORAL 4 TIMES DAILY
Qty: 40 | Refills: 0 | Status: ACTIVE | COMMUNITY
Start: 2021-05-04 | End: 1900-01-01

## 2021-05-05 PROBLEM — K13.21 TONGUE LEUKOPLAKIA: Status: ACTIVE | Noted: 2020-05-22

## 2021-05-06 DIAGNOSIS — C02.9 MALIGNANT NEOPLASM OF TONGUE, UNSPECIFIED: ICD-10-CM

## 2021-05-07 ENCOUNTER — APPOINTMENT (OUTPATIENT)
Dept: INFUSION THERAPY | Facility: HOSPITAL | Age: 56
End: 2021-05-07

## 2021-05-10 ENCOUNTER — NON-APPOINTMENT (OUTPATIENT)
Age: 56
End: 2021-05-10

## 2021-05-11 ENCOUNTER — RESULT REVIEW (OUTPATIENT)
Age: 56
End: 2021-05-11

## 2021-05-11 ENCOUNTER — APPOINTMENT (OUTPATIENT)
Dept: INFUSION THERAPY | Facility: HOSPITAL | Age: 56
End: 2021-05-11

## 2021-05-11 ENCOUNTER — LABORATORY RESULT (OUTPATIENT)
Age: 56
End: 2021-05-11

## 2021-05-11 LAB
BASOPHILS # BLD AUTO: 0.01 K/UL — SIGNIFICANT CHANGE UP (ref 0–0.2)
BASOPHILS NFR BLD AUTO: 0.4 % — SIGNIFICANT CHANGE UP (ref 0–2)
EOSINOPHIL # BLD AUTO: 0.01 K/UL — SIGNIFICANT CHANGE UP (ref 0–0.5)
EOSINOPHIL NFR BLD AUTO: 0.4 % — SIGNIFICANT CHANGE UP (ref 0–6)
HCT VFR BLD CALC: 25.4 % — LOW (ref 34.5–45)
HGB BLD-MCNC: 8.9 G/DL — LOW (ref 11.5–15.5)
IMM GRANULOCYTES NFR BLD AUTO: 0.4 % — SIGNIFICANT CHANGE UP (ref 0–1.5)
LYMPHOCYTES # BLD AUTO: 0.28 K/UL — LOW (ref 1–3.3)
LYMPHOCYTES # BLD AUTO: 11.1 % — LOW (ref 13–44)
MCHC RBC-ENTMCNC: 31.2 PG — SIGNIFICANT CHANGE UP (ref 27–34)
MCHC RBC-ENTMCNC: 35 G/DL — SIGNIFICANT CHANGE UP (ref 32–36)
MCV RBC AUTO: 89.1 FL — SIGNIFICANT CHANGE UP (ref 80–100)
MONOCYTES # BLD AUTO: 0.4 K/UL — SIGNIFICANT CHANGE UP (ref 0–0.9)
MONOCYTES NFR BLD AUTO: 15.9 % — HIGH (ref 2–14)
NEUTROPHILS # BLD AUTO: 1.81 K/UL — SIGNIFICANT CHANGE UP (ref 1.8–7.4)
NEUTROPHILS NFR BLD AUTO: 71.8 % — SIGNIFICANT CHANGE UP (ref 43–77)
NRBC # BLD: 0 /100 WBCS — SIGNIFICANT CHANGE UP (ref 0–0)
PLATELET # BLD AUTO: 249 K/UL — SIGNIFICANT CHANGE UP (ref 150–400)
RBC # BLD: 2.85 M/UL — LOW (ref 3.8–5.2)
RBC # FLD: 18 % — HIGH (ref 10.3–14.5)
WBC # BLD: 2.52 K/UL — LOW (ref 3.8–10.5)
WBC # FLD AUTO: 2.52 K/UL — LOW (ref 3.8–10.5)

## 2021-05-12 NOTE — HISTORY OF PRESENT ILLNESS
[Disease: _____________________] : Disease: [unfilled] [T: ___] : T[unfilled] [N: ___] : N[unfilled] [AJCC Stage: ____] : AJCC Stage: [unfilled] [de-identified] : 56 yro female pt never smoker with hx of  thyroid cancer ( dr Salmeron) s/p 3/2020 ( sched [facility] 6/2020) and stroke 11/2018 referred by Dr. Aman Perea for right tongue lesion.Pt states the lesion started after stroke in 2018 and thinks it could be from trauma to the tongue from the teeth, but she started feeling it increased in size from 5/2020. She is s/p right hemiglossectomy, right neck dissection, tracheostomy 12/29/2020. Pathology result back as  Invasive squamous cell carcinoma, well-differentiated, Perineural invasion and lymphovascular invasion seen. Resection margins negative for carcinoma. PD-L1 %. Patient S/P R glossectomy, bilateral neck dissection, tracheostomy and free flap reconstruction on 12/29/20. [id number].  ECS present.\par \par surgical path 1/5/21: Final Diagnosis\par 1. Tongue, right, glossectomy\par - Invasive squamous cell carcinoma, well-differentiated, see synoptic summary\par - Perineural invasion and lymphovascular invasion seen\par - Resection margins negative for carcinoma\par - PD-L1 CPS immunostain 100%\par \par 2. Tongue, additional lateral margin, excision\par - Squamous mucosa negative for carcinoma\par \par 3. Lymph nodes, right levels 1, 2, 3 and 4, neck dissection\par - Level 1: 1 out of 5 lymph nodes positive for metastatic\par carcinoma with extranodal extension; submandibular gland negative for carcinoma\par - Level 2: 1 out of 3 lymph nodes positive for metastatic\par carcinoma with extranodal extension\par - Level 3: 20 lymph nodes negative for metastatic carcinoma\par - Level 4: 20 lymph nodes negative for metastatic carcinoma\par \par 4. Lymph nodes, left level 1, neck dissection\par - Submandibular gland negative for carcinoma\par - 7 lymph nodes negative for metastatic carcinoma\par \par 5. Lymph nodes, left level 2, neck dissection\par - 15 lymph nodes negative for metastatic carcinoma\par \par 6. Lymph nodes, left level 3, neck dissection\par - 10 lymph nodes negative for metastatic carcinoma\par \par 7. Lymph nodes, left level 4, neck dissection\par - 4 lymph nodes negative for metastatic carcinoma\par \par Patient came for initial consultation today, reports feeling well,  She is s/p PEG on 1/13/21, she denies dysphagia, odynophonia, dyspnea, bleeding, fever, chills, signs of infection since surgery. Pt is NPO,  She never smoked and never chew tobacco and no h/o etoh \par \par 2/24/21: Post-op, pt developed a neck abscess/infection s/p I&D and is on abx and packing. She is now cleared by head and neck surgery for chemoRT\par \par 3/22/21: Pt started RT last week and has received one cycle of cisplatin. She reports increased nausea since chemo started. She also has decreased appetite. She is exclusively using the G tube now. She is taking antiemetics with some benefit. \par \par 4/6/21: Finishing CRT. DOing well with minimal AEs. Some nausea-improved with antiemetics. \par \par 4/20/21: Finishing up CRT. Doing well. Maintaining weight. Soem oral pain- not on any pain meds. Sporadic nausea- using antiemetics with relief of symptoms. \par \par 5/4/21: Pt feeling well. Reports compliance with synthroid. Using magic mouthwash, reports some oral pain. \par \par \par  [de-identified] : sq cell ca

## 2021-05-12 NOTE — ASSESSMENT
[FreeTextEntry1] : 56 yro female pt never smoker with hx of  thyroid papillary carcinoma ( dr Salmeron) s/p 3/2020 ( [facility] 6/2020) and stroke 11/2018 diagnosed with oral cancer. She is s/p right hemiglossectomy, right neck dissection, tracheostomy 12/29/2020. Pathology result back as  Invasive squamous cell carcinoma, well-differentiated, Perineural invasion and lymphovascular invasion seen. Resection margins negative for carcinoma. PD-L1 %. Patient S/P R glossectomy, bilateral neck dissection, tracheostomy and free flap reconstruction on 12/29/20. [id number].  ECS present.\par Completed CCRT with weekly cisplatin on 4/27\par Doing well\par Reviewed labs\par TSH low- reports compliance with levothyroxine. \par Cytopenia- expected AE from chemo. No intervention needed. will follow closely. \par renal and liver function- normal\par \par Case seen and discussed with Dr. Lam

## 2021-05-12 NOTE — PHYSICAL EXAM
[Restricted in physically strenuous activity but ambulatory and able to carry out work of a light or sedentary nature] : Status 1- Restricted in physically strenuous activity but ambulatory and able to carry out work of a light or sedentary nature, e.g., light house work, office work [Thin] : thin [Mucositis] : mucositis [Normal] : grossly intact [de-identified] : right tongue hemiglossectomy, reconstructed, mild mucositis

## 2021-05-13 NOTE — PROCEDURE NOTE - NSSITEPREP_SKIN_A_CORE
chlorhexidine/Adherence to aseptic technique: hand hygiene prior to donning barriers (gown, gloves), don cap and mask, sterile drape over patient
Sub acute rehab. However patient prefers d/c home. If patient d/c home,would recommend home with Home PT and assist for all functional mobility./no

## 2021-05-14 ENCOUNTER — APPOINTMENT (OUTPATIENT)
Dept: INFUSION THERAPY | Facility: HOSPITAL | Age: 56
End: 2021-05-14

## 2021-05-17 ENCOUNTER — NON-APPOINTMENT (OUTPATIENT)
Age: 56
End: 2021-05-17

## 2021-05-19 ENCOUNTER — APPOINTMENT (OUTPATIENT)
Dept: SPEECH THERAPY | Facility: CLINIC | Age: 56
End: 2021-05-19

## 2021-06-02 ENCOUNTER — OUTPATIENT (OUTPATIENT)
Dept: OUTPATIENT SERVICES | Facility: HOSPITAL | Age: 56
LOS: 1 days | Discharge: HOME | End: 2021-06-02

## 2021-06-02 DIAGNOSIS — Z98.890 OTHER SPECIFIED POSTPROCEDURAL STATES: Chronic | ICD-10-CM

## 2021-06-02 DIAGNOSIS — C76.0 MALIGNANT NEOPLASM OF HEAD, FACE AND NECK: ICD-10-CM

## 2021-06-03 ENCOUNTER — APPOINTMENT (OUTPATIENT)
Dept: HEMATOLOGY ONCOLOGY | Facility: CLINIC | Age: 56
End: 2021-06-03
Payer: MEDICARE

## 2021-06-03 VITALS
DIASTOLIC BLOOD PRESSURE: 78 MMHG | OXYGEN SATURATION: 96 % | RESPIRATION RATE: 14 BRPM | SYSTOLIC BLOOD PRESSURE: 131 MMHG | BODY MASS INDEX: 18.94 KG/M2 | HEART RATE: 122 BPM | WEIGHT: 106.92 LBS | TEMPERATURE: 98 F

## 2021-06-03 PROCEDURE — 99214 OFFICE O/P EST MOD 30 MIN: CPT

## 2021-06-03 NOTE — PHYSICAL EXAM
[Restricted in physically strenuous activity but ambulatory and able to carry out work of a light or sedentary nature] : Status 1- Restricted in physically strenuous activity but ambulatory and able to carry out work of a light or sedentary nature, e.g., light house work, office work [Thin] : thin [Mucositis] : mucositis [Normal] : grossly intact [de-identified] : right tongue hemiglossectomy, reconstructed, hot potato speech

## 2021-06-03 NOTE — ASSESSMENT
[FreeTextEntry1] : 57 yo female pt never smoker with hx of  thyroid papillary carcinoma ( dr Salmeron) s/p 3/2020 ( [facility] 6/2020) and stroke 11/2018 diagnosed with oral cancer. She is s/p right hemiglossectomy, right neck dissection, tracheostomy 12/29/2020. Pathology result back as  Invasive squamous cell carcinoma, well-differentiated, Perineural invasion and lymphovascular invasion seen. Resection margins negative for carcinoma. PD-L1 %. Patient S/P R glossectomy, bilateral neck dissection, tracheostomy and free flap reconstruction on 12/29/20. [id number].  ECS present.\par Completed CCRT with weekly cisplatin on 4/27\par Doing well\par Reviewed labs-chemo-induced anemia on last test a month ago. will repeat\par TSH low- reports compliance with levothyroxine. Currently taking 112 mcg daily. \par Cytopenia- expected AE from chemo. No intervention needed. will follow closely. \par renal and liver function- normal\par PET/CT in July\par OV in 1 month

## 2021-06-03 NOTE — REVIEW OF SYSTEMS
[Fatigue] : fatigue [Recent Change In Weight] : ~T recent weight change [Dysphagia] : dysphagia [Mucosal Pain] : no mucosal pain [Negative] : Heme/Lymph [FreeTextEntry4] : oral discomfort

## 2021-06-03 NOTE — HISTORY OF PRESENT ILLNESS
[Disease: _____________________] : Disease: [unfilled] [T: ___] : T[unfilled] [N: ___] : N[unfilled] [AJCC Stage: ____] : AJCC Stage: [unfilled] [de-identified] : 56 yro female pt never smoker with hx of  thyroid cancer ( dr Salmeron) s/p 3/2020 ( sched [facility] 6/2020) and stroke 11/2018 referred by Dr. Aman Perea for right tongue lesion.Pt states the lesion started after stroke in 2018 and thinks it could be from trauma to the tongue from the teeth, but she started feeling it increased in size from 5/2020. She is s/p right hemiglossectomy, right neck dissection, tracheostomy 12/29/2020. Pathology result back as  Invasive squamous cell carcinoma, well-differentiated, Perineural invasion and lymphovascular invasion seen. Resection margins negative for carcinoma. PD-L1 %. Patient S/P R glossectomy, bilateral neck dissection, tracheostomy and free flap reconstruction on 12/29/20. [id number].  ECS present.\par \par surgical path 1/5/21: Final Diagnosis\par 1. Tongue, right, glossectomy\par - Invasive squamous cell carcinoma, well-differentiated, see synoptic summary\par - Perineural invasion and lymphovascular invasion seen\par - Resection margins negative for carcinoma\par - PD-L1 CPS immunostain 100%\par \par 2. Tongue, additional lateral margin, excision\par - Squamous mucosa negative for carcinoma\par \par 3. Lymph nodes, right levels 1, 2, 3 and 4, neck dissection\par - Level 1: 1 out of 5 lymph nodes positive for metastatic\par carcinoma with extranodal extension; submandibular gland negative for carcinoma\par - Level 2: 1 out of 3 lymph nodes positive for metastatic\par carcinoma with extranodal extension\par - Level 3: 20 lymph nodes negative for metastatic carcinoma\par - Level 4: 20 lymph nodes negative for metastatic carcinoma\par \par 4. Lymph nodes, left level 1, neck dissection\par - Submandibular gland negative for carcinoma\par - 7 lymph nodes negative for metastatic carcinoma\par \par 5. Lymph nodes, left level 2, neck dissection\par - 15 lymph nodes negative for metastatic carcinoma\par \par 6. Lymph nodes, left level 3, neck dissection\par - 10 lymph nodes negative for metastatic carcinoma\par \par 7. Lymph nodes, left level 4, neck dissection\par - 4 lymph nodes negative for metastatic carcinoma\par \par Patient came for initial consultation today, reports feeling well,  She is s/p PEG on 1/13/21, she denies dysphagia, odynophonia, dyspnea, bleeding, fever, chills, signs of infection since surgery. Pt is NPO,  She never smoked and never chew tobacco and no h/o etoh \par \par 2/24/21: Post-op, pt developed a neck abscess/infection s/p I&D and is on abx and packing. She is now cleared by head and neck surgery for chemoRT\par \par 3/22/21: Pt started RT last week and has received one cycle of cisplatin. She reports increased nausea since chemo started. She also has decreased appetite. She is exclusively using the G tube now. She is taking antiemetics with some benefit. \par \par 4/6/21: Finishing CRT. DOing well with minimal AEs. Some nausea-improved with antiemetics. \par \par 4/20/21: Finishing up CRT. Doing well. Maintaining weight. Soem oral pain- not on any pain meds. Sporadic nausea- using antiemetics with relief of symptoms. \par \par 5/4/21: Pt feeling well. Reports compliance with synthroid. Using magic mouthwash, reports some oral pain. \par \par 6/3/21: Uncomfortable feeling in the mouth, sialorrhea, gained some weight. No pain. Persistent left sided weakness due to prior stroke. \par \par \par  [de-identified] : sq cell ca

## 2021-06-09 ENCOUNTER — APPOINTMENT (OUTPATIENT)
Dept: SPEECH THERAPY | Facility: HOSPITAL | Age: 56
End: 2021-06-09
Payer: MEDICARE

## 2021-06-09 ENCOUNTER — OUTPATIENT (OUTPATIENT)
Dept: OUTPATIENT SERVICES | Facility: HOSPITAL | Age: 56
LOS: 1 days | End: 2021-06-09

## 2021-06-09 ENCOUNTER — APPOINTMENT (OUTPATIENT)
Dept: RADIOLOGY | Facility: HOSPITAL | Age: 56
End: 2021-06-09
Payer: MEDICARE

## 2021-06-09 DIAGNOSIS — Z98.890 OTHER SPECIFIED POSTPROCEDURAL STATES: Chronic | ICD-10-CM

## 2021-06-09 DIAGNOSIS — C02.9 MALIGNANT NEOPLASM OF TONGUE, UNSPECIFIED: ICD-10-CM

## 2021-06-09 PROCEDURE — 74230 X-RAY XM SWLNG FUNCJ C+: CPT | Mod: 26

## 2021-06-14 ENCOUNTER — APPOINTMENT (OUTPATIENT)
Dept: SPEECH THERAPY | Facility: CLINIC | Age: 56
End: 2021-06-14

## 2021-06-17 ENCOUNTER — APPOINTMENT (OUTPATIENT)
Dept: OTOLARYNGOLOGY | Facility: CLINIC | Age: 56
End: 2021-06-17
Payer: MEDICARE

## 2021-06-17 ENCOUNTER — APPOINTMENT (OUTPATIENT)
Dept: RADIATION ONCOLOGY | Facility: CLINIC | Age: 56
End: 2021-06-17
Payer: MEDICAID

## 2021-06-17 VITALS
RESPIRATION RATE: 15 BRPM | HEART RATE: 109 BPM | OXYGEN SATURATION: 98 % | TEMPERATURE: 97.1 F | BODY MASS INDEX: 18.75 KG/M2 | WEIGHT: 105.82 LBS | DIASTOLIC BLOOD PRESSURE: 77 MMHG | SYSTOLIC BLOOD PRESSURE: 119 MMHG | HEIGHT: 63 IN

## 2021-06-17 VITALS
WEIGHT: 104 LBS | HEART RATE: 101 BPM | SYSTOLIC BLOOD PRESSURE: 120 MMHG | DIASTOLIC BLOOD PRESSURE: 79 MMHG | HEIGHT: 63 IN | BODY MASS INDEX: 18.43 KG/M2

## 2021-06-17 PROCEDURE — 99214 OFFICE O/P EST MOD 30 MIN: CPT | Mod: 25

## 2021-06-17 PROCEDURE — 31575 DIAGNOSTIC LARYNGOSCOPY: CPT

## 2021-06-17 PROCEDURE — 99024 POSTOP FOLLOW-UP VISIT: CPT

## 2021-06-17 RX ORDER — DIPHENHYDRAMINE HYDROCHLORIDE AND LIDOCAINE HYDROCHLORIDE AND ALUMINUM HYDROXIDE AND MAGNESIUM HYDRO
KIT
Qty: 500 | Refills: 1 | Status: DISCONTINUED | COMMUNITY
Start: 2021-04-21 | End: 2021-06-17

## 2021-06-17 NOTE — CONSULT LETTER
[Dear  ___] : Dear  [unfilled], [Courtesy Letter:] : I had the pleasure of seeing your patient, [unfilled], in my office today. [Please see my note below.] : Please see my note below. [Consult Closing:] : Thank you very much for allowing me to participate in the care of this patient.  If you have any questions, please do not hesitate to contact me. [FreeTextEntry2] : Dr Aman Perea  [Sincerely,] : Sincerely, [FreeTextEntry3] : \par Laurent Busby MD, FACS\par \par Otolaryngology-Head and Neck Surgery\par [employer] [facility]\par

## 2021-06-17 NOTE — HISTORY OF PRESENT ILLNESS
[de-identified] : 56 yro female with tongue cancer, here for 2 month f/up.  Pt with h/o with thyroid cancer ( dr Salmeron) s/p 3/2020 ( sched [facility] 6/2020) and stroke 11/2018 referred by Dr. Aman Perea for right tongue lesion. Pt is s/p right hemiglossectomy, right neck dissection, tracheostomy 12/29/2020. Path showed SCC. Pt completed CRT in April 2021. \par Today pt denies neck pain, odynophagia, dyspnea, bleeding, fever, chills. Pt is still NPO, with PEG tube in place. Weight is stable. Pt is f/up with SLP for swallow therapy and lymphedema treatment, feeling better.\par Complete review of systems which was performed during a previous encounter was reviewed with the patient and there are no changes except as stated in the HPI section.\par \par \par

## 2021-06-17 NOTE — REASON FOR VISIT
[Subsequent Evaluation] : a subsequent evaluation for [[employer] ] : provided by [employer]   [FreeTextEntry1] : [id number] [FreeTextEntry2] : mitzi [FreeTextEntry3] : mandarin

## 2021-06-21 ENCOUNTER — APPOINTMENT (OUTPATIENT)
Dept: PHYSICAL MEDICINE AND REHAB | Facility: CLINIC | Age: 56
End: 2021-06-21
Payer: MEDICARE

## 2021-06-21 PROCEDURE — 99204 OFFICE O/P NEW MOD 45 MIN: CPT

## 2021-06-21 NOTE — ASSESSMENT
[FreeTextEntry1] : 55 yo F with tongue ca s/p resection, now with lymphedema and limited cervical ROM.\par start lymphedema therapy for neck swelling\par continue SLP, recommend oral stretching, therabite. monitor mouth opening as patient also has trismus\par receives nutrition via peg, will be seeing SLP tomorrow\par will also consider PT program in the future for generalized weakness and prior left sided weakness\par follow up in 4-5weeks

## 2021-06-21 NOTE — HISTORY OF PRESENT ILLNESS
[FreeTextEntry1] : 55 yo RHD female with pmh cva in 2018 (residual LUE and LLE weakness), h/o thyroid ca dx 3/2020 s/p resection, htn, presenting for symptoms related to squamous cell tongue cancer and treatment. She had initial biopsy which was negative for cancer, when symptoms did not improve a repeat biopsy was positive.  She underwent right hemiglossectomy, right neck dissection, tracheostomy 12/2020.  She also underwent chemo and radiation.  She has a peg for nutrition.   She reports generalized weakness and neck tightness.    \par \par Patient had MBS and was recommended to start puree with honey thick liquids but states she has not started it yet.  Her main complaint today is firm neck swelling, reports it causes a tightness but denies pain otherwise. \par She reports swelling began after surgery but worsened about a month later when she was treated for infection which resolved, however swelling continued. \par \par She lives alone in a house with 3 stairs to enter, ambulates with cane at times. \par \par denies falls. \par \par Interviewed with mandarin .\par \par Patient lives alone but states she has a brother who can assist if needed.

## 2021-06-21 NOTE — PHYSICAL EXAM
[FreeTextEntry1] : \par GEN: AAOx3, NAD. +PEG  + swelling in anterior neck, firm to palpation\par PSYCH: Normal mood and affect. Responds appropriately to commands.\par EYES: Sclerae Anicteric. No discharge. EOMI.\par RESP: Breathing unlabored.\par CV: Radial pulses 2+ and equal. No varicosities noted.\par EXT: No C/C/E. \par SKIN:  Incisions well healed,  no erythema  \par LYMPH: No supraclavicular, anterior or posterior cervical lymphadenopathy appreciated.\par GAIT: hemiparetic\par STRENGTH: 5/5 RUE and RLE,  4+/5 LUE and LLE\par REFLEXES: 2+  brachioradialis and patellar on the Right, 3+ on the left. \par SENSATION: Grossly intact to light touch bilateral upper extremities.\par INSP: no visible swelling appreciated\par CERVICAL ROM:  rotation to 45 degrees b/l with stiffness reported to the left\par SHOULDER ROM: Full and pain free bilaterally.\par PALP: there is no tenderness to palpation b/l masseter, mandible \par MOUTH OPENIN mm\par \par \par \par

## 2021-06-22 ENCOUNTER — APPOINTMENT (OUTPATIENT)
Dept: SPEECH THERAPY | Facility: CLINIC | Age: 56
End: 2021-06-22

## 2021-06-22 NOTE — REASON FOR VISIT
[Post-Treatment Evaluation] : post-treatment evaluation for [Head and Neck Cancer] : head and neck cancer [Patient Declined  Services] : - None: Patient declined  services [FreeTextEntry2] : Lowell Howard [FreeTextEntry4] : PA [FreeTextEntry3] : Mandarin [TWNoteComboBox1] : Chinese

## 2021-06-22 NOTE — REVIEW OF SYSTEMS
[Fatigue: Grade 0] : Fatigue: Grade 0 [Mucositis Oral: Grade 0] : Mucositis Oral: Grade 0  [Xerostomia: Grade 1 - Symptomatic (e.g., dry or thick saliva) without significant dietary alteration; unstimulated saliva flow >0.2 ml/min] : Xerostomia: Grade 1 - Symptomatic (e.g., dry or thick saliva) without significant dietary alteration; unstimulated saliva flow >0.2 ml/min [Oral Pain: Grade 0] : Oral Pain: Grade 0 [Cough: Grade 1 - Mild symptoms; nonprescription intervention indicated] : Cough: Grade 1 - Mild symptoms; nonprescription intervention indicated [Skin Hyperpigmentation: Grade 1 - Hyperpigmentation covering <10% BSA; no psychosocial impact] : Skin Hyperpigmentation: Grade 1 - Hyperpigmentation covering <10% BSA; no psychosocial impact [Dermatitis Radiation: Grade 1 - Faint erythema or dry desquamation] : Dermatitis Radiation: Grade 1 - Faint erythema or dry desquamation [FreeTextEntry6] : PEG [de-identified] : PEG

## 2021-06-22 NOTE — HISTORY OF PRESENT ILLNESS
[FreeTextEntry1] : 55 yr old woman with history of oral cavity cancer, diagnosed with Invasive squamous cell carcinoma of the tongue. She is s/p right hemiglossectomy, right neck dissection, tracheostomy and free flap reconstruction on 12/29/2020. Pathology showed Invasive squamous cell carcinoma, well-differentiated, Perineural invasion and lymphovascular invasion seen. Resection margins negative for carcinoma. PD-L1 %.  [id number]. ECS present.\par She is s/p PEG on 1/13/2021.  4/29/2021 Completed RT to oral cavity/neck total dose 66 Gy.\par Presents today for post treatment evaluation.

## 2021-06-29 ENCOUNTER — APPOINTMENT (OUTPATIENT)
Dept: SPEECH THERAPY | Facility: CLINIC | Age: 56
End: 2021-06-29
Payer: MEDICARE

## 2021-06-29 PROCEDURE — 92526 ORAL FUNCTION THERAPY: CPT | Mod: GN

## 2021-07-01 ENCOUNTER — RESULT REVIEW (OUTPATIENT)
Age: 56
End: 2021-07-01

## 2021-07-01 ENCOUNTER — OUTPATIENT (OUTPATIENT)
Dept: OUTPATIENT SERVICES | Facility: HOSPITAL | Age: 56
LOS: 1 days | Discharge: HOME | End: 2021-07-01

## 2021-07-01 ENCOUNTER — OUTPATIENT (OUTPATIENT)
Dept: OUTPATIENT SERVICES | Facility: HOSPITAL | Age: 56
LOS: 1 days | End: 2021-07-01
Payer: MEDICARE

## 2021-07-01 ENCOUNTER — LABORATORY RESULT (OUTPATIENT)
Age: 56
End: 2021-07-01

## 2021-07-01 ENCOUNTER — APPOINTMENT (OUTPATIENT)
Dept: HEMATOLOGY ONCOLOGY | Facility: CLINIC | Age: 56
End: 2021-07-01
Payer: MEDICARE

## 2021-07-01 VITALS
HEIGHT: 63.07 IN | RESPIRATION RATE: 17 BRPM | BODY MASS INDEX: 18.63 KG/M2 | TEMPERATURE: 97.9 F | DIASTOLIC BLOOD PRESSURE: 68 MMHG | HEART RATE: 94 BPM | WEIGHT: 105.16 LBS | OXYGEN SATURATION: 97 % | SYSTOLIC BLOOD PRESSURE: 99 MMHG

## 2021-07-01 DIAGNOSIS — Z98.890 OTHER SPECIFIED POSTPROCEDURAL STATES: Chronic | ICD-10-CM

## 2021-07-01 DIAGNOSIS — R11.0 NAUSEA: ICD-10-CM

## 2021-07-01 DIAGNOSIS — K21.9 GASTRO-ESOPHAGEAL REFLUX DISEASE W/OUT ESOPHAGITIS: ICD-10-CM

## 2021-07-01 DIAGNOSIS — C76.0 MALIGNANT NEOPLASM OF HEAD, FACE AND NECK: ICD-10-CM

## 2021-07-01 DIAGNOSIS — C06.9 MALIGNANT NEOPLASM OF MOUTH, UNSPECIFIED: ICD-10-CM

## 2021-07-01 LAB
BASOPHILS # BLD AUTO: 0.01 K/UL — SIGNIFICANT CHANGE UP (ref 0–0.2)
BASOPHILS NFR BLD AUTO: 0.3 % — SIGNIFICANT CHANGE UP (ref 0–2)
EOSINOPHIL # BLD AUTO: 0.03 K/UL — SIGNIFICANT CHANGE UP (ref 0–0.5)
EOSINOPHIL NFR BLD AUTO: 0.9 % — SIGNIFICANT CHANGE UP (ref 0–6)
HCT VFR BLD CALC: 31 % — LOW (ref 34.5–45)
HGB BLD-MCNC: 10.8 G/DL — LOW (ref 11.5–15.5)
IMM GRANULOCYTES NFR BLD AUTO: 0.3 % — SIGNIFICANT CHANGE UP (ref 0–1.5)
LYMPHOCYTES # BLD AUTO: 0.57 K/UL — LOW (ref 1–3.3)
LYMPHOCYTES # BLD AUTO: 16.9 % — SIGNIFICANT CHANGE UP (ref 13–44)
MCHC RBC-ENTMCNC: 31.7 PG — SIGNIFICANT CHANGE UP (ref 27–34)
MCHC RBC-ENTMCNC: 34.8 G/DL — SIGNIFICANT CHANGE UP (ref 32–36)
MCV RBC AUTO: 90.9 FL — SIGNIFICANT CHANGE UP (ref 80–100)
MONOCYTES # BLD AUTO: 0.26 K/UL — SIGNIFICANT CHANGE UP (ref 0–0.9)
MONOCYTES NFR BLD AUTO: 7.7 % — SIGNIFICANT CHANGE UP (ref 2–14)
NEUTROPHILS # BLD AUTO: 2.5 K/UL — SIGNIFICANT CHANGE UP (ref 1.8–7.4)
NEUTROPHILS NFR BLD AUTO: 73.9 % — SIGNIFICANT CHANGE UP (ref 43–77)
NRBC # BLD: 0 /100 WBCS — SIGNIFICANT CHANGE UP (ref 0–0)
PLATELET # BLD AUTO: 179 K/UL — SIGNIFICANT CHANGE UP (ref 150–400)
RBC # BLD: 3.41 M/UL — LOW (ref 3.8–5.2)
RBC # FLD: 13.1 % — SIGNIFICANT CHANGE UP (ref 10.3–14.5)
WBC # BLD: 3.38 K/UL — LOW (ref 3.8–10.5)
WBC # FLD AUTO: 3.38 K/UL — LOW (ref 3.8–10.5)

## 2021-07-01 PROCEDURE — 86901 BLOOD TYPING SEROLOGIC RH(D): CPT

## 2021-07-01 PROCEDURE — 86900 BLOOD TYPING SEROLOGIC ABO: CPT

## 2021-07-01 PROCEDURE — 99215 OFFICE O/P EST HI 40 MIN: CPT

## 2021-07-01 PROCEDURE — 86850 RBC ANTIBODY SCREEN: CPT

## 2021-07-01 RX ORDER — LANSOPRAZOLE
3 KIT
Qty: 1 | Refills: 5 | Status: ACTIVE | COMMUNITY
Start: 2021-07-01 | End: 1900-01-01

## 2021-07-01 NOTE — PHYSICAL EXAM
[Restricted in physically strenuous activity but ambulatory and able to carry out work of a light or sedentary nature] : Status 1- Restricted in physically strenuous activity but ambulatory and able to carry out work of a light or sedentary nature, e.g., light house work, office work [Thin] : thin [Mucositis] : mucositis [Normal] : grossly intact [de-identified] : right tongue hemiglossectomy, reconstructed, hot potato speech

## 2021-07-01 NOTE — REVIEW OF SYSTEMS
[Fatigue] : fatigue [Recent Change In Weight] : ~T no recent weight change [Dysphagia] : dysphagia [Mucosal Pain] : no mucosal pain [Negative] : Heme/Lymph [FreeTextEntry2] : wt stable [FreeTextEntry4] : dry mouth

## 2021-07-01 NOTE — ASSESSMENT
[FreeTextEntry1] : 55 yo female pt never smoker with hx of  thyroid papillary carcinoma ( dr aSlmeron) s/p 3/2020 ( [facility] 6/2020) and stroke 11/2018 diagnosed with oral cancer. She is s/p right hemiglossectomy, right neck dissection, tracheostomy 12/29/2020. Pathology result back as  Invasive squamous cell carcinoma, well-differentiated, Perineural invasion and lymphovascular invasion seen. Resection margins negative for carcinoma. PD-L1 %. Patient S/P R glossectomy, bilateral neck dissection, tracheostomy and free flap reconstruction on 12/29/20. [id number].  ECS present.\par Completed CCRT with weekly cisplatin on 4/27\par Doing well, slowly recovering. \par Reviewed labs- chemo-induced anemia on last test a month ago. will repeat\par TSH low- reports compliance with levothyroxine. Currently taking 112 mcg daily. \par Cytopenia- expected AE from chemo. No intervention needed. will follow closely. \par renal and liver function- normal. Will repeat\par PET/CT in July\par OV in 1 month\par Pt lives in a basement apartment- no air conditioning. Pt does not have the resources to get air conditioner. I advised her to get a cold air humidifier for now along with a fan. Due to her medical condition, she absolutely needs air conditioning. Will reach out to SW if they can help apply for any programs.

## 2021-07-01 NOTE — HISTORY OF PRESENT ILLNESS
[Disease: _____________________] : Disease: [unfilled] [T: ___] : T[unfilled] [N: ___] : N[unfilled] [AJCC Stage: ____] : AJCC Stage: [unfilled] [de-identified] : 56 yro female pt never smoker with hx of  thyroid cancer ( dr Salmeron) s/p 3/2020 ( sched [facility] 6/2020) and stroke 11/2018 referred by Dr. Aman Perea for right tongue lesion.Pt states the lesion started after stroke in 2018 and thinks it could be from trauma to the tongue from the teeth, but she started feeling it increased in size from 5/2020. She is s/p right hemiglossectomy, right neck dissection, tracheostomy 12/29/2020. Pathology result back as  Invasive squamous cell carcinoma, well-differentiated, Perineural invasion and lymphovascular invasion seen. Resection margins negative for carcinoma. PD-L1 %. Patient S/P R glossectomy, bilateral neck dissection, tracheostomy and free flap reconstruction on 12/29/20. [id number].  ECS present.\par \par surgical path 1/5/21: Final Diagnosis\par 1. Tongue, right, glossectomy\par - Invasive squamous cell carcinoma, well-differentiated, see synoptic summary\par - Perineural invasion and lymphovascular invasion seen\par - Resection margins negative for carcinoma\par - PD-L1 CPS immunostain 100%\par \par 2. Tongue, additional lateral margin, excision\par - Squamous mucosa negative for carcinoma\par \par 3. Lymph nodes, right levels 1, 2, 3 and 4, neck dissection\par - Level 1: 1 out of 5 lymph nodes positive for metastatic\par carcinoma with extranodal extension; submandibular gland negative for carcinoma\par - Level 2: 1 out of 3 lymph nodes positive for metastatic\par carcinoma with extranodal extension\par - Level 3: 20 lymph nodes negative for metastatic carcinoma\par - Level 4: 20 lymph nodes negative for metastatic carcinoma\par \par 4. Lymph nodes, left level 1, neck dissection\par - Submandibular gland negative for carcinoma\par - 7 lymph nodes negative for metastatic carcinoma\par \par 5. Lymph nodes, left level 2, neck dissection\par - 15 lymph nodes negative for metastatic carcinoma\par \par 6. Lymph nodes, left level 3, neck dissection\par - 10 lymph nodes negative for metastatic carcinoma\par \par 7. Lymph nodes, left level 4, neck dissection\par - 4 lymph nodes negative for metastatic carcinoma\par \par Patient came for initial consultation today, reports feeling well,  She is s/p PEG on 1/13/21, she denies dysphagia, odynophonia, dyspnea, bleeding, fever, chills, signs of infection since surgery. Pt is NPO,  She never smoked and never chew tobacco and no h/o etoh \par \par 2/24/21: Post-op, pt developed a neck abscess/infection s/p I&D and is on abx and packing. She is now cleared by head and neck surgery for chemoRT\par \par 3/22/21: Pt started RT last week and has received one cycle of cisplatin. She reports increased nausea since chemo started. She also has decreased appetite. She is exclusively using the G tube now. She is taking antiemetics with some benefit. \par \par 4/6/21: Finishing CRT. DOing well with minimal AEs. Some nausea-improved with antiemetics. \par \par 4/20/21: Finishing up CRT. Doing well. Maintaining weight. Soem oral pain- not on any pain meds. Sporadic nausea- using antiemetics with relief of symptoms. \par \par 5/4/21: Pt feeling well. Reports compliance with synthroid. Using magic mouthwash, reports some oral pain. \par \par 6/3/21: Uncomfortable feeling in the mouth, sialorrhea, gained some weight. No pain. Persistent left sided weakness due to prior stroke. \par \par 7/1/21: Doing well. No new complaints. Feels nauseously when she eats because if thick phlegm. She reports some acid reflux. She is taking some PO but mostly dependent on G tube. \par \par \par  [de-identified] : sq cell ca

## 2021-07-03 LAB
ALBUMIN SERPL ELPH-MCNC: 4.8 G/DL
ALP BLD-CCNC: 74 U/L
ALT SERPL-CCNC: 22 U/L
ANION GAP SERPL CALC-SCNC: 12 MMOL/L
AST SERPL-CCNC: 20 U/L
BILIRUB SERPL-MCNC: 1.3 MG/DL
BUN SERPL-MCNC: 15 MG/DL
CALCIUM SERPL-MCNC: 10 MG/DL
CHLORIDE SERPL-SCNC: 101 MMOL/L
CO2 SERPL-SCNC: 27 MMOL/L
CREAT SERPL-MCNC: 0.6 MG/DL
GLUCOSE SERPL-MCNC: 100 MG/DL
MAGNESIUM SERPL-MCNC: 2.2 MG/DL
POTASSIUM SERPL-SCNC: 4.5 MMOL/L
PROT SERPL-MCNC: 7.3 G/DL
SODIUM SERPL-SCNC: 140 MMOL/L
TSH SERPL-ACNC: 0.01 UIU/ML

## 2021-07-09 ENCOUNTER — NON-APPOINTMENT (OUTPATIENT)
Age: 56
End: 2021-07-09

## 2021-07-25 ENCOUNTER — APPOINTMENT (OUTPATIENT)
Dept: NUCLEAR MEDICINE | Facility: IMAGING CENTER | Age: 56
End: 2021-07-25
Payer: MEDICARE

## 2021-07-25 ENCOUNTER — OUTPATIENT (OUTPATIENT)
Dept: OUTPATIENT SERVICES | Facility: HOSPITAL | Age: 56
LOS: 1 days | End: 2021-07-25
Payer: MEDICARE

## 2021-07-25 DIAGNOSIS — Z98.890 OTHER SPECIFIED POSTPROCEDURAL STATES: Chronic | ICD-10-CM

## 2021-07-25 DIAGNOSIS — K13.21 LEUKOPLAKIA OF ORAL MUCOSA, INCLUDING TONGUE: ICD-10-CM

## 2021-07-25 DIAGNOSIS — R11.0 NAUSEA: ICD-10-CM

## 2021-07-25 PROCEDURE — 78815 PET IMAGE W/CT SKULL-THIGH: CPT

## 2021-07-25 PROCEDURE — 78815 PET IMAGE W/CT SKULL-THIGH: CPT | Mod: 26,PS,MH

## 2021-07-25 PROCEDURE — A9552: CPT

## 2021-07-26 ENCOUNTER — APPOINTMENT (OUTPATIENT)
Dept: PHYSICAL MEDICINE AND REHAB | Facility: CLINIC | Age: 56
End: 2021-07-26
Payer: MEDICARE

## 2021-07-26 VITALS
OXYGEN SATURATION: 99 % | TEMPERATURE: 98.4 F | DIASTOLIC BLOOD PRESSURE: 77 MMHG | SYSTOLIC BLOOD PRESSURE: 115 MMHG | HEART RATE: 96 BPM

## 2021-07-26 PROCEDURE — 99214 OFFICE O/P EST MOD 30 MIN: CPT

## 2021-07-26 NOTE — HISTORY OF PRESENT ILLNESS
[FreeTextEntry1] : 57 yo RHD female with pmh cva in 2018 (residual LUE and LLE weakness), h/o thyroid ca dx 3/2020 s/p resection, htn, presenting for follow up for symptoms related to squamous cell tongue cancer and treatment. She is s/p right hemiglossectomy, right neck dissection, tracheostomy 12/2020 and also underwent chemo and radiation. She has a peg for nutrition and receives all nutrition via peg. She reports generalized weakness and neck tightness. \par \par She denies pain but reports neck stiffness persists but is improving.  She states swelling is unchanged but feels softer.  Weight is 104 today, patient reports weight is stable. \par \par She lives alone in a house with 3 stairs to enter, ambulates with cane but is getting a walker.  \par \par denies falls. \par \par Interviewed with mandarin  [id number] \par

## 2021-07-26 NOTE — ASSESSMENT
[FreeTextEntry1] : 56 year old female pmh cva (left sided residual weakness), now s/p scc of the tongue s/p glossectomy, chemo and radiation, here for follow up of lymphedema\par -also with trismus, monitoring and will order therabite\par -recommend SLP follow up to clarify swallowing restrictions and for oral stretching\par -continue lymphedema therapy, patient has attended only 1 session so far\par -will plan to start PT for L sided weakness and ambulation in the future.  Patient states she is getting a rolling walker. also recommended for AFO for L ankle weakness\par -follow up in 4 weeks

## 2021-07-26 NOTE — PHYSICAL EXAM
[FreeTextEntry1] : GEN: AAOx3, NAD. +PEG + swelling in anterior neck, firm to palpation\par PSYCH: Normal mood and affect. Responds appropriately to commands.\par EYES: Sclerae Anicteric. No discharge. EOMI.\par RESP: Breathing unlabored.\par CV: Radial pulses 2+ and equal. No varicosities noted.\par EXT: No C/C/E. \par SKIN: Incisions well healed, no erythema  \par GAIT: hemiparetic, hip hiking on the left likely due to weak left dorsiflexion\par STRENGTH: 5-/5 RUE and RLE, 4+/5 LUE and LLE including L dorsiflexion\par SENSATION: Grossly intact to light touch bilateral upper extremities.\par INSP: no visible swelling appreciated\par CERVICAL ROM: rotation to 55 degrees b/l with stiffness reported to the left\par SHOULDER ROM: Full and pain free bilaterally.\par PALP: there is no tenderness to palpation b/l masseter, mandible \par MOUTH OPENING: 3 cm\par \par

## 2021-08-04 ENCOUNTER — OUTPATIENT (OUTPATIENT)
Dept: OUTPATIENT SERVICES | Facility: HOSPITAL | Age: 56
LOS: 1 days | Discharge: HOME | End: 2021-08-04

## 2021-08-04 DIAGNOSIS — Z98.890 OTHER SPECIFIED POSTPROCEDURAL STATES: Chronic | ICD-10-CM

## 2021-08-04 DIAGNOSIS — C76.0 MALIGNANT NEOPLASM OF HEAD, FACE AND NECK: ICD-10-CM

## 2021-08-05 ENCOUNTER — RESULT REVIEW (OUTPATIENT)
Age: 56
End: 2021-08-05

## 2021-08-05 ENCOUNTER — APPOINTMENT (OUTPATIENT)
Dept: HEMATOLOGY ONCOLOGY | Facility: CLINIC | Age: 56
End: 2021-08-05
Payer: MEDICARE

## 2021-08-05 VITALS
WEIGHT: 105.82 LBS | RESPIRATION RATE: 16 BRPM | BODY MASS INDEX: 18.7 KG/M2 | SYSTOLIC BLOOD PRESSURE: 119 MMHG | TEMPERATURE: 98 F | OXYGEN SATURATION: 95 % | DIASTOLIC BLOOD PRESSURE: 80 MMHG | HEART RATE: 90 BPM

## 2021-08-05 LAB
BASOPHILS # BLD AUTO: 0.02 K/UL — SIGNIFICANT CHANGE UP (ref 0–0.2)
BASOPHILS NFR BLD AUTO: 0.5 % — SIGNIFICANT CHANGE UP (ref 0–2)
EOSINOPHIL # BLD AUTO: 0.06 K/UL — SIGNIFICANT CHANGE UP (ref 0–0.5)
EOSINOPHIL NFR BLD AUTO: 1.5 % — SIGNIFICANT CHANGE UP (ref 0–6)
HCT VFR BLD CALC: 31.6 % — LOW (ref 34.5–45)
HGB BLD-MCNC: 11 G/DL — LOW (ref 11.5–15.5)
IMM GRANULOCYTES NFR BLD AUTO: 0.3 % — SIGNIFICANT CHANGE UP (ref 0–1.5)
LYMPHOCYTES # BLD AUTO: 0.46 K/UL — LOW (ref 1–3.3)
LYMPHOCYTES # BLD AUTO: 11.6 % — LOW (ref 13–44)
MCHC RBC-ENTMCNC: 31.3 PG — SIGNIFICANT CHANGE UP (ref 27–34)
MCHC RBC-ENTMCNC: 34.8 G/DL — SIGNIFICANT CHANGE UP (ref 32–36)
MCV RBC AUTO: 90 FL — SIGNIFICANT CHANGE UP (ref 80–100)
MONOCYTES # BLD AUTO: 0.32 K/UL — SIGNIFICANT CHANGE UP (ref 0–0.9)
MONOCYTES NFR BLD AUTO: 8.1 % — SIGNIFICANT CHANGE UP (ref 2–14)
NEUTROPHILS # BLD AUTO: 3.08 K/UL — SIGNIFICANT CHANGE UP (ref 1.8–7.4)
NEUTROPHILS NFR BLD AUTO: 78 % — HIGH (ref 43–77)
NRBC # BLD: 0 /100 WBCS — SIGNIFICANT CHANGE UP (ref 0–0)
PLATELET # BLD AUTO: 176 K/UL — SIGNIFICANT CHANGE UP (ref 150–400)
RBC # BLD: 3.51 M/UL — LOW (ref 3.8–5.2)
RBC # FLD: 13 % — SIGNIFICANT CHANGE UP (ref 10.3–14.5)
WBC # BLD: 3.95 K/UL — SIGNIFICANT CHANGE UP (ref 3.8–10.5)
WBC # FLD AUTO: 3.95 K/UL — SIGNIFICANT CHANGE UP (ref 3.8–10.5)

## 2021-08-05 PROCEDURE — 99214 OFFICE O/P EST MOD 30 MIN: CPT

## 2021-08-07 LAB
ALBUMIN SERPL ELPH-MCNC: 4.7 G/DL
ALP BLD-CCNC: 80 U/L
ALT SERPL-CCNC: 25 U/L
ANION GAP SERPL CALC-SCNC: 11 MMOL/L
AST SERPL-CCNC: 24 U/L
BILIRUB SERPL-MCNC: 1.4 MG/DL
BUN SERPL-MCNC: 16 MG/DL
CALCIUM SERPL-MCNC: 9.6 MG/DL
CHLORIDE SERPL-SCNC: 102 MMOL/L
CO2 SERPL-SCNC: 27 MMOL/L
CREAT SERPL-MCNC: 0.58 MG/DL
GLUCOSE SERPL-MCNC: 102 MG/DL
POTASSIUM SERPL-SCNC: 4.3 MMOL/L
PROT SERPL-MCNC: 7.4 G/DL
SODIUM SERPL-SCNC: 141 MMOL/L

## 2021-08-11 ENCOUNTER — APPOINTMENT (OUTPATIENT)
Dept: SPEECH THERAPY | Facility: CLINIC | Age: 56
End: 2021-08-11
Payer: MEDICARE

## 2021-08-11 PROCEDURE — 92526 ORAL FUNCTION THERAPY: CPT | Mod: GN

## 2021-08-19 ENCOUNTER — APPOINTMENT (OUTPATIENT)
Dept: SPEECH THERAPY | Facility: CLINIC | Age: 56
End: 2021-08-19

## 2021-08-21 NOTE — ASSESSMENT
[FreeTextEntry1] : 55 yo female pt never smoker with hx of  thyroid papillary carcinoma ( dr Salmeron) s/p 3/2020 ( [facility] 6/2020) and stroke 11/2018 diagnosed with oral cancer. She is s/p right hemiglossectomy, right neck dissection, tracheostomy 12/29/2020. Pathology result back as  Invasive squamous cell carcinoma, well-differentiated, Perineural invasion and lymphovascular invasion seen. Resection margins negative for carcinoma. PD-L1 %. Patient S/P R glossectomy, bilateral neck dissection, tracheostomy and free flap reconstruction on 12/29/20. [id number].  ECS present.\par Completed CCRT with weekly cisplatin on 4/27\par Doing well, slowly recovering. \par Patient speech improved \par TSH low- reports compliance with levothyroxine. Currently taking 112 mcg daily. \par Cytopenia- expected AE from chemo. No intervention needed. will follow closely. \par renal and liver function- normal. Will repeat\par PET/CT in July\par OV in 1 month\par Pt lives in a basement apartment- no air conditioning. Pt does not have the resources to get air conditioner. I advised her to get a cold air humidifier for now along with a fan. Due to her medical condition, she absolutely needs air conditioning. Will reach out to SW if they can help apply for any programs. \par -Patient requested a letter or documentation medical indication for the air conditioner. The requested letter was provided to patient.  \par

## 2021-08-21 NOTE — REVIEW OF SYSTEMS
[Fatigue] : fatigue [Dysphagia] : dysphagia [Negative] : Heme/Lymph [Fever] : no fever [Chills] : no chills [Recent Change In Weight] : ~T no recent weight change [Mucosal Pain] : no mucosal pain [FreeTextEntry2] : wt improved.  Good appetite  [FreeTextEntry4] : dry mouth

## 2021-08-21 NOTE — HISTORY OF PRESENT ILLNESS
[Disease: _____________________] : Disease: [unfilled] [T: ___] : T[unfilled] [N: ___] : N[unfilled] [AJCC Stage: ____] : AJCC Stage: [unfilled] [de-identified] : 56 yro female pt never smoker with hx of  thyroid cancer ( dr Salmeron) s/p 3/2020 ( sched [facility] 6/2020) and stroke 11/2018 referred by Dr. Aman Perea for right tongue lesion.Pt states the lesion started after stroke in 2018 and thinks it could be from trauma to the tongue from the teeth, but she started feeling it increased in size from 5/2020. She is s/p right hemiglossectomy, right neck dissection, tracheostomy 12/29/2020. Pathology result back as  Invasive squamous cell carcinoma, well-differentiated, Perineural invasion and lymphovascular invasion seen. Resection margins negative for carcinoma. PD-L1 %. Patient S/P R glossectomy, bilateral neck dissection, tracheostomy and free flap reconstruction on 12/29/20. [id number].  ECS present.\par \par surgical path 1/5/21: Final Diagnosis\par 1. Tongue, right, glossectomy\par - Invasive squamous cell carcinoma, well-differentiated, see synoptic summary\par - Perineural invasion and lymphovascular invasion seen\par - Resection margins negative for carcinoma\par - PD-L1 CPS immunostain 100%\par \par 2. Tongue, additional lateral margin, excision\par - Squamous mucosa negative for carcinoma\par \par 3. Lymph nodes, right levels 1, 2, 3 and 4, neck dissection\par - Level 1: 1 out of 5 lymph nodes positive for metastatic\par carcinoma with extranodal extension; submandibular gland negative for carcinoma\par - Level 2: 1 out of 3 lymph nodes positive for metastatic\par carcinoma with extranodal extension\par - Level 3: 20 lymph nodes negative for metastatic carcinoma\par - Level 4: 20 lymph nodes negative for metastatic carcinoma\par \par 4. Lymph nodes, left level 1, neck dissection\par - Submandibular gland negative for carcinoma\par - 7 lymph nodes negative for metastatic carcinoma\par \par 5. Lymph nodes, left level 2, neck dissection\par - 15 lymph nodes negative for metastatic carcinoma\par \par 6. Lymph nodes, left level 3, neck dissection\par - 10 lymph nodes negative for metastatic carcinoma\par \par 7. Lymph nodes, left level 4, neck dissection\par - 4 lymph nodes negative for metastatic carcinoma\par \par Patient came for initial consultation today, reports feeling well,  She is s/p PEG on 1/13/21, she denies dysphagia, odynophonia, dyspnea, bleeding, fever, chills, signs of infection since surgery. Pt is NPO,  She never smoked and never chew tobacco and no h/o etoh \par \par 2/24/21: Post-op, pt developed a neck abscess/infection s/p I&D and is on abx and packing. She is now cleared by head and neck surgery for chemoRT\par \par 3/22/21: Pt started RT last week and has received one cycle of cisplatin. She reports increased nausea since chemo started. She also has decreased appetite. She is exclusively using the G tube now. She is taking antiemetics with some benefit. \par \par 4/6/21: Finishing CRT. DOing well with minimal AEs. Some nausea-improved with antiemetics. \par \par 4/20/21: Finishing up CRT. Doing well. Maintaining weight. Soem oral pain- not on any pain meds. Sporadic nausea- using antiemetics with relief of symptoms. \par \par 5/4/21: Pt feeling well. Reports compliance with synthroid. Using magic mouthwash, reports some oral pain. \par \par 6/3/21: Uncomfortable feeling in the mouth, sialorrhea, gained some weight. No pain. Persistent left sided weakness due to prior stroke. \par \par 7/1/21: Doing well. No new complaints. Feels nauseously when she eats because if thick phlegm. She reports some acid reflux. She is taking some PO but mostly dependent on G tube. \par \par 8/5/21: Lost her insurance and unable to get feeds. Feels little stronger. Speech improved. \par \par \par  [de-identified] : sq cell ca

## 2021-08-21 NOTE — PHYSICAL EXAM
[Restricted in physically strenuous activity but ambulatory and able to carry out work of a light or sedentary nature] : Status 1- Restricted in physically strenuous activity but ambulatory and able to carry out work of a light or sedentary nature, e.g., light house work, office work [Thin] : thin [Normal] : affect appropriate [de-identified] : right tongue hemiglossectomy, reconstructed, speech as improved

## 2021-08-23 ENCOUNTER — APPOINTMENT (OUTPATIENT)
Dept: PHYSICAL MEDICINE AND REHAB | Facility: CLINIC | Age: 56
End: 2021-08-23
Payer: MEDICARE

## 2021-08-23 VITALS
OXYGEN SATURATION: 96 % | DIASTOLIC BLOOD PRESSURE: 72 MMHG | SYSTOLIC BLOOD PRESSURE: 111 MMHG | HEART RATE: 87 BPM | TEMPERATURE: 97.5 F

## 2021-08-23 PROCEDURE — 99214 OFFICE O/P EST MOD 30 MIN: CPT

## 2021-08-23 NOTE — HISTORY OF PRESENT ILLNESS
[FreeTextEntry1] : 57 yo RHD female with pmh cva in 2018 (residual LUE and LLE weakness), h/o thyroid ca dx 3/2020 s/p resection, htn, presenting for follow up for symptoms related to squamous cell tongue cancer and treatment. She is s/p right hemiglossectomy, right neck dissection, tracheostomy 12/2020 and also underwent chemo and radiation. She has a peg for nutrition and receives all nutrition via peg. She reports generalized weakness and neck tightness. \par She has been attending lymphedema therapy with improvement and states she has a few sessions left. She reports swelling is now softer and smaller, and she has been doing massage on her own as well. She denies pain currently but has occasional tenderness R neck at times to palpation.   She reports some improvement in stiffness, states neck is not bothering her currently.  Reports she is happy with lymphedema therapy and interested in how to maintain treatments once it completes, would like to continue for now. \par She lives alone in a house with 3 stairs to enter. She ambulates with cane at home, but is awaiting RW. Ambulating without device today.\par denies falls.\par \par Interviewed with mandarin  [id number]

## 2021-08-23 NOTE — ASSESSMENT
[FreeTextEntry1] : \par 56 year old female pmh cva (left sided residual weakness),  scc of the tongue s/p glossectomy, chemo and radiation, here for follow up of lymphedema of the neck\par -also with trismus, mouth opening unchanged since last visit, therabite ordered last visit. \par -recommend SLP follow up to clarify swallowing restrictions and for oral stretching\par -continue lymphedema therapy \par -will plan to start PT for L sided weakness and ambulation once lymphedema therapy completes. \par -follow up in 4 weeks. \par \par  \par

## 2021-08-23 NOTE — PHYSICAL EXAM
[FreeTextEntry1] : GEN: AAOx3, NAD. +PEG + improved anterior neck swelling, + hoarseness  + PEG\par PSYCH: Normal mood and affect. Responds appropriately to commands.\par EYES: Sclerae Anicteric. No discharge. EOMI.\par RESP: Breathing unlabored.\par CV: Radial pulses 2+ and equal. No varicosities noted.\par EXT: No C/C/E. \par SKIN: Incisions well healed, no erythema \par GAIT: hemiparetic, hip hiking on the left likely due to weak left dorsiflexion, without device\par STRENGTH: 5-/5 RUE and RLE, 4+/5 LUE and LLE including L dorsiflexion\par SENSATION: Grossly intact to light touch bilateral upper extremities.\par CERVICAL ROM: rotation to 55 degrees b/l  \par SHOULDER ROM: Full and pain free bilaterally. (using R hand to assist L shoulder ROM)\par PALP: there is no tenderness to palpation b/l masseter, mandible \par MOUTH OPENING: 3 cm\par \par

## 2021-09-01 ENCOUNTER — APPOINTMENT (OUTPATIENT)
Dept: SPEECH THERAPY | Facility: CLINIC | Age: 56
End: 2021-09-01
Payer: MEDICARE

## 2021-09-01 PROCEDURE — 92526 ORAL FUNCTION THERAPY: CPT | Mod: GN

## 2021-09-02 ENCOUNTER — APPOINTMENT (OUTPATIENT)
Dept: OTOLARYNGOLOGY | Facility: CLINIC | Age: 56
End: 2021-09-02
Payer: MEDICARE

## 2021-09-02 VITALS
BODY MASS INDEX: 18.61 KG/M2 | HEIGHT: 63 IN | SYSTOLIC BLOOD PRESSURE: 115 MMHG | HEART RATE: 103 BPM | WEIGHT: 105 LBS | DIASTOLIC BLOOD PRESSURE: 76 MMHG

## 2021-09-02 PROCEDURE — 99214 OFFICE O/P EST MOD 30 MIN: CPT | Mod: 25

## 2021-09-02 PROCEDURE — 31575 DIAGNOSTIC LARYNGOSCOPY: CPT

## 2021-09-02 NOTE — PHYSICAL EXAM
[Midline] : trachea located in midline position [Normal] : no rashes [de-identified] : Incision well healed. wound is healing well.  [de-identified] : Flap in place, viable. KIMO

## 2021-09-02 NOTE — CONSULT LETTER
[Dear  ___] : Dear  [unfilled], [Courtesy Letter:] : I had the pleasure of seeing your patient, [unfilled], in my office today. [Please see my note below.] : Please see my note below. [Consult Closing:] : Thank you very much for allowing me to participate in the care of this patient.  If you have any questions, please do not hesitate to contact me. [Sincerely,] : Sincerely, [FreeTextEntry2] : Dr Aman Perea  [FreeTextEntry3] : \par Laurent Busby MD, FACS\par \par Otolaryngology-Head and Neck Surgery\par [employer] [facility]\par

## 2021-09-02 NOTE — HISTORY OF PRESENT ILLNESS
[de-identified] : 56 yro female with tongue cancer, here for 2 month f/up.  Pt with h/o with thyroid cancer ( dr Salmeron) s/p 3/2020 ( sched [facility] 6/2020) and stroke 11/2018 referred by Dr. Aman Perea for right tongue lesion. Pt is s/p right hemiglossectomy, right neck dissection, tracheostomy 12/29/2020. Path showed SCC. Pt completed CRT in April 2021. \par Today pt c/o some left-sided tongue pain. \par Denies neck pain, hemoptysis, odynophagia, dyspnea, fever, chills. Pt is still with PEG tube in place, not eating much by mouth yet. Weight is stable. Pt is f/up with SLP for swallow therapy and lymphedema treatment. \par Complete review of systems which was performed during a previous encounter was reviewed with the patient and there are no changes except as stated in the HPI section.\par \par \par

## 2021-09-08 ENCOUNTER — OUTPATIENT (OUTPATIENT)
Dept: OUTPATIENT SERVICES | Facility: HOSPITAL | Age: 56
LOS: 1 days | Discharge: HOME | End: 2021-09-08

## 2021-09-08 DIAGNOSIS — C76.0 MALIGNANT NEOPLASM OF HEAD, FACE AND NECK: ICD-10-CM

## 2021-09-08 DIAGNOSIS — Z98.890 OTHER SPECIFIED POSTPROCEDURAL STATES: Chronic | ICD-10-CM

## 2021-09-09 ENCOUNTER — APPOINTMENT (OUTPATIENT)
Dept: HEMATOLOGY ONCOLOGY | Facility: CLINIC | Age: 56
End: 2021-09-09
Payer: MEDICARE

## 2021-09-09 VITALS
TEMPERATURE: 98 F | BODY MASS INDEX: 18.55 KG/M2 | WEIGHT: 104.72 LBS | SYSTOLIC BLOOD PRESSURE: 113 MMHG | HEART RATE: 86 BPM | OXYGEN SATURATION: 98 % | RESPIRATION RATE: 16 BRPM | DIASTOLIC BLOOD PRESSURE: 78 MMHG

## 2021-09-09 PROCEDURE — 99214 OFFICE O/P EST MOD 30 MIN: CPT

## 2021-09-18 NOTE — PHYSICAL EXAM
[Restricted in physically strenuous activity but ambulatory and able to carry out work of a light or sedentary nature] : Status 1- Restricted in physically strenuous activity but ambulatory and able to carry out work of a light or sedentary nature, e.g., light house work, office work [Thin] : thin [Normal] : affect appropriate [de-identified] : Right tongue hemiglossectomy and reconstruction [de-identified] : + PEG tube in place

## 2021-09-18 NOTE — HISTORY OF PRESENT ILLNESS
[Disease: _____________________] : Disease: [unfilled] [T: ___] : T[unfilled] [N: ___] : N[unfilled] [AJCC Stage: ____] : AJCC Stage: [unfilled] [de-identified] : 56 yro female pt never smoker with hx of  thyroid cancer ( dr Salmeron) s/p 3/2020 ( sched MORGAN 6/2020) and stroke 11/2018 referred by Dr. Aman Perea for right tongue lesion.Pt states the lesion started after stroke in 2018 and thinks it could be from trauma to the tongue from the teeth, but she started feeling it increased in size from 5/2020. She is s/p right hemiglossectomy, right neck dissection, tracheostomy 12/29/2020. Pathology result back as  Invasive squamous cell carcinoma, well-differentiated, Perineural invasion and lymphovascular invasion seen. Resection margins negative for carcinoma. PD-L1 %. Patient S/P R glossectomy, bilateral neck dissection, tracheostomy and free flap reconstruction on 12/29/20. [id number].  ECS present.\par \par surgical path 1/5/21: Final Diagnosis\par 1. Tongue, right, glossectomy\par - Invasive squamous cell carcinoma, well-differentiated, see synoptic summary\par - Perineural invasion and lymphovascular invasion seen\par - Resection margins negative for carcinoma\par - PD-L1 CPS immunostain 100%\par \par 2. Tongue, additional lateral margin, excision\par - Squamous mucosa negative for carcinoma\par \par 3. Lymph nodes, right levels 1, 2, 3 and 4, neck dissection\par - Level 1: 1 out of 5 lymph nodes positive for metastatic\par carcinoma with extranodal extension; submandibular gland negative for carcinoma\par - Level 2: 1 out of 3 lymph nodes positive for metastatic\par carcinoma with extranodal extension\par - Level 3: 20 lymph nodes negative for metastatic carcinoma\par - Level 4: 20 lymph nodes negative for metastatic carcinoma\par \par 4. Lymph nodes, left level 1, neck dissection\par - Submandibular gland negative for carcinoma\par - 7 lymph nodes negative for metastatic carcinoma\par \par 5. Lymph nodes, left level 2, neck dissection\par - 15 lymph nodes negative for metastatic carcinoma\par \par 6. Lymph nodes, left level 3, neck dissection\par - 10 lymph nodes negative for metastatic carcinoma\par \par 7. Lymph nodes, left level 4, neck dissection\par - 4 lymph nodes negative for metastatic carcinoma\par \par Patient came for initial consultation today, reports feeling well,  She is s/p PEG on 1/13/21, she denies dysphagia, odynophonia, dyspnea, bleeding, fever, chills, signs of infection since surgery. Pt is NPO,  She never smoked and never chew tobacco and no h/o etoh \par \par 2/24/21: Post-op, pt developed a neck abscess/infection s/p I&D and is on abx and packing. She is now cleared by head and neck surgery for chemoRT\par \par 3/22/21: Pt started RT last week and has received one cycle of cisplatin. She reports increased nausea since chemo started. She also has decreased appetite. She is exclusively using the G tube now. She is taking antiemetics with some benefit. \par \par 4/6/21: Finishing CRT. DOing well with minimal AEs. Some nausea-improved with antiemetics. \par \par 4/20/21: Finishing up CRT. Doing well. Maintaining weight. Soem oral pain- not on any pain meds. Sporadic nausea- using antiemetics with relief of symptoms. \par \par 5/4/21: Pt feeling well. Reports compliance with synthroid. Using magic mouthwash, reports some oral pain. \par \par 6/3/21: Uncomfortable feeling in the mouth, sialorrhea, gained some weight. No pain. Persistent left sided weakness due to prior stroke. \par \par 7/1/21: Doing well. No new complaints. Feels nauseously when she eats because if thick phlegm. She reports some acid reflux. She is taking some PO but mostly dependent on G tube. \par \par 8/5/21: Lost her insurance and unable to get feeds. Feels little stronger. Speech improved. \par \par 9/9/21: Pt returns for follow-up. She reports that she feels better since her last visit. No complaints of fever, throat pain, chest pain, shortness of breath, nausea, vomiting, diarrhea, abdominal pain, or dysuria. Her strength continues to gradually improve. Her nutritional intake is primarily through her PEG tube currently, though she tries to eat a small amount of food when possible. Her weight has been stable. She reports that an air conditioner will be delivered to her house tomorrow. [de-identified] : sq cell ca

## 2021-09-18 NOTE — ASSESSMENT
[FreeTextEntry1] : 57 y/o F (never smoker) w/ a history of papillary thyroid carcinoma (Dx 2/2020, s/p thyroidectomy in 3/2020, s/p MORGAN in 6/2020), CVA (11/2018), and oropharyngeal cancer s/p R hemiglossectomy, bilateral neck dissection, tracheostomy, and free flap reconstruction on 12/29/20 with pathology showing invasive squamous cell carcinoma ([id number], +PNI, +LVI, +ECS, negative margins, PD-L1 CPS = 100%), s/p PEG tube, s/p CCRT with weekly cisplatin (completed 4/27/21), who presents for follow-up.\par \par # Oropharyngeal cancer\par - Dx 12/2021 ([id number], PD-L1 CPS = 100%)\par - S/p CCRT with weekly cisplatin (completed 4/27/21). She has been doing well since completing treatment, slowly recovering. She continues to use a PEG tube for nutrition. Speech is improved. \par - PET/CT (7/2021) was notable for a new nonspecific, minimally FDG-avid soft tissue density in the left level IIA region. The rest of the findings were otherwise stable.\par - Will plan to repeat scans next month\par - Follow-up in 2 months or sooner as-needed\par \par # Papillary thyroid carcinoma \par - Dx 2/2020 (S/p thyroidectomy in 3/2020, S/p MORGAN in 6/2020)\par - Continue levothyroxine (currently taking 112 mcg daily). Last TSH = 0.01 in 7/2021\par \par ** Pt lives in a basement apartment with no air conditioning. She does not have the resources to get an air conditioner. Due to her medical condition, she absolutely needs air conditioning. SW was contacted and a letter documenting the medical indication was provided. Per pt, an air conditioner will be delivered to her house tomorrow.\par \par Case was discussed with Dr. Lam\par \par [place], PGY6\par Hematology-Oncology Fellow\par \par I was with the hematology/ oncology fellow for the entire visit and agree with above documentation\par \par

## 2021-09-18 NOTE — REVIEW OF SYSTEMS
[Dysphagia] : dysphagia [Fever] : no fever [Recent Change In Weight] : ~T no recent weight change [Eye Pain] : no eye pain [Red Eyes] : eyes not red [Mucosal Pain] : no mucosal pain [Chest Pain] : no chest pain [Palpitations] : no palpitations [Shortness Of Breath] : no shortness of breath [Wheezing] : no wheezing [Abdominal Pain] : no abdominal pain [Vomiting] : no vomiting [Dysuria] : no dysuria [Incontinence] : no incontinence [Joint Pain] : no joint pain [Muscle Pain] : no muscle pain [Skin Rash] : no skin rash [Skin Wound] : no skin wound [Confused] : no confusion [Difficulty Walking] : no difficulty walking [Easy Bleeding] : no tendency for easy bleeding [Easy Bruising] : no tendency for easy bruising [FreeTextEntry4] : + Dry mouth

## 2021-09-20 ENCOUNTER — APPOINTMENT (OUTPATIENT)
Dept: PHYSICAL MEDICINE AND REHAB | Facility: CLINIC | Age: 56
End: 2021-09-20
Payer: MEDICARE

## 2021-09-20 ENCOUNTER — OUTPATIENT (OUTPATIENT)
Dept: OUTPATIENT SERVICES | Facility: HOSPITAL | Age: 56
LOS: 1 days | Discharge: HOME | End: 2021-09-20

## 2021-09-20 VITALS
HEART RATE: 98 BPM | TEMPERATURE: 96.8 F | OXYGEN SATURATION: 96 % | DIASTOLIC BLOOD PRESSURE: 77 MMHG | SYSTOLIC BLOOD PRESSURE: 120 MMHG

## 2021-09-20 DIAGNOSIS — Z98.890 OTHER SPECIFIED POSTPROCEDURAL STATES: Chronic | ICD-10-CM

## 2021-09-20 DIAGNOSIS — C76.0 MALIGNANT NEOPLASM OF HEAD, FACE AND NECK: ICD-10-CM

## 2021-09-20 PROCEDURE — 99214 OFFICE O/P EST MOD 30 MIN: CPT

## 2021-09-20 NOTE — ASSESSMENT
[FreeTextEntry1] : 56 year old female pmh cva (left sided residual weakness), scc of the tongue s/p glossectomy, chemo and radiation, here for follow up of lymphedema of the neck\par -also with trismus, mouth opening unchanged since last visit, previously ordered for therabite, follows with SLP.\par -continue lymphedema therapy for 2 more sessions, patient reports significant improvement\par -referral made for lymphedema pump to maintain treatments\par -will start PT for L sided weakness and ambulation, patient has prior cva but weakness worsened during cancer treatment.  new rx given for RW. \par -follow up in 6 weeks. \par

## 2021-09-20 NOTE — HISTORY OF PRESENT ILLNESS
[FreeTextEntry1] : 55 yo RHD female with pmh cva in 2018 (residual LUE and LLE weakness), h/o thyroid ca dx 3/2020 s/p resection, htn, presenting for follow up for symptoms related to squamous cell tongue cancer and treatment. She is s/p right hemiglossectomy, right neck dissection, tracheostomy 12/2020 and also underwent chemo and radiation. She has a peg for nutrition and continues to receive all nutrition via peg. She reports generalized weakness and neck tightness.  She has L sided weakness s/p CVA however states it worsened after recent surgery, but is starting to improve. She ambulates with cane, and is waiting for RW.  \par She has been attending lymphedema therapy with improvement and states she has a 2 sessions left. She reports swelling is now softer and smaller. She denies pain, feels lymphedema therapy helped her significantly, still has mild stiffness with rotation to the right.\par She lives alone in a house with 3 stairs to enter.  \par denies falls.\par \par Interviewed with mandarin  [id number]\par \par weight is 104 lb today.

## 2021-09-20 NOTE — PHYSICAL EXAM
[FreeTextEntry1] : GEN: AAOx3, NAD. +PEG + mild R>L anterior neck swelling, well healed anterior neck incision, + PEG\par PSYCH: Normal mood and affect. Responds appropriately to commands.\par EYES: Sclerae Anicteric. No discharge. EOMI.\par RESP: Breathing unlabored.\par CV: Radial pulses 2+ and equal. No varicosities noted.\par EXT: No C/C/E. \par SKIN: Incisions well healed, no erythema \par GAIT: hemiparetic, hip hiking on the left likely due to weak left dorsiflexion, without device\par STRENGTH: 5-/5 RUE and RLE, 4+/5 LUE and LLE including L ankle\par SENSATION: Grossly intact to light touch bilateral upper extremities.\par CERVICAL ROM: rotation to 55 degrees b/l \par PALP: there is no tenderness to palpation b/l masseter, mandible \par MOUTH OPENING: 3 cm\par

## 2021-10-15 ENCOUNTER — OUTPATIENT (OUTPATIENT)
Dept: OUTPATIENT SERVICES | Facility: HOSPITAL | Age: 56
LOS: 1 days | End: 2021-10-15
Payer: COMMERCIAL

## 2021-10-15 ENCOUNTER — APPOINTMENT (OUTPATIENT)
Dept: CT IMAGING | Facility: IMAGING CENTER | Age: 56
End: 2021-10-15
Payer: MEDICARE

## 2021-10-15 DIAGNOSIS — C06.9 MALIGNANT NEOPLASM OF MOUTH, UNSPECIFIED: ICD-10-CM

## 2021-10-15 DIAGNOSIS — Z98.890 OTHER SPECIFIED POSTPROCEDURAL STATES: Chronic | ICD-10-CM

## 2021-10-15 PROCEDURE — 70491 CT SOFT TISSUE NECK W/DYE: CPT | Mod: 26

## 2021-10-15 PROCEDURE — 70491 CT SOFT TISSUE NECK W/DYE: CPT

## 2021-10-15 PROCEDURE — 82565 ASSAY OF CREATININE: CPT

## 2021-10-15 PROCEDURE — 71260 CT THORAX DX C+: CPT | Mod: 26

## 2021-10-15 PROCEDURE — 71260 CT THORAX DX C+: CPT

## 2021-10-25 ENCOUNTER — APPOINTMENT (OUTPATIENT)
Dept: PHYSICAL MEDICINE AND REHAB | Facility: CLINIC | Age: 56
End: 2021-10-25
Payer: MEDICARE

## 2021-10-25 PROCEDURE — 99214 OFFICE O/P EST MOD 30 MIN: CPT

## 2021-10-25 NOTE — PHYSICAL EXAM
[FreeTextEntry1] : GEN: AAOx3, NAD. +PEG + mild R>L anterior neck swelling, well healed anterior neck incision, + PEG\par PSYCH: Normal mood and affect. Responds appropriately to commands.\par EYES: Sclerae Anicteric. No discharge. EOMI.\par RESP: Breathing unlabored.\par CV: Radial pulses 2+ and equal. No varicosities noted.\par EXT: No C/C/E. \par SKIN: Incisions well healed, no erythema \par GAIT: hemiparetic, hip hiking on the left likely due to weak left dorsiflexion, without device\par STRENGTH: 5-/5 RUE and RLE, 4+/5 LUE and LLE including L ankle\par SENSATION: Grossly intact to light touch bilateral upper extremities.\par CERVICAL ROM: rotation to 55 degrees b/l \par PALP: there is no tenderness to palpation b/l masseter, mandible \par MOUTH OPENING: 3 cm\par .

## 2021-10-25 NOTE — HISTORY OF PRESENT ILLNESS
[FreeTextEntry1] : \par 55 yo RHD female with pmh cva in 2018 (residual LUE and LLE weakness), h/o thyroid ca dx 3/2020 s/p resection, htn, presenting for follow up for symptoms related to squamous cell tongue cancer and treatment. She is s/p right hemiglossectomy, right neck dissection, tracheostomy 12/2020 and also underwent chemo and radiation. She has a peg for nutrition and continues to receive all nutrition via peg. She reports generalized weakness and neck tightness. She had L sided weakness s/p CVA which worsened after surgery and she started PT for strengthening since last visit. She ambulates with cane at times but is ambulating without device today.  Denies falls.\par She completed lymphedema therapy with improvement. She reports swelling softer and smaller. \par She lives alone in a house with 3 stairs to enter. \par denies falls.\par \par Interviewed with mandarin  ID [id number]\par \par \par

## 2021-10-25 NOTE — ASSESSMENT
[FreeTextEntry1] : 56 year old RHD female pmh cva (left sided residual weakness), scc of the tongue s/p glossectomy, chemo and radiation, here for follow up of lymphedema of the neck\par -also with trismus, mouth opening unchanged since last visit, previously ordered for therabite \par Recommended to continue oral stretching and follow up with SLP\par -referred for flexitouch lymphedema pump.  \par -continue PT for L sided weakness and ambulation, patient has prior cva but weakness worsened during cancer treatment. \par new rx given for RW last visit but did not get it yet\par -follow up in 4 weeks. \par  \par I spent a total of 30 minutes on this encounter including documentation, face to face time, care coordination and reviewing prior records.\par

## 2021-11-02 ENCOUNTER — OUTPATIENT (OUTPATIENT)
Dept: OUTPATIENT SERVICES | Facility: HOSPITAL | Age: 56
LOS: 1 days | Discharge: HOME | End: 2021-11-02

## 2021-11-02 DIAGNOSIS — C76.0 MALIGNANT NEOPLASM OF HEAD, FACE AND NECK: ICD-10-CM

## 2021-11-02 DIAGNOSIS — Z98.890 OTHER SPECIFIED POSTPROCEDURAL STATES: Chronic | ICD-10-CM

## 2021-11-03 ENCOUNTER — APPOINTMENT (OUTPATIENT)
Dept: SPEECH THERAPY | Facility: CLINIC | Age: 56
End: 2021-11-03

## 2021-11-04 ENCOUNTER — APPOINTMENT (OUTPATIENT)
Dept: ULTRASOUND IMAGING | Facility: IMAGING CENTER | Age: 56
End: 2021-11-04

## 2021-11-04 ENCOUNTER — RESULT REVIEW (OUTPATIENT)
Age: 56
End: 2021-11-04

## 2021-11-04 ENCOUNTER — OUTPATIENT (OUTPATIENT)
Dept: OUTPATIENT SERVICES | Facility: HOSPITAL | Age: 56
LOS: 1 days | End: 2021-11-04
Payer: COMMERCIAL

## 2021-11-04 ENCOUNTER — LABORATORY RESULT (OUTPATIENT)
Age: 56
End: 2021-11-04

## 2021-11-04 ENCOUNTER — APPOINTMENT (OUTPATIENT)
Dept: HEMATOLOGY ONCOLOGY | Facility: CLINIC | Age: 56
End: 2021-11-04
Payer: MEDICARE

## 2021-11-04 VITALS
HEART RATE: 96 BPM | OXYGEN SATURATION: 97 % | RESPIRATION RATE: 16 BRPM | DIASTOLIC BLOOD PRESSURE: 88 MMHG | WEIGHT: 108.03 LBS | SYSTOLIC BLOOD PRESSURE: 121 MMHG | TEMPERATURE: 98 F | BODY MASS INDEX: 19.14 KG/M2

## 2021-11-04 DIAGNOSIS — C06.9 MALIGNANT NEOPLASM OF MOUTH, UNSPECIFIED: ICD-10-CM

## 2021-11-04 DIAGNOSIS — Z98.890 OTHER SPECIFIED POSTPROCEDURAL STATES: Chronic | ICD-10-CM

## 2021-11-04 LAB
BASOPHILS # BLD AUTO: 0.02 K/UL — SIGNIFICANT CHANGE UP (ref 0–0.2)
BASOPHILS NFR BLD AUTO: 0.6 % — SIGNIFICANT CHANGE UP (ref 0–2)
EOSINOPHIL # BLD AUTO: 0.16 K/UL — SIGNIFICANT CHANGE UP (ref 0–0.5)
EOSINOPHIL NFR BLD AUTO: 5.2 % — SIGNIFICANT CHANGE UP (ref 0–6)
HCT VFR BLD CALC: 34.4 % — LOW (ref 34.5–45)
HGB BLD-MCNC: 11.4 G/DL — LOW (ref 11.5–15.5)
IMM GRANULOCYTES NFR BLD AUTO: 0 % — SIGNIFICANT CHANGE UP (ref 0–1.5)
LYMPHOCYTES # BLD AUTO: 0.61 K/UL — LOW (ref 1–3.3)
LYMPHOCYTES # BLD AUTO: 19.7 % — SIGNIFICANT CHANGE UP (ref 13–44)
MCHC RBC-ENTMCNC: 29.5 PG — SIGNIFICANT CHANGE UP (ref 27–34)
MCHC RBC-ENTMCNC: 33.1 G/DL — SIGNIFICANT CHANGE UP (ref 32–36)
MCV RBC AUTO: 88.9 FL — SIGNIFICANT CHANGE UP (ref 80–100)
MONOCYTES # BLD AUTO: 0.28 K/UL — SIGNIFICANT CHANGE UP (ref 0–0.9)
MONOCYTES NFR BLD AUTO: 9.1 % — SIGNIFICANT CHANGE UP (ref 2–14)
NEUTROPHILS # BLD AUTO: 2.02 K/UL — SIGNIFICANT CHANGE UP (ref 1.8–7.4)
NEUTROPHILS NFR BLD AUTO: 65.4 % — SIGNIFICANT CHANGE UP (ref 43–77)
NRBC # BLD: 0 /100 WBCS — SIGNIFICANT CHANGE UP (ref 0–0)
PLATELET # BLD AUTO: 173 K/UL — SIGNIFICANT CHANGE UP (ref 150–400)
RBC # BLD: 3.87 M/UL — SIGNIFICANT CHANGE UP (ref 3.8–5.2)
RBC # FLD: 13.4 % — SIGNIFICANT CHANGE UP (ref 10.3–14.5)
WBC # BLD: 3.09 K/UL — LOW (ref 3.8–10.5)
WBC # FLD AUTO: 3.09 K/UL — LOW (ref 3.8–10.5)

## 2021-11-04 PROCEDURE — 10005 FNA BX W/US GDN 1ST LES: CPT

## 2021-11-04 PROCEDURE — 87205 SMEAR GRAM STAIN: CPT

## 2021-11-04 PROCEDURE — 88184 FLOWCYTOMETRY/ TC 1 MARKER: CPT

## 2021-11-04 PROCEDURE — 88185 FLOWCYTOMETRY/TC ADD-ON: CPT

## 2021-11-04 PROCEDURE — 88189 FLOWCYTOMETRY/READ 16 & >: CPT

## 2021-11-04 PROCEDURE — 88173 CYTOPATH EVAL FNA REPORT: CPT | Mod: 26

## 2021-11-04 PROCEDURE — 99215 OFFICE O/P EST HI 40 MIN: CPT

## 2021-11-04 PROCEDURE — 88173 CYTOPATH EVAL FNA REPORT: CPT

## 2021-11-04 PROCEDURE — 88172 CYTP DX EVAL FNA 1ST EA SITE: CPT

## 2021-11-04 PROCEDURE — 88305 TISSUE EXAM BY PATHOLOGIST: CPT | Mod: 26

## 2021-11-04 PROCEDURE — 88305 TISSUE EXAM BY PATHOLOGIST: CPT

## 2021-11-04 NOTE — REVIEW OF SYSTEMS
[Fever] : no fever [Recent Change In Weight] : ~T recent weight change [Eye Pain] : no eye pain [Red Eyes] : eyes not red [Dysphagia] : dysphagia [Mucosal Pain] : no mucosal pain [Chest Pain] : no chest pain [Palpitations] : no palpitations [Shortness Of Breath] : no shortness of breath [Wheezing] : no wheezing [Abdominal Pain] : no abdominal pain [Vomiting] : no vomiting [Dysuria] : no dysuria [Incontinence] : no incontinence [Joint Pain] : no joint pain [Muscle Pain] : no muscle pain [Skin Rash] : no skin rash [Skin Wound] : no skin wound [Confused] : no confusion [Difficulty Walking] : no difficulty walking [Easy Bleeding] : no tendency for easy bleeding [Easy Bruising] : no tendency for easy bruising [FreeTextEntry4] : + Dry mouth, as above

## 2021-11-04 NOTE — ASSESSMENT
[FreeTextEntry1] : 57 y/o F (never smoker) w/ a history of papillary thyroid carcinoma (Dx 2/2020, s/p thyroidectomy in 3/2020, s/p MORGAN in 6/2020), CVA (11/2018), and oropharyngeal cancer s/p R hemiglossectomy, bilateral neck dissection, tracheostomy, and free flap reconstruction on 12/29/20 with pathology showing invasive squamous cell carcinoma ([id number], +PNI, +LVI, +ECS, negative margins, PD-L1 CPS = 100%), s/p PEG tube, s/p CCRT with weekly cisplatin (completed 4/27/21), who presents for follow-up.\par \par # Oropharyngeal cancer\par - Dx 12/2021 ([id number], PD-L1 CPS = 100%)\par - S/p CCRT with weekly cisplatin (completed 4/27/21). She has been doing well since completing treatment, slowly recovering. She continues to use a PEG tube for nutrition. Speech is improved. \par - PET/CT (7/2021) was notable for a new nonspecific, minimally FDG-avid soft tissue density in the left level IIA region. The rest of the findings were otherwise stable.\par Repeat scans from last month shows \par Postoperative changes as described. No evidence for recurrent disease at the operative bed.\par \par Slight increase in size of a left periparotid lymph node which was shown to demonstrate minimal FDG uptake on the most recent PET/CT from 7/25/2021. New left supraclavicular lymph node. Cannot exclude metastatic disease.\par \par \par Of note, pt has received COVID vaccine just a couple of weeks prior and hence, these LAD could be inflammatory in nature\par \par \par Will nevertheless refer for US guided bx of the LN\par \par # Papillary thyroid carcinoma \par - Dx 2/2020 (S/p thyroidectomy in 3/2020, S/p MORGAN in 6/2020)\par - Continue levothyroxine (currently taking 112 mcg daily). Last TSH = 0.01 in 7/2021\par \par

## 2021-11-04 NOTE — HISTORY OF PRESENT ILLNESS
[Disease: _____________________] : Disease: [unfilled] [T: ___] : T[unfilled] [N: ___] : N[unfilled] [AJCC Stage: ____] : AJCC Stage: [unfilled] [de-identified] : 56 yro female pt never smoker with hx of  thyroid cancer ( dr Salmeron) s/p 3/2020 ( sched MORGAN 6/2020) and stroke 11/2018 referred by Dr. Aman Perea for right tongue lesion.Pt states the lesion started after stroke in 2018 and thinks it could be from trauma to the tongue from the teeth, but she started feeling it increased in size from 5/2020. She is s/p right hemiglossectomy, right neck dissection, tracheostomy 12/29/2020. Pathology result back as  Invasive squamous cell carcinoma, well-differentiated, Perineural invasion and lymphovascular invasion seen. Resection margins negative for carcinoma. PD-L1 %. Patient S/P R glossectomy, bilateral neck dissection, tracheostomy and free flap reconstruction on 12/29/20. [id number].  ECS present.\par \par surgical path 1/5/21: Final Diagnosis\par 1. Tongue, right, glossectomy\par - Invasive squamous cell carcinoma, well-differentiated, see synoptic summary\par - Perineural invasion and lymphovascular invasion seen\par - Resection margins negative for carcinoma\par - PD-L1 CPS immunostain 100%\par \par 2. Tongue, additional lateral margin, excision\par - Squamous mucosa negative for carcinoma\par \par 3. Lymph nodes, right levels 1, 2, 3 and 4, neck dissection\par - Level 1: 1 out of 5 lymph nodes positive for metastatic\par carcinoma with extranodal extension; submandibular gland negative for carcinoma\par - Level 2: 1 out of 3 lymph nodes positive for metastatic\par carcinoma with extranodal extension\par - Level 3: 20 lymph nodes negative for metastatic carcinoma\par - Level 4: 20 lymph nodes negative for metastatic carcinoma\par \par 4. Lymph nodes, left level 1, neck dissection\par - Submandibular gland negative for carcinoma\par - 7 lymph nodes negative for metastatic carcinoma\par \par 5. Lymph nodes, left level 2, neck dissection\par - 15 lymph nodes negative for metastatic carcinoma\par \par 6. Lymph nodes, left level 3, neck dissection\par - 10 lymph nodes negative for metastatic carcinoma\par \par 7. Lymph nodes, left level 4, neck dissection\par - 4 lymph nodes negative for metastatic carcinoma\par \par Patient came for initial consultation today, reports feeling well,  She is s/p PEG on 1/13/21, she denies dysphagia, odynophonia, dyspnea, bleeding, fever, chills, signs of infection since surgery. Pt is NPO,  She never smoked and never chew tobacco and no h/o etoh \par \par 2/24/21: Post-op, pt developed a neck abscess/infection s/p I&D and is on abx and packing. She is now cleared by head and neck surgery for chemoRT\par \par 3/22/21: Pt started RT last week and has received one cycle of cisplatin. She reports increased nausea since chemo started. She also has decreased appetite. She is exclusively using the G tube now. She is taking antiemetics with some benefit. \par \par 4/6/21: Finishing CRT. DOing well with minimal AEs. Some nausea-improved with antiemetics. \par \par 4/20/21: Finishing up CRT. Doing well. Maintaining weight. Soem oral pain- not on any pain meds. Sporadic nausea- using antiemetics with relief of symptoms. \par \par 5/4/21: Pt feeling well. Reports compliance with synthroid. Using magic mouthwash, reports some oral pain. \par \par 6/3/21: Uncomfortable feeling in the mouth, sialorrhea, gained some weight. No pain. Persistent left sided weakness due to prior stroke. \par \par 7/1/21: Doing well. No new complaints. Feels nauseously when she eats because if thick phlegm. She reports some acid reflux. She is taking some PO but mostly dependent on G tube. \par \par 8/5/21: Lost her insurance and unable to get feeds. Feels little stronger. Speech improved. \par \par 9/9/21: Pt returns for follow-up. She reports that she feels better since her last visit. No complaints of fever, throat pain, chest pain, shortness of breath, nausea, vomiting, diarrhea, abdominal pain, or dysuria. Her strength continues to gradually improve. Her nutritional intake is primarily through her PEG tube currently, though she tries to eat a small amount of food when possible. Her weight has been stable. She reports that an air conditioner will be delivered to her house tomorrow.\par \par 11/4/21: Gained some weight. Overall doing better. Some rt partotid swelling.  [de-identified] : sq cell ca

## 2021-11-04 NOTE — PHYSICAL EXAM
[Restricted in physically strenuous activity but ambulatory and able to carry out work of a light or sedentary nature] : Status 1- Restricted in physically strenuous activity but ambulatory and able to carry out work of a light or sedentary nature, e.g., light house work, office work [Thin] : thin [Normal] : affect appropriate [de-identified] : Right tongue hemiglossectomy and reconstruction [de-identified] : rt parotid fullness [de-identified] : + PEG tube in place

## 2021-11-08 LAB
ALBUMIN SERPL ELPH-MCNC: 4.6 G/DL
ALP BLD-CCNC: 90 U/L
ALT SERPL-CCNC: 21 U/L
ANION GAP SERPL CALC-SCNC: 13 MMOL/L
AST SERPL-CCNC: 21 U/L
BILIRUB SERPL-MCNC: 1 MG/DL
BUN SERPL-MCNC: 16 MG/DL
CALCIUM SERPL-MCNC: 9.3 MG/DL
CHLORIDE SERPL-SCNC: 103 MMOL/L
CO2 SERPL-SCNC: 26 MMOL/L
CREAT SERPL-MCNC: 0.6 MG/DL
GLUCOSE SERPL-MCNC: 112 MG/DL
MAGNESIUM SERPL-MCNC: 2.2 MG/DL
POTASSIUM SERPL-SCNC: 4.4 MMOL/L
PROT SERPL-MCNC: 7.5 G/DL
SODIUM SERPL-SCNC: 142 MMOL/L
TSH SERPL-ACNC: 0.01 UIU/ML

## 2021-11-09 LAB
NON-GYNECOLOGICAL CYTOLOGY STUDY: SIGNIFICANT CHANGE UP
TM INTERPRETATION: SIGNIFICANT CHANGE UP

## 2021-11-17 NOTE — PRE-OP CHECKLIST - AS BP NONINV METHOD
[address]  Admission Date:   11/10/2021    Note written by:   JERRY AvilaBC      Time seen: 1543    SUBJECTIVE:   RRT called for low desaturation in the 80's, decreased mentation. On arrival, patient with labored respirations in the 40's,  minimally responsive, per nursing, discussion of intubation and vent support were already discussed with patient's family member/POA, consented if needed, prepared for intubation. VSS on arrival   /71   irreg  RR 40   SPO2 81 % on BIPAP @ 100%     Patient intubated 1552 (see intubation note)         OBJECTIVE:        Vitals:    11/17/21 1159 11/17/21 1550 11/17/21 1551 11/17/21 1553   BP: 132/79 120/71 120/70 (!) 146/46   BP Location: RUE - Right upper extremity      Patient Position: Sitting      Pulse: 91 (!) 112 (!) 144 (!) 131   Resp: 12 (!) 40 (!) 27 (!) 35   Temp: 98.1 Â°F (36.7 Â°C) 98.4 Â°F (36.9 Â°C) 98.4 Â°F (36.9 Â°C)    TempSrc: Oral      SpO2: (!) 88% (!) 81% 97% 97%   Weight:       Height:              Scheduled Meds:  â¢ TACROlimus  0.5 mg Oral Nightly Tx   â¢ apixaBAN  2.5 mg Oral 2 times per day   â¢ methylPREDNISolone  40 mg Intravenous BID WC   â¢ nystatin  500,000 Units Swish & Swallow 4x Daily   â¢ furosemide  20 mg Oral Daily   â¢ TACROlimus  1 mg Oral Daily Tx   â¢ dilTIAZem  60 mg Oral BID   â¢ ipratropium-albuterol  3 mL Nebulization Q4H WA Resp   â¢ mycophenolate  360 mg Oral BIDTX   â¢ cholecalciferol  25 mcg Oral Daily   â¢ DULoxetine  60 mg Oral QHS   â¢ fluticasone-umeclidin-vilanterol  1 puff Inhalation Daily   â¢ labetalol  200 mg Oral BID   â¢ rosuvastatin  5 mg Oral Q Evening   â¢ traZODone  50 mg Oral Nightly     Continuous Infusions:  â¢ fentaNYL (SUBLIMAZE) 2,500 mcg/250 mL in sodium chloride 0.9 % infusion       PRN Meds:MIDAZolam, melatonin, LORazepam    Intake/Output last 3 shifts:  I/O last 3 completed shifts:   In: 937.8 [I.V.:937.8]  Out: 400 [Urine:400]  Intake/Output this shift:  No intake/output data recorded. Vent settings for last 24 hours:          Physical Examination    General:  Patient appears older than stated age       Lungs:    Per vent,  RRR,  unlabored, coarse, dim at bases, no wheezing, + rales      Cardiac:  AFIB per monitor, S1S2  irreg- soft, no M/R/G    Abd:  Rounded, soft, nondistended, no grimacing to palp, + BSx4 hypoactive     Neuro:   Intubated, pupils are equal, + gag/cough     MS: Radial +2, pedal +1    Skin: Pale, thin, bruising to extremities at different stages      Recent Labs   Lab 11/17/21  0550 11/16/21  0544 11/16/21  0544 11/15/21  0644 11/15/21  0644   SODIUM 132*  --  135  --  135   POTASSIUM 3.9  --  4.7  --  4.5   CHLORIDE 91*  --  95*  --  96*   CO2 35*  --  36*  --  37*   BUN 41*  --  44*  --  46*   CREATININE 0.76  --  0.86  --  0.86   GLUCOSE 178*   < > 203*   < > 198*   CALCIUM 9.6  --  9.9  --  10.0    < > = values in this interval not displayed. Recent Labs   Lab 11/17/21  0550 11/16/21  0544 11/15/21  0644   SODIUM 132* 135 135   CO2 35* 36* 37*   BUN 41* 44* 46*   CREATININE 0.76 0.86 0.86   CALCIUM 9.6 9.9 10.0   ALBUMIN 3.1* 3.0* 3.1*   BILIRUBIN 0.6 0.6 0.5   ALKPT 55 50 56   GPT 49 37 32   AST 28 33 17   GLUCOSE 178* 203* 198*      Recent Labs   Lab 11/17/21  0551 11/16/21  0544 11/15/21  0644   WBC 11.9* 5.1 5.3   RBC 4.59 4.64 4.58   HGB 13.8 13.8 14.1   HCT 43.4 45.0 44.2   * 85* 119*    Results for Pavan Mcneil (MRN [id number]) as of 11/17/2021 16:48   Ref.  Range 11/17/2021 15:40   PH, ARTERIAL - RESPIRATORY Latest Ref Range: 7.35 - 7.45 Units 7.34 (L)   PCO2, ARTERIAL - RESPIRATORY Latest Ref Range: 32 - 45 mm Hg 76 (HH)   PO2, ARTERIAL - RESPIRATORY Latest Ref Range: 83 - 108 mm Hg 55 (L)   HCO3, ARTERIAL - RESPIRATORY Latest Ref Range: 22 - 28 mmol/L 41 (HH)   BASE EXCESS / DEFICIT, ARTERIAL - RESPIRATORY Latest Ref Range: -2 - 3 mmol/L 11 (H)   O2 CONTENT, ARTERIAL - RESPIRATORY Latest Ref Range: 15 - 23 % 18   O2 SATURATION, ARTERIAL - RESPIRATORY Latest Ref Range: 95 - 99 % 84 (L)   OXYHEMOGLOBIN, ARTERIAL - RESPIRATORY Latest Ref Range: 94.0 - 98.0 % 81.4 (L)   CARBOXYHEMOGLOBIN - RESPIRATORY Latest Ref Range: 1.5 - 15.0 % 1.9   METHEMOGLOBIN - RESPIRATORY Latest Ref Range: <=1.6 % 0.9   SITE - RESPIRATORY Unknown Right Radial     Narrative & Impression   EXAM: XR CHEST PA OR AP 1 VIEW  Â   CLINICAL INDICATION: Shortness of breath. Â   COMPARISON: Portable AP upright chest 11/17/2021 at 3:33 PM.  Â   FINDINGS: On this AP portable semiupright chest persistent bilateral  pulmonary infiltrates are again noted most pronounced in the right lower  lobe. Cardiomegaly is identified with normal pulmonary vascularity. An  endotracheal tube is now in place with its tip 2.5 cm above the ml. A  nasogastric tube is seen extending below the hemidiaphragm. Orthopedic  anchors are seen in the proximal humeri bilaterally. Â   IMPRESSION:  1. Interval placement of an endotracheal tube and nasogastric tube  otherwise no significant change in the appearance the chest since earlier  on the same date. Â   Electronically Signed by: Gilma Carreon M.D. Signed on: 11/17/2021 4:38 PM          IMPRESSION & PLAN:     Weston Garcia is a 76yo female with PMH COPD, on home O2, still smokes 1pk/day, AFIB, CAD, AS, CKD, renal transplant, presented to ED 11/10 with c/o SOB. Patient was admitted with COPD exacerbation. Today, patient's respiratory status and WOB progressively got worse, RRT was called this afternoon called for low saturation in the 80's, decreased mentation. Acute on Chronic Hypoxemic Hypercarbic Respiratory Failure => intubated/vented   AMS, encephalopathy => presumed 2/2 above   COPD exacerbation => continue nebs and steroids     Patient will be transferred to ICU for further evaluation and treatment.      Perfect serv msg sent to PCP/Dr. Janny Velasquez was notified by nursing    Amarilis Wilder, ACNP-BC  Critical Care Nurse Practitioner         >35 mins CC time spent during RRT with > 50% of that time spent at the bedside coordinating care, examination, reviewing chart, documentation and discussion events/intervention/POC with nursing and collaborative physician electronic

## 2021-11-22 ENCOUNTER — APPOINTMENT (OUTPATIENT)
Dept: PHYSICAL MEDICINE AND REHAB | Facility: CLINIC | Age: 56
End: 2021-11-22
Payer: MEDICARE

## 2021-11-22 VITALS
OXYGEN SATURATION: 95 % | TEMPERATURE: 97.2 F | DIASTOLIC BLOOD PRESSURE: 69 MMHG | SYSTOLIC BLOOD PRESSURE: 109 MMHG | HEART RATE: 78 BPM

## 2021-11-22 PROCEDURE — 99214 OFFICE O/P EST MOD 30 MIN: CPT

## 2021-11-22 NOTE — ASSESSMENT
[FreeTextEntry1] : 56 year old RHD female pmh cva (left sided residual weakness), scc of the tongue s/p glossectomy, chemo and radiation, here for follow up of lymphedema of the neck\par -also with trismus, mouth opening 2.5cm today, previously ordered for therabite - seeing SLP next month. \par Recommended to continue oral stretching and follow up with SLP\par -referred for flexitouch lymphedema pump, however patient states it was too expensive for her\par -referral ordered for compression garment for neck lymphedema\par -continue PT for L sided weakness and ambulation, patient has prior cva but weakness worsened during cancer treatment. \par -follow up in 4-6 weeks. \par \par I spent a total of 30 minutes on this encounter including documentation, face to face time, care coordination and reviewing prior records.\par

## 2021-11-22 NOTE — HISTORY OF PRESENT ILLNESS
[FreeTextEntry1] : 57 yo RHD female with pmh cva in 2018 (residual LUE and LLE weakness), h/o thyroid ca dx 3/2020 s/p resection, htn, presenting for follow up for symptoms related to squamous cell tongue cancer and treatment. She is s/p right hemiglossectomy, right neck dissection, tracheostomy 12/2020 and also underwent chemo and radiation. She has a peg for nutrition and continues to receive nutrition via peg but takes small amounts of food by mouth. She reports generalized weakness and neck tightness, however reports symptoms gradually improving and strength and gait improving with PT.   L sided weakness s/p CVA worsened after surgery however she states she is improving Denies falls.\par She completed lymphedema therapy in the past with improvement. She reports swelling softer and smaller.  She is awaiting lymphedema pump however was told it is expensive.  Referred for compression garment. \par She lives alone in a house with 3 stairs to enter. \par denies falls.\par

## 2021-11-22 NOTE — PHYSICAL EXAM
[FreeTextEntry1] : GEN: AAOx3, NAD. +PEG + mild R>L anterior neck swelling firm however softer than in the past\par  well healed anterior neck incision, + PEG\par PSYCH: Normal mood and affect. Responds appropriately to commands.\par EYES: Sclerae Anicteric. No discharge. EOMI.\par RESP: Breathing unlabored.\par CV: Radial pulses 2+ and equal. No varicosities noted.\par EXT: No C/C/E. \par SKIN: Incisions well healed, no erythema \par GAIT: hemiparetic, hip hiking on the left likely due to weak left dorsiflexion, without device\par STRENGTH: 5-/5 RUE and RLE, 4+/5 LUE and LLE including L ankle\par SENSATION: Grossly intact to light touch bilateral upper extremities.\par CERVICAL ROM: rotation to 55 degrees b/l \par PALP: there is no tenderness to palpation b/l masseter, mandible \par MOUTH OPENIN.5 cm\par . \par \par

## 2021-12-01 ENCOUNTER — OUTPATIENT (OUTPATIENT)
Dept: OUTPATIENT SERVICES | Facility: HOSPITAL | Age: 56
LOS: 1 days | Discharge: HOME | End: 2021-12-01

## 2021-12-01 ENCOUNTER — APPOINTMENT (OUTPATIENT)
Dept: SPEECH THERAPY | Facility: CLINIC | Age: 56
End: 2021-12-01

## 2021-12-01 DIAGNOSIS — Z98.890 OTHER SPECIFIED POSTPROCEDURAL STATES: Chronic | ICD-10-CM

## 2021-12-02 ENCOUNTER — APPOINTMENT (OUTPATIENT)
Dept: HEMATOLOGY ONCOLOGY | Facility: CLINIC | Age: 56
End: 2021-12-02
Payer: MEDICARE

## 2021-12-02 VITALS
DIASTOLIC BLOOD PRESSURE: 72 MMHG | OXYGEN SATURATION: 97 % | WEIGHT: 293 LBS | RESPIRATION RATE: 16 BRPM | SYSTOLIC BLOOD PRESSURE: 117 MMHG | HEART RATE: 96 BPM | BODY MASS INDEX: 51.91 KG/M2 | HEIGHT: 63.07 IN | TEMPERATURE: 97.6 F

## 2021-12-02 PROCEDURE — 99214 OFFICE O/P EST MOD 30 MIN: CPT

## 2021-12-02 NOTE — ASSESSMENT
[FreeTextEntry1] : 55 y/o F (never smoker) w/ a history of papillary thyroid carcinoma (Dx 2/2020, s/p thyroidectomy in 3/2020, s/p MORGAN in 6/2020), CVA (11/2018), and oropharyngeal cancer s/p R hemiglossectomy, bilateral neck dissection, tracheostomy, and free flap reconstruction on 12/29/20 with pathology showing invasive squamous cell carcinoma ([id number], +PNI, +LVI, +ECS, negative margins, PD-L1 CPS = 100%), s/p PEG tube, s/p CCRT with weekly cisplatin (completed 4/27/21), who presents for follow-up.\par \par # Oropharyngeal cancer\par - Dx 12/2021 ([id number], PD-L1 CPS = 100%)\par - S/p CCRT with weekly cisplatin (completed 4/27/21). She has been doing well since completing treatment, slowly recovering. She continues to use a PEG tube for nutrition. Eating a little now. Speech is improved. \par - PET/CT (7/2021) was notable for a new nonspecific, minimally FDG-avid soft tissue density in the left level IIA region. The rest of the findings were otherwise stable.\par -Repeat scans from October shows postoperative changes as described. No evidence for recurrent disease at the operative bed. Slight increase in size of a left periparotid lymph node which was shown to demonstrate minimal FDG uptake on the most recent PET/CT from 7/25/2021. New left supraclavicular lymph node. Cannot exclude metastatic disease.\par -She is now s/p bx of the parotid node which came back reactive\par -Will get PET/CT next time. \par -Present at  after that\par -Follow up with speech and swallow team\par -Papillary thyroid carcinoma \par - Dx 2/2020 (S/p thyroidectomy in 3/2020, S/p MORGAN in 6/2020)\par - Continue levothyroxine (currently taking 112 mcg daily). Last TSH = 0.01 last visit\par -Highly recommend COVID vaccine. Pt tells me she has not been vaccinated\par \par

## 2021-12-02 NOTE — HISTORY OF PRESENT ILLNESS
[Disease: _____________________] : Disease: [unfilled] [T: ___] : T[unfilled] [N: ___] : N[unfilled] [AJCC Stage: ____] : AJCC Stage: [unfilled] [de-identified] : 56 yro female pt never smoker with hx of  thyroid cancer ( dr Salmeron) s/p 3/2020 ( sched MORGAN 6/2020) and stroke 11/2018 referred by Dr. Aman Perea for right tongue lesion.Pt states the lesion started after stroke in 2018 and thinks it could be from trauma to the tongue from the teeth, but she started feeling it increased in size from 5/2020. She is s/p right hemiglossectomy, right neck dissection, tracheostomy 12/29/2020. Pathology result back as  Invasive squamous cell carcinoma, well-differentiated, Perineural invasion and lymphovascular invasion seen. Resection margins negative for carcinoma. PD-L1 %. Patient S/P R glossectomy, bilateral neck dissection, tracheostomy and free flap reconstruction on 12/29/20. [id number].  ECS present.\par \par surgical path 1/5/21: Final Diagnosis\par 1. Tongue, right, glossectomy\par - Invasive squamous cell carcinoma, well-differentiated, see synoptic summary\par - Perineural invasion and lymphovascular invasion seen\par - Resection margins negative for carcinoma\par - PD-L1 CPS immunostain 100%\par \par 2. Tongue, additional lateral margin, excision\par - Squamous mucosa negative for carcinoma\par \par 3. Lymph nodes, right levels 1, 2, 3 and 4, neck dissection\par - Level 1: 1 out of 5 lymph nodes positive for metastatic\par carcinoma with extranodal extension; submandibular gland negative for carcinoma\par - Level 2: 1 out of 3 lymph nodes positive for metastatic\par carcinoma with extranodal extension\par - Level 3: 20 lymph nodes negative for metastatic carcinoma\par - Level 4: 20 lymph nodes negative for metastatic carcinoma\par \par 4. Lymph nodes, left level 1, neck dissection\par - Submandibular gland negative for carcinoma\par - 7 lymph nodes negative for metastatic carcinoma\par \par 5. Lymph nodes, left level 2, neck dissection\par - 15 lymph nodes negative for metastatic carcinoma\par \par 6. Lymph nodes, left level 3, neck dissection\par - 10 lymph nodes negative for metastatic carcinoma\par \par 7. Lymph nodes, left level 4, neck dissection\par - 4 lymph nodes negative for metastatic carcinoma\par \par Patient came for initial consultation today, reports feeling well,  She is s/p PEG on 1/13/21, she denies dysphagia, odynophonia, dyspnea, bleeding, fever, chills, signs of infection since surgery. Pt is NPO,  She never smoked and never chew tobacco and no h/o etoh \par \par 2/24/21: Post-op, pt developed a neck abscess/infection s/p I&D and is on abx and packing. She is now cleared by head and neck surgery for chemoRT\par \par 3/22/21: Pt started RT last week and has received one cycle of cisplatin. She reports increased nausea since chemo started. She also has decreased appetite. She is exclusively using the G tube now. She is taking antiemetics with some benefit. \par \par 4/6/21: Finishing CRT. DOing well with minimal AEs. Some nausea-improved with antiemetics. \par \par 4/20/21: Finishing up CRT. Doing well. Maintaining weight. Soem oral pain- not on any pain meds. Sporadic nausea- using antiemetics with relief of symptoms. \par \par 5/4/21: Pt feeling well. Reports compliance with synthroid. Using magic mouthwash, reports some oral pain. \par \par 6/3/21: Uncomfortable feeling in the mouth, sialorrhea, gained some weight. No pain. Persistent left sided weakness due to prior stroke. \par \par 7/1/21: Doing well. No new complaints. Feels nauseously when she eats because if thick phlegm. She reports some acid reflux. She is taking some PO but mostly dependent on G tube. \par \par 8/5/21: Lost her insurance and unable to get feeds. Feels little stronger. Speech improved. \par \par 9/9/21: Pt returns for follow-up. She reports that she feels better since her last visit. No complaints of fever, throat pain, chest pain, shortness of breath, nausea, vomiting, diarrhea, abdominal pain, or dysuria. Her strength continues to gradually improve. Her nutritional intake is primarily through her PEG tube currently, though she tries to eat a small amount of food when possible. Her weight has been stable. She reports that an air conditioner will be delivered to her house tomorrow.\par \par 11/4/21: Gained some weight. Overall doing better. Some rt partotid swelling. \par \par 12/2/21: Rt neck swelling is better. Gained weight. Still using G tube. Started eating rice and cooked vegetables by mouth,  [de-identified] : sq cell ca

## 2021-12-02 NOTE — REVIEW OF SYSTEMS
[Recent Change In Weight] : ~T recent weight change [Dysphagia] : dysphagia [Fever] : no fever [Eye Pain] : no eye pain [Red Eyes] : eyes not red [Mucosal Pain] : no mucosal pain [Chest Pain] : no chest pain [Palpitations] : no palpitations [Shortness Of Breath] : no shortness of breath [Wheezing] : no wheezing [Abdominal Pain] : no abdominal pain [Vomiting] : no vomiting [Dysuria] : no dysuria [Incontinence] : no incontinence [Joint Pain] : no joint pain [Muscle Pain] : no muscle pain [Skin Rash] : no skin rash [Skin Wound] : no skin wound [Confused] : no confusion [Difficulty Walking] : no difficulty walking [Easy Bleeding] : no tendency for easy bleeding [Easy Bruising] : no tendency for easy bruising [FreeTextEntry4] : + Dry mouth, as above

## 2021-12-02 NOTE — PHYSICAL EXAM
[Restricted in physically strenuous activity but ambulatory and able to carry out work of a light or sedentary nature] : Status 1- Restricted in physically strenuous activity but ambulatory and able to carry out work of a light or sedentary nature, e.g., light house work, office work [Thin] : thin [Normal] : affect appropriate [de-identified] : Right tongue hemiglossectomy and reconstruction [de-identified] : rt parotid fullness [de-identified] : + PEG tube in place

## 2021-12-09 ENCOUNTER — APPOINTMENT (OUTPATIENT)
Dept: OTOLARYNGOLOGY | Facility: CLINIC | Age: 56
End: 2021-12-09
Payer: MEDICARE

## 2021-12-09 PROCEDURE — 99214 OFFICE O/P EST MOD 30 MIN: CPT

## 2021-12-09 NOTE — PHYSICAL EXAM
[de-identified] : Incision well healed. wound is healing well.  [Midline] : trachea located in midline position [de-identified] : Flap in place, viable. KIMO [Normal] : no rashes

## 2021-12-09 NOTE — HISTORY OF PRESENT ILLNESS
[de-identified] : 56 yro female with tongue cancer, here for 2 month f/up. History of with thyroid cancer ( dr Salmeron) s/p 3/2020 ( sched MORGAN 6/2020) and stroke 11/2018 referred by Dr. Aman Perea for right tongue lesion. Pt is s/p right hemiglossectomy, right neck dissection, tracheostomy 12/29/2020. Path showed SCC. Pt completed CRT in 04/27/21. \par CT chest and neck 10/15/21\par Denies neck pain, hemoptysis, dysphagia, odynophagia, dyspnea, fever, chills. Pt is still with PEG tube in place, but has been eating by mouth. States has gained some weight since last visit. States following with SLP for swallow therapy and lymphedema treatment. \par Complete review of systems which was performed during a previous encounter was reviewed with the patient and there are no changes except as stated in the HPI section.\par

## 2021-12-22 ENCOUNTER — NON-APPOINTMENT (OUTPATIENT)
Age: 56
End: 2021-12-22

## 2021-12-22 ENCOUNTER — APPOINTMENT (OUTPATIENT)
Dept: RADIOLOGY | Facility: HOSPITAL | Age: 56
End: 2021-12-22

## 2021-12-22 ENCOUNTER — APPOINTMENT (OUTPATIENT)
Dept: SPEECH THERAPY | Facility: HOSPITAL | Age: 56
End: 2021-12-22

## 2021-12-22 ENCOUNTER — OUTPATIENT (OUTPATIENT)
Dept: OUTPATIENT SERVICES | Facility: HOSPITAL | Age: 56
LOS: 1 days | End: 2021-12-22
Payer: MEDICARE

## 2021-12-22 DIAGNOSIS — Z98.890 OTHER SPECIFIED POSTPROCEDURAL STATES: Chronic | ICD-10-CM

## 2021-12-22 DIAGNOSIS — C73 MALIGNANT NEOPLASM OF THYROID GLAND: ICD-10-CM

## 2021-12-22 DIAGNOSIS — C06.9 MALIGNANT NEOPLASM OF MOUTH, UNSPECIFIED: ICD-10-CM

## 2021-12-22 PROCEDURE — 74230 X-RAY XM SWLNG FUNCJ C+: CPT | Mod: 26

## 2022-01-03 ENCOUNTER — APPOINTMENT (OUTPATIENT)
Dept: PHYSICAL MEDICINE AND REHAB | Facility: CLINIC | Age: 57
End: 2022-01-03
Payer: MEDICARE

## 2022-01-03 VITALS — WEIGHT: 113 LBS | BODY MASS INDEX: 19.97 KG/M2

## 2022-01-03 VITALS
TEMPERATURE: 98.2 F | OXYGEN SATURATION: 96 % | SYSTOLIC BLOOD PRESSURE: 114 MMHG | HEART RATE: 89 BPM | DIASTOLIC BLOOD PRESSURE: 72 MMHG

## 2022-01-03 PROCEDURE — 99214 OFFICE O/P EST MOD 30 MIN: CPT

## 2022-01-03 NOTE — HISTORY OF PRESENT ILLNESS
[FreeTextEntry1] : 57 yo RHD female with pmh cva in 2018 (residual LUE and LLE weakness), h/o thyroid ca dx 3/2020 s/p resection, htn, presenting for follow up for symptoms related to squamous cell tongue cancer and treatment. She is s/p right hemiglossectomy, right neck dissection, tracheostomy 12/2020 and also underwent chemo and radiation. She has a peg for nutrition and continues to receive nutrition via peg but takes small amounts of food by mouth. She reports generalized weakness and neck tightness, however reports symptoms gradually improving and strength and gait improving with PT. L sided weakness s/p CVA worsened after surgery however she states she is improving Denies falls.\par She completed lymphedema therapy in the past with improvement. She reports swelling softer and smaller.  Referred for compression garment but did not get one yet.  She attended PT for L sided weakness with improvement in strength. She is ambulating without device today but states she usually uses her cane. \par \par She has PEG but is now tolerating oral diet, underwent barium swallow. \par \par She lives alone in a house with 3 stairs to enter. \par denies falls.\par

## 2022-01-03 NOTE — PHYSICAL EXAM
[FreeTextEntry1] : GEN: AAOx3, NAD. +PEG + mild R>L anterior neck swelling firm to palpation, patient reports improving\par  well healed anterior neck incision, + PEG\par PSYCH: Normal mood and affect. Responds appropriately to commands.\par EYES: Sclerae Anicteric. No discharge. EOMI.\par RESP: Breathing unlabored.\par CV: Radial pulses 2+ and equal. No varicosities noted.\par EXT: No C/C/E. \par SKIN: Incisions well healed, no erythema \par GAIT: hemiparetic, hip hiking on the left likely due to weak left dorsiflexion, without device\par STRENGTH: 5-/5 RUE and RLE, 4+/5 LUE and LLE including L ankle\par SENSATION: Grossly intact to light touch bilateral upper extremities.\par CERVICAL ROM: rotation to 55 degrees b/l \par PALP: there is no tenderness to palpation b/l masseter, mandible \par MOUTH OPENING: 3.5 cm

## 2022-01-03 NOTE — ASSESSMENT
[FreeTextEntry1] : 56 year old RHD female pmh cva (left sided residual weakness), scc of the tongue s/p glossectomy, chemo and radiation, here for follow up of lymphedema of the neck\par Mouth opening 3.5 cm today. continue SLP   \par -patient reports improvement with neck swelling, also improved swallowing. \par -she does not have compression yet but will obtain garment. \par -referred for flexitouch lymphedema pump in the past, states she is still interested in device  \par -follow up in 4-6 weeks. \par \par I spent a total of 30 minutes on this encounter including documentation, face to face time, care coordination and reviewing prior records.\par

## 2022-01-12 DIAGNOSIS — R13.12 DYSPHAGIA, OROPHARYNGEAL PHASE: ICD-10-CM

## 2022-01-29 ENCOUNTER — OUTPATIENT (OUTPATIENT)
Dept: OUTPATIENT SERVICES | Facility: HOSPITAL | Age: 57
LOS: 1 days | Discharge: HOME | End: 2022-01-29

## 2022-01-29 DIAGNOSIS — C76.0 MALIGNANT NEOPLASM OF HEAD, FACE AND NECK: ICD-10-CM

## 2022-01-29 DIAGNOSIS — Z98.890 OTHER SPECIFIED POSTPROCEDURAL STATES: Chronic | ICD-10-CM

## 2022-02-01 ENCOUNTER — APPOINTMENT (OUTPATIENT)
Dept: HEMATOLOGY ONCOLOGY | Facility: CLINIC | Age: 57
End: 2022-02-01
Payer: MEDICARE

## 2022-02-01 VITALS
WEIGHT: 111.77 LBS | HEIGHT: 63.11 IN | OXYGEN SATURATION: 98 % | BODY MASS INDEX: 19.8 KG/M2 | HEART RATE: 100 BPM | SYSTOLIC BLOOD PRESSURE: 130 MMHG | DIASTOLIC BLOOD PRESSURE: 88 MMHG | TEMPERATURE: 97.8 F | RESPIRATION RATE: 17 BRPM

## 2022-02-01 PROCEDURE — 99214 OFFICE O/P EST MOD 30 MIN: CPT

## 2022-02-01 RX ORDER — AMOXICILLIN AND CLAVULANATE POTASSIUM 600; 42.9 MG/5ML; MG/5ML
600-42.9 FOR SUSPENSION ORAL
Qty: 1 | Refills: 0 | Status: DISCONTINUED | COMMUNITY
Start: 2022-02-01 | End: 2022-02-01

## 2022-02-03 NOTE — ASSESSMENT
[FreeTextEntry1] : 55 y/o F (never smoker) w/ a history of papillary thyroid carcinoma (Dx 2/2020, s/p thyroidectomy in 3/2020, s/p MORGAN in 6/2020), CVA (11/2018), and oropharyngeal cancer s/p R hemiglossectomy, bilateral neck dissection, tracheostomy, and free flap reconstruction on 12/29/20 with pathology showing invasive squamous cell carcinoma ([id number], +PNI, +LVI, +ECS, negative margins, PD-L1 CPS = 100%), s/p PEG tube, s/p CCRT with weekly cisplatin (completed 4/27/21), who presents for follow-up.\par \par # Oropharyngeal cancer\par - Dx 12/2021 ([id number], PD-L1 CPS = 100%)\par - S/p CCRT with weekly cisplatin (completed 4/27/21). She has been doing well since completing treatment, slowly recovering. She continues to use a PEG tube for nutrition. Eating a little now. Speech is improved. \par - PET/CT (7/2021) was notable for a new nonspecific, minimally FDG-avid soft tissue density in the left level IIA region. The rest of the findings were otherwise stable.\par -Repeat scans from October shows postoperative changes as described. No evidence for recurrent disease at the operative bed. Slight increase in size of a left periparotid lymph node which was shown to demonstrate minimal FDG uptake on the most recent PET/CT from 7/25/2021. New left supraclavicular lymph node. Cannot exclude metastatic disease.\par -She is now s/p bx of the parotid node which came back reactive\par -Given left facial swelling, will get PET/CT ASAP\par -will also get MRI of the orbit, face\par -Papillary thyroid carcinoma \par - Dx 2/2020 (S/p thyroidectomy in 3/2020, S/p MORGAN in 6/2020)\par - Continue levothyroxine (currently taking 112 mcg daily). Last TSH = 0.01 last visit\par \par \par

## 2022-02-03 NOTE — PHYSICAL EXAM
[Restricted in physically strenuous activity but ambulatory and able to carry out work of a light or sedentary nature] : Status 1- Restricted in physically strenuous activity but ambulatory and able to carry out work of a light or sedentary nature, e.g., light house work, office work [Thin] : thin [Normal] : affect appropriate [de-identified] : Right tongue hemiglossectomy and reconstruction [de-identified] : left facial swelling and stiff neck [de-identified] : + PEG tube in place- completely discolored

## 2022-02-03 NOTE — HISTORY OF PRESENT ILLNESS
[Disease: _____________________] : Disease: [unfilled] [T: ___] : T[unfilled] [N: ___] : N[unfilled] [AJCC Stage: ____] : AJCC Stage: [unfilled] [de-identified] : 57 yro female pt never smoker with hx of  thyroid cancer ( dr Salmeron) s/p 3/2020 ( sched MORGAN 6/2020) and stroke 11/2018 referred by Dr. Aman Perea for right tongue lesion.Pt states the lesion started after stroke in 2018 and thinks it could be from trauma to the tongue from the teeth, but she started feeling it increased in size from 5/2020. She is s/p right hemiglossectomy, right neck dissection, tracheostomy 12/29/2020. Pathology result back as  Invasive squamous cell carcinoma, well-differentiated, Perineural invasion and lymphovascular invasion seen. Resection margins negative for carcinoma. PD-L1 %. Patient S/P R glossectomy, bilateral neck dissection, tracheostomy and free flap reconstruction on 12/29/20. [id number].  ECS present.\par \par surgical path 1/5/21: Final Diagnosis\par 1. Tongue, right, glossectomy\par - Invasive squamous cell carcinoma, well-differentiated, see synoptic summary\par - Perineural invasion and lymphovascular invasion seen\par - Resection margins negative for carcinoma\par - PD-L1 CPS immunostain 100%\par \par 2. Tongue, additional lateral margin, excision\par - Squamous mucosa negative for carcinoma\par \par 3. Lymph nodes, right levels 1, 2, 3 and 4, neck dissection\par - Level 1: 1 out of 5 lymph nodes positive for metastatic\par carcinoma with extranodal extension; submandibular gland negative for carcinoma\par - Level 2: 1 out of 3 lymph nodes positive for metastatic\par carcinoma with extranodal extension\par - Level 3: 20 lymph nodes negative for metastatic carcinoma\par - Level 4: 20 lymph nodes negative for metastatic carcinoma\par \par 4. Lymph nodes, left level 1, neck dissection\par - Submandibular gland negative for carcinoma\par - 7 lymph nodes negative for metastatic carcinoma\par \par 5. Lymph nodes, left level 2, neck dissection\par - 15 lymph nodes negative for metastatic carcinoma\par \par 6. Lymph nodes, left level 3, neck dissection\par - 10 lymph nodes negative for metastatic carcinoma\par \par 7. Lymph nodes, left level 4, neck dissection\par - 4 lymph nodes negative for metastatic carcinoma\par \par Patient came for initial consultation today, reports feeling well,  She is s/p PEG on 1/13/21, she denies dysphagia, odynophonia, dyspnea, bleeding, fever, chills, signs of infection since surgery. Pt is NPO,  She never smoked and never chew tobacco and no h/o etoh \par \par 2/24/21: Post-op, pt developed a neck abscess/infection s/p I&D and is on abx and packing. She is now cleared by head and neck surgery for chemoRT\par \par 3/22/21: Pt started RT last week and has received one cycle of cisplatin. She reports increased nausea since chemo started. She also has decreased appetite. She is exclusively using the G tube now. She is taking antiemetics with some benefit. \par \par 4/6/21: Finishing CRT. DOing well with minimal AEs. Some nausea-improved with antiemetics. \par \par 4/20/21: Finishing up CRT. Doing well. Maintaining weight. Soem oral pain- not on any pain meds. Sporadic nausea- using antiemetics with relief of symptoms. \par \par 5/4/21: Pt feeling well. Reports compliance with synthroid. Using magic mouthwash, reports some oral pain. \par \par 6/3/21: Uncomfortable feeling in the mouth, sialorrhea, gained some weight. No pain. Persistent left sided weakness due to prior stroke. \par \par 7/1/21: Doing well. No new complaints. Feels nauseously when she eats because if thick phlegm. She reports some acid reflux. She is taking some PO but mostly dependent on G tube. \par \par 8/5/21: Lost her insurance and unable to get feeds. Feels little stronger. Speech improved. \par \par 9/9/21: Pt returns for follow-up. She reports that she feels better since her last visit. No complaints of fever, throat pain, chest pain, shortness of breath, nausea, vomiting, diarrhea, abdominal pain, or dysuria. Her strength continues to gradually improve. Her nutritional intake is primarily through her PEG tube currently, though she tries to eat a small amount of food when possible. Her weight has been stable. She reports that an air conditioner will be delivered to her house tomorrow.\par \par 11/4/21: Gained some weight. Overall doing better. Some rt partotid swelling. \par \par 12/2/21: Rt neck swelling is better. Gained weight. Still using G tube. Started eating rice and cooked vegetables by mouth, \par \par 2/1/22: Pt notes left facial swelling which is new. She attributes it to perhaps a bad tooth. She is feeling through G tube which shows discoloration. She has been trying to maintain weight.  [de-identified] : sq cell ca

## 2022-02-03 NOTE — REVIEW OF SYSTEMS
[Recent Change In Weight] : ~T recent weight change [Dysphagia] : dysphagia [Fever] : no fever [Eye Pain] : no eye pain [Red Eyes] : eyes not red [Mucosal Pain] : no mucosal pain [Chest Pain] : no chest pain [Palpitations] : no palpitations [Shortness Of Breath] : no shortness of breath [Wheezing] : no wheezing [Abdominal Pain] : no abdominal pain [Vomiting] : no vomiting [Dysuria] : no dysuria [Incontinence] : no incontinence [Joint Pain] : no joint pain [Muscle Pain] : no muscle pain [Skin Rash] : no skin rash [Skin Wound] : no skin wound [Confused] : no confusion [Difficulty Walking] : no difficulty walking [Easy Bleeding] : no tendency for easy bleeding [Easy Bruising] : no tendency for easy bruising [FreeTextEntry4] : left facial swelling.

## 2022-02-04 PROBLEM — Z93.1 PEG (PERCUTANEOUS ENDOSCOPIC GASTROSTOMY) STATUS: Status: RESOLVED | Noted: 2022-02-04 | Resolved: 2022-02-04

## 2022-02-07 ENCOUNTER — OUTPATIENT (OUTPATIENT)
Dept: OUTPATIENT SERVICES | Facility: HOSPITAL | Age: 57
LOS: 1 days | End: 2022-02-07
Payer: COMMERCIAL

## 2022-02-07 ENCOUNTER — APPOINTMENT (OUTPATIENT)
Dept: THORACIC SURGERY | Facility: CLINIC | Age: 57
End: 2022-02-07
Payer: MEDICARE

## 2022-02-07 ENCOUNTER — APPOINTMENT (OUTPATIENT)
Dept: MRI IMAGING | Facility: IMAGING CENTER | Age: 57
End: 2022-02-07
Payer: MEDICARE

## 2022-02-07 VITALS
WEIGHT: 111 LBS | SYSTOLIC BLOOD PRESSURE: 126 MMHG | RESPIRATION RATE: 17 BRPM | DIASTOLIC BLOOD PRESSURE: 88 MMHG | OXYGEN SATURATION: 98 % | BODY MASS INDEX: 19.67 KG/M2 | HEIGHT: 63 IN | HEART RATE: 117 BPM

## 2022-02-07 DIAGNOSIS — C73 MALIGNANT NEOPLASM OF THYROID GLAND: ICD-10-CM

## 2022-02-07 DIAGNOSIS — Z93.1 GASTROSTOMY STATUS: ICD-10-CM

## 2022-02-07 DIAGNOSIS — Z98.890 OTHER SPECIFIED POSTPROCEDURAL STATES: Chronic | ICD-10-CM

## 2022-02-07 PROCEDURE — 70543 MRI ORBT/FAC/NCK W/O &W/DYE: CPT

## 2022-02-07 PROCEDURE — A9585: CPT

## 2022-02-07 PROCEDURE — 70543 MRI ORBT/FAC/NCK W/O &W/DYE: CPT | Mod: 26

## 2022-02-07 PROCEDURE — 99215 OFFICE O/P EST HI 40 MIN: CPT

## 2022-02-07 NOTE — CONSULT LETTER
[Dear  ___] : Dear  [unfilled], [Courtesy Letter:] : I had the pleasure of seeing your patient, [unfilled], in my office today. [Please see my note below.] : Please see my note below. [Sincerely,] : Sincerely, [FreeTextEntry2] : Dr. Kelli Lam (Hem/Onc/ref) [FreeTextEntry3] : Sophie Oneal MD\par Attending Surgeon\par Division of Thoracic Surgery\par , [facility] [employer] at [facility]\par

## 2022-02-07 NOTE — ASSESSMENT
[FreeTextEntry1] : Ms. NICOL COLINDRES, 57 year old female, never smoker, w/ hx of thyroid cancer s/p chemo, stroke 11/2018, squamous cell carcinoma of the tongue s/p Rt hemiglossectomy, bilateral neck dissection, tracheostomy and free flap reconstruction on 12/29/20 ([id number]), followed by chemo and radiation. Pt is also s/p PEG tube placement on 1/12/21 by Dr. Oneal.\par \par I have reviewed the patient's medical records and diagnostic images at time of this office consultation and have made the following recommendation:\par 1. Black part of the PEG tube is cut out in the office today, pt may continue use it. If it happens again in the future, pt may call the office and arrange for PEG replacement in the OR. \par \par \par I personally performed the services described in the documentation, reviewed the documentation recorded by the scribe in my presence and it accurately and completely records my words and actions. \par \par I, Celena Falcon NP, am scribing for and the presence of Dr. Sophie Oneal the following sections, HISTORY OF PRESENT ILLNESS, PAST MEDICAL/FAMILY/SOCIAL HISTORY; REVIEW OF SYSTEMS; VITAL SIGNS; PHYSICAL EXAM; DISPOSITION.\par

## 2022-02-07 NOTE — HISTORY OF PRESENT ILLNESS
[FreeTextEntry1] : Ms. NICOL COLINDRES, 57 year old female, never smoker, w/ hx of thyroid cancer s/p chemo, stroke 11/2018, squamous cell carcinoma of the tongue s/p Rt hemiglossectomy, bilateral neck dissection, tracheostomy and free flap reconstruction on 12/29/20 ([id number]), followed by chemo and radiation. Pt is also s/p PEG tube placement on 1/12/21 by Dr. Oneal.\par \par Pt presents today for follow up for PEG tube checkup. Pt reports the distal part of the tube turned black, but still working well, no leaking. \par \par

## 2022-02-14 ENCOUNTER — APPOINTMENT (OUTPATIENT)
Dept: PHYSICAL MEDICINE AND REHAB | Facility: CLINIC | Age: 57
End: 2022-02-14
Payer: MEDICARE

## 2022-02-14 PROCEDURE — 99214 OFFICE O/P EST MOD 30 MIN: CPT

## 2022-02-14 NOTE — ASSESSMENT
[FreeTextEntry1] : 56 year old RHD female pmh cva (left sided residual weakness), scc of the tongue s/p glossectomy, chemo and radiation, here for follow up of lymphedema of the neck\par Mouth opening 3.5 cm today. continue SLP - will be seeing Dr. Busby later this week. Recommend making follow up appointment with Юлия Brooks. \par -eating two meals a day by mouth. weight is stable/increasing.\par -patient reports improvement with neck swelling, also improved swallowing.  agreeable to trying compression and flexitouch pump.  \par -follow up in 2-3 months  \par \par I spent a total of 30 minutes on this encounter including documentation, face to face time, care coordination and reviewing prior records.\par  
No

## 2022-02-14 NOTE — HISTORY OF PRESENT ILLNESS
[FreeTextEntry1] : 55 yo RHD female with pmh cva in 2018 (residual LUE and LLE weakness), h/o thyroid ca dx 3/2020 s/p resection, htn, presenting for follow up for symptoms related to squamous cell tongue cancer and treatment. She is s/p right hemiglossectomy, right neck dissection, tracheostomy 12/2020 and also underwent chemo and radiation. She has a peg for nutrition and continues to receive nutrition once a day vs tube feeds, lunch and dinner she eats by mouth. She reports generalized weakness and neck tightness. L sided weakness s/p CVA improved slightly after PT last year. \par She completed lymphedema therapy in the past with improvement. She does not wish to wear compression garment or use a pump, states her symptoms are stable and don't bother her.  She is ambulating without device today, still has a cane at home. \par \par She has PEG but is now tolerating oral diet, underwent barium swallow. \par \par She lives alone in a house with 3 stairs to enter. \par denies falls.\par \par weight is 113 lb today.

## 2022-02-14 NOTE — PHYSICAL EXAM
[FreeTextEntry1] : GEN: AAOx3, NAD. +PEG + mild R>L anterior neck swelling firm to palpation, nontender\par well healed anterior neck incision, + PEG\par PSYCH: Normal mood and affect. Responds appropriately to commands.\par EYES: Sclerae Anicteric. No discharge. EOMI.\par RESP: Breathing unlabored. \par EXT: No C/C/E. \par SKIN: Incisions well healed, no erythema \par GAIT: hemiparetic \par STRENGTH: 5/5 RUE and RLE, 4+/5 LUE and LLE \par SENSATION: Grossly intact to light touch bilateral upper extremities.\par CERVICAL ROM: rotation to 50 degrees b/l \par PALP: there is no tenderness to palpation b/l masseter, mandible \par MOUTH OPENING: 3.5 cm.  (unchanged)\par

## 2022-02-15 ENCOUNTER — TRANSCRIPTION ENCOUNTER (OUTPATIENT)
Age: 57
End: 2022-02-15

## 2022-02-15 ENCOUNTER — APPOINTMENT (OUTPATIENT)
Dept: HEMATOLOGY ONCOLOGY | Facility: CLINIC | Age: 57
End: 2022-02-15
Payer: MEDICARE

## 2022-02-15 VITALS
HEART RATE: 84 BPM | SYSTOLIC BLOOD PRESSURE: 121 MMHG | DIASTOLIC BLOOD PRESSURE: 77 MMHG | BODY MASS INDEX: 19.92 KG/M2 | OXYGEN SATURATION: 98 % | RESPIRATION RATE: 16 BRPM | WEIGHT: 112.41 LBS | HEIGHT: 63 IN | TEMPERATURE: 98.1 F

## 2022-02-15 PROCEDURE — 99215 OFFICE O/P EST HI 40 MIN: CPT

## 2022-02-15 NOTE — ASSESSMENT
[FreeTextEntry1] : 57 y/o F (never smoker) w/ a history of papillary thyroid carcinoma (Dx 2/2020, s/p thyroidectomy in 3/2020, s/p MORGAN in 6/2020), CVA (11/2018), and oropharyngeal cancer s/p R hemiglossectomy, bilateral neck dissection, tracheostomy, and free flap reconstruction on 12/29/20 with pathology showing invasive squamous cell carcinoma ([id number], +PNI, +LVI, +ECS, negative margins, PD-L1 CPS = 100%), s/p PEG tube, s/p CCRT with weekly cisplatin (completed 4/27/21), who presents for follow-up.\par \par # Oropharyngeal cancer\par - Dx 12/2021 ([id number], PD-L1 CPS = 100%)\par - S/p CCRT with weekly cisplatin (completed 4/27/21). She has been doing well since completing treatment, slowly recovering. She continues to use a PEG tube for nutrition. Eating a little now. Speech is improved. \par - PET/CT (7/2021) was notable for a new nonspecific, minimally FDG-avid soft tissue density in the left level IIA region. The rest of the findings were otherwise stable.\par -Repeat scans from October shows postoperative changes as described. No evidence for recurrent disease at the operative bed. Slight increase in size of a left periparotid lymph node which was shown to demonstrate minimal FDG uptake on the most recent PET/CT from 7/25/2021. New left supraclavicular lymph node. Cannot exclude metastatic disease.\par -She is now s/p bx of the parotid node which came back reactive\par _MRI of the face personally reviewed and discussed with radiologist, Dr Callahan showed re demonstration, right hemiglossectomy, neck dissection, prior tracheostomy, free flap reconstruction, fatty flap is unchanged. Unchanged submucosal soft tissue swelling, loss of tissue planes, aryepiglottic folds, piriform sinuses, likely posttreatment sequela. Interval obstruction left ostiomeatal complex, near complete opacification with mucosal thickening, retention cysts/polyps left maxillary sinus with maxillary molar teeth extending into left maxillary sinus, odontogenic sinusitis not excluded, with heterogeneity and enhancement, strongly suggest dental inspection with interval soft tissue thickening and enhancement left buccinator muscle, with inflammatory change extending to the left parotid Stensen's duct and left parotid gland (4:21).\par PET/CT scheduled for tomorrow\par OV with Dr. Busby on 2/17\par - Dx 2/2020 (S/p thyroidectomy in 3/2020, S/p MORGAN in 6/2020)\par - Continue levothyroxine (currently taking 112 mcg daily). Last TSH = 0.01 last visit\par I called dental clinic and scheduled appt for pt with dental at 1 PM on 2/17\par Pt has completed abx with good results\par OV in 1 month\par \par \par

## 2022-02-15 NOTE — REVIEW OF SYSTEMS
[Recent Change In Weight] : ~T recent weight change [Dysphagia] : dysphagia [Fever] : no fever [Eye Pain] : no eye pain [Red Eyes] : eyes not red [Odynophagia] : odynophagia [Mucosal Pain] : no mucosal pain [Chest Pain] : no chest pain [Palpitations] : no palpitations [Shortness Of Breath] : no shortness of breath [Wheezing] : no wheezing [Abdominal Pain] : no abdominal pain [Vomiting] : no vomiting [Dysuria] : no dysuria [Incontinence] : no incontinence [Joint Pain] : no joint pain [Muscle Pain] : no muscle pain [Skin Rash] : no skin rash [Skin Wound] : no skin wound [Confused] : no confusion [Difficulty Walking] : no difficulty walking [Easy Bleeding] : no tendency for easy bleeding [Easy Bruising] : no tendency for easy bruising [FreeTextEntry4] : left facial swelling resolved

## 2022-02-15 NOTE — PHYSICAL EXAM
[Restricted in physically strenuous activity but ambulatory and able to carry out work of a light or sedentary nature] : Status 1- Restricted in physically strenuous activity but ambulatory and able to carry out work of a light or sedentary nature, e.g., light house work, office work [Thin] : thin [Normal] : affect appropriate [de-identified] : Right tongue hemiglossectomy and reconstruction [de-identified] : left facial swelling  [de-identified] : + PEG tube in place- completely discolored

## 2022-02-15 NOTE — HISTORY OF PRESENT ILLNESS
[Disease: _____________________] : Disease: [unfilled] [T: ___] : T[unfilled] [N: ___] : N[unfilled] [AJCC Stage: ____] : AJCC Stage: [unfilled] [de-identified] : 57 yro female pt never smoker with hx of  thyroid cancer ( dr Salmeron) s/p 3/2020 ( sched MORGAN 6/2020) and stroke 11/2018 referred by Dr. Aman Perea for right tongue lesion.Pt states the lesion started after stroke in 2018 and thinks it could be from trauma to the tongue from the teeth, but she started feeling it increased in size from 5/2020. She is s/p right hemiglossectomy, right neck dissection, tracheostomy 12/29/2020. Pathology result back as  Invasive squamous cell carcinoma, well-differentiated, Perineural invasion and lymphovascular invasion seen. Resection margins negative for carcinoma. PD-L1 %. Patient S/P R glossectomy, bilateral neck dissection, tracheostomy and free flap reconstruction on 12/29/20. [id number].  ECS present.\par \par surgical path 1/5/21: Final Diagnosis\par 1. Tongue, right, glossectomy\par - Invasive squamous cell carcinoma, well-differentiated, see synoptic summary\par - Perineural invasion and lymphovascular invasion seen\par - Resection margins negative for carcinoma\par - PD-L1 CPS immunostain 100%\par \par 2. Tongue, additional lateral margin, excision\par - Squamous mucosa negative for carcinoma\par \par 3. Lymph nodes, right levels 1, 2, 3 and 4, neck dissection\par - Level 1: 1 out of 5 lymph nodes positive for metastatic\par carcinoma with extranodal extension; submandibular gland negative for carcinoma\par - Level 2: 1 out of 3 lymph nodes positive for metastatic\par carcinoma with extranodal extension\par - Level 3: 20 lymph nodes negative for metastatic carcinoma\par - Level 4: 20 lymph nodes negative for metastatic carcinoma\par \par 4. Lymph nodes, left level 1, neck dissection\par - Submandibular gland negative for carcinoma\par - 7 lymph nodes negative for metastatic carcinoma\par \par 5. Lymph nodes, left level 2, neck dissection\par - 15 lymph nodes negative for metastatic carcinoma\par \par 6. Lymph nodes, left level 3, neck dissection\par - 10 lymph nodes negative for metastatic carcinoma\par \par 7. Lymph nodes, left level 4, neck dissection\par - 4 lymph nodes negative for metastatic carcinoma\par \par Patient came for initial consultation today, reports feeling well,  She is s/p PEG on 1/13/21, she denies dysphagia, odynophonia, dyspnea, bleeding, fever, chills, signs of infection since surgery. Pt is NPO,  She never smoked and never chew tobacco and no h/o etoh \par \par 2/24/21: Post-op, pt developed a neck abscess/infection s/p I&D and is on abx and packing. She is now cleared by head and neck surgery for chemoRT\par \par 3/22/21: Pt started RT last week and has received one cycle of cisplatin. She reports increased nausea since chemo started. She also has decreased appetite. She is exclusively using the G tube now. She is taking antiemetics with some benefit. \par \par 4/6/21: Finishing CRT. DOing well with minimal AEs. Some nausea-improved with antiemetics. \par \par 4/20/21: Finishing up CRT. Doing well. Maintaining weight. Soem oral pain- not on any pain meds. Sporadic nausea- using antiemetics with relief of symptoms. \par \par 5/4/21: Pt feeling well. Reports compliance with synthroid. Using magic mouthwash, reports some oral pain. \par \par 6/3/21: Uncomfortable feeling in the mouth, sialorrhea, gained some weight. No pain. Persistent left sided weakness due to prior stroke. \par \par 7/1/21: Doing well. No new complaints. Feels nauseously when she eats because if thick phlegm. She reports some acid reflux. She is taking some PO but mostly dependent on G tube. \par \par 8/5/21: Lost her insurance and unable to get feeds. Feels little stronger. Speech improved. \par \par 9/9/21: Pt returns for follow-up. She reports that she feels better since her last visit. No complaints of fever, throat pain, chest pain, shortness of breath, nausea, vomiting, diarrhea, abdominal pain, or dysuria. Her strength continues to gradually improve. Her nutritional intake is primarily through her PEG tube currently, though she tries to eat a small amount of food when possible. Her weight has been stable. She reports that an air conditioner will be delivered to her house tomorrow.\par \par 11/4/21: Gained some weight. Overall doing better. Some rt partotid swelling. \par \par 12/2/21: Rt neck swelling is better. Gained weight. Still using G tube. Started eating rice and cooked vegetables by mouth, \par \par 2/1/22: Pt notes left facial swelling which is new. She attributes it to perhaps a bad tooth. She is feeling through G tube which shows discoloration. She has been trying to maintain weight. \par \par 2/15/22: Since last visit, pt has had scans done. Dental infection/abscess was noted. I called pt over the weekend to discuss results. Last visit, pt was started on abx and she feels much better since she started the meds. She also had her G tube fixed by Dr. Oneal.  [de-identified] : sq cell ca

## 2022-02-17 ENCOUNTER — APPOINTMENT (OUTPATIENT)
Dept: OTOLARYNGOLOGY | Facility: CLINIC | Age: 57
End: 2022-02-17
Payer: MEDICARE

## 2022-02-17 PROCEDURE — 31575 DIAGNOSTIC LARYNGOSCOPY: CPT

## 2022-02-17 PROCEDURE — 99214 OFFICE O/P EST MOD 30 MIN: CPT | Mod: 25

## 2022-02-17 RX ORDER — AMOXICILLIN AND CLAVULANATE POTASSIUM 600; 42.9 MG/5ML; MG/5ML
600-42.9 FOR SUSPENSION ORAL
Qty: 150 | Refills: 0 | Status: COMPLETED | COMMUNITY
Start: 2022-02-13 | End: 2022-02-17

## 2022-02-17 NOTE — HISTORY OF PRESENT ILLNESS
[de-identified] : 57 year old female presents for tongue cancer follow up evaluation. History of with thyroid cancer ( dr Salmeron) s/p 3/2020 (sched MORGAN 6/2020) and stroke 11/2018 referred by Dr. Aman Perea for right tongue lesion. Pt is s/p right hemiglossectomy, right neck dissection, tracheostomy 12/29/2020. Path showed SCC. completed CRT in 04/27/21. CT chest and neck 10/15/21 Seeing Dr. Patel for PM&R. Patient still has PEG tube in place, still in use. Also able to tolerate small portions PO. Following with SLP for swallow therapy. Denies neck pain, hemoptysis, dysphagia, odynophagia, dyspnea, fever, and chills. treatment. \par Complete review of systems which was performed during a previous encounter was reviewed with the patient and there are no changes except as stated in the HPI section.\par \par

## 2022-02-17 NOTE — REASON FOR VISIT
[Subsequent Evaluation] : a subsequent evaluation for [Other: ______] : provided by LISA [FreeTextEntry2] : tongue cancer [Interpreters_IDNumber] : [id number] [Interpreters_FullName] : Fredy [TWNoteComboBox1] : Chinese

## 2022-02-17 NOTE — CONSULT LETTER
[Dear  ___] : Dear  [unfilled], [Courtesy Letter:] : I had the pleasure of seeing your patient, [unfilled], in my office today. [Please see my note below.] : Please see my note below. [Consult Closing:] : Thank you very much for allowing me to participate in the care of this patient.  If you have any questions, please do not hesitate to contact me. [Sincerely,] : Sincerely, [FreeTextEntry2] : Dr Aamn Perea  [FreeTextEntry3] : \par Laurent Busby MD, FACS\par \par Otolaryngology-Head and Neck Surgery\par [employer] [facility]\par

## 2022-02-17 NOTE — PHYSICAL EXAM
[de-identified] : Incision well healed. wound is healing well.  [Midline] : trachea located in midline position [de-identified] : Flap in place, viable. KIMO [Normal] : orientation to person, place, and time: normal

## 2022-02-21 ENCOUNTER — APPOINTMENT (OUTPATIENT)
Dept: NUCLEAR MEDICINE | Facility: IMAGING CENTER | Age: 57
End: 2022-02-21
Payer: MEDICARE

## 2022-02-21 ENCOUNTER — OUTPATIENT (OUTPATIENT)
Dept: OUTPATIENT SERVICES | Facility: HOSPITAL | Age: 57
LOS: 1 days | End: 2022-02-21
Payer: COMMERCIAL

## 2022-02-21 DIAGNOSIS — Z98.890 OTHER SPECIFIED POSTPROCEDURAL STATES: Chronic | ICD-10-CM

## 2022-02-21 DIAGNOSIS — C02.9 MALIGNANT NEOPLASM OF TONGUE, UNSPECIFIED: ICD-10-CM

## 2022-02-21 PROCEDURE — A9552: CPT

## 2022-02-21 PROCEDURE — 78815 PET IMAGE W/CT SKULL-THIGH: CPT | Mod: 26,PS

## 2022-02-21 PROCEDURE — 78815 PET IMAGE W/CT SKULL-THIGH: CPT

## 2022-03-21 ENCOUNTER — APPOINTMENT (OUTPATIENT)
Dept: PHYSICAL MEDICINE AND REHAB | Facility: CLINIC | Age: 57
End: 2022-03-21
Payer: MEDICARE

## 2022-03-21 PROCEDURE — 99214 OFFICE O/P EST MOD 30 MIN: CPT

## 2022-03-21 NOTE — PHYSICAL EXAM
[FreeTextEntry1] : GEN: AAOx3, NAD. +PEG + mild R>L anterior neck swelling firm to palpation, nontender\par well healed anterior neck incision, + PEG\par PSYCH: Normal mood and affect. Responds appropriately to commands.\par EYES: Sclerae Anicteric. No discharge. EOMI.\par RESP: Breathing unlabored. \par EXT: No C/C/E. \par SKIN: Incisions well healed, no erythema \par GAIT: hemiparetic \par STRENGTH: 5/5 RUE and RLE, 4+/5 LUE and 4/5 LLE \par SENSATION: Grossly intact to light touch bilateral upper extremities.\par CERVICAL ROM: rotation to 50 degrees b/l \par PALP: there is no tenderness to palpation b/l masseter, mandible \par MOUTH OPENING: 3.25 cm \par . \par

## 2022-03-21 NOTE — HISTORY OF PRESENT ILLNESS
[FreeTextEntry1] : 56 yo RHD female with pmh cva in 2018 (residual LUE and LLE weakness), h/o thyroid ca dx 3/2020 s/p resection, htn, presenting for follow up for symptoms related to squamous cell tongue cancer and treatment. She is s/p right hemiglossectomy, right neck dissection, tracheostomy 12/2020 and also underwent chemo and radiation. She has a peg for nutrition and continues to receive nutrition once a day tube feeds, lunch and dinner she eats by mouth. She reports generalized weakness and neck tightness. L sided weakness s/p CVA improved slightly after PT last year. \par She completed lymphedema therapy in the past with improvement. She recently received lymphedema pump and is starting to use it. She is ambulating without device today, still has a cane at home. \par \par She has PEG but is now tolerating oral diet, underwent barium swallow. \par \par She lives alone in a house with 3 stairs to enter. \par denies falls, uses cane outside the house but does not have it with her today. \par \par weight is 110 lb today. \par

## 2022-03-21 NOTE — ASSESSMENT
[FreeTextEntry1] : 57 year old RHD female pmh cva (left sided residual weakness), scc of the tongue s/p glossectomy, chemo and radiation, here for follow up of lymphedema of the neck\par -slight decrease in mouth opening, advised to restart oral stretching\par -using lymphedema pump\par -eating two meals a day by mouth. monitor weight \par -recent PET, showed hypermetabolism L maxillary sinus, possible sinusitis, follow up with Dr. Busby \par -follow up in 6 weeks to monitor mild trismus\par I spent a total of 30 minutes on this encounter including documentation, face to face time, care coordination and reviewing prior records.\par \par

## 2022-03-23 ENCOUNTER — OUTPATIENT (OUTPATIENT)
Dept: OUTPATIENT SERVICES | Facility: HOSPITAL | Age: 57
LOS: 1 days | Discharge: HOME | End: 2022-03-23

## 2022-03-23 DIAGNOSIS — Z98.890 OTHER SPECIFIED POSTPROCEDURAL STATES: Chronic | ICD-10-CM

## 2022-03-23 DIAGNOSIS — C76.0 MALIGNANT NEOPLASM OF HEAD, FACE AND NECK: ICD-10-CM

## 2022-03-25 ENCOUNTER — NON-APPOINTMENT (OUTPATIENT)
Age: 57
End: 2022-03-25

## 2022-03-25 ENCOUNTER — APPOINTMENT (OUTPATIENT)
Dept: HEMATOLOGY ONCOLOGY | Facility: CLINIC | Age: 57
End: 2022-03-25
Payer: MEDICARE

## 2022-03-25 ENCOUNTER — RESULT REVIEW (OUTPATIENT)
Age: 57
End: 2022-03-25

## 2022-03-25 VITALS
RESPIRATION RATE: 16 BRPM | WEIGHT: 110.23 LBS | HEART RATE: 88 BPM | BODY MASS INDEX: 19.53 KG/M2 | OXYGEN SATURATION: 99 % | TEMPERATURE: 98.1 F | DIASTOLIC BLOOD PRESSURE: 76 MMHG | SYSTOLIC BLOOD PRESSURE: 115 MMHG

## 2022-03-25 LAB
BASOPHILS # BLD AUTO: 0.03 K/UL — SIGNIFICANT CHANGE UP (ref 0–0.2)
BASOPHILS NFR BLD AUTO: 0.8 % — SIGNIFICANT CHANGE UP (ref 0–2)
EOSINOPHIL # BLD AUTO: 0.12 K/UL — SIGNIFICANT CHANGE UP (ref 0–0.5)
EOSINOPHIL NFR BLD AUTO: 3.1 % — SIGNIFICANT CHANGE UP (ref 0–6)
HCT VFR BLD CALC: 34 % — LOW (ref 34.5–45)
HGB BLD-MCNC: 11.5 G/DL — SIGNIFICANT CHANGE UP (ref 11.5–15.5)
IMM GRANULOCYTES NFR BLD AUTO: 0.3 % — SIGNIFICANT CHANGE UP (ref 0–1.5)
LYMPHOCYTES # BLD AUTO: 0.88 K/UL — LOW (ref 1–3.3)
LYMPHOCYTES # BLD AUTO: 23.1 % — SIGNIFICANT CHANGE UP (ref 13–44)
MCHC RBC-ENTMCNC: 28.3 PG — SIGNIFICANT CHANGE UP (ref 27–34)
MCHC RBC-ENTMCNC: 33.8 G/DL — SIGNIFICANT CHANGE UP (ref 32–36)
MCV RBC AUTO: 83.7 FL — SIGNIFICANT CHANGE UP (ref 80–100)
MONOCYTES # BLD AUTO: 0.27 K/UL — SIGNIFICANT CHANGE UP (ref 0–0.9)
MONOCYTES NFR BLD AUTO: 7.1 % — SIGNIFICANT CHANGE UP (ref 2–14)
NEUTROPHILS # BLD AUTO: 2.5 K/UL — SIGNIFICANT CHANGE UP (ref 1.8–7.4)
NEUTROPHILS NFR BLD AUTO: 65.6 % — SIGNIFICANT CHANGE UP (ref 43–77)
NRBC # BLD: 0 /100 WBCS — SIGNIFICANT CHANGE UP (ref 0–0)
PLATELET # BLD AUTO: 162 K/UL — SIGNIFICANT CHANGE UP (ref 150–400)
RBC # BLD: 4.06 M/UL — SIGNIFICANT CHANGE UP (ref 3.8–5.2)
RBC # FLD: 13.4 % — SIGNIFICANT CHANGE UP (ref 10.3–14.5)
WBC # BLD: 3.81 K/UL — SIGNIFICANT CHANGE UP (ref 3.8–10.5)
WBC # FLD AUTO: 3.81 K/UL — SIGNIFICANT CHANGE UP (ref 3.8–10.5)

## 2022-03-25 PROCEDURE — 99214 OFFICE O/P EST MOD 30 MIN: CPT

## 2022-03-28 NOTE — HISTORY OF PRESENT ILLNESS
[Disease: _____________________] : Disease: [unfilled] [T: ___] : T[unfilled] [N: ___] : N[unfilled] [AJCC Stage: ____] : AJCC Stage: [unfilled] [de-identified] : 57 yro female pt never smoker with hx of  thyroid cancer ( dr Salmeron) s/p 3/2020 ( sched MORGAN 6/2020) and stroke 11/2018 referred by Dr. Aman Perea for right tongue lesion.Pt states the lesion started after stroke in 2018 and thinks it could be from trauma to the tongue from the teeth, but she started feeling it increased in size from 5/2020. She is s/p right hemiglossectomy, right neck dissection, tracheostomy 12/29/2020. Pathology result back as  Invasive squamous cell carcinoma, well-differentiated, Perineural invasion and lymphovascular invasion seen. Resection margins negative for carcinoma. PD-L1 %. Patient S/P R glossectomy, bilateral neck dissection, tracheostomy and free flap reconstruction on 12/29/20. [id number].  ECS present.\par \par surgical path 1/5/21: Final Diagnosis\par 1. Tongue, right, glossectomy\par - Invasive squamous cell carcinoma, well-differentiated, see synoptic summary\par - Perineural invasion and lymphovascular invasion seen\par - Resection margins negative for carcinoma\par - PD-L1 CPS immunostain 100%\par \par 2. Tongue, additional lateral margin, excision\par - Squamous mucosa negative for carcinoma\par \par 3. Lymph nodes, right levels 1, 2, 3 and 4, neck dissection\par - Level 1: 1 out of 5 lymph nodes positive for metastatic\par carcinoma with extranodal extension; submandibular gland negative for carcinoma\par - Level 2: 1 out of 3 lymph nodes positive for metastatic\par carcinoma with extranodal extension\par - Level 3: 20 lymph nodes negative for metastatic carcinoma\par - Level 4: 20 lymph nodes negative for metastatic carcinoma\par \par 4. Lymph nodes, left level 1, neck dissection\par - Submandibular gland negative for carcinoma\par - 7 lymph nodes negative for metastatic carcinoma\par \par 5. Lymph nodes, left level 2, neck dissection\par - 15 lymph nodes negative for metastatic carcinoma\par \par 6. Lymph nodes, left level 3, neck dissection\par - 10 lymph nodes negative for metastatic carcinoma\par \par 7. Lymph nodes, left level 4, neck dissection\par - 4 lymph nodes negative for metastatic carcinoma\par \par Patient came for initial consultation today, reports feeling well,  She is s/p PEG on 1/13/21, she denies dysphagia, odynophonia, dyspnea, bleeding, fever, chills, signs of infection since surgery. Pt is NPO,  She never smoked and never chew tobacco and no h/o etoh \par \par 2/24/21: Post-op, pt developed a neck abscess/infection s/p I&D and is on abx and packing. She is now cleared by head and neck surgery for chemoRT\par \par 3/22/21: Pt started RT last week and has received one cycle of cisplatin. She reports increased nausea since chemo started. She also has decreased appetite. She is exclusively using the G tube now. She is taking antiemetics with some benefit. \par \par 4/6/21: Finishing CRT. DOing well with minimal AEs. Some nausea-improved with antiemetics. \par \par 4/20/21: Finishing up CRT. Doing well. Maintaining weight. Soem oral pain- not on any pain meds. Sporadic nausea- using antiemetics with relief of symptoms. \par \par 5/4/21: Pt feeling well. Reports compliance with synthroid. Using magic mouthwash, reports some oral pain. \par \par 6/3/21: Uncomfortable feeling in the mouth, sialorrhea, gained some weight. No pain. Persistent left sided weakness due to prior stroke. \par \par 7/1/21: Doing well. No new complaints. Feels nauseously when she eats because if thick phlegm. She reports some acid reflux. She is taking some PO but mostly dependent on G tube. \par \par 8/5/21: Lost her insurance and unable to get feeds. Feels little stronger. Speech improved. \par \par 9/9/21: Pt returns for follow-up. She reports that she feels better since her last visit. No complaints of fever, throat pain, chest pain, shortness of breath, nausea, vomiting, diarrhea, abdominal pain, or dysuria. Her strength continues to gradually improve. Her nutritional intake is primarily through her PEG tube currently, though she tries to eat a small amount of food when possible. Her weight has been stable. She reports that an air conditioner will be delivered to her house tomorrow.\par \par 11/4/21: Gained some weight. Overall doing better. Some rt partotid swelling. \par \par 12/2/21: Rt neck swelling is better. Gained weight. Still using G tube. Started eating rice and cooked vegetables by mouth, \par \par 2/1/22: Pt notes left facial swelling which is new. She attributes it to perhaps a bad tooth. She is feeling through G tube which shows discoloration. She has been trying to maintain weight. \par \par 2/15/22: Since last visit, pt has had scans done. Dental infection/abscess was noted. I called pt over the weekend to discuss results. Last visit, pt was started on abx and she feels much better since she started the meds. She also had her G tube fixed by Dr. Oneal. \par \par 3/25/22: Pt here for follow up. Doing well overall. Reports she is eating well - has full solid meals for lunch and dinner (reports eating whatever she wants). Uses G-tube in AM for breakfast as she reports being too lazy to cook breakfast for herself.  Denies fevers, mouth pain, weight loss, easy bleeding/bruising, night sweats, difficult breathing, dysphagia, GI symptoms.  [de-identified] : sq cell ca

## 2022-03-28 NOTE — PHYSICAL EXAM
[Restricted in physically strenuous activity but ambulatory and able to carry out work of a light or sedentary nature] : Status 1- Restricted in physically strenuous activity but ambulatory and able to carry out work of a light or sedentary nature, e.g., light house work, office work [Thin] : thin [Normal] : affect appropriate [Ulcers] : no ulcers [Mucositis] : no mucositis [de-identified] : Right tongue hemiglossectomy and reconstruction; moist mucous membranes; no oral lesions [de-identified] : no palpable lymphadenopathy [de-identified] : no lower extremity edema or calf tenderness [de-identified] : + PEG tube in place - clean, dry, intact

## 2022-03-28 NOTE — REVIEW OF SYSTEMS
[Fever] : no fever [Recent Change In Weight] : ~T no recent weight change [Eye Pain] : no eye pain [Red Eyes] : eyes not red [Dysphagia] : no dysphagia [Odynophagia] : no odynophagia [Mucosal Pain] : no mucosal pain [Chest Pain] : no chest pain [Palpitations] : no palpitations [Shortness Of Breath] : no shortness of breath [Wheezing] : no wheezing [Abdominal Pain] : no abdominal pain [Vomiting] : no vomiting [Dysuria] : no dysuria [Incontinence] : no incontinence [Joint Pain] : no joint pain [Muscle Pain] : no muscle pain [Skin Rash] : no skin rash [Skin Wound] : no skin wound [Confused] : no confusion [Difficulty Walking] : no difficulty walking [Easy Bleeding] : no tendency for easy bleeding [Easy Bruising] : no tendency for easy bruising

## 2022-03-28 NOTE — ASSESSMENT
Patient Name: Dora Brooks  MRN:[id number]  :1980    Referring Physician:   Vimal Navas MD  Fax: [phone]      Chief Complaint   Patient presents with   • Consultation   • Syncope   • Office Visit       SUBJECTIVE:     HISTORY OF PRESENT ILLNESS:  Dora Brooks is a 41 year old female referred to the office for cardiovascular evaluation due to episode of syncope.    As per the patient she is having episodes of passing out since her teenage years.  It started after infectious mononucleosis.  She used to have multiple episodes in her 20s.  She was placed on salt tablets.  Afterwards the episodes reduce in frequency and now occurs once or twice a year.    However recently she was going on a trip and had 2 back-to-back episode within a week in 2022.  The first episode occurred at that point while she was standing in [place] line.  She feels foggy vision as well as something warm.  Usually the episodes are short lasting and resolved.  She never had fall hitting her head or other part of the body.  Usually at home, her  puts her near the wall and she recovers afterwards.    She denies associated palpitation, dizziness, chest pain, shortness of breath.    Otherwise she does regularly exercise with moderate intensity for 5 days a week.  She started noticing some dizziness lately.    She also has most of her episodes while standing.  She denies any seizure-like activity.    She also has been diagnosed with trigeminal neuralgia and occasionally takes carbamazepine.    She has borderline dyslipidemia and diet-controlled.  She denies any history of diabetes or hypertension.    Her sister had transposition of great vessel and had a blue baby syndrome requiring surgery.    She is .  She was .  She has 5 kids.    She does smoke.  She never smoked.  She does not drink alcohol in excess amount.    EKG performed in the office today showed sinus bradycardia otherwise normal.   [FreeTextEntry1] : 55 y/o F (never smoker) w/ a history of papillary thyroid carcinoma (Dx 2/2020, s/p thyroidectomy in 3/2020, s/p MORGNA in 6/2020), CVA (11/2018), and oropharyngeal cancer s/p R hemiglossectomy, bilateral neck dissection, tracheostomy, and free flap reconstruction on 12/29/20 with pathology showing invasive squamous cell carcinoma ([id number], +PNI, +LVI, +ECS, negative margins, PD-L1 CPS = 100%), s/p PEG tube, s/p CCRT with weekly cisplatin (completed 4/27/21), who presents for follow-up and is now in clinical remission.\par \par PET CT reviewed and avidity noted is secondary to previous dental infection that has since resolved.\par \par # Oropharyngeal cancer\par - Dx 12/2021 ([id number], PD-L1 CPS = 100%)\par - S/p CCRT with weekly cisplatin (completed 4/27/21). She has been doing well since completing treatment, slowly recovering. She continues to use a PEG tube for nutrition. Eating a little now. Speech is improved. \par - PET/CT (7/2021) was notable for a new nonspecific, minimally FDG-avid soft tissue density in the left level IIA region. The rest of the findings were otherwise stable.\par -Repeat scans from October shows postoperative changes as described. No evidence for recurrent disease at the operative bed. Slight increase in size of a left periparotid lymph node which was shown to demonstrate minimal FDG uptake on the most recent PET/CT from 7/25/2021. New left supraclavicular lymph node. Cannot exclude metastatic disease.\par -She is now s/p bx of the parotid node which came back reactive\par - MRI of the face showed redemonstration, right hemiglossectomy, neck dissection, prior tracheostomy, free flap reconstruction, fatty flap is unchanged. Unchanged submucosal soft tissue swelling, loss of tissue planes, aryepiglottic folds, piriform sinuses, likely posttreatment sequela. Interval obstruction left ostiomeatal complex, near complete opacification with mucosal thickening, retention cysts/polyps left maxillary sinus with maxillary molar teeth extending into left maxillary sinus, odontogenic sinusitis not excluded, with heterogeneity and enhancement, strongly suggest dental inspection with interval soft tissue thickening and enhancement left buccinator muscle, with inflammatory change extending to the left parotid Stensen's duct and left parotid gland (4:21).\par - Dx 2/2020 (S/p thyroidectomy in 3/2020, S/p MORGAN in 6/2020)\par \par Survivorship:\par - Continue levothyroxine (currently taking 112 mcg daily) TSH < 0.01 from today; will check TSH and Thyroglobulin at next follow up\par - Monitor CBC at follow ups to monitor for secondary malignancies such as leukemia or MDS\par - Advised to obtain healthcare maintenance screenings such as:\par [] colonoscopy - never had done\par [] mammogram - overdue\par [] f/u with OB/GYN for evaluation for need for Pap smear\par - Will refer back to Dr. Oneal for PEG Tube removal as she is tolerating solid meals and maintaining her weights\par - Refer to social work for meals on wheels programs\par \par Return to clinic 3 months and will repeat scans prior to visit for recurrence surveillance\par \par I was with the hematology/ oncology fellow, Dr. Lopez for the entire visit and agree with above documentation\par \par \par  Her intervals are normal.    She denies previous cardiac work-up in near future.    She does drink at least 90 ounces of water and general salt intake.    She also has history of Hashimoto thyroiditis and TSH lately in 30s.    I  personally reviewed past medical history, medications, allergies, past surgical history, family history and social history.  It is as described below otherwise as described in HPI.      PAST MEDICAL HISTORY:  Past Medical History:   Diagnosis Date   • Acute bilateral low back pain without sciatica 2021   • Anxiety    • Hypothyroid    • Neuropathic pruritus 2019   • Thyroid cyst        MEDICATIONS:  Current Outpatient Medications   Medication Sig   • levothyroxine 137 MCG tablet Take 1 tablet by mouth daily.   • Cholecalciferol (Vitamin D) 50 mcg (2,000 units) tablet Take 1 tablet by mouth daily.   • carBAMazepine (TEGretol) 200 MG tablet Inc as directed to 400 mg twice a day. (Patient taking differently: as needed. Inc as directed to 400 mg twice a day.)     No current facility-administered medications for this visit.       ALLERGIES:  ALLERGIES:   Allergen Reactions   • Cat Dander Other (See Comments)   • Dust Other (See Comments)   • Grass Other (See Comments)   • Pollen Other (See Comments)   • Trees Other (See Comments)       PAST SURGICAL HISTORY:  Past Surgical History:   Procedure Laterality Date   •  section, low transverse     • Pearl tooth extraction         FAMILY HISTORY:  Family History   Problem Relation Age of Onset   • Other Sister         pumonary embolism   • Obesity Sister         transposition of great vessels   • Patient is unaware of any medical problems Mother    • Emphysema Father    • Cancer, Thyroid Neg Hx        SOCIAL HISTORY:  Social History     Tobacco Use   • Smoking status: Never Smoker   • Smokeless tobacco: Never Used   Vaping Use   • Vaping Use: never used   Substance Use Topics   • Alcohol use: Yes     Alcohol/week: 6.0 standard drinks      Types: 6 Glasses of wine per week     Comment: occas   • Drug use: Never       Review of Systems   Constitutional: Negative.   HENT: Negative.    Eyes: Negative.    Cardiovascular:        AS PER HPI   Respiratory:        AS PER HPI   Endocrine: Negative.    Hematologic/Lymphatic: Negative.    Skin: Negative.    Musculoskeletal: Negative.    Gastrointestinal: Negative.    Neurological: Negative.    Psychiatric/Behavioral: Negative.    Allergic/Immunologic: Negative.        OBJECTIVE:     Vitals:    03/11/22 1156 03/11/22 1204   BP: 102/60 98/64   BP Location: RUE - Right upper extremity RUE - Right upper extremity   Patient Position: Sitting Standing   Cuff Size: Regular Regular   Pulse: (!) 57    Temp: 98.2 °F (36.8 °C)    TempSrc: Temporal    SpO2: 98%    Weight: 71.2 kg (157 lb)    Height: 5' 7\" (1.702 m)        Physical Exam  Constitutional:       General: She is not in acute distress.     Appearance: She is well-developed. She is not diaphoretic.   HENT:      Head: Atraumatic.      Nose: Nose normal.      Mouth/Throat:      Mouth: Mucous membranes are moist.      Neck: Neck supple.   Eyes:      General: No scleral icterus.     Conjunctiva/sclera: Conjunctivae normal.   Neck:      Vascular: No carotid bruit or JVD.   Cardiovascular:      Rate and Rhythm: Normal rate and regular rhythm.      Pulses: Normal pulses.           Carotid pulses are 2+ on the right side and 2+ on the left side.       Radial pulses are 2+ on the right side and 2+ on the left side.      Heart sounds: S1 normal and S2 normal. No murmur heard.    No friction rub. No gallop.   Pulmonary:      Effort: Pulmonary effort is normal.      Breath sounds: Normal breath sounds.   Abdominal:      General: Bowel sounds are normal.      Palpations: Abdomen is soft.      Tenderness: There is no abdominal tenderness.   Musculoskeletal:         General: No tenderness.      Right lower leg: No edema.      Left lower leg: No edema.   Skin:     General:  Skin is warm and moist.   Neurological:      Mental Status: She is alert and oriented to person, place, and time.      Motor: No weakness.      Deep Tendon Reflexes: Reflexes are normal and symmetric.   Psychiatric:         Attention and Perception: Attention normal.         Speech: Speech normal.         Behavior: Behavior normal.         Cognition and Memory: Cognition normal.         DIAGNOSTIC STUDIES:     Labs:  CBC:   Recent Labs   Lab 11/24/21  1134 10/18/21  1634   WBC 3.6* 7.8   RBC 3.76* 4.10   HGB 12.3 13.3   HCT 37.3 39.8   MCV 99.2 97.1   MCHC 33.0 33.4   RDW-CV 12.5 11.9    260   Lymphocytes, Percent 32 27       CMP:  Recent Labs   Lab 11/24/21  1134 10/18/21  1634   Sodium 138 138   Potassium 5.1 4.2   Chloride 106 104   Carbon Dioxide 28 26   BUN 17 16   Creatinine 0.83 0.80   Glucose 88 151*   Albumin 4.2 4.1   GPT 22 20   Alkaline Phosphatase 53 53   Bilirubin, Total 0.5 0.3       COAGULATION STUDIES: No results for input(s): PT, PTT, INR in the last 8765 hours.  TSH:    Lab Results   Component Value Date    TSH 36.030 (H) 11/24/2021    TSH 38.490 (H) 11/05/2020     HbA1c:   Hemoglobin A1C (%)   Date Value   11/24/2021 4.5     LIPID PANEL:   Recent Labs   Lab 11/24/21  1134   Cholesterol 203*   Triglycerides 66   HDL 71   CALCLDL 119       NT-PRONBP: No results for input(s): NTPROB in the last 8765 hours.    Imaging:  Results for orders placed or performed in visit on 03/11/22   ELECTROCARDIOGRAM 12-LEAD    Impression    Sinus bradycardia at 57 bpm.  Normal EKG.       ASSESSMENT AND PLAN:     1. Syncope and collapse    2. Hypothyroidism due to Hashimoto's thyroiditis    3. Diet-controlled hyperlipidemia      Plan:  -Patient presented for evaluation of her recurrent syncope which is ongoing since her teenage years.  -She does have some kind of prodromal symptoms.  She never had trauma with syncope episode.  At this time her symptoms are likely suggestive of vasovagal syncope.  -Education  provided regarding vasovagal syncope and recommend to avoid prolonged standing or sitting with leg hanging in the air.    -Recommend to lie down or at least sit down and raise the leg if possible during the prodromal symptoms.  -As the episodes are occurring more frequently recommend event monitor for 21 days especially with some dizziness-like symptoms.  -Check echocardiogram.  -Continue hydration and agree with at least 64 ounces or more of water.  -Regarding dyslipidemia strongly encourage patient to monitor diet and recheck cholesterol.  -She has hypothyroidism as per the TSH.  Will check echocardiogram.  Also recommend to discuss with PCP and may need evaluation with endocrinology regarding TSH management.  She is somewhat symptomatic with cold intolerance as well as constipation and fatigue.  - Limit the amount of sugar sweetened drinks like soda pop and juice..  - Recommend 30-60 minutes of physical activity 4-5 days a week.    All question answered the patient      Orders Placed This Encounter   • Electrocardiogram 12-Lead   • Transthoracic Echo (TTE) Complete W/ W/O Imaging Agent   • Cardiac event monitor        Return in about 1 year (around 3/11/2023), or if symptoms worsen or fail to improve.    Alcon Rivera MD  Cardiology  Portions of this  note may have been created using the Dragon voice recognition system.  Errors in content may be related to improper recognition of the system.  Effort to review and correct the note has been made but irregularities may still be present.  Medication reconciliation was done but medications not prescribed by me may be inaccurate.

## 2022-03-31 PROBLEM — Z93.1 PEG (PERCUTANEOUS ENDOSCOPIC GASTROSTOMY) STATUS: Status: ACTIVE | Noted: 2022-02-04

## 2022-04-04 ENCOUNTER — APPOINTMENT (OUTPATIENT)
Dept: THORACIC SURGERY | Facility: CLINIC | Age: 57
End: 2022-04-04
Payer: MEDICARE

## 2022-04-04 VITALS
HEIGHT: 63 IN | WEIGHT: 110 LBS | DIASTOLIC BLOOD PRESSURE: 85 MMHG | HEART RATE: 92 BPM | OXYGEN SATURATION: 97 % | SYSTOLIC BLOOD PRESSURE: 135 MMHG | BODY MASS INDEX: 19.49 KG/M2

## 2022-04-04 DIAGNOSIS — Z93.1 GASTROSTOMY STATUS: ICD-10-CM

## 2022-04-04 PROCEDURE — 36590 REMOVAL TUNNELED CV CATH: CPT

## 2022-04-04 PROCEDURE — 99215 OFFICE O/P EST HI 40 MIN: CPT | Mod: 24

## 2022-04-04 RX ORDER — LEVOFLOXACIN 25 MG/ML
25 SOLUTION ORAL
Qty: 140 | Refills: 0 | Status: COMPLETED | COMMUNITY
Start: 2022-02-01 | End: 2022-04-04

## 2022-04-04 NOTE — HISTORY OF PRESENT ILLNESS
[FreeTextEntry1] : Ms. NICOL COLINDRES, 57 year old female, never smoker, w/ hx of thyroid cancer s/p chemo, stroke 11/2018, squamous cell carcinoma of the tongue s/p Rt hemiglossectomy, bilateral neck dissection, tracheostomy and free flap reconstruction on 12/29/20 ([id number]), followed by chemo and radiation. Pt is also s/p PEG tube placement on 1/12/21 by Dr. nOeal.\par \par Pt presents today for follow up for PEG tube removal. \par \par

## 2022-04-04 NOTE — CONSULT LETTER
[Dear  ___] : Dear  [unfilled], [Courtesy Letter:] : I had the pleasure of seeing your patient, [unfilled], in my office today. [Please see my note below.] : Please see my note below. [Sincerely,] : Sincerely, [FreeTextEntry2] : Dr. Kelli Lam (Hem/Onc/ref) [FreeTextEntry3] : Sophie Oneal MD\par Attending Surgeon\par Division of Thoracic Surgery\par , [facility] [employer] [facility]\par

## 2022-04-04 NOTE — ASSESSMENT
[FreeTextEntry1] : Ms. NICOL COLINDRES, 57 year old female, never smoker, w/ hx of thyroid cancer s/p chemo, stroke 11/2018, squamous cell carcinoma of the tongue s/p Rt hemiglossectomy, bilateral neck dissection, tracheostomy and free flap reconstruction on 12/29/20 ([id number]), followed by chemo and radiation. Pt is also s/p PEG tube placement on 1/12/21 by Dr. Lu.\par \par I have reviewed the patient's medical records and diagnostic images at time of this office consultation and have made the following recommendation:\par 1. PEG removed today in the office without any complications. Instructed pt to keep dressing on for 48 hours and full liquid diet for today. May resume regular diet tomorrow\par 2. RTC PRN \par \par \par I personally performed the services described in the documentation, reviewed the documentation recorded by the scribe in my presence and it accurately and completely records my words and actions. \par \par I, Celena Falcon NP, am scribing for and the presence of Dr. LU, ANU LOGAN, the following sections HISTORY OF PRESENT ILLNESS, PAST MEDICAL/FAMILY/SOCIAL HISTORY; REVIEW OF SYSTEMS; VITAL SIGNS; PHYSICAL EXAM; DISPOSITION.\par \par .\par  Partial Purse String (Simple) Text: Given the location of the defect and the characteristics of the surrounding skin a simple purse string closure was deemed most appropriate.  Undermining was performed circumfirentially around the surgical defect.  A purse string suture was then placed and tightened. Wound tension only allowed a partial closure of the circular defect.

## 2022-04-06 LAB
ALBUMIN SERPL ELPH-MCNC: 4.8 G/DL
ALP BLD-CCNC: 105 U/L
ALT SERPL-CCNC: 18 U/L
ANION GAP SERPL CALC-SCNC: 12 MMOL/L
AST SERPL-CCNC: 21 U/L
BILIRUB SERPL-MCNC: 0.9 MG/DL
BUN SERPL-MCNC: 15 MG/DL
CALCIUM SERPL-MCNC: 9.4 MG/DL
CHLORIDE SERPL-SCNC: 105 MMOL/L
CO2 SERPL-SCNC: 28 MMOL/L
CREAT SERPL-MCNC: 0.64 MG/DL
EGFR: 102 ML/MIN/1.73M2
GLUCOSE SERPL-MCNC: 134 MG/DL
MAGNESIUM SERPL-MCNC: 2.2 MG/DL
POTASSIUM SERPL-SCNC: 4.4 MMOL/L
PROT SERPL-MCNC: 7.3 G/DL
SODIUM SERPL-SCNC: 144 MMOL/L
TSH SERPL-ACNC: <0.01 UIU/ML

## 2022-04-21 ENCOUNTER — APPOINTMENT (OUTPATIENT)
Dept: OTOLARYNGOLOGY | Facility: CLINIC | Age: 57
End: 2022-04-21
Payer: MEDICARE

## 2022-04-21 VITALS
HEART RATE: 95 BPM | BODY MASS INDEX: 19.49 KG/M2 | HEIGHT: 63 IN | OXYGEN SATURATION: 98 % | DIASTOLIC BLOOD PRESSURE: 72 MMHG | SYSTOLIC BLOOD PRESSURE: 123 MMHG | WEIGHT: 110 LBS | TEMPERATURE: 98 F

## 2022-04-21 PROCEDURE — 31575 DIAGNOSTIC LARYNGOSCOPY: CPT

## 2022-04-21 PROCEDURE — 99214 OFFICE O/P EST MOD 30 MIN: CPT | Mod: 25

## 2022-04-21 NOTE — HISTORY OF PRESENT ILLNESS
[de-identified] : 57 year old female presents for tongue cancer for 2 month follow-up. History of with thyroid cancer ( dr Salmeron) s/p 3/2020 (sched MORGAN 6/2020) and stroke 11/2018 referred by Dr. Aman Perea for right tongue lesion. Pt is s/p right hemiglossectomy, right neck dissection, tracheostomy 12/29/2020. Path showed SCC. Completed CRT in 04/27/21. CT Chest and Neck 10/15/21; MRI 02/07/2022; PET Scan 02/21/2022 ordered by Dr. Lam. Seeing Dr. Patel for PM&R. \par Patient reports PEG tube was removed  2 weeks. Reports that she is on a regular diet and tolerating welll--eating slowly and if food is too dry she drinks a little water to help it go down. Following with SLP for swallow therapy. Denies neck pain, hemoptysis, dysphagia, odynophagia, dyspnea, dysphonia. Denies recent fevers/infections, chills, night sweats, weight loss. \par \par Complete review of systems which was performed during a previous encounter was reviewed with the patient and there are no changes except as stated in the HPI section.

## 2022-04-21 NOTE — REASON FOR VISIT
[Subsequent Evaluation] : a subsequent evaluation for [Other: ______] : provided by LISA [FreeTextEntry2] : tongue cancer [Interpreters_IDNumber] : [id number] [Interpreters_FullName] : Fredy [TWNoteComboBox1] : Chinese

## 2022-04-21 NOTE — PHYSICAL EXAM
[Midline] : trachea located in midline position [Normal] : no rashes [de-identified] : Incision well healed. [de-identified] : Flap in place, viable. KIMO

## 2022-04-21 NOTE — CONSULT LETTER
[Dear  ___] : Dear  [unfilled], [Courtesy Letter:] : I had the pleasure of seeing your patient, [unfilled], in my office today. [Please see my note below.] : Please see my note below. [Consult Closing:] : Thank you very much for allowing me to participate in the care of this patient.  If you have any questions, please do not hesitate to contact me. [Sincerely,] : Sincerely, [FreeTextEntry2] : Dr. Aman Perea  [FreeTextEntry3] : Laurent Busby MD, FACS\par \par Otolaryngology-Head and Neck Surgery\par [employer] [facility]

## 2022-05-09 ENCOUNTER — APPOINTMENT (OUTPATIENT)
Dept: PHYSICAL MEDICINE AND REHAB | Facility: CLINIC | Age: 57
End: 2022-05-09
Payer: MEDICARE

## 2022-05-09 VITALS
HEART RATE: 91 BPM | OXYGEN SATURATION: 98 % | SYSTOLIC BLOOD PRESSURE: 122 MMHG | DIASTOLIC BLOOD PRESSURE: 76 MMHG | TEMPERATURE: 97.3 F

## 2022-05-09 VITALS — WEIGHT: 114 LBS | BODY MASS INDEX: 20.19 KG/M2

## 2022-05-09 PROCEDURE — 99214 OFFICE O/P EST MOD 30 MIN: CPT

## 2022-05-09 NOTE — HISTORY OF PRESENT ILLNESS
[FreeTextEntry1] : 56 yo RHD female with pmh cva in 2018 (residual LUE and LLE weakness), h/o thyroid ca dx 3/2020 s/p resection, htn, presenting for follow up for symptoms related to squamous cell tongue cancer and treatment. She is s/p right hemiglossectomy, right neck dissection, tracheostomy 12/2020 and also underwent chemo and radiation.Since last visit PEG was removed and she is tolerating oral diet.  L sided weakness s/p CVA improved slightly after PT last year.   \par She completed lymphedema therapy in the past, now uses lymphedema pump.  Lymphedema controlled\par \par She lives alone in a house with 3 stairs to enter. \par denies falls, uses cane outside the house

## 2022-05-09 NOTE — ASSESSMENT
[FreeTextEntry1] : 57 year old RHD female pmh cva (left sided residual weakness), scc of the tongue s/p glossectomy, chemo and radiation, here for follow up of lymphedema of the neck\par -using lymphedema pump \par -tolerating diet (peg since removed), monitor weight\par -follow up in 3 months, trismus stable, continue stretching\par -referral given for AFO for L dorsiflexion weakness \par I spent a total of 30 minutes on this encounter including documentation, face to face time, care coordination and reviewing prior records.\par \par  \par

## 2022-06-15 ENCOUNTER — OUTPATIENT (OUTPATIENT)
Dept: OUTPATIENT SERVICES | Facility: HOSPITAL | Age: 57
LOS: 1 days | Discharge: HOME | End: 2022-06-15

## 2022-06-15 DIAGNOSIS — C76.0 MALIGNANT NEOPLASM OF HEAD, FACE AND NECK: ICD-10-CM

## 2022-06-15 DIAGNOSIS — Z98.890 OTHER SPECIFIED POSTPROCEDURAL STATES: Chronic | ICD-10-CM

## 2022-06-24 ENCOUNTER — RESULT REVIEW (OUTPATIENT)
Age: 57
End: 2022-06-24

## 2022-06-24 ENCOUNTER — LABORATORY RESULT (OUTPATIENT)
Age: 57
End: 2022-06-24

## 2022-06-24 ENCOUNTER — APPOINTMENT (OUTPATIENT)
Dept: HEMATOLOGY ONCOLOGY | Facility: CLINIC | Age: 57
End: 2022-06-24
Payer: MEDICARE

## 2022-06-24 VITALS
DIASTOLIC BLOOD PRESSURE: 90 MMHG | HEART RATE: 106 BPM | OXYGEN SATURATION: 96 % | TEMPERATURE: 98.2 F | SYSTOLIC BLOOD PRESSURE: 154 MMHG | BODY MASS INDEX: 20.16 KG/M2 | HEIGHT: 62.99 IN | WEIGHT: 113.76 LBS | RESPIRATION RATE: 16 BRPM

## 2022-06-24 LAB
BASOPHILS # BLD AUTO: 0.02 K/UL — SIGNIFICANT CHANGE UP (ref 0–0.2)
BASOPHILS NFR BLD AUTO: 0.5 % — SIGNIFICANT CHANGE UP (ref 0–2)
EOSINOPHIL # BLD AUTO: 0.11 K/UL — SIGNIFICANT CHANGE UP (ref 0–0.5)
EOSINOPHIL NFR BLD AUTO: 2.6 % — SIGNIFICANT CHANGE UP (ref 0–6)
HCT VFR BLD CALC: 36.1 % — SIGNIFICANT CHANGE UP (ref 34.5–45)
HGB BLD-MCNC: 12.3 G/DL — SIGNIFICANT CHANGE UP (ref 11.5–15.5)
IMM GRANULOCYTES NFR BLD AUTO: 0.2 % — SIGNIFICANT CHANGE UP (ref 0–1.5)
LYMPHOCYTES # BLD AUTO: 0.9 K/UL — LOW (ref 1–3.3)
LYMPHOCYTES # BLD AUTO: 21.2 % — SIGNIFICANT CHANGE UP (ref 13–44)
MCHC RBC-ENTMCNC: 28.4 PG — SIGNIFICANT CHANGE UP (ref 27–34)
MCHC RBC-ENTMCNC: 34.1 G/DL — SIGNIFICANT CHANGE UP (ref 32–36)
MCV RBC AUTO: 83.4 FL — SIGNIFICANT CHANGE UP (ref 80–100)
MONOCYTES # BLD AUTO: 0.24 K/UL — SIGNIFICANT CHANGE UP (ref 0–0.9)
MONOCYTES NFR BLD AUTO: 5.6 % — SIGNIFICANT CHANGE UP (ref 2–14)
NEUTROPHILS # BLD AUTO: 2.97 K/UL — SIGNIFICANT CHANGE UP (ref 1.8–7.4)
NEUTROPHILS NFR BLD AUTO: 69.9 % — SIGNIFICANT CHANGE UP (ref 43–77)
NRBC # BLD: 0 /100 WBCS — SIGNIFICANT CHANGE UP (ref 0–0)
PLATELET # BLD AUTO: 161 K/UL — SIGNIFICANT CHANGE UP (ref 150–400)
RBC # BLD: 4.33 M/UL — SIGNIFICANT CHANGE UP (ref 3.8–5.2)
RBC # FLD: 13.1 % — SIGNIFICANT CHANGE UP (ref 10.3–14.5)
WBC # BLD: 4.25 K/UL — SIGNIFICANT CHANGE UP (ref 3.8–10.5)
WBC # FLD AUTO: 4.25 K/UL — SIGNIFICANT CHANGE UP (ref 3.8–10.5)

## 2022-06-24 PROCEDURE — 99214 OFFICE O/P EST MOD 30 MIN: CPT

## 2022-06-24 NOTE — ASSESSMENT
[FreeTextEntry1] : 58 y/o F (never smoker) w/ a history of papillary thyroid carcinoma (Dx 2/2020, s/p thyroidectomy in 3/2020, s/p MORGAN in 6/2020), CVA (11/2018), and oropharyngeal cancer s/p R hemiglossectomy, bilateral neck dissection, tracheostomy, and free flap reconstruction on 12/29/20 with pathology showing invasive squamous cell carcinoma ([id number], +PNI, +LVI, +ECS, negative margins, PD-L1 CPS = 100%), s/p PEG tube, s/p CCRT with weekly cisplatin (completed 4/27/21), who presents for follow-up.\par \par # Oropharyngeal cancer\par - Dx 12/2021 ([id number], PD-L1 CPS = 100%)\par - S/p CCRT with weekly cisplatin (completed 4/27/21). She has been doing well since completing treatment, slowly recovering. She continues to use a PEG tube for nutrition. Eating a little now. Speech is improved. \par - PET/CT (7/2021) was notable for a new nonspecific, minimally FDG-avid soft tissue density in the left level IIA region. The rest of the findings were otherwise stable.\par -Repeat scans from October shows postoperative changes as described. No evidence for recurrent disease at the operative bed. Slight increase in size of a left periparotid lymph node which was shown to demonstrate minimal FDG uptake on the most recent PET/CT from 7/25/2021. New left supraclavicular lymph node. Cannot exclude metastatic disease.\par -She is now s/p bx of the parotid node which came back reactive\par -Last visit, pt presented with dental abscess and scans done at that time was c/w that dx. She improved with oral abx and clinically, is KIMO. \par Will repeat scans\par FU with Sridhar Busby and Marie\par Pt also has hx of DTC- Dx 2/2020 (S/p thyroidectomy in 3/2020, S/p MORGAN in 6/2020)\par Continue levothyroxine (currently taking 112 mcg daily). Last TSH = 0.01 last visit\par OV in 3 months\par Labs today\par \par

## 2022-06-24 NOTE — HISTORY OF PRESENT ILLNESS
[Disease: _____________________] : Disease: [unfilled] [T: ___] : T[unfilled] [N: ___] : N[unfilled] [AJCC Stage: ____] : AJCC Stage: [unfilled] [de-identified] : 57 yro female pt never smoker with hx of  thyroid cancer ( dr Salmeron) s/p 3/2020 ( sched MORGAN 6/2020) and stroke 11/2018 referred by Dr. Aman Perea for right tongue lesion.Pt states the lesion started after stroke in 2018 and thinks it could be from trauma to the tongue from the teeth, but she started feeling it increased in size from 5/2020. She is s/p right hemiglossectomy, right neck dissection, tracheostomy 12/29/2020. Pathology result back as  Invasive squamous cell carcinoma, well-differentiated, Perineural invasion and lymphovascular invasion seen. Resection margins negative for carcinoma. PD-L1 %. Patient S/P R glossectomy, bilateral neck dissection, tracheostomy and free flap reconstruction on 12/29/20. [id number].  ECS present.\par \par surgical path 1/5/21: Final Diagnosis\par 1. Tongue, right, glossectomy\par - Invasive squamous cell carcinoma, well-differentiated, see synoptic summary\par - Perineural invasion and lymphovascular invasion seen\par - Resection margins negative for carcinoma\par - PD-L1 CPS immunostain 100%\par \par 2. Tongue, additional lateral margin, excision\par - Squamous mucosa negative for carcinoma\par \par 3. Lymph nodes, right levels 1, 2, 3 and 4, neck dissection\par - Level 1: 1 out of 5 lymph nodes positive for metastatic\par carcinoma with extranodal extension; submandibular gland negative for carcinoma\par - Level 2: 1 out of 3 lymph nodes positive for metastatic\par carcinoma with extranodal extension\par - Level 3: 20 lymph nodes negative for metastatic carcinoma\par - Level 4: 20 lymph nodes negative for metastatic carcinoma\par \par 4. Lymph nodes, left level 1, neck dissection\par - Submandibular gland negative for carcinoma\par - 7 lymph nodes negative for metastatic carcinoma\par \par 5. Lymph nodes, left level 2, neck dissection\par - 15 lymph nodes negative for metastatic carcinoma\par \par 6. Lymph nodes, left level 3, neck dissection\par - 10 lymph nodes negative for metastatic carcinoma\par \par 7. Lymph nodes, left level 4, neck dissection\par - 4 lymph nodes negative for metastatic carcinoma\par \par Patient came for initial consultation today, reports feeling well,  She is s/p PEG on 1/13/21, she denies dysphagia, odynophonia, dyspnea, bleeding, fever, chills, signs of infection since surgery. Pt is NPO,  She never smoked and never chew tobacco and no h/o etoh \par \par 2/24/21: Post-op, pt developed a neck abscess/infection s/p I&D and is on abx and packing. She is now cleared by head and neck surgery for chemoRT\par \par 3/22/21: Pt started RT last week and has received one cycle of cisplatin. She reports increased nausea since chemo started. She also has decreased appetite. She is exclusively using the G tube now. She is taking antiemetics with some benefit. \par \par 4/6/21: Finishing CRT. DOing well with minimal AEs. Some nausea-improved with antiemetics. \par \par 4/20/21: Finishing up CRT. Doing well. Maintaining weight. Soem oral pain- not on any pain meds. Sporadic nausea- using antiemetics with relief of symptoms. \par \par 5/4/21: Pt feeling well. Reports compliance with synthroid. Using magic mouthwash, reports some oral pain. \par \par 6/3/21: Uncomfortable feeling in the mouth, sialorrhea, gained some weight. No pain. Persistent left sided weakness due to prior stroke. \par \par 7/1/21: Doing well. No new complaints. Feels nauseously when she eats because if thick phlegm. She reports some acid reflux. She is taking some PO but mostly dependent on G tube. \par \par 8/5/21: Lost her insurance and unable to get feeds. Feels little stronger. Speech improved. \par \par 9/9/21: Pt returns for follow-up. She reports that she feels better since her last visit. No complaints of fever, throat pain, chest pain, shortness of breath, nausea, vomiting, diarrhea, abdominal pain, or dysuria. Her strength continues to gradually improve. Her nutritional intake is primarily through her PEG tube currently, though she tries to eat a small amount of food when possible. Her weight has been stable. She reports that an air conditioner will be delivered to her house tomorrow.\par \par 11/4/21: Gained some weight. Overall doing better. Some rt partotid swelling. \par \par 12/2/21: Rt neck swelling is better. Gained weight. Still using G tube. Started eating rice and cooked vegetables by mouth, \par \par 2/1/22: Pt notes left facial swelling which is new. She attributes it to perhaps a bad tooth. She is feeling through G tube which shows discoloration. She has been trying to maintain weight. \par \par 2/15/22: Since last visit, pt has had scans done. Dental infection/abscess was noted. I called pt over the weekend to discuss results. Last visit, pt was started on abx and she feels much better since she started the meds. She also had her G tube fixed by Dr. Oneal. \par \par 6/24/22: Pt states she is doing well. She has no G tube any more (removed 2 months ago). She is able to take PO foods.  [de-identified] : sq cell ca

## 2022-06-24 NOTE — REVIEW OF SYSTEMS
[Dysphagia] : dysphagia [Negative] : Allergic/Immunologic [Fever] : no fever [Recent Change In Weight] : ~T no recent weight change [Eye Pain] : no eye pain [Red Eyes] : eyes not red [Odynophagia] : no odynophagia [Mucosal Pain] : no mucosal pain [Chest Pain] : no chest pain [Palpitations] : no palpitations [Shortness Of Breath] : no shortness of breath [Wheezing] : no wheezing [Abdominal Pain] : no abdominal pain [Vomiting] : no vomiting [Dysuria] : no dysuria [Incontinence] : no incontinence [Joint Pain] : no joint pain [Muscle Pain] : no muscle pain [Skin Rash] : no skin rash [Skin Wound] : no skin wound [Confused] : no confusion [Difficulty Walking] : no difficulty walking [Easy Bleeding] : no tendency for easy bleeding [Easy Bruising] : no tendency for easy bruising

## 2022-06-24 NOTE — PHYSICAL EXAM
[Restricted in physically strenuous activity but ambulatory and able to carry out work of a light or sedentary nature] : Status 1- Restricted in physically strenuous activity but ambulatory and able to carry out work of a light or sedentary nature, e.g., light house work, office work [Thin] : thin [Normal] : affect appropriate [de-identified] : Right tongue hemiglossectomy and reconstruction [de-identified] : PEG removed

## 2022-06-26 LAB
ALBUMIN SERPL ELPH-MCNC: 5.3 G/DL
ALP BLD-CCNC: 120 U/L
ALT SERPL-CCNC: 34 U/L
ANION GAP SERPL CALC-SCNC: 11 MMOL/L
AST SERPL-CCNC: 27 U/L
BILIRUB SERPL-MCNC: 1 MG/DL
BUN SERPL-MCNC: 14 MG/DL
CALCIUM SERPL-MCNC: 9.3 MG/DL
CHLORIDE SERPL-SCNC: 104 MMOL/L
CO2 SERPL-SCNC: 30 MMOL/L
CREAT SERPL-MCNC: 0.6 MG/DL
EGFR: 104 ML/MIN/1.73M2
GLUCOSE SERPL-MCNC: 153 MG/DL
MAGNESIUM SERPL-MCNC: 2.3 MG/DL
POTASSIUM SERPL-SCNC: 4 MMOL/L
PROT SERPL-MCNC: 7.4 G/DL
SODIUM SERPL-SCNC: 145 MMOL/L
TSH SERPL-ACNC: 0.01 UIU/ML

## 2022-06-30 ENCOUNTER — OUTPATIENT (OUTPATIENT)
Dept: OUTPATIENT SERVICES | Facility: HOSPITAL | Age: 57
LOS: 1 days | End: 2022-06-30
Payer: COMMERCIAL

## 2022-06-30 ENCOUNTER — APPOINTMENT (OUTPATIENT)
Dept: CT IMAGING | Facility: IMAGING CENTER | Age: 57
End: 2022-06-30

## 2022-06-30 DIAGNOSIS — C02.9 MALIGNANT NEOPLASM OF TONGUE, UNSPECIFIED: ICD-10-CM

## 2022-06-30 DIAGNOSIS — Z98.890 OTHER SPECIFIED POSTPROCEDURAL STATES: Chronic | ICD-10-CM

## 2022-06-30 PROCEDURE — 70491 CT SOFT TISSUE NECK W/DYE: CPT

## 2022-06-30 PROCEDURE — 71250 CT THORAX DX C-: CPT | Mod: 26,59

## 2022-06-30 PROCEDURE — 70491 CT SOFT TISSUE NECK W/DYE: CPT | Mod: 26

## 2022-06-30 PROCEDURE — 71250 CT THORAX DX C-: CPT

## 2022-07-07 ENCOUNTER — APPOINTMENT (OUTPATIENT)
Dept: OTOLARYNGOLOGY | Facility: CLINIC | Age: 57
End: 2022-07-07

## 2022-07-07 VITALS
DIASTOLIC BLOOD PRESSURE: 82 MMHG | SYSTOLIC BLOOD PRESSURE: 137 MMHG | HEIGHT: 63 IN | WEIGHT: 114 LBS | BODY MASS INDEX: 20.2 KG/M2 | HEART RATE: 84 BPM | OXYGEN SATURATION: 97 %

## 2022-07-07 PROCEDURE — 31575 DIAGNOSTIC LARYNGOSCOPY: CPT

## 2022-07-07 PROCEDURE — 99214 OFFICE O/P EST MOD 30 MIN: CPT | Mod: 25

## 2022-07-07 NOTE — REASON FOR VISIT
[Subsequent Evaluation] : a subsequent evaluation for [Patient Declined  Services] : - None: Patient declined  services [FreeTextEntry2] : tongue cancer.  [FreeTextEntry3] : .

## 2022-07-07 NOTE — PHYSICAL EXAM
[de-identified] : Incision well healed. [Midline] : trachea located in midline position [de-identified] : Flap in place, viable. KIMO [Normal] : no rashes

## 2022-07-07 NOTE — CONSULT LETTER
[Dear  ___] : Dear  [unfilled], [Courtesy Letter:] : I had the pleasure of seeing your patient, [unfilled], in my office today. [Please see my note below.] : Please see my note below. [Consult Closing:] : Thank you very much for allowing me to participate in the care of this patient.  If you have any questions, please do not hesitate to contact me. [Sincerely,] : Sincerely, [FreeTextEntry2] : Dr Aman Perea  [FreeTextEntry3] : \par Laurent Busby MD, FACS\par \par Otolaryngology-Head and Neck Surgery\par [employer] at [facility]\par

## 2022-07-07 NOTE — HISTORY OF PRESENT ILLNESS
[de-identified] : 57 year old female presents for tongue cancer for 2 month follow-up. History of with thyroid cancer ( dr Salmeron) s/p 3/2020 (sched MORGAN 6/2020) and stroke 11/2018 referred by Dr. Aman Perea for right tongue lesion. Pt is s/p right hemiglossectomy, right neck dissection, tracheostomy 12/29/2020. Path showed SCC. Completed CRT in 04/27/21. CT Chest 06/30/22 ; MRI 02/07/2022; PET Scan 02/21/2022 ordered by Dr. Lam. \par Reports that she is on a regular diet and tolerating welll--eating slowly and if food is too dry she drinks a little water to help it go down. Following with SLP for swallow therapy. Denies neck pain, throat pain, hemoptysis, dysphagia, odynophagia, dyspnea, dysphonia. Denies recent fevers/infections, chills, night sweats, weight loss. \par

## 2022-08-08 ENCOUNTER — APPOINTMENT (OUTPATIENT)
Dept: PHYSICAL MEDICINE AND REHAB | Facility: CLINIC | Age: 57
End: 2022-08-08

## 2022-08-08 VITALS — DIASTOLIC BLOOD PRESSURE: 74 MMHG | HEART RATE: 85 BPM | OXYGEN SATURATION: 96 % | SYSTOLIC BLOOD PRESSURE: 123 MMHG

## 2022-08-08 PROCEDURE — 99214 OFFICE O/P EST MOD 30 MIN: CPT

## 2022-08-08 NOTE — PHYSICAL EXAM
[FreeTextEntry1] : GEN: AAOx3, NAD, mild anterior neck swelling firm to palpation, nontender\par well healed anterior neck incision \par PSYCH: Normal mood and affect. Responds appropriately to commands.\par EYES: Sclerae Anicteric. No discharge. EOMI.\par RESP: Breathing unlabored. \par EXT: No C/C/E. \par SKIN: Incisions well healed, no erythema \par GAIT: hemiparetic \par STRENGTH: 5/5 RUE and RLE, 4+/5 LUE and 4+/5 L hip flexion, 4/5 L ankle dorsiflexion. able to stand from seated position without using hands to push off.\par SENSATION: Grossly intact to light touch bilateral upper extremities. \par PALP: there is no tenderness to palpation b/l masseter, mandible \par MOUTH OPENING: 3.5 cm \par .

## 2022-08-08 NOTE — ASSESSMENT
[FreeTextEntry1] : 57 year old RHD female pmh cva (left sided residual weakness), scc of the tongue s/p glossectomy, chemo and radiation, here for follow up \par -using lymphedema pump \par -tolerating diet monitor weight, 117 lb today\par -follow up in 3 months, trismus improving, continue stretching \par -referral given for AFO for L dorsiflexion weakness last visit, she states she is feeling stronger and prefers not to get afo.\par I spent a total of 30 minutes on this encounter including documentation, face to face time, care coordination and reviewing prior records.

## 2022-08-08 NOTE — HISTORY OF PRESENT ILLNESS
[FreeTextEntry1] : 56 yo RHD female with pmh cva in 2018 (residual LUE and LLE weakness), h/o thyroid ca dx 3/2020 s/p resection, htn, presenting for follow up for symptoms related to squamous cell tongue cancer and treatment. She is s/p right hemiglossectomy, right neck dissection, tracheostomy 12/2020 and also underwent chemo and radiation.   She is tolerating oral diet. L sided weakness s/p CVA improved slightly after PT last year and she has been continuing home exercises.  \par She completed lymphedema therapy in the past, now uses lymphedema pump. Lymphedema controlled\par \par She lives alone in a house with 3 stairs to enter. \par denies falls, uses cane outside the house, does not have it with her today but states it is in the car.\par

## 2022-09-20 ENCOUNTER — OUTPATIENT (OUTPATIENT)
Dept: OUTPATIENT SERVICES | Facility: HOSPITAL | Age: 57
LOS: 1 days | Discharge: HOME | End: 2022-09-20

## 2022-09-20 DIAGNOSIS — C76.0 MALIGNANT NEOPLASM OF HEAD, FACE AND NECK: ICD-10-CM

## 2022-09-20 DIAGNOSIS — Z98.890 OTHER SPECIFIED POSTPROCEDURAL STATES: Chronic | ICD-10-CM

## 2022-09-23 ENCOUNTER — APPOINTMENT (OUTPATIENT)
Dept: HEMATOLOGY ONCOLOGY | Facility: CLINIC | Age: 57
End: 2022-09-23

## 2022-09-23 ENCOUNTER — RESULT REVIEW (OUTPATIENT)
Age: 57
End: 2022-09-23

## 2022-09-23 VITALS
OXYGEN SATURATION: 97 % | DIASTOLIC BLOOD PRESSURE: 85 MMHG | SYSTOLIC BLOOD PRESSURE: 128 MMHG | RESPIRATION RATE: 16 BRPM | HEART RATE: 91 BPM | BODY MASS INDEX: 20.55 KG/M2 | HEIGHT: 62.99 IN | WEIGHT: 115.96 LBS | TEMPERATURE: 97.6 F

## 2022-09-23 LAB
BASOPHILS # BLD AUTO: 0.03 K/UL — SIGNIFICANT CHANGE UP (ref 0–0.2)
BASOPHILS NFR BLD AUTO: 0.7 % — SIGNIFICANT CHANGE UP (ref 0–2)
EOSINOPHIL # BLD AUTO: 0.2 K/UL — SIGNIFICANT CHANGE UP (ref 0–0.5)
EOSINOPHIL NFR BLD AUTO: 4.8 % — SIGNIFICANT CHANGE UP (ref 0–6)
HCT VFR BLD CALC: 36.1 % — SIGNIFICANT CHANGE UP (ref 34.5–45)
HGB BLD-MCNC: 12.1 G/DL — SIGNIFICANT CHANGE UP (ref 11.5–15.5)
IMM GRANULOCYTES NFR BLD AUTO: 0.5 % — SIGNIFICANT CHANGE UP (ref 0–0.9)
LYMPHOCYTES # BLD AUTO: 1.03 K/UL — SIGNIFICANT CHANGE UP (ref 1–3.3)
LYMPHOCYTES # BLD AUTO: 24.7 % — SIGNIFICANT CHANGE UP (ref 13–44)
MCHC RBC-ENTMCNC: 28.1 PG — SIGNIFICANT CHANGE UP (ref 27–34)
MCHC RBC-ENTMCNC: 33.5 G/DL — SIGNIFICANT CHANGE UP (ref 32–36)
MCV RBC AUTO: 83.8 FL — SIGNIFICANT CHANGE UP (ref 80–100)
MONOCYTES # BLD AUTO: 0.25 K/UL — SIGNIFICANT CHANGE UP (ref 0–0.9)
MONOCYTES NFR BLD AUTO: 6 % — SIGNIFICANT CHANGE UP (ref 2–14)
NEUTROPHILS # BLD AUTO: 2.64 K/UL — SIGNIFICANT CHANGE UP (ref 1.8–7.4)
NEUTROPHILS NFR BLD AUTO: 63.3 % — SIGNIFICANT CHANGE UP (ref 43–77)
NRBC # BLD: 0 /100 WBCS — SIGNIFICANT CHANGE UP (ref 0–0)
PLATELET # BLD AUTO: 184 K/UL — SIGNIFICANT CHANGE UP (ref 150–400)
RBC # BLD: 4.31 M/UL — SIGNIFICANT CHANGE UP (ref 3.8–5.2)
RBC # FLD: 13.2 % — SIGNIFICANT CHANGE UP (ref 10.3–14.5)
WBC # BLD: 4.17 K/UL — SIGNIFICANT CHANGE UP (ref 3.8–10.5)
WBC # FLD AUTO: 4.17 K/UL — SIGNIFICANT CHANGE UP (ref 3.8–10.5)

## 2022-09-23 PROCEDURE — 99214 OFFICE O/P EST MOD 30 MIN: CPT

## 2022-09-24 NOTE — REVIEW OF SYSTEMS
[Negative] : Allergic/Immunologic [Fever] : no fever [Fatigue] : no fatigue [Recent Change In Weight] : ~T no recent weight change [Eye Pain] : no eye pain [Red Eyes] : eyes not red [Dysphagia] : no dysphagia [Chest Pain] : no chest pain [Palpitations] : no palpitations [Shortness Of Breath] : no shortness of breath [Cough] : no cough [Abdominal Pain] : no abdominal pain [Vomiting] : no vomiting [Diarrhea] : no diarrhea [Muscle Pain] : no muscle pain [Skin Rash] : no skin rash [Difficulty Walking] : no difficulty walking [Easy Bruising] : no tendency for easy bruising

## 2022-09-24 NOTE — PHYSICAL EXAM
[Restricted in physically strenuous activity but ambulatory and able to carry out work of a light or sedentary nature] : Status 1- Restricted in physically strenuous activity but ambulatory and able to carry out work of a light or sedentary nature, e.g., light house work, office work [Thin] : thin [Normal] : affect appropriate [Ulcers] : no ulcers [Mucositis] : no mucositis [Thrush] : no thrush [de-identified] : Right tongue hemiglossectomy and reconstruction [de-identified] : PEG removed

## 2022-09-24 NOTE — ASSESSMENT
[FreeTextEntry1] : 58 y/o F (never smoker) w/ a history of papillary thyroid carcinoma (Dx 2/2020, s/p thyroidectomy in 3/2020, s/p MORGAN in 6/2020), CVA (11/2018), and oropharyngeal cancer s/p R hemiglossectomy, bilateral neck dissection, tracheostomy, and free flap reconstruction on 12/29/20 with pathology showing invasive squamous cell carcinoma ([id number], +PNI, +LVI, +ECS, negative margins, PD-L1 CPS = 100%), s/p PEG tube, s/p CCRT with weekly cisplatin (completed 4/27/21), who presents for follow-up.\par \par # Oropharyngeal cancer\par - Dx 12/2021 ([id number], PD-L1 CPS = 100%)\par - S/p CCRT with weekly cisplatin (completed 4/27/21). She has been doing well since completing treatment, slowly recovering. She continues to use a PEG tube for nutrition. Eating a little now. Speech is improved. \par - PET/CT (7/2021) was notable for a new nonspecific, minimally FDG-avid soft tissue density in the left level IIA region. The rest of the findings were otherwise stable.\par -Repeat scans from October shows postoperative changes as described. No evidence for recurrent disease at the operative bed. Slight increase in size of a left periparotid lymph node which was shown to demonstrate minimal FDG uptake on the most recent PET/CT from 7/25/2021. New left supraclavicular lymph node. Cannot exclude metastatic disease.\par -She is now s/p bx of the parotid node which came back reactive\par - In Feb 2022 presented with dental abscess and scans done at that time was c/w that dx. She improved with oral abx and clinically, is KIMO. \par \par Scans in July 2022 with no evidence of disease\par Continue follow up with Dr. Busby \par Repeat scans in 3 months\par Labs today \par OV in 3 months after scans are done\par \par \par \par

## 2022-09-24 NOTE — HISTORY OF PRESENT ILLNESS
[Disease: _____________________] : Disease: [unfilled] [T: ___] : T[unfilled] [N: ___] : N[unfilled] [AJCC Stage: ____] : AJCC Stage: [unfilled] [de-identified] : 58 yo female pt never smoker with hx of  thyroid cancer ( dr Salmeron) s/p 3/2020 ( sched MORGAN 6/2020) and stroke 11/2018 referred by Dr. Aman Perea for right tongue lesion.Pt states the lesion started after stroke in 2018 and thinks it could be from trauma to the tongue from the teeth, but she started feeling it increased in size from 5/2020. She is s/p right hemiglossectomy, right neck dissection, tracheostomy 12/29/2020. Pathology result back as  Invasive squamous cell carcinoma, well-differentiated, Perineural invasion and lymphovascular invasion seen. Resection margins negative for carcinoma. PD-L1 %. Patient S/P R glossectomy, bilateral neck dissection, tracheostomy and free flap reconstruction on 12/29/20. [id number].  ECS present.\par \par surgical path 1/5/21: Final Diagnosis\par 1. Tongue, right, glossectomy\par - Invasive squamous cell carcinoma, well-differentiated, see synoptic summary\par - Perineural invasion and lymphovascular invasion seen\par - Resection margins negative for carcinoma\par - PD-L1 CPS immunostain 100%\par \par 2. Tongue, additional lateral margin, excision\par - Squamous mucosa negative for carcinoma\par \par 3. Lymph nodes, right levels 1, 2, 3 and 4, neck dissection\par - Level 1: 1 out of 5 lymph nodes positive for metastatic\par carcinoma with extranodal extension; submandibular gland negative for carcinoma\par - Level 2: 1 out of 3 lymph nodes positive for metastatic\par carcinoma with extranodal extension\par - Level 3: 20 lymph nodes negative for metastatic carcinoma\par - Level 4: 20 lymph nodes negative for metastatic carcinoma\par \par 4. Lymph nodes, left level 1, neck dissection\par - Submandibular gland negative for carcinoma\par - 7 lymph nodes negative for metastatic carcinoma\par \par 5. Lymph nodes, left level 2, neck dissection\par - 15 lymph nodes negative for metastatic carcinoma\par \par 6. Lymph nodes, left level 3, neck dissection\par - 10 lymph nodes negative for metastatic carcinoma\par \par 7. Lymph nodes, left level 4, neck dissection\par - 4 lymph nodes negative for metastatic carcinoma\par \par Patient came for initial consultation today, reports feeling well,  She is s/p PEG on 1/13/21, she denies dysphagia, odynophonia, dyspnea, bleeding, fever, chills, signs of infection since surgery. Pt is NPO,  She never smoked and never chew tobacco and no h/o etoh \par \par 2/24/21: Post-op, pt developed a neck abscess/infection s/p I&D and is on abx and packing. She is now cleared by head and neck surgery for chemoRT\par \par 3/22/21: Pt started RT last week and has received one cycle of cisplatin. She reports increased nausea since chemo started. She also has decreased appetite. She is exclusively using the G tube now. She is taking antiemetics with some benefit. \par \par 4/6/21: Finishing CRT. DOing well with minimal AEs. Some nausea-improved with antiemetics. \par \par 4/20/21: Finishing up CRT. Doing well. Maintaining weight. Soem oral pain- not on any pain meds. Sporadic nausea- using antiemetics with relief of symptoms. \par \par 5/4/21: Pt feeling well. Reports compliance with synthroid. Using magic mouthwash, reports some oral pain. \par \par 6/3/21: Uncomfortable feeling in the mouth, sialorrhea, gained some weight. No pain. Persistent left sided weakness due to prior stroke. \par \par 7/1/21: Doing well. No new complaints. Feels nauseously when she eats because if thick phlegm. She reports some acid reflux. She is taking some PO but mostly dependent on G tube. \par \par 8/5/21: Lost her insurance and unable to get feeds. Feels little stronger. Speech improved. \par \par 9/9/21: Pt returns for follow-up. She reports that she feels better since her last visit. No complaints of fever, throat pain, chest pain, shortness of breath, nausea, vomiting, diarrhea, abdominal pain, or dysuria. Her strength continues to gradually improve. Her nutritional intake is primarily through her PEG tube currently, though she tries to eat a small amount of food when possible. Her weight has been stable. She reports that an air conditioner will be delivered to her house tomorrow.\par \par 11/4/21: Gained some weight. Overall doing better. Some rt partotid swelling. \par \par 12/2/21: Rt neck swelling is better. Gained weight. Still using G tube. Started eating rice and cooked vegetables by mouth, \par \par 2/1/22: Pt notes left facial swelling which is new. She attributes it to perhaps a bad tooth. She is feeling through G tube which shows discoloration. She has been trying to maintain weight. \par \par 2/15/22: Since last visit, pt has had scans done. Dental infection/abscess was noted. I called pt over the weekend to discuss results. Last visit, pt was started on abx and she feels much better since she started the meds. She also had her G tube fixed by Dr. Oneal. \par \par 6/24/22: Pt states she is doing well. She has no G tube any more (removed 2 months ago). She is able to take PO foods. \par \par 9/23/22: Patient presents for follow up today. Her appetite is good and she has been taking PO. Saw Dr. Busby and had scans in July that showed KIMO.  [de-identified] : sq cell ca

## 2022-09-26 LAB
ALBUMIN SERPL ELPH-MCNC: 5 G/DL
ALP BLD-CCNC: 89 U/L
ALT SERPL-CCNC: 18 U/L
ANION GAP SERPL CALC-SCNC: 12 MMOL/L
AST SERPL-CCNC: 20 U/L
BILIRUB SERPL-MCNC: 0.7 MG/DL
BUN SERPL-MCNC: 16 MG/DL
CALCIUM SERPL-MCNC: 10.2 MG/DL
CHLORIDE SERPL-SCNC: 104 MMOL/L
CO2 SERPL-SCNC: 28 MMOL/L
CREAT SERPL-MCNC: 0.61 MG/DL
EGFR: 104 ML/MIN/1.73M2
GLUCOSE SERPL-MCNC: 122 MG/DL
POTASSIUM SERPL-SCNC: 4.2 MMOL/L
PROT SERPL-MCNC: 7.3 G/DL
SODIUM SERPL-SCNC: 144 MMOL/L
T4 SERPL-MCNC: 11.4 UG/DL
TSH SERPL-ACNC: <0.01 UIU/ML

## 2022-10-27 ENCOUNTER — APPOINTMENT (OUTPATIENT)
Dept: OTOLARYNGOLOGY | Facility: CLINIC | Age: 57
End: 2022-10-27

## 2022-10-27 VITALS
HEART RATE: 82 BPM | HEIGHT: 63 IN | WEIGHT: 115.96 LBS | BODY MASS INDEX: 20.55 KG/M2 | DIASTOLIC BLOOD PRESSURE: 81 MMHG | SYSTOLIC BLOOD PRESSURE: 127 MMHG

## 2022-10-27 DIAGNOSIS — K14.8 OTHER DISEASES OF TONGUE: ICD-10-CM

## 2022-10-27 PROCEDURE — 99214 OFFICE O/P EST MOD 30 MIN: CPT | Mod: 25

## 2022-10-27 PROCEDURE — 31575 DIAGNOSTIC LARYNGOSCOPY: CPT

## 2022-10-27 NOTE — PHYSICAL EXAM
[de-identified] : Incision well healed. [Midline] : trachea located in midline position [de-identified] : Flap in place, viable. KIMO [Normal] : no rashes

## 2022-10-27 NOTE — REASON FOR VISIT
[Subsequent Evaluation] : a subsequent evaluation for [Patient Declined  Services] : - None: Patient declined  services [Source: ______] : History obtained from [unfilled] [FreeTextEntry2] : Tongue Cancer [FreeTextEntry3] : Patient declined offer of translation service.  Patient preferred to use accompanying family member/friend for translation.

## 2022-10-27 NOTE — CONSULT LETTER
[Dear  ___] : Dear  [unfilled], [Courtesy Letter:] : I had the pleasure of seeing your patient, [unfilled], in my office today. [Please see my note below.] : Please see my note below. [Consult Closing:] : Thank you very much for allowing me to participate in the care of this patient.  If you have any questions, please do not hesitate to contact me. [Sincerely,] : Sincerely, [FreeTextEntry2] : Dr Aman Perea  [FreeTextEntry3] : \par Laurent Busby MD, FACS\par \par Otolaryngology-Head and Neck Surgery\par [employer] [facility]\par

## 2022-11-08 ENCOUNTER — APPOINTMENT (OUTPATIENT)
Dept: PHYSICAL MEDICINE AND REHAB | Facility: CLINIC | Age: 57
End: 2022-11-08

## 2022-11-08 VITALS
HEART RATE: 88 BPM | SYSTOLIC BLOOD PRESSURE: 131 MMHG | DIASTOLIC BLOOD PRESSURE: 81 MMHG | TEMPERATURE: 97.52 F | OXYGEN SATURATION: 95 %

## 2022-11-08 PROCEDURE — 99214 OFFICE O/P EST MOD 30 MIN: CPT

## 2022-11-08 NOTE — PHYSICAL EXAM
[FreeTextEntry1] : GEN: AAOx3, NAD, mild anterior neck swelling firm to palpation, nontender\par well healed anterior neck incision \par PSYCH: Normal mood and affect. Responds appropriately to commands.\par EYES: Sclerae Anicteric. No discharge. EOMI.\par RESP: Breathing unlabored. \par EXT: No C/C/E. \par SKIN: Incisions well healed, no erythema \par GAIT: hemiparetic \par STRENGTH: 5/5 RUE and RLE, 4+/5 LUE and 4/5 L hip flexion, 4+/5 L ankle dorsiflexion. able to stand from seated position without using hands to push off.  slight extension lag L elbow\par SENSATION: Grossly intact to light touch bilateral upper extremities. \par PALP: there is no tenderness to palpation b/l masseter, mandible \par MOUTH OPENING: 3.5 cm \par . \par

## 2022-11-08 NOTE — HISTORY OF PRESENT ILLNESS
[FreeTextEntry1] : \par 56 yo RHD female with pmh cva in 2018 (residual LUE and LLE weakness), h/o thyroid ca dx 3/2020 s/p resection, htn, presenting for follow up for symptoms related to squamous cell tongue cancer and treatment. She is s/p right hemiglossectomy, right neck dissection, tracheostomy 12/2020 and also underwent chemo and radiation. She is tolerating oral diet. L sided weakness s/p CVA improved slightly after PT last year and she has been continuing home exercises. \par She completed lymphedema therapy in the past, now uses lymphedema pump. Lymphedema controlled.  She states she has follow up for thyroid study 11/18.    \par \par She lives alone in a house with 3 stairs to enter. \par denies falls, uses cane outside the house \par \par \par weight 115 today

## 2022-11-08 NOTE — ASSESSMENT
[FreeTextEntry1] : \par 57 year old RHD female pmh cva (left sided residual weakness), scc of the tongue s/p glossectomy, chemo and radiation, here for follow up \par -using lymphedema pump \par -tolerating diet monitor weight \par -follow up in 3 months, continue stretching \par -new referral given for AFO for L dorsiflexion weakness \par I spent a total of 30 minutes on this encounter including documentation, face to face time, care coordination and reviewing prior records. \par

## 2022-12-16 ENCOUNTER — OUTPATIENT (OUTPATIENT)
Dept: OUTPATIENT SERVICES | Facility: HOSPITAL | Age: 57
LOS: 1 days | Discharge: HOME | End: 2022-12-16

## 2022-12-16 ENCOUNTER — OUTPATIENT (OUTPATIENT)
Dept: OUTPATIENT SERVICES | Facility: HOSPITAL | Age: 57
LOS: 1 days | End: 2022-12-16
Payer: COMMERCIAL

## 2022-12-16 ENCOUNTER — APPOINTMENT (OUTPATIENT)
Dept: CT IMAGING | Facility: IMAGING CENTER | Age: 57
End: 2022-12-16

## 2022-12-16 DIAGNOSIS — Z98.890 OTHER SPECIFIED POSTPROCEDURAL STATES: Chronic | ICD-10-CM

## 2022-12-16 DIAGNOSIS — C73 MALIGNANT NEOPLASM OF THYROID GLAND: ICD-10-CM

## 2022-12-16 DIAGNOSIS — C76.0 MALIGNANT NEOPLASM OF HEAD, FACE AND NECK: ICD-10-CM

## 2022-12-16 PROCEDURE — 71260 CT THORAX DX C+: CPT

## 2022-12-16 PROCEDURE — 70491 CT SOFT TISSUE NECK W/DYE: CPT | Mod: 26

## 2022-12-16 PROCEDURE — 71260 CT THORAX DX C+: CPT | Mod: 26

## 2022-12-16 PROCEDURE — 70491 CT SOFT TISSUE NECK W/DYE: CPT

## 2022-12-23 ENCOUNTER — APPOINTMENT (OUTPATIENT)
Dept: HEMATOLOGY ONCOLOGY | Facility: CLINIC | Age: 57
End: 2022-12-23

## 2022-12-23 VITALS
SYSTOLIC BLOOD PRESSURE: 134 MMHG | BODY MASS INDEX: 20.55 KG/M2 | TEMPERATURE: 98.1 F | HEART RATE: 99 BPM | OXYGEN SATURATION: 94 % | DIASTOLIC BLOOD PRESSURE: 85 MMHG | HEIGHT: 62.99 IN | WEIGHT: 115.96 LBS | RESPIRATION RATE: 16 BRPM

## 2022-12-23 PROCEDURE — 99214 OFFICE O/P EST MOD 30 MIN: CPT

## 2022-12-26 NOTE — ASSESSMENT
[FreeTextEntry1] : 58 y/o F (never smoker) w/ a history of papillary thyroid carcinoma (Dx 2/2020, s/p thyroidectomy in 3/2020, s/p MORGAN in 6/2020), CVA (11/2018), and oropharyngeal cancer s/p R hemiglossectomy, bilateral neck dissection, tracheostomy, and free flap reconstruction on 12/29/20 with pathology showing invasive squamous cell carcinoma ([id number], +PNI, +LVI, +ECS, negative margins, PD-L1 CPS = 100%), s/p PEG tube, s/p CCRT with weekly cisplatin (completed 4/27/21), who presents for follow-up.\par \par # Oropharyngeal cancer\par - Dx 12/2021 ([id number], PD-L1 CPS = 100%)\par - S/p CCRT with weekly cisplatin (completed 4/27/21). She has been doing well since completing treatment, slowly recovering. She continues to use a PEG tube for nutrition. Eating a little now. Speech is improved. \par - PET/CT (7/2021) was notable for a new nonspecific, minimally FDG-avid soft tissue density in the left level IIA region. The rest of the findings were otherwise stable.\par -Repeat scans from October shows postoperative changes as described. No evidence for recurrent disease at the operative bed. Slight increase in size of a left periparotid lymph node which was shown to demonstrate minimal FDG uptake on the most recent PET/CT from 7/25/2021. New left supraclavicular lymph node. Cannot exclude metastatic disease.\par -She underwent bx of the parotid node which came back reactive\par - In Feb 2022 presented with dental abscess and scans done at that time was c/w that dx. She improved with oral abx and clinically, is KIMO. \par - scans in July 2022 with no evidence of disease\par - Continue follow up with Dr. Busby. Scans as per Dr. Shipman\par - I reviewed scans done in December 2022 independently and note KIMO. \par -Recent labs noted. Appropriate suppression of TSH. Levothyroxine was recently decreased by PCP to 100 mcg daily. Follow up with PCP and endo for thyroid issues\par -OV in 6 months

## 2022-12-26 NOTE — PHYSICAL EXAM
[Restricted in physically strenuous activity but ambulatory and able to carry out work of a light or sedentary nature] : Status 1- Restricted in physically strenuous activity but ambulatory and able to carry out work of a light or sedentary nature, e.g., light house work, office work [Thin] : thin [Normal] : affect appropriate [Ulcers] : no ulcers [Mucositis] : no mucositis [Thrush] : no thrush [de-identified] : Right tongue hemiglossectomy and reconstruction, thin white film over tongue [de-identified] : rt neck stiffness [de-identified] : PEG removed

## 2022-12-26 NOTE — HISTORY OF PRESENT ILLNESS
[Disease: _____________________] : Disease: [unfilled] [T: ___] : T[unfilled] [N: ___] : N[unfilled] [AJCC Stage: ____] : AJCC Stage: [unfilled] [de-identified] : 56 yo female pt never smoker with hx of  thyroid cancer ( dr Salmeron) s/p 3/2020 ( sched MORGAN 6/2020) and stroke 11/2018 referred by Dr. Aman Perea for right tongue lesion.Pt states the lesion started after stroke in 2018 and thinks it could be from trauma to the tongue from the teeth, but she started feeling it increased in size from 5/2020. She is s/p right hemiglossectomy, right neck dissection, tracheostomy 12/29/2020. Pathology result back as  Invasive squamous cell carcinoma, well-differentiated, Perineural invasion and lymphovascular invasion seen. Resection margins negative for carcinoma. PD-L1 %. Patient S/P R glossectomy, bilateral neck dissection, tracheostomy and free flap reconstruction on 12/29/20. [id number].  ECS present.\par \par surgical path 1/5/21: Final Diagnosis\par 1. Tongue, right, glossectomy\par - Invasive squamous cell carcinoma, well-differentiated, see synoptic summary\par - Perineural invasion and lymphovascular invasion seen\par - Resection margins negative for carcinoma\par - PD-L1 CPS immunostain 100%\par \par 2. Tongue, additional lateral margin, excision\par - Squamous mucosa negative for carcinoma\par \par 3. Lymph nodes, right levels 1, 2, 3 and 4, neck dissection\par - Level 1: 1 out of 5 lymph nodes positive for metastatic\par carcinoma with extranodal extension; submandibular gland negative for carcinoma\par - Level 2: 1 out of 3 lymph nodes positive for metastatic\par carcinoma with extranodal extension\par - Level 3: 20 lymph nodes negative for metastatic carcinoma\par - Level 4: 20 lymph nodes negative for metastatic carcinoma\par \par 4. Lymph nodes, left level 1, neck dissection\par - Submandibular gland negative for carcinoma\par - 7 lymph nodes negative for metastatic carcinoma\par \par 5. Lymph nodes, left level 2, neck dissection\par - 15 lymph nodes negative for metastatic carcinoma\par \par 6. Lymph nodes, left level 3, neck dissection\par - 10 lymph nodes negative for metastatic carcinoma\par \par 7. Lymph nodes, left level 4, neck dissection\par - 4 lymph nodes negative for metastatic carcinoma\par \par Patient came for initial consultation today, reports feeling well,  She is s/p PEG on 1/13/21, she denies dysphagia, odynophonia, dyspnea, bleeding, fever, chills, signs of infection since surgery. Pt is NPO,  She never smoked and never chew tobacco and no h/o etoh \par \par 2/24/21: Post-op, pt developed a neck abscess/infection s/p I&D and is on abx and packing. She is now cleared by head and neck surgery for chemoRT\par \par 3/22/21: Pt started RT last week and has received one cycle of cisplatin. She reports increased nausea since chemo started. She also has decreased appetite. She is exclusively using the G tube now. She is taking antiemetics with some benefit. \par \par 4/6/21: Finishing CRT. DOing well with minimal AEs. Some nausea-improved with antiemetics. \par \par 4/20/21: Finishing up CRT. Doing well. Maintaining weight. Soem oral pain- not on any pain meds. Sporadic nausea- using antiemetics with relief of symptoms. \par \par 5/4/21: Pt feeling well. Reports compliance with synthroid. Using magic mouthwash, reports some oral pain. \par \par 6/3/21: Uncomfortable feeling in the mouth, sialorrhea, gained some weight. No pain. Persistent left sided weakness due to prior stroke. \par \par 7/1/21: Doing well. No new complaints. Feels nauseously when she eats because if thick phlegm. She reports some acid reflux. She is taking some PO but mostly dependent on G tube. \par \par 8/5/21: Lost her insurance and unable to get feeds. Feels little stronger. Speech improved. \par \par 9/9/21: Pt returns for follow-up. She reports that she feels better since her last visit. No complaints of fever, throat pain, chest pain, shortness of breath, nausea, vomiting, diarrhea, abdominal pain, or dysuria. Her strength continues to gradually improve. Her nutritional intake is primarily through her PEG tube currently, though she tries to eat a small amount of food when possible. Her weight has been stable. She reports that an air conditioner will be delivered to her house tomorrow.\par \par 11/4/21: Gained some weight. Overall doing better. Some rt partotid swelling. \par \par 12/2/21: Rt neck swelling is better. Gained weight. Still using G tube. Started eating rice and cooked vegetables by mouth, \par \par 2/1/22: Pt notes left facial swelling which is new. She attributes it to perhaps a bad tooth. She is feeling through G tube which shows discoloration. She has been trying to maintain weight. \par \par 2/15/22: Since last visit, pt has had scans done. Dental infection/abscess was noted. I called pt over the weekend to discuss results. Last visit, pt was started on abx and she feels much better since she started the meds. She also had her G tube fixed by Dr. Oneal. \par \par 6/24/22: Pt states she is doing well. She has no G tube any more (removed 2 months ago). She is able to take PO foods. \par \par 9/23/22: Patient presents for follow up today. Her appetite is good and she has been taking PO. Saw Dr. Busby and had scans in July that showed KIMO. \par \par 12/23/22: Pt is doing well. She has no new symptoms. She has gained weight. She is receiving PT for lymphedema and neck tightness.  [de-identified] : sq cell ca

## 2022-12-26 NOTE — REVIEW OF SYSTEMS
[Negative] : Allergic/Immunologic [Fever] : no fever [Fatigue] : no fatigue [Recent Change In Weight] : ~T no recent weight change [Eye Pain] : no eye pain [Red Eyes] : eyes not red [Dysphagia] : no dysphagia [Chest Pain] : no chest pain [Palpitations] : no palpitations [Shortness Of Breath] : no shortness of breath [Cough] : no cough [Abdominal Pain] : no abdominal pain [Vomiting] : no vomiting [Diarrhea] : no diarrhea [Muscle Pain] : no muscle pain [Skin Rash] : no skin rash [Difficulty Walking] : no difficulty walking [Easy Bruising] : no tendency for easy bruising [FreeTextEntry4] : mild dysphagia to solids

## 2023-01-03 ENCOUNTER — NON-APPOINTMENT (OUTPATIENT)
Age: 58
End: 2023-01-03

## 2023-01-19 ENCOUNTER — APPOINTMENT (OUTPATIENT)
Dept: OTOLARYNGOLOGY | Facility: CLINIC | Age: 58
End: 2023-01-19
Payer: MEDICARE

## 2023-01-19 VITALS
BODY MASS INDEX: 21.16 KG/M2 | DIASTOLIC BLOOD PRESSURE: 77 MMHG | OXYGEN SATURATION: 97 % | HEIGHT: 62 IN | HEART RATE: 96 BPM | SYSTOLIC BLOOD PRESSURE: 118 MMHG | WEIGHT: 115 LBS

## 2023-01-19 PROCEDURE — 99214 OFFICE O/P EST MOD 30 MIN: CPT | Mod: 25

## 2023-01-19 PROCEDURE — 31575 DIAGNOSTIC LARYNGOSCOPY: CPT

## 2023-01-19 NOTE — PHYSICAL EXAM
[de-identified] : Incision well healed. KIMO [Midline] : trachea located in midline position [de-identified] : Flap in place, viable. KIMO [Normal] : no rashes

## 2023-01-19 NOTE — REASON FOR VISIT
[Subsequent Evaluation] : a subsequent evaluation for [FreeTextEntry2] : follow up for tongue cancer

## 2023-01-19 NOTE — CONSULT LETTER
[Dear  ___] : Dear  [unfilled], [Courtesy Letter:] : I had the pleasure of seeing your patient, [unfilled], in my office today. [Please see my note below.] : Please see my note below. [Sincerely,] : Sincerely, [FreeTextEntry2] : Aman Perea  [FreeTextEntry3] : Laurent Busby MD, FACS\par \par Otolaryngology-Head and Neck Surgery\par [employer] [facility]

## 2023-02-10 ENCOUNTER — APPOINTMENT (OUTPATIENT)
Dept: PHYSICAL MEDICINE AND REHAB | Facility: CLINIC | Age: 58
End: 2023-02-10
Payer: MEDICARE

## 2023-02-10 VITALS
TEMPERATURE: 97.3 F | HEART RATE: 100 BPM | OXYGEN SATURATION: 97 % | SYSTOLIC BLOOD PRESSURE: 148 MMHG | DIASTOLIC BLOOD PRESSURE: 85 MMHG

## 2023-02-10 PROCEDURE — 99214 OFFICE O/P EST MOD 30 MIN: CPT

## 2023-02-10 NOTE — HISTORY OF PRESENT ILLNESS
[FreeTextEntry1] : 58 yo RHD female with pmh cva in 2018 (residual LUE and LLE weakness), h/o thyroid ca dx 3/2020 s/p resection, htn, presenting for follow up for symptoms related to squamous cell tongue cancer and treatment. She is s/p right hemiglossectomy, right neck dissection, tracheostomy 12/2020 and also underwent chemo and radiation. She is tolerating oral diet. L sided weakness s/p CVA improved slightly after PT last year and she has been continuing home exercises. \par She completed lymphedema therapy in the past, now uses lymphedema pump. Lymphedema controlled.\par \par She lives alone in a house with 3 stairs to enter. \par uses cane outside the house at times, not with her today.\par Does not have AFO, denies falls. \par \par weight 118 lb today. \par \par Mandarin : [id number]

## 2023-02-10 NOTE — ASSESSMENT
[FreeTextEntry1] : referred for AFO for L dorsiflexion weakness, patient can attend PT with AFO for ambulation\par continue lymphedema pump\par tolerating diet, swallows carefully to prevent choking, if worsens would restart SLP and/or lymphedema therapy\par patient to continue home exercises\par follow up in 8 weeks\par I spent a total of 30 minutes on this encounter including documentation, face to face time, care coordination and reviewing prior records.\par \par

## 2023-02-10 NOTE — PHYSICAL EXAM
[FreeTextEntry1] : GEN: AAOx3, NAD, mild anterior neck swelling firm to palpation near incision, nontender\par well healed anterior neck incision \par PSYCH: Normal mood and affect. Responds appropriately to commands.\par EYES: Sclerae Anicteric. No discharge. EOMI.\par RESP: Breathing unlabored. \par EXT: No C/C/E. \par SKIN: Incisions well healed, no erythema \par GAIT: hemiparetic \par STRENGTH: 5/5 RUE and RLE, 4+/5 LUE and 4/5 L hip flexion, 4/5 L ankle dorsiflexion. able to stand from seated position without using hands to push off. slight extension lag L elbow\par SENSATION: Grossly intact to light touch bilateral upper extremities. \par PALP: there is no tenderness to palpation b/l masseter, mandible \par MOUTH OPENING: 3.75 cm \par . \par  \par

## 2023-02-14 NOTE — ASU PATIENT PROFILE, ADULT - MENTAL HEALTH CONDITIONS/SYMPTOMS, PROFILE
DISCHARGE SUMMARY    Patient ID:   Jay Dalton  [id number]; 50 year old 1972    Admit date: 2/11/2023    Discharge date:  2/14/2023    PCP: Verify Pcp     DISCHARGE DIAGNOSIS:  Principal Problem:    SBP (spontaneous bacterial peritonitis) (CMS/HCC)  Active Problems:    History of atrial fibrillation    Alcoholic cirrhosis of liver with ascites (CMS/HCC)    GERD (esophageal reflux)    Hemiparesis, left (CMS/HCC)    Thrombocytopenia (CMS/HCC)    Seizure (CMS/HCC)        PROCEDURES PERFORMED DURING THIS STAY:      Images:  CT ABDOMEN PELVIS W CONTRAST w/ IV contrast only    Result Date: 2/11/2023  EXAM: CT ABDOMEN PELVIS W CONTRAST INDICATION: 50-year-old male presenting with abdominal distention. COMPARISON: CT liver with and without contrast 9/20/2022. TECHNIQUE:  Axial images were obtained through the abdomen and pelvis after the administration of 100 mL intravenous Omnipaque 300. Scanning was performed from the lung bases to the pelvic symphysis. Coronal and sagittal reformations were also provided for interpretation. FINDINGS: Linear left basilar atelectasis. The liver contour is nodular. There is hypertrophy of the left hepatic lobe. The portal veins are grossly patent. Tortuous paraesophageal and gastric varices are present. A large volume of ascites is present. The spleen is enlarged, measuring 13.9 cm in craniocaudal dimension. The pancreas and adrenal glands are unremarkable. The gallbladder is contracted. Several small calcified gallstones are present. The kidneys are unremarkable. No evidence of collecting system obstruction. The bowel is normal in caliber. There is mild diffuse mural enhancement and thickening of the small bowel and colon. No evidence of bowel obstruction. No free air. No lymphadenopathy. The appendix is not identified with certainty. The prostate is unremarkable. The urinary bladder is intact. No acute osseous findings. Moderate focal degenerative disc disease at the L5-S1 level.  No expansile or destructive osseous lesions.     IMPRESSION: 1.  Cirrhosis and stigmata of portal hypertension. 2.  Large volume ascites. 3.  Mild diffuse wall thickening and hyperenhancement of the small bowel and colon may be due to portal enteropathy. 4.  Moderate L5-S1 degenerative disc disease.    IR Paracentesis    Result Date: 2/12/2023  EXAM: IR PARACENTESIS DATE:  2/12/2023 12:48 PM: HISTORY: ascites Medications: 1% lidocaine local. TECHNIQUE: Preliminary sonography of the right lower quadrant demonstrates large volume ascites.  After obtaining written informed consent, the patient was placed on the ultrasound table in the supine position and the right lower quadrant was prepped and draped in the usual sterile fashion. 1% lidocaine was used for local anesthesia. A timeout was performed to verify the correct patient, procedure, and site. Using ultrasound guidance, a 5 Nepali centesis needle was advanced into the peritoneal fluid. Approximately 9 L of serous fluid was aspirated. The cannula was removed and a sterile dressing applied at the site. Complications: The patient tolerated the procedure well without evidence of periprocedural complication. EBL: Minimal     IMPRESSION: Ultrasound guided paracentesis yielding 9 L of serous fluid.      Consults:ID    Labs  Recent Labs   Lab 02/13/23  0527 02/12/23  0440 02/11/23  1510   WBC 4.7 5.6 7.5   HGB 10.3* 10.5* 11.7*   HCT 29.3* 32.1* 35.2*   * 141 160   RBC 3.29* 3.45* 3.85*   MCV 89.1 93.0 91.4   MCH 31.3 30.4 30.4   MCHC 35.2 32.7 33.2     Recent Labs   Lab 02/13/23  0527 02/12/23  0440 02/11/23  1510   SODIUM 133* 134* 134*   CHLORIDE 102 103 102   BUN 32* 34* 37*   GLUCOSE 80 76 112*   POTASSIUM 4.2 4.5 4.5   CO2 19* 21 22   CREATININE 1.37* 1.35* 1.45*   CALCIUM 7.9* 8.1* 8.3*     Recent Labs   Lab 02/11/23  1510   BILIRUBIN 2.3*   AST 28   ALBUMIN 2.6*   ALKPT 231*   GPT 40       HOSPITAL COURSE:  Please see the history and physical/surgical  consultation notes for details. Mr. Dalton is a 50 year old male with with history of alcoholic liver disease with recurrent ascites, portal hypertension, history of hemorrhagic CVA with residual left-sided weakness, atrial fibrillation, not on any anticoagulation, seizure disorder presented admitted with right-sided abdominal pain, distention.  Patient underwent diagnostic paracentesis, fluid culture positive positive for Streptococcus parasanguinis    Infectious Disease consulted, patient most likely had SBP due to that.  He was initially started on Zosyn, transitioned to IV ceftriaxone.  Patient underwent repeat ultrasound-guided thoracentesis, repeat cultures are negative, blood cultures are also negative, Infectious Disease as okayed him to go home on oral Keflex for 7 days for treatment of SBP.  His abdominal pain is improving.  Patient had issues with the low blood pressure after large volume thoracentesis which has improved.  I did decrease his Aldactone dose from 100 b.i.d. to 50 mg twice a day and add for added holding parameters if systolic blood pressure below 110/60.  Patient otherwise doing much better.  He was seen by Physical therapy, new wheelchair has been ordered and he was advised to follow with outpatient therapy.  He will be discharged home today in stable condition.  Patient was referred to new PCP as his old PCP as left.    PATIENTS CURRENT VITALS & PHYSICAL EXAM  Visit Vitals  /63 (BP Location: RUE - Right upper extremity, Patient Position: Sitting)   Pulse 75   Temp 98.5 °F (36.9 °C) (Temporal)   Resp (!) 22   Ht 6' 2\" (1.88 m)   Wt 100.1 kg (220 lb 10.9 oz)   SpO2 95%   BMI 28.33 kg/m²     General : Well developed Male, NAD  HEENT: Atraumatic, normocephalic, pupils equal and reactive, no JVP, no carotid bruit,oral mucosa moist, no JACLYN  Lung:CTAB, no wheezing, rales or rhonchi, AP diameter appropriate   CVS:RRR, S1,S2 present  Abdomen:Soft ,non tender, slightly distended bowel  sounds present, no guarding or rigidity  Neuro ;AAOX3, baseline L sided hemiparesis   Extremeties:Pulses 2+ b/l, no edema or cyanosis or clubbing  Skin:no rashes      Discharge Medications:     Summary of your Discharge Medications      Take these Medications      Details   ALPRAZolam 0.25 MG tablet  Commonly known as: XANAX   Take 1 tablet by mouth daily as needed for Anxiety.     cephalexin 500 MG capsule  Commonly known as: Keflex   Take 1 capsule by mouth 4 times daily for 7 days.     diazePAM 5 MG tablet  Commonly known as: Valium   Take 1 tablet by mouth daily as needed for Muscle spasms (or seizures).     dilTIAZem 120 MG 24 hr capsule  Commonly known as: Cartia XT   Take 1 capsule by mouth daily.     folic acid 400 MCG tablet  Commonly known as: FOLATE   Take 1 tablet by mouth daily.     furosemide 20 MG tablet  Commonly known as: LASIX   Take 2 tablets by mouth 2 times daily. PLEASE HOLD THE DOSE IF BP BELOW 110/60     lactulose 10 GM/15ML solution  Commonly known as: CHRONULAC   Take 30 mLs by mouth 2 times daily.     levetiracetam 1000 MG tablet  Commonly known as: KEPPRA   Take 1 tablet by mouth in the morning and 1 tablet in the evening.     mirtazapine 7.5 MG tablet  Commonly known as: REMERON   Take 1 tablet by mouth nightly.     pantoprazole 40 MG tablet  Commonly known as: PROTONIX   Take 1 tablet by mouth in the morning and 1 tablet in the evening.     spironolactone 100 MG tablet  Commonly known as: ALDACTONE   Take 0.5 tablets by mouth 2 times daily. PLEASE HOLD THE DOSE IF BLOOD PRESSURE BELOW 110/60     thiamine 100 MG tablet  Commonly known as: VITAMIN B1   Take 1 tablet by mouth daily. Do not start before December 14, 2021.     Vitamin D (Ergocalciferol) 1.25 mg (50,000 units) capsule   Take 1 capsule by mouth 1 day a week.     Xifaxan 550 MG Tab   Generic drug: rifAXIMin  Take 1 tablet by mouth every 12 hours.     zinc sulfate 220 (50 Zn) MG tablet  Commonly known as: MAR-ZINC   Take 1  tablet by mouth 2 times daily.            Patient Discharge Instructions:   1. Activity: activity as tolerated and activity as tolerated and no driving for today  2. Diet: cardiac diet  3. Wound Care: See provided instructions.    Follow-up:  PCP as scheduled     Total time spent in discharge is 40  minutes,most of the time spent in counseling and coordinating care of the patient.    Signed:  Lucian Braswell MD  2/14/2023  12:53 PM       none

## 2023-02-16 NOTE — ED PROVIDER NOTE - TOBACCO USE
Chief Complaint   Patient presents with    Follow-up     Closed displaced fracture of proximal end of let humerus. Having problems with doing ADL's . Having problem sleeping. Can't do anything with her arm. DOI 2/2/23  [facility] fracture care billing as of 2/7/23  Global Date Ends: 5/8/2023    SUBJECTIVE: Julio Chavez is an 71 y.o. female who is right hand dominant who presents today for follow up on left proximal humerus fracture, she maintains sling but has been having challenges completing ADLs and with pain control. She has a very hard time getting up and out of her bed independently from laying flat. Patient is looking into obtaining a recliner with possible stand assist due to this injury. She is now 2 weeks from DOI. States she is coming out of sling for motion of the elbow, has not been having difficulty with motion of the wrist or hand. With change of position patient is having sharp pain at the fracture site, she is having a difficult time getting the arm to be less painful when that occurs. Review of Systems -   General ROS: negative for - chills, fatigue, fever or night sweats  Respiratory ROS: no cough, shortness of breath, or wheezing  Cardiovascular ROS: no chest pain or dyspnea on exertion  Gastrointestinal ROS: no abdominal pain, change in bowel habits, or black or bloody stools  Genitourinary: no hematuria, dysuria, or incontinence   Musculoskeletal ROS:see above  Neurological ROS: no TIA or stroke symptoms       OBJECTIVE:   Alert and oriented X 3, no acute distress, respirations easy and unlabored with no audible wheezes, skin warm and dry, speech and dress appropriate for noted age, affect euthymic.     Extremity:  Left upper extremity:  Overlying skin is intact, there is no skin tenting or compromise  There is mild deformity noted to the proximal humerus with TTP  States sensation is intact to the axillary distribution to light touch  unable to actively ROM the shoulder any due to pain discomfort from her known fracture,  Elbow is nontender nor is wrist or hand  Full AROM of the elbow/wrist/hand without pain  Distal sensation is preserved in the radial/ulnar/median nerve distributions of the hand  2+ radial pulse, hand warm and perfused    XR: 2/16/23   THREE XRAY VIEWS OF THE LEFT SHOULDER       2/16/2023 9:20 am       COMPARISON:   02/02/2023       HISTORY:   ORDERING SYSTEM PROVIDED HISTORY: Other closed displaced fracture of proximal   end of left humerus, initial encounter   TECHNOLOGIST PROVIDED HISTORY:   What reading provider will be dictating this exam?->CRC       FINDINGS:   Three views of left shoulder reveal healing fracture seen of the left humeral   neck. Multiple fracture fragments identified. There is no change seen in   the alignment. The articular surface is intact with degenerative changes   seen of the acromioclavicular junction. Impression   There is healing fracture seen of the left humeral neck. No change seen in   the alignment. Multiple fracture fragments identified. The articular   surface remains intact. Ht 5' 4\" (1.626 m)   Wt 142 lb (64.4 kg)   LMP 01/02/2015   BMI 24.37 kg/m²     ASSESSMENT:     Diagnosis Orders   1.  Other closed displaced fracture of proximal end of left humerus with routine healing, subsequent encounter  [facility]    HYDROcodone-acetaminophen (NORCO) 5-325 MG per tablet    methocarbamol (ROBAXIN-750) 750 MG tablet    DISCONTINUED: HYDROcodone-acetaminophen ([address]) 5-325 MG per tablet    DISCONTINUED: methocarbamol (ROBAXIN-750) 750 MG tablet          PLAN:  Imaging reviewed with patient as well as POC moving forward  Recommend therapy due to patients limitation with ADLs and mobilization  Patient agreeable to therapy  Multimodal pain control prescribed  Patient will follow up in 2-3 weeks for repeat evaluation and imaging to assess alignment, again reviewed if significant change in alignment may need to discuss surgical intervention further  Controlled Substance Monitoring:    Acute and Chronic Pain Monitoring:   RX Monitoring 2/16/2023   Attestation -   Periodic Controlled Substance Monitoring No signs of potential drug abuse or diversion identified. Chronic Pain > 80 MEDD -           Electronically signed by Magaly Navarro PA-C on 2/16/2023 at 10:12 AM  Note: This report was completed using [employer] voiced recognition software. Every effort has been made to ensure accuracy; however, inadvertent computerized transcription errors may be present. Never smoker

## 2023-03-31 ENCOUNTER — APPOINTMENT (OUTPATIENT)
Dept: PHYSICAL MEDICINE AND REHAB | Facility: CLINIC | Age: 58
End: 2023-03-31
Payer: MEDICARE

## 2023-03-31 VITALS — SYSTOLIC BLOOD PRESSURE: 117 MMHG | DIASTOLIC BLOOD PRESSURE: 74 MMHG | HEART RATE: 89 BPM | TEMPERATURE: 96.9 F

## 2023-03-31 DIAGNOSIS — I63.9 CEREBRAL INFARCTION, UNSPECIFIED: ICD-10-CM

## 2023-03-31 PROCEDURE — 99214 OFFICE O/P EST MOD 30 MIN: CPT

## 2023-03-31 NOTE — HISTORY OF PRESENT ILLNESS
[FreeTextEntry1] : 57 yo RHD female with pmh cva in 2018 (residual LUE and LLE weakness), h/o thyroid ca dx 3/2020 s/p resection, htn, presenting for follow up for symptoms related to squamous cell tongue cancer and treatment. She is s/p right hemiglossectomy, right neck dissection, tracheostomy 12/2020 and also underwent chemo and radiation. She is tolerating oral diet. L sided weakness s/p CVA improved slightly after PT last year and she has been continuing home exercises.   \par She completed lymphedema therapy in the past, now uses lymphedema pump. \par \par She was last seen 2/10/23 and referred to restart PT program for L sided weakness including mobility with AFO.\par She now has AFO but did not restart therapy yet, is agreeable to starting.  \par \par She lives alone in a house with 3 stairs to enter. \par uses cane outside the house at times, not with her today.\par denies falls. \par \par weight:  123 lb today with AFO\par \par Mandarin :  [id number]\par

## 2023-03-31 NOTE — ASSESSMENT
[FreeTextEntry1] : start PT for gait, ambulation, strengthening with AFO\par now has L afo\par continue neck compression, lymphedema pump for neck lymphedema.\par follow up in 8 weeks\par I spent a total of 30 minutes on this encounter including documentation, face to face time, care coordination and reviewing prior records.\par

## 2023-05-11 ENCOUNTER — APPOINTMENT (OUTPATIENT)
Dept: OTOLARYNGOLOGY | Facility: CLINIC | Age: 58
End: 2023-05-11
Payer: MEDICARE

## 2023-05-11 VITALS
WEIGHT: 115 LBS | SYSTOLIC BLOOD PRESSURE: 122 MMHG | HEART RATE: 86 BPM | DIASTOLIC BLOOD PRESSURE: 77 MMHG | BODY MASS INDEX: 21.16 KG/M2 | HEIGHT: 62 IN | TEMPERATURE: 98.2 F | OXYGEN SATURATION: 97 %

## 2023-05-11 PROCEDURE — 99214 OFFICE O/P EST MOD 30 MIN: CPT | Mod: 25

## 2023-05-11 PROCEDURE — 31575 DIAGNOSTIC LARYNGOSCOPY: CPT

## 2023-05-11 RX ORDER — TRIAMCINOLONE ACETONIDE 1 MG/G
0.1 CREAM TOPICAL
Refills: 0 | Status: COMPLETED | COMMUNITY
End: 2023-05-11

## 2023-05-11 RX ORDER — FAMOTIDINE 20 MG/1
20 TABLET, FILM COATED ORAL
Qty: 30 | Refills: 0 | Status: COMPLETED | COMMUNITY
Start: 2021-01-14 | End: 2023-05-11

## 2023-05-11 RX ORDER — UBIDECARENONE/VIT E ACET 100MG-5
CAPSULE ORAL
Refills: 0 | Status: COMPLETED | COMMUNITY
End: 2023-05-11

## 2023-05-11 NOTE — CONSULT LETTER
[Dear  ___] : Dear  [unfilled], [Courtesy Letter:] : I had the pleasure of seeing your patient, [unfilled], in my office today. [Please see my note below.] : Please see my note below. [Consult Closing:] : Thank you very much for allowing me to participate in the care of this patient.  If you have any questions, please do not hesitate to contact me. [Sincerely,] : Sincerely, [FreeTextEntry2] : Dr Aman Perea  [FreeTextEntry3] : \par Laurent Busby MD, FACS\par \par Otolaryngology-Head and Neck Surgery\par [employer] [facility]\par

## 2023-05-11 NOTE — PHYSICAL EXAM
[de-identified] : Incision well healed. KIMO [Midline] : trachea located in midline position [de-identified] : Flap in place, viable. KIMO [Normal] : no rashes

## 2023-05-11 NOTE — HISTORY OF PRESENT ILLNESS
[de-identified] : 58 year old female presents for follow up of tongue cancer\par History of superficial 7mm leukoplakic lesion of Right lateral tongue, s/p Right glossectomy, bilateral neck dissection, tracheostomy and free flap reconstruction 12/29/2020\par Recommended for adjuvant CRT - s/p completion of treatment 4/27/21\par s/p CT of neck/chest on 12/16/22.\par Reports no change in symptoms since last visit. \par Tolerating liquids, having difficulty swallowing solids - eats slowly to avoid choking. Has to have liquids with dry foods to help swallow. Weight is stable \par Moderate amount of saliva production, increasing since last visit\par Denies odynophagia, aspirations, dyspnea, dysphonia, otalgia. \par Complete review of systems which was performed during a previous encounter was reviewed with the patient and there are no changes except as stated in the HPI section.\par

## 2023-05-26 ENCOUNTER — APPOINTMENT (OUTPATIENT)
Dept: PHYSICAL MEDICINE AND REHAB | Facility: CLINIC | Age: 58
End: 2023-05-26
Payer: MEDICARE

## 2023-05-26 VITALS
HEART RATE: 83 BPM | DIASTOLIC BLOOD PRESSURE: 77 MMHG | OXYGEN SATURATION: 97 % | TEMPERATURE: 97.6 F | SYSTOLIC BLOOD PRESSURE: 119 MMHG

## 2023-05-26 PROCEDURE — 99214 OFFICE O/P EST MOD 30 MIN: CPT

## 2023-05-26 NOTE — HISTORY OF PRESENT ILLNESS
[FreeTextEntry1] : 57 yo RHD female with pmh cva in 2018 (residual LUE and LLE weakness), h/o thyroid ca dx 3/2020 s/p resection, htn, presenting for follow up for symptoms related to squamous cell tongue cancer and treatment. She is s/p right hemiglossectomy, right neck dissection, tracheostomy 12/2020 and also underwent chemo and radiation. She is tolerating oral diet. L sided weakness s/p CVA improved slightly after PT last year and she has been continuing home exercises. \par She completed lymphedema therapy in the past, now uses lymphedema pump. \par \par She was last seen 2/10/23 and referred to restart PT program for L sided weakness including mobility with AFO.\par She now has AFO (not with her today) but did not restart therapy yet, is agreeable to starting. \par \par She lives alone in a house with 3 stairs to enter. \par uses cane outside the house, has it with her today.  \par denies falls. \par \par \par Mandarin  [phone]

## 2023-05-26 NOTE — PHYSICAL EXAM
[FreeTextEntry1] : GEN: AAOx3, NAD, mild anterior neck swelling firm to palpation above>below incision R neck approx 5 cm centrally, patient reports getting smaller, nontender\par well healed anterior neck incision \par PSYCH: Normal mood and affect. Responds appropriately to commands.\par EYES: Sclerae Anicteric. No discharge. EOMI.\par RESP: Breathing unlabored. \par EXT: No C/C/E. \par SKIN: Incisions well healed, no erythema \par GAIT: \par STRENGTH: 5/5 RUE and RLE, 4+/5 LUE and 4/5 L hip flexion, 4/5 L ankle dorsiflexion. \par SENSATION: Grossly intact to light touch bilateral upper extremities. \par PALP: there is no tenderness to palpation b/l masseter, mandible \par MOUTH OPENIN cm \par .

## 2023-05-26 NOTE — ASSESSMENT
[FreeTextEntry1] : last seen 3/31, referred for PT for gait, ambulation, strengthening with AFO, however patient has not attended PT since that time, but is interested in attending.  Used  to assist in scheduling PT appointment with [facility].\par now has L afo, not with her today.\par continue neck compression, lymphedema pump for neck lymphedema.\par follow up in 2-3 months or as needed\par I spent a total of 30 minutes on this encounter including documentation, face to face time, care coordination and reviewing prior records.\par

## 2023-06-14 ENCOUNTER — OUTPATIENT (OUTPATIENT)
Dept: OUTPATIENT SERVICES | Facility: HOSPITAL | Age: 58
LOS: 1 days | Discharge: HOME | End: 2023-06-14

## 2023-06-14 DIAGNOSIS — Z98.890 OTHER SPECIFIED POSTPROCEDURAL STATES: Chronic | ICD-10-CM

## 2023-06-14 DIAGNOSIS — C76.0 MALIGNANT NEOPLASM OF HEAD, FACE AND NECK: ICD-10-CM

## 2023-06-22 ENCOUNTER — APPOINTMENT (OUTPATIENT)
Dept: HEMATOLOGY ONCOLOGY | Facility: CLINIC | Age: 58
End: 2023-06-22
Payer: MEDICARE

## 2023-06-22 ENCOUNTER — LABORATORY RESULT (OUTPATIENT)
Age: 58
End: 2023-06-22

## 2023-06-22 ENCOUNTER — RESULT REVIEW (OUTPATIENT)
Age: 58
End: 2023-06-22

## 2023-06-22 VITALS
HEART RATE: 88 BPM | SYSTOLIC BLOOD PRESSURE: 116 MMHG | TEMPERATURE: 97.3 F | RESPIRATION RATE: 15 BRPM | WEIGHT: 121.25 LBS | DIASTOLIC BLOOD PRESSURE: 76 MMHG | BODY MASS INDEX: 22.18 KG/M2 | OXYGEN SATURATION: 97 %

## 2023-06-22 DIAGNOSIS — C73 MALIGNANT NEOPLASM OF THYROID GLAND: ICD-10-CM

## 2023-06-22 LAB
BASOPHILS # BLD AUTO: 0.03 K/UL — SIGNIFICANT CHANGE UP (ref 0–0.2)
BASOPHILS NFR BLD AUTO: 0.6 % — SIGNIFICANT CHANGE UP (ref 0–2)
EOSINOPHIL # BLD AUTO: 0.14 K/UL — SIGNIFICANT CHANGE UP (ref 0–0.5)
EOSINOPHIL NFR BLD AUTO: 2.8 % — SIGNIFICANT CHANGE UP (ref 0–6)
HCT VFR BLD CALC: 33.8 % — LOW (ref 34.5–45)
HGB BLD-MCNC: 11.3 G/DL — LOW (ref 11.5–15.5)
IMM GRANULOCYTES NFR BLD AUTO: 0.2 % — SIGNIFICANT CHANGE UP (ref 0–0.9)
LYMPHOCYTES # BLD AUTO: 1.16 K/UL — SIGNIFICANT CHANGE UP (ref 1–3.3)
LYMPHOCYTES # BLD AUTO: 23.4 % — SIGNIFICANT CHANGE UP (ref 13–44)
MCHC RBC-ENTMCNC: 27.7 PG — SIGNIFICANT CHANGE UP (ref 27–34)
MCHC RBC-ENTMCNC: 33.4 G/DL — SIGNIFICANT CHANGE UP (ref 32–36)
MCV RBC AUTO: 82.8 FL — SIGNIFICANT CHANGE UP (ref 80–100)
MONOCYTES # BLD AUTO: 0.3 K/UL — SIGNIFICANT CHANGE UP (ref 0–0.9)
MONOCYTES NFR BLD AUTO: 6.1 % — SIGNIFICANT CHANGE UP (ref 2–14)
NEUTROPHILS # BLD AUTO: 3.31 K/UL — SIGNIFICANT CHANGE UP (ref 1.8–7.4)
NEUTROPHILS NFR BLD AUTO: 66.9 % — SIGNIFICANT CHANGE UP (ref 43–77)
NRBC # BLD: 0 /100 WBCS — SIGNIFICANT CHANGE UP (ref 0–0)
PLATELET # BLD AUTO: 184 K/UL — SIGNIFICANT CHANGE UP (ref 150–400)
RBC # BLD: 4.08 M/UL — SIGNIFICANT CHANGE UP (ref 3.8–5.2)
RBC # FLD: 13.4 % — SIGNIFICANT CHANGE UP (ref 10.3–14.5)
WBC # BLD: 4.95 K/UL — SIGNIFICANT CHANGE UP (ref 3.8–10.5)
WBC # FLD AUTO: 4.95 K/UL — SIGNIFICANT CHANGE UP (ref 3.8–10.5)

## 2023-06-22 PROCEDURE — 99213 OFFICE O/P EST LOW 20 MIN: CPT

## 2023-06-22 RX ORDER — LEVOTHYROXINE SODIUM 0.11 MG/1
112 TABLET ORAL DAILY
Refills: 0 | Status: DISCONTINUED | COMMUNITY
Start: 2020-12-30 | End: 2023-06-22

## 2023-06-22 RX ORDER — LEVOTHYROXINE SODIUM 0.1 MG/1
100 TABLET ORAL DAILY
Refills: 0 | Status: ACTIVE | COMMUNITY
Start: 2023-06-22

## 2023-07-02 NOTE — REVIEW OF SYSTEMS
[Negative] : Allergic/Immunologic [Fever] : no fever [Fatigue] : no fatigue [Eye Pain] : no eye pain [Recent Change In Weight] : ~T no recent weight change [Red Eyes] : eyes not red [Dysphagia] : no dysphagia [Chest Pain] : no chest pain [Palpitations] : no palpitations [Shortness Of Breath] : no shortness of breath [Cough] : no cough [Abdominal Pain] : no abdominal pain [Muscle Pain] : no muscle pain [Vomiting] : no vomiting [Skin Rash] : no skin rash [Difficulty Walking] : no difficulty walking [Easy Bruising] : no tendency for easy bruising [FreeTextEntry4] : mild dysphagia to solids

## 2023-07-02 NOTE — PHYSICAL EXAM
[Restricted in physically strenuous activity but ambulatory and able to carry out work of a light or sedentary nature] : Status 1- Restricted in physically strenuous activity but ambulatory and able to carry out work of a light or sedentary nature, e.g., light house work, office work [Thin] : thin [Normal] : affect appropriate [Ulcers] : no ulcers [Mucositis] : no mucositis [Thrush] : no thrush [de-identified] : Right tongue hemiglossectomy and reconstruction, thin white film over tongue [de-identified] : rt neck stiffness

## 2023-07-02 NOTE — ASSESSMENT
[FreeTextEntry1] : 57 y/o F (never smoker) w/ a history of papillary thyroid carcinoma (Dx 2/2020, s/p thyroidectomy in 3/2020, s/p MORGAN in 6/2020), CVA (11/2018), and oropharyngeal cancer s/p R hemiglossectomy, bilateral neck dissection, tracheostomy, and free flap reconstruction on 12/29/20 with pathology showing invasive squamous cell carcinoma ([id number], +PNI, +LVI, +ECS, negative margins, PD-L1 CPS = 100%), s/p PEG tube, s/p CCRT with weekly cisplatin (completed 4/27/21), who presents for follow-up.\par \par # Oropharyngeal cancer\par - Dx 12/2021 ([id number], PD-L1 CPS = 100%)\par - S/p CCRT with weekly cisplatin (completed 4/27/21). She has been doing well since completing treatment, slowly recovering. She continues to use a PEG tube for nutrition. Eating a little now. Speech is improved. \par - PET/CT (7/2021) was notable for a new nonspecific, minimally FDG-avid soft tissue density in the left level IIA region. The rest of the findings were otherwise stable.\par -Repeat scans from October shows postoperative changes as described. No evidence for recurrent disease at the operative bed. Slight increase in size of a left periparotid lymph node which was shown to demonstrate minimal FDG uptake on the most recent PET/CT from 7/25/2021. New left supraclavicular lymph node. Cannot exclude metastatic disease.\par -She underwent bx of the parotid node which came back reactive\par - In Feb 2022 presented with dental abscess and scans done at that time was c/w that dx. She improved with oral abx and clinically, is KIMO. \par - scans in July 2022 with no evidence of disease\par - Continue follow up with Dr. Busby. Scans as per Dr. Shipman\par - I reviewed scans done in December 2022 independently and note KIMO. \par -Recent labs noted. Appropriate suppression of TSH. Levothyroxine was recently decreased by PCP to 100 mcg daily. Follow up with PCP for other health issues and age-appropriate cancer screening and endo for thyroid cancer and optimal suppression of TSH\par -Will repeat scans\par -Check labs today: CBC, CMP, TSH\par -Carotid doppler to r/o stenosis post-RT\par -OV in 6 months

## 2023-07-02 NOTE — HISTORY OF PRESENT ILLNESS
[Disease: _____________________] : Disease: [unfilled] [T: ___] : T[unfilled] [N: ___] : N[unfilled] [AJCC Stage: ____] : AJCC Stage: [unfilled] [de-identified] : 57 yo female pt never smoker with hx of  thyroid cancer ( dr Salmeron) s/p 3/2020 ( sched MORGAN 6/2020) and stroke 11/2018 referred by Dr. Aman Perea for right tongue lesion.Pt states the lesion started after stroke in 2018 and thinks it could be from trauma to the tongue from the teeth, but she started feeling it increased in size from 5/2020. She is s/p right hemiglossectomy, right neck dissection, tracheostomy 12/29/2020. Pathology result back as  Invasive squamous cell carcinoma, well-differentiated, Perineural invasion and lymphovascular invasion seen. Resection margins negative for carcinoma. PD-L1 %. Patient S/P R glossectomy, bilateral neck dissection, tracheostomy and free flap reconstruction on 12/29/20. [id number].  ECS present.\par \par surgical path 1/5/21: Final Diagnosis\par 1. Tongue, right, glossectomy\par - Invasive squamous cell carcinoma, well-differentiated, see synoptic summary\par - Perineural invasion and lymphovascular invasion seen\par - Resection margins negative for carcinoma\par - PD-L1 CPS immunostain 100%\par \par 2. Tongue, additional lateral margin, excision\par - Squamous mucosa negative for carcinoma\par \par 3. Lymph nodes, right levels 1, 2, 3 and 4, neck dissection\par - Level 1: 1 out of 5 lymph nodes positive for metastatic\par carcinoma with extranodal extension; submandibular gland negative for carcinoma\par - Level 2: 1 out of 3 lymph nodes positive for metastatic\par carcinoma with extranodal extension\par - Level 3: 20 lymph nodes negative for metastatic carcinoma\par - Level 4: 20 lymph nodes negative for metastatic carcinoma\par \par 4. Lymph nodes, left level 1, neck dissection\par - Submandibular gland negative for carcinoma\par - 7 lymph nodes negative for metastatic carcinoma\par \par 5. Lymph nodes, left level 2, neck dissection\par - 15 lymph nodes negative for metastatic carcinoma\par \par 6. Lymph nodes, left level 3, neck dissection\par - 10 lymph nodes negative for metastatic carcinoma\par \par 7. Lymph nodes, left level 4, neck dissection\par - 4 lymph nodes negative for metastatic carcinoma\par \par Patient came for initial consultation today, reports feeling well,  She is s/p PEG on 1/13/21, she denies dysphagia, odynophonia, dyspnea, bleeding, fever, chills, signs of infection since surgery. Pt is NPO,  She never smoked and never chew tobacco and no h/o etoh \par \par 2/24/21: Post-op, pt developed a neck abscess/infection s/p I&D and is on abx and packing. She is now cleared by head and neck surgery for chemoRT\par \par 3/22/21: Pt started RT last week and has received one cycle of cisplatin. She reports increased nausea since chemo started. She also has decreased appetite. She is exclusively using the G tube now. She is taking antiemetics with some benefit. \par \par 4/6/21: Finishing CRT. DOing well with minimal AEs. Some nausea-improved with antiemetics. \par \par 4/20/21: Finishing up CRT. Doing well. Maintaining weight. Soem oral pain- not on any pain meds. Sporadic nausea- using antiemetics with relief of symptoms. \par \par 5/4/21: Pt feeling well. Reports compliance with synthroid. Using magic mouthwash, reports some oral pain. \par \par 6/3/21: Uncomfortable feeling in the mouth, sialorrhea, gained some weight. No pain. Persistent left sided weakness due to prior stroke. \par \par 7/1/21: Doing well. No new complaints. Feels nauseously when she eats because if thick phlegm. She reports some acid reflux. She is taking some PO but mostly dependent on G tube. \par \par 8/5/21: Lost her insurance and unable to get feeds. Feels little stronger. Speech improved. \par \par 9/9/21: Pt returns for follow-up. She reports that she feels better since her last visit. No complaints of fever, throat pain, chest pain, shortness of breath, nausea, vomiting, diarrhea, abdominal pain, or dysuria. Her strength continues to gradually improve. Her nutritional intake is primarily through her PEG tube currently, though she tries to eat a small amount of food when possible. Her weight has been stable. She reports that an air conditioner will be delivered to her house tomorrow.\par \par 11/4/21: Gained some weight. Overall doing better. Some rt partotid swelling. \par \par 12/2/21: Rt neck swelling is better. Gained weight. Still using G tube. Started eating rice and cooked vegetables by mouth, \par \par 2/1/22: Pt notes left facial swelling which is new. She attributes it to perhaps a bad tooth. She is feeling through G tube which shows discoloration. She has been trying to maintain weight. \par \par 2/15/22: Since last visit, pt has had scans done. Dental infection/abscess was noted. I called pt over the weekend to discuss results. Last visit, pt was started on abx and she feels much better since she started the meds. She also had her G tube fixed by Dr. Oneal. \par \par 6/24/22: Pt states she is doing well. She has no G tube any more (removed 2 months ago). She is able to take PO foods. \par \par 9/23/22: Patient presents for follow up today. Her appetite is good and she has been taking PO. Saw Dr. Busby and had scans in July that showed KIMO. \par \par 12/23/22: Pt is doing well. She has no new symptoms. She has gained weight. She is receiving PT for lymphedema and neck tightness. \par \par 6/22/23: Pt doing well, non new symptoms, gained weight, receiving PT, lymphedema and neck tightness only occurs in cold weather which improves w/ massage [de-identified] : sq cell ca

## 2023-07-11 NOTE — PHYSICAL EXAM
She was previously diagnosed with POTS and has been followed by Dr Villatoro, cardiologist. She has routine appts annually and was last evaluated in October of 2022. She was taking Atenolol prior to pregnancy but discontinued in October.    She reports relatively stable symptoms since becoming pregnant and being without medication. She states every now and then she has a flare up of high heart rate and low blood pressure. When she experiences these exacerbations, she increases sodium and water intake which helps a little. She denies severe symptoms or need for starting medication during pregnancy.   [FreeTextEntry1] : GEN: AAOx3, NAD, mild anterior neck swelling firm to palpation above>below incision R neck, nontender\par well healed anterior neck incision \par PSYCH: Normal mood and affect. Responds appropriately to commands.\par EYES: Sclerae Anicteric. No discharge. EOMI.\par RESP: Breathing unlabored. \par EXT: No C/C/E. \par SKIN: Incisions well healed, no erythema \par GAIT: improved with afo\par STRENGTH: 5/5 RUE and RLE, 4+/5 LUE and 4/5 L hip flexion, 4/5 L ankle dorsiflexion. \par SENSATION: Grossly intact to light touch bilateral upper extremities. \par PALP: there is no tenderness to palpation b/l masseter, mandible \par MOUTH OPENING: 3.75 cm \par

## 2023-09-07 ENCOUNTER — APPOINTMENT (OUTPATIENT)
Dept: OTOLARYNGOLOGY | Facility: CLINIC | Age: 58
End: 2023-09-07
Payer: MEDICARE

## 2023-09-07 VITALS
DIASTOLIC BLOOD PRESSURE: 73 MMHG | HEIGHT: 62 IN | SYSTOLIC BLOOD PRESSURE: 111 MMHG | BODY MASS INDEX: 22.45 KG/M2 | HEART RATE: 62 BPM | WEIGHT: 122 LBS

## 2023-09-07 PROCEDURE — 31575 DIAGNOSTIC LARYNGOSCOPY: CPT

## 2023-09-07 PROCEDURE — 99214 OFFICE O/P EST MOD 30 MIN: CPT | Mod: 25

## 2023-09-07 NOTE — HISTORY OF PRESENT ILLNESS
[de-identified] : 58 year old female presents for follow up of tongue cancer.  History of superficial 7mm leukoplakic lesion of Right lateral tongue, s/p Right glossectomy, bilateral neck dissection, tracheostomy and free flap reconstruction 12/29/2020 Recommended for adjuvant CRT - s/p completion of treatment 4/27/21.  Last CT of neck/chest on 12/16/22. Today pt reports similar symptoms since last visit.  Tolerating liquids, still with some difficulty swallowing solids - eats slowly to avoid choking. Has to have liquids with dry foods to help swallow. Weight is stable within a few pounds.  Reports clear phlegm in the throat worse in the mornings. She feels better after expectoration.  Denies odynophagia, hemoptysis, aspirations, post-nasal drip, throat clearing, dyspnea, or dysphonia. No recent fever or chills.  Complete review of systems which was performed during a previous encounter was reviewed with the patient and there are no changes except as stated in the HPI section.

## 2023-09-07 NOTE — PHYSICAL EXAM
[Midline] : trachea located in midline position [Normal] : no rashes [de-identified] : Incision well healed. KIMO [de-identified] : Flap in place, viable. KIMO

## 2023-09-07 NOTE — REASON FOR VISIT
[Subsequent Evaluation] : a subsequent evaluation for [Other: ______] : provided by LISA [FreeTextEntry2] :  tongue cancer [Interpreters_IDNumber] : [id number] [Interpreters_FullName] : Mikala [FreeTextEntry3] : Mandarin

## 2023-12-05 NOTE — PROVIDER CONTACT NOTE (CRITICAL VALUE NOTIFICATION) - PERSON GIVING RESULT:
82M with PMH ESRD (MWF), Afib, HTN, and CAD who presented with acute COVID-19 which caused her to miss her outpatient HD session. She had no evidence of pneumonia or respiratory failure. While undergoing HD 12/4, she developed a narrow QRS complex tachycardia thought to be either SVT of Afib with RVR (later impression atrial fibrillation) which resolved administration of a diltiazem infusion. She underwent routine HD. Cardiology was consulted and started the patient on PO sotalol as she was able to be weaned from diltiazem infusion. On 12/7, the patient developed Afib with RVR with hypotension which resolved with IV fluids administration, a dose of IV digoxin, and a bolus of IV amiodarone. She converted to NSR and her BP was at the lower limit of normal. Cardiology started PO diltiazem in addition to the PO sotalol and her vitals were monitored. She became hypotensive and bradycardic requiring addition of midodrine and IVF bolus with discontinuation of PO diltiazem and sotalol. Cardiology re-consulted and recommended to continue current regimen and f/u outpatient w/ EP referral. PT/OT recommended moderate intensity therapy. Pending SNF placement delayed due to COVID isolation.       Assumed care of this patient on 12/15/23.  Patient with prolonged hospital stay.  Initially admitted with COVID-19, no evidence of hypoxia or pneumonia, supportive care with isolation precaution.  ESRD on HD MWF via right upper extremity AVF, hospital course complicated by recurrent arrhythmia with hypotension.  Known history of paroxysmal atrial fibrillation, followed by Dr. Thompson, on outpatient diltiazem, sotalol, Eliquis.  She had recurrent tachyarrhythmia, ultimately felt to be atrial fibrillation with RVR, seen by Cardiology, medications were adjusted but ultimately discontinued due to bradycardia and hypotension, monitored closely without any recurrent arrhythmias and tolerating dialysis.  Midodrine was initiated for hypotension 
with improvement.  Cardiology recommended outpatient EP referral.  Previous echo with EF of 57% with moderate to severe tricuspid regurgitation, mild to moderate mitral regurgitation.  She was evaluated by PT/OT, recommendations for skilled nursing placement but delayed due to isolation for COVID-19, ultimately accepted 12/15.  She had dialysis prior to discharge and tolerated well without any hemodynamic instability.  Denies any new complaints on discharge, cleared by consultants for discharge and appears medically stable for discharge with outpatient follow-up.  Discharge plan was reviewed with patient, no questions or concerns.  Discussed with nursing and case management.      Discharge examination   Lying in bed, alert, oriented, on room air, regular heart rhythm, right upper extremity AVF  
Romeo Guillen
ROSIE Whiting ([place])

## 2023-12-06 ENCOUNTER — OUTPATIENT (OUTPATIENT)
Dept: OUTPATIENT SERVICES | Facility: HOSPITAL | Age: 58
LOS: 1 days | Discharge: HOME | End: 2023-12-06

## 2023-12-06 DIAGNOSIS — C76.0 MALIGNANT NEOPLASM OF HEAD, FACE AND NECK: ICD-10-CM

## 2023-12-06 DIAGNOSIS — Z98.890 OTHER SPECIFIED POSTPROCEDURAL STATES: Chronic | ICD-10-CM

## 2023-12-07 ENCOUNTER — APPOINTMENT (OUTPATIENT)
Dept: CT IMAGING | Facility: IMAGING CENTER | Age: 58
End: 2023-12-07
Payer: MEDICARE

## 2023-12-07 ENCOUNTER — OUTPATIENT (OUTPATIENT)
Dept: OUTPATIENT SERVICES | Facility: HOSPITAL | Age: 58
LOS: 1 days | End: 2023-12-07
Payer: COMMERCIAL

## 2023-12-07 DIAGNOSIS — C02.9 MALIGNANT NEOPLASM OF TONGUE, UNSPECIFIED: ICD-10-CM

## 2023-12-07 DIAGNOSIS — Z98.890 OTHER SPECIFIED POSTPROCEDURAL STATES: Chronic | ICD-10-CM

## 2023-12-07 PROCEDURE — 70491 CT SOFT TISSUE NECK W/DYE: CPT

## 2023-12-07 PROCEDURE — 71260 CT THORAX DX C+: CPT

## 2023-12-07 PROCEDURE — 71260 CT THORAX DX C+: CPT | Mod: 26

## 2023-12-07 PROCEDURE — 70491 CT SOFT TISSUE NECK W/DYE: CPT | Mod: 26

## 2023-12-21 ENCOUNTER — RESULT REVIEW (OUTPATIENT)
Age: 58
End: 2023-12-21

## 2023-12-21 ENCOUNTER — LABORATORY RESULT (OUTPATIENT)
Age: 58
End: 2023-12-21

## 2023-12-21 ENCOUNTER — APPOINTMENT (OUTPATIENT)
Dept: HEMATOLOGY ONCOLOGY | Facility: CLINIC | Age: 58
End: 2023-12-21
Payer: MEDICARE

## 2023-12-21 VITALS
HEART RATE: 88 BPM | HEIGHT: 62.01 IN | WEIGHT: 121.35 LBS | BODY MASS INDEX: 22.05 KG/M2 | RESPIRATION RATE: 16 BRPM | SYSTOLIC BLOOD PRESSURE: 131 MMHG | TEMPERATURE: 97.9 F | DIASTOLIC BLOOD PRESSURE: 87 MMHG | OXYGEN SATURATION: 96 %

## 2023-12-21 LAB
BASOPHILS # BLD AUTO: 0.03 K/UL — SIGNIFICANT CHANGE UP (ref 0–0.2)
BASOPHILS # BLD AUTO: 0.03 K/UL — SIGNIFICANT CHANGE UP (ref 0–0.2)
BASOPHILS NFR BLD AUTO: 0.5 % — SIGNIFICANT CHANGE UP (ref 0–2)
BASOPHILS NFR BLD AUTO: 0.5 % — SIGNIFICANT CHANGE UP (ref 0–2)
EOSINOPHIL # BLD AUTO: 0.2 K/UL — SIGNIFICANT CHANGE UP (ref 0–0.5)
EOSINOPHIL # BLD AUTO: 0.2 K/UL — SIGNIFICANT CHANGE UP (ref 0–0.5)
EOSINOPHIL NFR BLD AUTO: 3.3 % — SIGNIFICANT CHANGE UP (ref 0–6)
EOSINOPHIL NFR BLD AUTO: 3.3 % — SIGNIFICANT CHANGE UP (ref 0–6)
HCT VFR BLD CALC: 33.3 % — LOW (ref 34.5–45)
HCT VFR BLD CALC: 33.3 % — LOW (ref 34.5–45)
HGB BLD-MCNC: 11.7 G/DL — SIGNIFICANT CHANGE UP (ref 11.5–15.5)
HGB BLD-MCNC: 11.7 G/DL — SIGNIFICANT CHANGE UP (ref 11.5–15.5)
IMM GRANULOCYTES NFR BLD AUTO: 0.7 % — SIGNIFICANT CHANGE UP (ref 0–0.9)
IMM GRANULOCYTES NFR BLD AUTO: 0.7 % — SIGNIFICANT CHANGE UP (ref 0–0.9)
LYMPHOCYTES # BLD AUTO: 1.65 K/UL — SIGNIFICANT CHANGE UP (ref 1–3.3)
LYMPHOCYTES # BLD AUTO: 1.65 K/UL — SIGNIFICANT CHANGE UP (ref 1–3.3)
LYMPHOCYTES # BLD AUTO: 27 % — SIGNIFICANT CHANGE UP (ref 13–44)
LYMPHOCYTES # BLD AUTO: 27 % — SIGNIFICANT CHANGE UP (ref 13–44)
MCHC RBC-ENTMCNC: 28.7 PG — SIGNIFICANT CHANGE UP (ref 27–34)
MCHC RBC-ENTMCNC: 28.7 PG — SIGNIFICANT CHANGE UP (ref 27–34)
MCHC RBC-ENTMCNC: 35.1 G/DL — SIGNIFICANT CHANGE UP (ref 32–36)
MCHC RBC-ENTMCNC: 35.1 G/DL — SIGNIFICANT CHANGE UP (ref 32–36)
MCV RBC AUTO: 81.8 FL — SIGNIFICANT CHANGE UP (ref 80–100)
MCV RBC AUTO: 81.8 FL — SIGNIFICANT CHANGE UP (ref 80–100)
MONOCYTES # BLD AUTO: 0.28 K/UL — SIGNIFICANT CHANGE UP (ref 0–0.9)
MONOCYTES # BLD AUTO: 0.28 K/UL — SIGNIFICANT CHANGE UP (ref 0–0.9)
MONOCYTES NFR BLD AUTO: 4.6 % — SIGNIFICANT CHANGE UP (ref 2–14)
MONOCYTES NFR BLD AUTO: 4.6 % — SIGNIFICANT CHANGE UP (ref 2–14)
NEUTROPHILS # BLD AUTO: 3.91 K/UL — SIGNIFICANT CHANGE UP (ref 1.8–7.4)
NEUTROPHILS # BLD AUTO: 3.91 K/UL — SIGNIFICANT CHANGE UP (ref 1.8–7.4)
NEUTROPHILS NFR BLD AUTO: 63.9 % — SIGNIFICANT CHANGE UP (ref 43–77)
NEUTROPHILS NFR BLD AUTO: 63.9 % — SIGNIFICANT CHANGE UP (ref 43–77)
NRBC # BLD: 0 /100 WBCS — SIGNIFICANT CHANGE UP (ref 0–0)
NRBC # BLD: 0 /100 WBCS — SIGNIFICANT CHANGE UP (ref 0–0)
PLATELET # BLD AUTO: 207 K/UL — SIGNIFICANT CHANGE UP (ref 150–400)
PLATELET # BLD AUTO: 207 K/UL — SIGNIFICANT CHANGE UP (ref 150–400)
RBC # BLD: 4.07 M/UL — SIGNIFICANT CHANGE UP (ref 3.8–5.2)
RBC # BLD: 4.07 M/UL — SIGNIFICANT CHANGE UP (ref 3.8–5.2)
RBC # FLD: 13.9 % — SIGNIFICANT CHANGE UP (ref 10.3–14.5)
RBC # FLD: 13.9 % — SIGNIFICANT CHANGE UP (ref 10.3–14.5)
WBC # BLD: 6.11 K/UL — SIGNIFICANT CHANGE UP (ref 3.8–10.5)
WBC # BLD: 6.11 K/UL — SIGNIFICANT CHANGE UP (ref 3.8–10.5)
WBC # FLD AUTO: 6.11 K/UL — SIGNIFICANT CHANGE UP (ref 3.8–10.5)
WBC # FLD AUTO: 6.11 K/UL — SIGNIFICANT CHANGE UP (ref 3.8–10.5)

## 2023-12-21 PROCEDURE — 99213 OFFICE O/P EST LOW 20 MIN: CPT

## 2024-01-08 LAB
ALBUMIN SERPL ELPH-MCNC: 4.6 G/DL
ALP BLD-CCNC: 75 U/L
ALT SERPL-CCNC: 19 U/L
ANION GAP SERPL CALC-SCNC: 13 MMOL/L
AST SERPL-CCNC: 20 U/L
BILIRUB SERPL-MCNC: 0.6 MG/DL
BUN SERPL-MCNC: 18 MG/DL
CALCIUM SERPL-MCNC: 9.7 MG/DL
CHLORIDE SERPL-SCNC: 105 MMOL/L
CO2 SERPL-SCNC: 25 MMOL/L
CREAT SERPL-MCNC: 0.72 MG/DL
EGFR: 96 ML/MIN/1.73M2
GLUCOSE SERPL-MCNC: 102 MG/DL
MAGNESIUM SERPL-MCNC: 2.3 MG/DL
POTASSIUM SERPL-SCNC: 4 MMOL/L
PROT SERPL-MCNC: 7.2 G/DL
SODIUM SERPL-SCNC: 143 MMOL/L
TSH SERPL-ACNC: 0.06 UIU/ML

## 2024-01-08 NOTE — REVIEW OF SYSTEMS
[Negative] : Allergic/Immunologic [Fever] : no fever [Fatigue] : no fatigue [Recent Change In Weight] : ~T no recent weight change [Eye Pain] : no eye pain [Red Eyes] : eyes not red [Chest Pain] : no chest pain [Palpitations] : no palpitations [Shortness Of Breath] : no shortness of breath [Cough] : no cough [Abdominal Pain] : no abdominal pain [Vomiting] : no vomiting [Muscle Pain] : no muscle pain [Skin Rash] : no skin rash [Difficulty Walking] : no difficulty walking [Easy Bruising] : no tendency for easy bruising [FreeTextEntry4] : Mild upper dysphagia to solids

## 2024-01-08 NOTE — ASSESSMENT
[FreeTextEntry1] : 59 y/o F (never smoker) w/ a history of papillary thyroid carcinoma (Dx 2/2020, s/p thyroidectomy in 3/2020, s/p MORGAN in 6/2020), CVA (11/2018), and oropharyngeal cancer s/p R hemiglossectomy, bilateral neck dissection, tracheostomy, and free flap reconstruction on 12/29/20 with pathology showing invasive squamous cell carcinoma ([id number], +PNI, +LVI, +ECS, negative margins, PD-L1 CPS = 100%), s/p PEG tube, s/p CCRT with weekly cisplatin (completed 4/27/21), who presents for follow-up.  # Oropharyngeal cancer - Dx 12/2021 ([id number], PD-L1 CPS = 100%) - S/p CCRT with weekly cisplatin (completed 4/27/21). She had a PEG tube for nutrition but was removed. Still having some minor upper dysphagia but resolves with drinking more water. - PET/CT (7/2021) was notable for a new nonspecific, minimally FDG-avid soft tissue density in the left level IIA region. The rest of the findings were otherwise stable. -Repeat scans from October shows postoperative changes as described. No evidence for recurrent disease at the operative bed. Slight increase in size of a left periparotid lymph node which was shown to demonstrate minimal FDG uptake on the most recent PET/CT from 7/25/2021. New left supraclavicular lymph node. Cannot exclude metastatic disease. -She underwent bx of the parotid node which came back reactive - In Feb 2022 presented with dental abscess and scans done at that time was c/w that dx. She improved with oral abx and clinically, is KIMO. -Continue follow up with Dr. Busby. -Repeat scans in 6 months -Check labs today: CBC, CMP, TSH -OV in 6 months.  Patient seen and examined with Dr. Lam.  Renny Galvan M.D. Hematology/Oncology Fellow PGY-5 [facility]   I was with the hematology/ oncology fellow, Dr. Galvan for the entire visit and agree with above documentation.

## 2024-01-08 NOTE — PHYSICAL EXAM
[Restricted in physically strenuous activity but ambulatory and able to carry out work of a light or sedentary nature] : Status 1- Restricted in physically strenuous activity but ambulatory and able to carry out work of a light or sedentary nature, e.g., light house work, office work [Thin] : thin [Normal] : affect appropriate [Ulcers] : no ulcers [Mucositis] : no mucositis [Thrush] : no thrush [de-identified] : Right tongue hemiglossectomy and reconstruction, thin white film over tongue [de-identified] : rt neck stiffness

## 2024-01-08 NOTE — HISTORY OF PRESENT ILLNESS
[Disease: _____________________] : Disease: [unfilled] [T: ___] : T[unfilled] [N: ___] : N[unfilled] [AJCC Stage: ____] : AJCC Stage: [unfilled] [de-identified] : 57 yo female pt never smoker with hx thyroid cancer (Dr. Salmeron) s/p 3/2020 (sched MORGAN 6/2020) and stroke 11/2018 referred by Dr. Aman Perea for right tongue lesion.Pt states the lesion started after stroke in 2018 and thinks it could be from trauma to the tongue from the teeth, but she started feeling it increased in size from 5/2020. She is s/p right hemiglossectomy, right neck dissection, tracheostomy 12/29/2020. Pathology result back as invasive squamous cell carcinoma, well-differentiated, Perineural invasion and lymphovascular invasion seen. Resection margins negative for carcinoma. PD-L1 %. Patient S/P R glossectomy, bilateral neck dissection, tracheostomy and free flap reconstruction on 12/29/20. [id number].  ECS present.  surgical path 1/5/21: Final Diagnosis 1. Tongue, right, glossectomy - Invasive squamous cell carcinoma, well-differentiated, see synoptic summary - Perineural invasion and lymphovascular invasion seen - Resection margins negative for carcinoma - PD-L1 CPS immunostain 100%  2. Tongue, additional lateral margin, excision - Squamous mucosa negative for carcinoma  3. Lymph nodes, right levels 1, 2, 3 and 4, neck dissection - Level 1: 1 out of 5 lymph nodes positive for metastatic carcinoma with extranodal extension; submandibular gland negative for carcinoma - Level 2: 1 out of 3 lymph nodes positive for metastatic carcinoma with extranodal extension - Level 3: 20 lymph nodes negative for metastatic carcinoma - Level 4: 20 lymph nodes negative for metastatic carcinoma  4. Lymph nodes, left level 1, neck dissection - Submandibular gland negative for carcinoma - 7 lymph nodes negative for metastatic carcinoma  5. Lymph nodes, left level 2, neck dissection - 15 lymph nodes negative for metastatic carcinoma  6. Lymph nodes, left level 3, neck dissection - 10 lymph nodes negative for metastatic carcinoma  7. Lymph nodes, left level 4, neck dissection - 4 lymph nodes negative for metastatic carcinoma  Patient came for initial consultation today, reports feeling well,  She is s/p PEG on 1/13/21, she denies dysphagia, odynophonia, dyspnea, bleeding, fever, chills, signs of infection since surgery. Pt is NPO,  She never smoked and never chew tobacco and no h/o etoh  [de-identified] : 2/24/21: Post-op, pt developed a neck abscess/infection s/p I&D and is on abx and packing. She is now cleared by head and neck surgery for chemoRT  3/22/21: Pt started RT last week and has received one cycle of cisplatin. She reports increased nausea since chemo started. She also has decreased appetite. She is exclusively using the G tube now. She is taking antiemetics with some benefit.   4/6/21: Finishing CRT. DOing well with minimal AEs. Some nausea-improved with antiemetics.   4/20/21: Finishing up CRT. Doing well. Maintaining weight. Soem oral pain- not on any pain meds. Sporadic nausea- using antiemetics with relief of symptoms.   5/4/21: Pt feeling well. Reports compliance with synthroid. Using magic mouthwash, reports some oral pain.   6/3/21: Uncomfortable feeling in the mouth, sialorrhea, gained some weight. No pain. Persistent left sided weakness due to prior stroke.   7/1/21: Doing well. No new complaints. Feels nauseously when she eats because if thick phlegm. She reports some acid reflux. She is taking some PO but mostly dependent on G tube.   8/5/21: Lost her insurance and unable to get feeds. Feels little stronger. Speech improved.   9/9/21: Pt returns for follow-up. She reports that she feels better since her last visit. No complaints of fever, throat pain, chest pain, shortness of breath, nausea, vomiting, diarrhea, abdominal pain, or dysuria. Her strength continues to gradually improve. Her nutritional intake is primarily through her PEG tube currently, though she tries to eat a small amount of food when possible. Her weight has been stable. She reports that an air conditioner will be delivered to her house tomorrow.  11/4/21: Gained some weight. Overall doing better. Some rt partotid swelling.   12/2/21: Rt neck swelling is better. Gained weight. Still using G tube. Started eating rice and cooked vegetables by mouth,   2/1/22: Pt notes left facial swelling which is new. She attributes it to perhaps a bad tooth. She is feeling through G tube which shows discoloration. She has been trying to maintain weight.   2/15/22: Since last visit, pt has had scans done. Dental infection/abscess was noted. I called pt over the weekend to discuss results. Last visit, pt was started on abx and she feels much better since she started the meds. She also had her G tube fixed by Dr. Oneal.   6/24/22: Pt states she is doing well. She has no G tube any more (removed 2 months ago). She is able to take PO foods.   9/23/22: Patient presents for follow up today. Her appetite is good and she has been taking PO. Saw Dr. Busby and had scans in July that showed KIMO.   12/23/22: Pt is doing well. She has no new symptoms. She has gained weight. She is receiving PT for lymphedema and neck tightness.   6/22/23: Pt doing well, non new symptoms, gained weight, receiving PT, lymphedema and neck tightness only occurs in cold weather which improves w/ massage  12/21/23: Patient doing well, still feels that food sometimes gets stuck in the throat area but will go down with some water. Reviewed scans from 12/7/23 showing KIMO. [de-identified] : sq cell ca

## 2024-01-18 ENCOUNTER — APPOINTMENT (OUTPATIENT)
Dept: OTOLARYNGOLOGY | Facility: CLINIC | Age: 59
End: 2024-01-18
Payer: MEDICARE

## 2024-01-18 VITALS
OXYGEN SATURATION: 97 % | BODY MASS INDEX: 22.08 KG/M2 | WEIGHT: 120 LBS | DIASTOLIC BLOOD PRESSURE: 80 MMHG | SYSTOLIC BLOOD PRESSURE: 112 MMHG | HEART RATE: 88 BPM | HEIGHT: 62 IN

## 2024-01-18 PROCEDURE — 99213 OFFICE O/P EST LOW 20 MIN: CPT | Mod: 25

## 2024-01-18 PROCEDURE — 31575 DIAGNOSTIC LARYNGOSCOPY: CPT

## 2024-01-18 NOTE — HISTORY OF PRESENT ILLNESS
[de-identified] : 58 year old female presents for follow up of tongue cancer.  History of superficial 7mm leukoplakic lesion of Right lateral tongue, s/p Right glossectomy, bilateral neck dissection, tracheostomy and free flap reconstruction 12/29/2020 Recommended for adjuvant CRT - s/p completion of treatment 4/27/21.  Last CT of neck/chest on 12/7/2023 Today pt reports same symptoms since last visit.  Tolerating liquids, still with some difficulty swallowing solids - eats slowly to avoid choking. Has to have liquids with dry foods to help swallow. Weight is stable within a few pounds.  Reports clear phlegm in the throat worse in the mornings. She feels better after expectoration.  Denies odynophagia, hemoptysis, dyspnea, or dysphonia. No recent fever or chills.  Complete review of systems which was performed during a previous encounter was reviewed with the patient and there are no changes except as stated in the HPI section.  CT neck 12/7/23: IMPRESSION: No evidence of residual or recurrent neoplasm within or about the right hemiglossectomy post surgical site. Slight interval enlargement of a left-sided parotid tail nodule in comparison with 12/16/2022. Unchanged subcentimeter sized right-sided parotid tail nodule.  No new or enlarging cervical lymph nodes.  CT chest 12/7/23: IMPRESSION: Stable exam

## 2024-01-18 NOTE — PHYSICAL EXAM
[Midline] : trachea located in midline position [Normal] : no rashes [de-identified] : Incision well healed. KIMO [de-identified] : Flap in place, viable. KIMO

## 2024-01-18 NOTE — REASON FOR VISIT
[Subsequent Evaluation] : a subsequent evaluation for [Patient Declined  Services] : - None: Patient declined  services [FreeTextEntry2] :  tongue cancer

## 2024-01-18 NOTE — CONSULT LETTER
[Dear  ___] : Dear  [unfilled], [Courtesy Letter:] : I had the pleasure of seeing your patient, [unfilled], in my office today. [Please see my note below.] : Please see my note below. [Consult Closing:] : Thank you very much for allowing me to participate in the care of this patient.  If you have any questions, please do not hesitate to contact me. [Sincerely,] : Sincerely, [FreeTextEntry2] : Dr Aman Perea  [FreeTextEntry3] : \par  Laurent Busby MD, FACS\par  \par  Otolaryngology-Head and Neck Surgery\par  [employer] [facility]\par

## 2024-04-16 NOTE — DISCHARGE NOTE NURSING/CASE MANAGEMENT/SOCIAL WORK - NSPROEXTENSIONSOFSELF_GEN_A_NUR
[EKG obtained to assist in diagnosis and management of assessed problem(s)] : EKG obtained to assist in diagnosis and management of assessed problem(s)
cane

## 2024-05-23 ENCOUNTER — APPOINTMENT (OUTPATIENT)
Dept: OTOLARYNGOLOGY | Facility: CLINIC | Age: 59
End: 2024-05-23
Payer: MEDICARE

## 2024-05-23 VITALS
WEIGHT: 120 LBS | SYSTOLIC BLOOD PRESSURE: 121 MMHG | HEIGHT: 62 IN | BODY MASS INDEX: 22.08 KG/M2 | DIASTOLIC BLOOD PRESSURE: 78 MMHG | HEART RATE: 75 BPM

## 2024-05-23 DIAGNOSIS — C02.9 MALIGNANT NEOPLASM OF TONGUE, UNSPECIFIED: ICD-10-CM

## 2024-05-23 PROCEDURE — 31575 DIAGNOSTIC LARYNGOSCOPY: CPT

## 2024-05-23 PROCEDURE — 99212 OFFICE O/P EST SF 10 MIN: CPT | Mod: 25

## 2024-05-23 NOTE — CONSULT LETTER
[Dear  ___] : Dear  [unfilled], [Courtesy Letter:] : I had the pleasure of seeing your patient, [unfilled], in my office today. [Please see my note below.] : Please see my note below. [Consult Closing:] : Thank you very much for allowing me to participate in the care of this patient.  If you have any questions, please do not hesitate to contact me. [Sincerely,] : Sincerely, [FreeTextEntry2] : Dr Aman Perea  [FreeTextEntry3] : \par  Laurent Busby MD, FACS\par  \par  Otolaryngology-Head and Neck Surgery\par  [employer] at [facility]\par

## 2024-05-23 NOTE — PHYSICAL EXAM
[Midline] : trachea located in midline position [Normal] : no rashes [de-identified] : Incision well healed. KIMO [de-identified] : Flap in place, viable. KIMO

## 2024-05-23 NOTE — HISTORY OF PRESENT ILLNESS
[de-identified] : 59 year old female presents for follow up of tongue cancer.  History of superficial 7mm leukoplakic lesion of Right lateral tongue, s/p Right glossectomy, bilateral neck dissection, tracheostomy and free flap reconstruction 12/29/2020 s/p completion of CRT treatment 4/27/21.  Last CT of neck/chest on 12/7/2023 Today pt  with no new head/neck complaints, overall feeling okay.  Tolerating liquids, still with some difficulty swallowing solids - eats slowly to avoid choking. Still has to have liquids with dry foods to help swallow. Weight is stable.  Denies odynophagia, hemoptysis, dyspnea, or dysphonia. No recent fever or chills.  Complete review of systems which was performed during a previous encounter was reviewed with the patient and there are no changes except as stated in the HPI section.

## 2024-06-13 ENCOUNTER — OUTPATIENT (OUTPATIENT)
Dept: OUTPATIENT SERVICES | Facility: HOSPITAL | Age: 59
LOS: 1 days | Discharge: HOME | End: 2024-06-13

## 2024-06-13 DIAGNOSIS — C76.0 MALIGNANT NEOPLASM OF HEAD, FACE AND NECK: ICD-10-CM

## 2024-06-13 DIAGNOSIS — Z98.890 OTHER SPECIFIED POSTPROCEDURAL STATES: Chronic | ICD-10-CM

## 2024-06-14 ENCOUNTER — OUTPATIENT (OUTPATIENT)
Dept: OUTPATIENT SERVICES | Facility: HOSPITAL | Age: 59
LOS: 1 days | End: 2024-06-14
Payer: COMMERCIAL

## 2024-06-14 ENCOUNTER — APPOINTMENT (OUTPATIENT)
Dept: CT IMAGING | Facility: IMAGING CENTER | Age: 59
End: 2024-06-14
Payer: MEDICARE

## 2024-06-14 DIAGNOSIS — Z98.890 OTHER SPECIFIED POSTPROCEDURAL STATES: Chronic | ICD-10-CM

## 2024-06-14 DIAGNOSIS — C06.9 MALIGNANT NEOPLASM OF MOUTH, UNSPECIFIED: ICD-10-CM

## 2024-06-14 PROCEDURE — 70491 CT SOFT TISSUE NECK W/DYE: CPT | Mod: 26

## 2024-06-14 PROCEDURE — 71260 CT THORAX DX C+: CPT | Mod: 26

## 2024-06-14 PROCEDURE — 71260 CT THORAX DX C+: CPT

## 2024-06-14 PROCEDURE — 70491 CT SOFT TISSUE NECK W/DYE: CPT

## 2024-06-18 ENCOUNTER — RESULT REVIEW (OUTPATIENT)
Age: 59
End: 2024-06-18

## 2024-06-18 ENCOUNTER — LABORATORY RESULT (OUTPATIENT)
Age: 59
End: 2024-06-18

## 2024-06-18 ENCOUNTER — APPOINTMENT (OUTPATIENT)
Dept: HEMATOLOGY ONCOLOGY | Facility: CLINIC | Age: 59
End: 2024-06-18
Payer: MEDICARE

## 2024-06-18 VITALS
BODY MASS INDEX: 22.92 KG/M2 | OXYGEN SATURATION: 97 % | HEIGHT: 62 IN | WEIGHT: 124.56 LBS | RESPIRATION RATE: 16 BRPM | DIASTOLIC BLOOD PRESSURE: 73 MMHG | TEMPERATURE: 97.9 F | SYSTOLIC BLOOD PRESSURE: 119 MMHG | HEART RATE: 84 BPM

## 2024-06-18 DIAGNOSIS — K11.8 OTHER DISEASES OF SALIVARY GLANDS: ICD-10-CM

## 2024-06-18 DIAGNOSIS — C06.9 MALIGNANT NEOPLASM OF MOUTH, UNSPECIFIED: ICD-10-CM

## 2024-06-18 LAB
BASOPHILS # BLD AUTO: 0.04 K/UL — SIGNIFICANT CHANGE UP (ref 0–0.2)
BASOPHILS NFR BLD AUTO: 0.7 % — SIGNIFICANT CHANGE UP (ref 0–2)
EOSINOPHIL # BLD AUTO: 0.21 K/UL — SIGNIFICANT CHANGE UP (ref 0–0.5)
EOSINOPHIL NFR BLD AUTO: 3.9 % — SIGNIFICANT CHANGE UP (ref 0–6)
HCT VFR BLD CALC: 35.7 % — SIGNIFICANT CHANGE UP (ref 34.5–45)
HGB BLD-MCNC: 12.5 G/DL — SIGNIFICANT CHANGE UP (ref 11.5–15.5)
IMM GRANULOCYTES NFR BLD AUTO: 0.6 % — SIGNIFICANT CHANGE UP (ref 0–0.9)
LYMPHOCYTES # BLD AUTO: 1.5 K/UL — SIGNIFICANT CHANGE UP (ref 1–3.3)
LYMPHOCYTES # BLD AUTO: 28 % — SIGNIFICANT CHANGE UP (ref 13–44)
MCHC RBC-ENTMCNC: 28.7 PG — SIGNIFICANT CHANGE UP (ref 27–34)
MCHC RBC-ENTMCNC: 35 G/DL — SIGNIFICANT CHANGE UP (ref 32–36)
MCV RBC AUTO: 81.9 FL — SIGNIFICANT CHANGE UP (ref 80–100)
MONOCYTES # BLD AUTO: 0.35 K/UL — SIGNIFICANT CHANGE UP (ref 0–0.9)
MONOCYTES NFR BLD AUTO: 6.5 % — SIGNIFICANT CHANGE UP (ref 2–14)
NEUTROPHILS # BLD AUTO: 3.23 K/UL — SIGNIFICANT CHANGE UP (ref 1.8–7.4)
NEUTROPHILS NFR BLD AUTO: 60.3 % — SIGNIFICANT CHANGE UP (ref 43–77)
NRBC # BLD: 0 /100 WBCS — SIGNIFICANT CHANGE UP (ref 0–0)
PLATELET # BLD AUTO: 192 K/UL — SIGNIFICANT CHANGE UP (ref 150–400)
RBC # BLD: 4.36 M/UL — SIGNIFICANT CHANGE UP (ref 3.8–5.2)
RBC # FLD: 14.2 % — SIGNIFICANT CHANGE UP (ref 10.3–14.5)
WBC # BLD: 5.36 K/UL — SIGNIFICANT CHANGE UP (ref 3.8–10.5)
WBC # FLD AUTO: 5.36 K/UL — SIGNIFICANT CHANGE UP (ref 3.8–10.5)

## 2024-06-18 PROCEDURE — G2211 COMPLEX E/M VISIT ADD ON: CPT

## 2024-06-18 PROCEDURE — 99213 OFFICE O/P EST LOW 20 MIN: CPT

## 2024-06-18 NOTE — PHYSICAL EXAM
[Restricted in physically strenuous activity but ambulatory and able to carry out work of a light or sedentary nature] : Status 1- Restricted in physically strenuous activity but ambulatory and able to carry out work of a light or sedentary nature, e.g., light house work, office work [Thin] : thin [Normal] : affect appropriate [Ulcers] : no ulcers [Mucositis] : no mucositis [Thrush] : no thrush [de-identified] : Right tongue hemiglossectomy and reconstruction, thin white film over tongue [de-identified] : rt neck stiffness

## 2024-06-18 NOTE — ASSESSMENT
[FreeTextEntry1] : 60 y/o F (never smoker) w/ a history of papillary thyroid carcinoma (Dx 2/2020, s/p thyroidectomy in 3/2020, s/p MORGAN in 6/2020), CVA (11/2018), and oropharyngeal cancer s/p R hemiglossectomy, bilateral neck dissection, tracheostomy, and free flap reconstruction on 12/29/20 with pathology showing invasive squamous cell carcinoma ([id number], +PNI, +LVI, +ECS, negative margins, PD-L1 CPS = 100%), s/p PEG tube, s/p CCRT with weekly cisplatin (completed 4/27/21), who presents for follow-up.  # Oropharyngeal cancer - Dx 12/2021 ([id number], PD-L1 CPS = 100%) - S/p CCRT with weekly cisplatin (completed 4/27/21). She had a PEG tube for nutrition but was removed. Still having some minor upper dysphagia but resolves with drinking more water. - PET/CT (7/2021) was notable for a new nonspecific, minimally FDG-avid soft tissue density in the left level IIA region. The rest of the findings were otherwise stable. -Repeat scans from October shows postoperative changes as described. No evidence for recurrent disease at the operative bed. Slight increase in size of a left periparotid lymph node which was shown to demonstrate minimal FDG uptake on the most recent PET/CT from 7/25/2021. New left supraclavicular lymph node. Cannot exclude metastatic disease. -She underwent bx of the parotid node which came back reactive - In Feb 2022 presented with dental abscess and scans done at that time was c/w that dx. She improved with oral abx and clinically, is KIMO. -Continue follow up with Dr. Busby. -Repeat scans in 6 months or as advised by Dr. Busby Last scan from Dec 2023 KIMO -CT chest from last week, not reported but to my eyes, looks good Check labs today: CBC, CMP, TSH

## 2024-06-18 NOTE — HISTORY OF PRESENT ILLNESS
[Disease: _____________________] : Disease: [unfilled] [T: ___] : T[unfilled] [N: ___] : N[unfilled] [AJCC Stage: ____] : AJCC Stage: [unfilled] [de-identified] : 59 yo female pt never smoker with hx thyroid cancer (Dr. Salmeron) s/p 3/2020 (sched MORGAN 6/2020) and stroke 11/2018 referred by Dr. Aman Perea for right tongue lesion.Pt states the lesion started after stroke in 2018 and thinks it could be from trauma to the tongue from the teeth, but she started feeling it increased in size from 5/2020. She is s/p right hemiglossectomy, right neck dissection, tracheostomy 12/29/2020. Pathology result back as invasive squamous cell carcinoma, well-differentiated, Perineural invasion and lymphovascular invasion seen. Resection margins negative for carcinoma. PD-L1 %. Patient S/P R glossectomy, bilateral neck dissection, tracheostomy and free flap reconstruction on 12/29/20. [id number].  ECS present.  surgical path 1/5/21: Final Diagnosis 1. Tongue, right, glossectomy - Invasive squamous cell carcinoma, well-differentiated, see synoptic summary - Perineural invasion and lymphovascular invasion seen - Resection margins negative for carcinoma - PD-L1 CPS immunostain 100%  2. Tongue, additional lateral margin, excision - Squamous mucosa negative for carcinoma  3. Lymph nodes, right levels 1, 2, 3 and 4, neck dissection - Level 1: 1 out of 5 lymph nodes positive for metastatic carcinoma with extranodal extension; submandibular gland negative for carcinoma - Level 2: 1 out of 3 lymph nodes positive for metastatic carcinoma with extranodal extension - Level 3: 20 lymph nodes negative for metastatic carcinoma - Level 4: 20 lymph nodes negative for metastatic carcinoma  4. Lymph nodes, left level 1, neck dissection - Submandibular gland negative for carcinoma - 7 lymph nodes negative for metastatic carcinoma  5. Lymph nodes, left level 2, neck dissection - 15 lymph nodes negative for metastatic carcinoma  6. Lymph nodes, left level 3, neck dissection - 10 lymph nodes negative for metastatic carcinoma  7. Lymph nodes, left level 4, neck dissection - 4 lymph nodes negative for metastatic carcinoma  Patient came for initial consultation today, reports feeling well,  She is s/p PEG on 1/13/21, she denies dysphagia, odynophonia, dyspnea, bleeding, fever, chills, signs of infection since surgery. Pt is NPO,  She never smoked and never chew tobacco and no h/o etoh  [de-identified] : sq cell ca [de-identified] : 2/24/21: Post-op, pt developed a neck abscess/infection s/p I&D and is on abx and packing. She is now cleared by head and neck surgery for chemoRT  3/22/21: Pt started RT last week and has received one cycle of cisplatin. She reports increased nausea since chemo started. She also has decreased appetite. She is exclusively using the G tube now. She is taking antiemetics with some benefit.   4/6/21: Finishing CRT. DOing well with minimal AEs. Some nausea-improved with antiemetics.   4/20/21: Finishing up CRT. Doing well. Maintaining weight. Soem oral pain- not on any pain meds. Sporadic nausea- using antiemetics with relief of symptoms.   5/4/21: Pt feeling well. Reports compliance with synthroid. Using magic mouthwash, reports some oral pain.   6/3/21: Uncomfortable feeling in the mouth, sialorrhea, gained some weight. No pain. Persistent left sided weakness due to prior stroke.   7/1/21: Doing well. No new complaints. Feels nauseously when she eats because if thick phlegm. She reports some acid reflux. She is taking some PO but mostly dependent on G tube.   8/5/21: Lost her insurance and unable to get feeds. Feels little stronger. Speech improved.   9/9/21: Pt returns for follow-up. She reports that she feels better since her last visit. No complaints of fever, throat pain, chest pain, shortness of breath, nausea, vomiting, diarrhea, abdominal pain, or dysuria. Her strength continues to gradually improve. Her nutritional intake is primarily through her PEG tube currently, though she tries to eat a small amount of food when possible. Her weight has been stable. She reports that an air conditioner will be delivered to her house tomorrow.  11/4/21: Gained some weight. Overall doing better. Some rt partotid swelling.   12/2/21: Rt neck swelling is better. Gained weight. Still using G tube. Started eating rice and cooked vegetables by mouth,   2/1/22: Pt notes left facial swelling which is new. She attributes it to perhaps a bad tooth. She is feeling through G tube which shows discoloration. She has been trying to maintain weight.   2/15/22: Since last visit, pt has had scans done. Dental infection/abscess was noted. I called pt over the weekend to discuss results. Last visit, pt was started on abx and she feels much better since she started the meds. She also had her G tube fixed by Dr. Oneal.   6/24/22: Pt states she is doing well. She has no G tube any more (removed 2 months ago). She is able to take PO foods.   9/23/22: Patient presents for follow up today. Her appetite is good and she has been taking PO. Saw Dr. Busby and had scans in July that showed KIMO.   12/23/22: Pt is doing well. She has no new symptoms. She has gained weight. She is receiving PT for lymphedema and neck tightness.   6/22/23: Pt doing well, non new symptoms, gained weight, receiving PT, lymphedema and neck tightness only occurs in cold weather which improves w/ massage  12/21/23: Patient doing well, still feels that food sometimes gets stuck in the throat area but will go down with some water. Reviewed scans from 12/7/23 showing KIMO.  6/18/24: doing well with no new symptoms. Dysphagia persists but improved.

## 2024-06-19 LAB
ALBUMIN SERPL ELPH-MCNC: 5 G/DL
ALP BLD-CCNC: 89 U/L
ALT SERPL-CCNC: 18 U/L
ANION GAP SERPL CALC-SCNC: 14 MMOL/L
AST SERPL-CCNC: 22 U/L
BILIRUB SERPL-MCNC: 0.9 MG/DL
BUN SERPL-MCNC: 15 MG/DL
CALCIUM SERPL-MCNC: 9.6 MG/DL
CHLORIDE SERPL-SCNC: 104 MMOL/L
CO2 SERPL-SCNC: 24 MMOL/L
CREAT SERPL-MCNC: 0.76 MG/DL
EGFR: 90 ML/MIN/1.73M2
GLUCOSE SERPL-MCNC: 100 MG/DL
MAGNESIUM SERPL-MCNC: 2.3 MG/DL
POTASSIUM SERPL-SCNC: 4.2 MMOL/L
PROT SERPL-MCNC: 7.8 G/DL
SODIUM SERPL-SCNC: 142 MMOL/L
TSH SERPL-ACNC: 0.24 UIU/ML

## 2024-09-17 ENCOUNTER — APPOINTMENT (OUTPATIENT)
Dept: HEMATOLOGY ONCOLOGY | Facility: CLINIC | Age: 59
End: 2024-09-17

## 2024-09-19 ENCOUNTER — APPOINTMENT (OUTPATIENT)
Dept: OTOLARYNGOLOGY | Facility: CLINIC | Age: 59
End: 2024-09-19
Payer: MEDICARE

## 2024-09-19 ENCOUNTER — APPOINTMENT (OUTPATIENT)
Dept: OTOLARYNGOLOGY | Facility: CLINIC | Age: 59
End: 2024-09-19

## 2024-09-19 VITALS
WEIGHT: 122 LBS | DIASTOLIC BLOOD PRESSURE: 83 MMHG | HEART RATE: 101 BPM | HEIGHT: 62 IN | SYSTOLIC BLOOD PRESSURE: 127 MMHG | BODY MASS INDEX: 22.45 KG/M2

## 2024-09-19 DIAGNOSIS — C06.9 MALIGNANT NEOPLASM OF MOUTH, UNSPECIFIED: ICD-10-CM

## 2024-09-19 PROCEDURE — 99212 OFFICE O/P EST SF 10 MIN: CPT | Mod: 25

## 2024-09-19 PROCEDURE — 31575 DIAGNOSTIC LARYNGOSCOPY: CPT

## 2024-09-19 NOTE — CONSULT LETTER
[Dear  ___] : Dear  [unfilled], [Courtesy Letter:] : I had the pleasure of seeing your patient, [unfilled], in my office today. [Please see my note below.] : Please see my note below. [Consult Closing:] : Thank you very much for allowing me to participate in the care of this patient.  If you have any questions, please do not hesitate to contact me. [Sincerely,] : Sincerely, [FreeTextEntry2] : Dr Aman Perea  [FreeTextEntry3] : \par  Laurent Busby MD, FACS\par  \par  Otolaryngology-Head and Neck Surgery\par  [employer] at [facility]\par

## 2024-09-19 NOTE — PHYSICAL EXAM
[Midline] : trachea located in midline position [Normal] : no rashes [de-identified] : Incision well healed. KIMO [de-identified] : Flap in place, viable. KIMO

## 2024-09-19 NOTE — HISTORY OF PRESENT ILLNESS
[de-identified] : 59 year old female presents for follow up of tongue cancer.  History of superficial 7mm leukoplakic lesion of Right lateral tongue, s/p Right glossectomy, bilateral neck dissection, tracheostomy and free flap reconstruction 12/29/2020 s/p completion of CRT treatment 4/27/21.  Last CT of neck/chest on 6/14/2024 Today pt with no new head/neck complaints.  Still tolerating liquids, still with some difficulty swallowing solids - eats slowly to avoid choking. Still has to have liquids with dry foods to help swallow. Weight is stable.  Denies odynophagia, hemoptysis, dyspnea, or dysphonia. No recent fever or chills.  Complete review of systems which was performed during a previous encounter was reviewed with the patient and there are no changes except as stated in the HPI section.  Ct neck 6/14/2024 IMPRESSION:  No recurrent lesions at the primary site. No lesions or abnormal enhancement elsewhere in aerodigestive mucosa.  No new or enlarging lymphadenopathy.  Periapical lucency surrounding left maxillary molar tooth recommend to follow-up with general dental.  Stable parotid glands findings as above.  CT chest 6/14/2024: IMPRESSION: Stable pulmonary nodules.

## 2024-11-15 NOTE — SWALLOW BEDSIDE ASSESSMENT ADULT - ORAL PHASE
hypertension, uncontrolled (significant whitecoat component)  current antihypertensive regimen: losartan 50mg BID  regimen changes: none  intolerance: amlodipine (palpitations, bradycardia), atenolol (bradycardia, fatigue)   - prior radial cuff with poor correlation with in-office readings   - subsequently obtained brachial cuff which correlates better today with in-office readings   - 9/2022: [place] echo - no LVH    - reviewed home readings, more consistently in 120s-140s      reduced bolus manipulation, mild anterior sulcus residue/Decreased anterior-posterior movement of the bolus reduced bolus manipulation No

## 2024-12-10 ENCOUNTER — OUTPATIENT (OUTPATIENT)
Dept: OUTPATIENT SERVICES | Facility: HOSPITAL | Age: 59
LOS: 1 days | Discharge: HOME | End: 2024-12-10

## 2024-12-10 DIAGNOSIS — Z98.890 OTHER SPECIFIED POSTPROCEDURAL STATES: Chronic | ICD-10-CM

## 2024-12-10 DIAGNOSIS — C76.0 MALIGNANT NEOPLASM OF HEAD, FACE AND NECK: ICD-10-CM

## 2024-12-17 ENCOUNTER — RESULT REVIEW (OUTPATIENT)
Age: 59
End: 2024-12-17

## 2024-12-17 ENCOUNTER — LABORATORY RESULT (OUTPATIENT)
Age: 59
End: 2024-12-17

## 2024-12-17 ENCOUNTER — APPOINTMENT (OUTPATIENT)
Dept: HEMATOLOGY ONCOLOGY | Facility: CLINIC | Age: 59
End: 2024-12-17
Payer: MEDICARE

## 2024-12-17 VITALS
OXYGEN SATURATION: 96 % | BODY MASS INDEX: 22.98 KG/M2 | WEIGHT: 125.66 LBS | HEART RATE: 104 BPM | DIASTOLIC BLOOD PRESSURE: 83 MMHG | TEMPERATURE: 98.1 F | SYSTOLIC BLOOD PRESSURE: 146 MMHG | RESPIRATION RATE: 15 BRPM

## 2024-12-17 DIAGNOSIS — C06.9 MALIGNANT NEOPLASM OF MOUTH, UNSPECIFIED: ICD-10-CM

## 2024-12-17 DIAGNOSIS — C73 MALIGNANT NEOPLASM OF THYROID GLAND: ICD-10-CM

## 2024-12-17 DIAGNOSIS — I63.9 CEREBRAL INFARCTION, UNSPECIFIED: ICD-10-CM

## 2024-12-17 LAB
BASOPHILS # BLD AUTO: 0.04 K/UL — SIGNIFICANT CHANGE UP (ref 0–0.2)
BASOPHILS NFR BLD AUTO: 0.6 % — SIGNIFICANT CHANGE UP (ref 0–2)
EOSINOPHIL # BLD AUTO: 0.17 K/UL — SIGNIFICANT CHANGE UP (ref 0–0.5)
EOSINOPHIL NFR BLD AUTO: 2.6 % — SIGNIFICANT CHANGE UP (ref 0–6)
HCT VFR BLD CALC: 36.6 % — SIGNIFICANT CHANGE UP (ref 34.5–45)
HGB BLD-MCNC: 12.8 G/DL — SIGNIFICANT CHANGE UP (ref 11.5–15.5)
IMM GRANULOCYTES NFR BLD AUTO: 0.6 % — SIGNIFICANT CHANGE UP (ref 0–0.9)
LYMPHOCYTES # BLD AUTO: 1.29 K/UL — SIGNIFICANT CHANGE UP (ref 1–3.3)
LYMPHOCYTES # BLD AUTO: 19.4 % — SIGNIFICANT CHANGE UP (ref 13–44)
MCHC RBC-ENTMCNC: 29.2 PG — SIGNIFICANT CHANGE UP (ref 27–34)
MCHC RBC-ENTMCNC: 35 G/DL — SIGNIFICANT CHANGE UP (ref 32–36)
MCV RBC AUTO: 83.6 FL — SIGNIFICANT CHANGE UP (ref 80–100)
MONOCYTES # BLD AUTO: 0.33 K/UL — SIGNIFICANT CHANGE UP (ref 0–0.9)
MONOCYTES NFR BLD AUTO: 5 % — SIGNIFICANT CHANGE UP (ref 2–14)
NEUTROPHILS # BLD AUTO: 4.77 K/UL — SIGNIFICANT CHANGE UP (ref 1.8–7.4)
NEUTROPHILS NFR BLD AUTO: 71.8 % — SIGNIFICANT CHANGE UP (ref 43–77)
NRBC # BLD: 0 /100 WBCS — SIGNIFICANT CHANGE UP (ref 0–0)
NRBC BLD-RTO: 0 /100 WBCS — SIGNIFICANT CHANGE UP (ref 0–0)
PLATELET # BLD AUTO: 203 K/UL — SIGNIFICANT CHANGE UP (ref 150–400)
RBC # BLD: 4.38 M/UL — SIGNIFICANT CHANGE UP (ref 3.8–5.2)
RBC # FLD: 13.8 % — SIGNIFICANT CHANGE UP (ref 10.3–14.5)
WBC # BLD: 6.64 K/UL — SIGNIFICANT CHANGE UP (ref 3.8–10.5)
WBC # FLD AUTO: 6.64 K/UL — SIGNIFICANT CHANGE UP (ref 3.8–10.5)

## 2024-12-17 PROCEDURE — G2211 COMPLEX E/M VISIT ADD ON: CPT

## 2024-12-17 PROCEDURE — 99213 OFFICE O/P EST LOW 20 MIN: CPT

## 2024-12-18 LAB
ALBUMIN SERPL ELPH-MCNC: 4.8 G/DL
ALP BLD-CCNC: 91 U/L
ALT SERPL-CCNC: 23 U/L
ANION GAP SERPL CALC-SCNC: 15 MMOL/L
AST SERPL-CCNC: 20 U/L
BILIRUB SERPL-MCNC: 0.9 MG/DL
BUN SERPL-MCNC: 11 MG/DL
CALCIUM SERPL-MCNC: 9.4 MG/DL
CHLORIDE SERPL-SCNC: 101 MMOL/L
CO2 SERPL-SCNC: 25 MMOL/L
CREAT SERPL-MCNC: 0.81 MG/DL
EGFR: 83 ML/MIN/1.73M2
GLUCOSE SERPL-MCNC: 159 MG/DL
MAGNESIUM SERPL-MCNC: 2.3 MG/DL
POTASSIUM SERPL-SCNC: 4.2 MMOL/L
PROT SERPL-MCNC: 7.9 G/DL
SODIUM SERPL-SCNC: 141 MMOL/L
THYROGLOB AB SERPL-ACNC: 15.9 IU/ML
THYROGLOB SERPL-MCNC: <0.1 NG/ML
TSH SERPL-ACNC: 0.9 UIU/ML

## 2025-01-23 ENCOUNTER — APPOINTMENT (OUTPATIENT)
Dept: OTOLARYNGOLOGY | Facility: CLINIC | Age: 60
End: 2025-01-23
Payer: MEDICARE

## 2025-01-23 VITALS
DIASTOLIC BLOOD PRESSURE: 89 MMHG | SYSTOLIC BLOOD PRESSURE: 153 MMHG | BODY MASS INDEX: 23.12 KG/M2 | WEIGHT: 125.66 LBS | HEIGHT: 62 IN

## 2025-01-23 DIAGNOSIS — C02.9 MALIGNANT NEOPLASM OF TONGUE, UNSPECIFIED: ICD-10-CM

## 2025-01-23 PROCEDURE — 31575 DIAGNOSTIC LARYNGOSCOPY: CPT

## 2025-01-23 PROCEDURE — 99212 OFFICE O/P EST SF 10 MIN: CPT | Mod: 25

## 2025-01-27 ENCOUNTER — OUTPATIENT (OUTPATIENT)
Dept: OUTPATIENT SERVICES | Facility: HOSPITAL | Age: 60
LOS: 1 days | End: 2025-01-27
Payer: COMMERCIAL

## 2025-01-27 ENCOUNTER — APPOINTMENT (OUTPATIENT)
Dept: ULTRASOUND IMAGING | Facility: IMAGING CENTER | Age: 60
End: 2025-01-27

## 2025-01-27 DIAGNOSIS — Z98.890 OTHER SPECIFIED POSTPROCEDURAL STATES: Chronic | ICD-10-CM

## 2025-01-27 DIAGNOSIS — C73 MALIGNANT NEOPLASM OF THYROID GLAND: ICD-10-CM

## 2025-01-27 DIAGNOSIS — C02.9 MALIGNANT NEOPLASM OF TONGUE, UNSPECIFIED: ICD-10-CM

## 2025-01-27 PROCEDURE — 93880 EXTRACRANIAL BILAT STUDY: CPT

## 2025-01-27 PROCEDURE — 93880 EXTRACRANIAL BILAT STUDY: CPT | Mod: 26

## 2025-06-02 ENCOUNTER — APPOINTMENT (OUTPATIENT)
Dept: CT IMAGING | Facility: IMAGING CENTER | Age: 60
End: 2025-06-02
Payer: MEDICARE

## 2025-06-02 ENCOUNTER — OUTPATIENT (OUTPATIENT)
Dept: OUTPATIENT SERVICES | Facility: HOSPITAL | Age: 60
LOS: 1 days | End: 2025-06-02
Payer: COMMERCIAL

## 2025-06-02 DIAGNOSIS — Z98.890 OTHER SPECIFIED POSTPROCEDURAL STATES: Chronic | ICD-10-CM

## 2025-06-02 DIAGNOSIS — C02.9 MALIGNANT NEOPLASM OF TONGUE, UNSPECIFIED: ICD-10-CM

## 2025-06-02 PROCEDURE — 70491 CT SOFT TISSUE NECK W/DYE: CPT

## 2025-06-02 PROCEDURE — 70491 CT SOFT TISSUE NECK W/DYE: CPT | Mod: 26

## 2025-06-02 PROCEDURE — 71260 CT THORAX DX C+: CPT

## 2025-06-02 PROCEDURE — 71260 CT THORAX DX C+: CPT | Mod: 26

## 2025-06-13 ENCOUNTER — OUTPATIENT (OUTPATIENT)
Dept: OUTPATIENT SERVICES | Facility: HOSPITAL | Age: 60
LOS: 1 days | Discharge: HOME | End: 2025-06-13

## 2025-06-13 DIAGNOSIS — Z98.890 OTHER SPECIFIED POSTPROCEDURAL STATES: Chronic | ICD-10-CM

## 2025-06-13 DIAGNOSIS — C76.0 MALIGNANT NEOPLASM OF HEAD, FACE AND NECK: ICD-10-CM

## 2025-06-17 ENCOUNTER — RESULT REVIEW (OUTPATIENT)
Age: 60
End: 2025-06-17

## 2025-06-17 ENCOUNTER — APPOINTMENT (OUTPATIENT)
Dept: HEMATOLOGY ONCOLOGY | Facility: CLINIC | Age: 60
End: 2025-06-17
Payer: MEDICARE

## 2025-06-17 VITALS
HEIGHT: 62 IN | TEMPERATURE: 98.1 F | BODY MASS INDEX: 23.04 KG/M2 | OXYGEN SATURATION: 98 % | DIASTOLIC BLOOD PRESSURE: 80 MMHG | RESPIRATION RATE: 15 BRPM | WEIGHT: 125.22 LBS | SYSTOLIC BLOOD PRESSURE: 120 MMHG | HEART RATE: 88 BPM

## 2025-06-17 LAB
BASOPHILS # BLD AUTO: 0.05 K/UL — SIGNIFICANT CHANGE UP (ref 0–0.2)
BASOPHILS NFR BLD AUTO: 0.8 % — SIGNIFICANT CHANGE UP (ref 0–2)
EOSINOPHIL # BLD AUTO: 0.24 K/UL — SIGNIFICANT CHANGE UP (ref 0–0.5)
EOSINOPHIL NFR BLD AUTO: 3.6 % — SIGNIFICANT CHANGE UP (ref 0–6)
HCT VFR BLD CALC: 36.2 % — SIGNIFICANT CHANGE UP (ref 34.5–45)
HGB BLD-MCNC: 12.4 G/DL — SIGNIFICANT CHANGE UP (ref 11.5–15.5)
IMM GRANULOCYTES NFR BLD AUTO: 0.3 % — SIGNIFICANT CHANGE UP (ref 0–0.9)
LYMPHOCYTES # BLD AUTO: 1.45 K/UL — SIGNIFICANT CHANGE UP (ref 1–3.3)
LYMPHOCYTES # BLD AUTO: 21.8 % — SIGNIFICANT CHANGE UP (ref 13–44)
MCHC RBC-ENTMCNC: 28.8 PG — SIGNIFICANT CHANGE UP (ref 27–34)
MCHC RBC-ENTMCNC: 34.3 G/DL — SIGNIFICANT CHANGE UP (ref 32–36)
MCV RBC AUTO: 84 FL — SIGNIFICANT CHANGE UP (ref 80–100)
MONOCYTES # BLD AUTO: 0.36 K/UL — SIGNIFICANT CHANGE UP (ref 0–0.9)
MONOCYTES NFR BLD AUTO: 5.4 % — SIGNIFICANT CHANGE UP (ref 2–14)
NEUTROPHILS # BLD AUTO: 4.53 K/UL — SIGNIFICANT CHANGE UP (ref 1.8–7.4)
NEUTROPHILS NFR BLD AUTO: 68.1 % — SIGNIFICANT CHANGE UP (ref 43–77)
NRBC BLD AUTO-RTO: 0 /100 WBCS — SIGNIFICANT CHANGE UP (ref 0–0)
PLATELET # BLD AUTO: 199 K/UL — SIGNIFICANT CHANGE UP (ref 150–400)
RBC # BLD: 4.31 M/UL — SIGNIFICANT CHANGE UP (ref 3.8–5.2)
RBC # FLD: 14.3 % — SIGNIFICANT CHANGE UP (ref 10.3–14.5)
WBC # BLD: 6.65 K/UL — SIGNIFICANT CHANGE UP (ref 3.8–10.5)
WBC # FLD AUTO: 6.65 K/UL — SIGNIFICANT CHANGE UP (ref 3.8–10.5)

## 2025-06-17 PROCEDURE — 99213 OFFICE O/P EST LOW 20 MIN: CPT

## 2025-06-17 PROCEDURE — G2211 COMPLEX E/M VISIT ADD ON: CPT

## 2025-06-18 ENCOUNTER — NON-APPOINTMENT (OUTPATIENT)
Age: 60
End: 2025-06-18

## 2025-06-18 LAB
ALBUMIN SERPL ELPH-MCNC: 4.9 G/DL
ALP BLD-CCNC: 84 U/L
ALT SERPL-CCNC: 22 U/L
ANION GAP SERPL CALC-SCNC: 17 MMOL/L
AST SERPL-CCNC: 26 U/L
BILIRUB SERPL-MCNC: 0.9 MG/DL
BUN SERPL-MCNC: 12 MG/DL
CALCIUM SERPL-MCNC: 9.6 MG/DL
CHLORIDE SERPL-SCNC: 102 MMOL/L
CO2 SERPL-SCNC: 24 MMOL/L
CREAT SERPL-MCNC: 0.84 MG/DL
EGFRCR SERPLBLD CKD-EPI 2021: 79 ML/MIN/1.73M2
GLUCOSE SERPL-MCNC: 139 MG/DL
MAGNESIUM SERPL-MCNC: 2.2 MG/DL
POTASSIUM SERPL-SCNC: 4.1 MMOL/L
PROT SERPL-MCNC: 7.8 G/DL
SODIUM SERPL-SCNC: 143 MMOL/L
THYROGLOB AB SERPL-ACNC: 16.6 IU/ML
THYROGLOB SERPL-MCNC: <0.1 NG/ML
TSH SERPL-ACNC: 0.81 UIU/ML

## 2025-07-03 ENCOUNTER — APPOINTMENT (OUTPATIENT)
Dept: OTOLARYNGOLOGY | Facility: CLINIC | Age: 60
End: 2025-07-03
Payer: MEDICARE

## 2025-07-03 VITALS — WEIGHT: 125 LBS | BODY MASS INDEX: 23 KG/M2 | HEIGHT: 62 IN

## 2025-07-03 PROCEDURE — 31575 DIAGNOSTIC LARYNGOSCOPY: CPT

## 2025-07-03 PROCEDURE — 99212 OFFICE O/P EST SF 10 MIN: CPT | Mod: 25

## 2025-07-22 ENCOUNTER — APPOINTMENT (OUTPATIENT)
Dept: OTOLARYNGOLOGY | Facility: CLINIC | Age: 60
End: 2025-07-22